# Patient Record
Sex: MALE | Race: WHITE | NOT HISPANIC OR LATINO | Employment: OTHER | URBAN - METROPOLITAN AREA
[De-identification: names, ages, dates, MRNs, and addresses within clinical notes are randomized per-mention and may not be internally consistent; named-entity substitution may affect disease eponyms.]

---

## 2017-01-09 DIAGNOSIS — E11.9 DIABETES MELLITUS WITH NO COMPLICATION (HCC): ICD-10-CM

## 2017-01-09 DIAGNOSIS — I15.8 OTHER SECONDARY HYPERTENSION: ICD-10-CM

## 2017-01-09 DIAGNOSIS — E78.49 OTHER HYPERLIPIDEMIA: Primary | ICD-10-CM

## 2017-01-10 LAB
ALBUMIN SERPL-MCNC: 4.5 G/DL (ref 3.5–4.8)
ALBUMIN/GLOB SERPL: 1.7 {RATIO} (ref 1.1–2.5)
ALP SERPL-CCNC: 64 IU/L (ref 39–117)
ALT SERPL-CCNC: 21 IU/L (ref 0–44)
AST SERPL-CCNC: 20 IU/L (ref 0–40)
BASOPHILS # BLD AUTO: 0.1 X10E3/UL (ref 0–0.2)
BASOPHILS NFR BLD AUTO: 1 %
BILIRUB SERPL-MCNC: 0.5 MG/DL (ref 0–1.2)
BUN SERPL-MCNC: 23 MG/DL (ref 8–27)
BUN/CREAT SERPL: 19 (ref 10–22)
CALCIUM SERPL-MCNC: 9.7 MG/DL (ref 8.6–10.2)
CHLORIDE SERPL-SCNC: 106 MMOL/L (ref 96–106)
CHOLEST SERPL-MCNC: 143 MG/DL (ref 100–199)
CO2 SERPL-SCNC: 23 MMOL/L (ref 18–29)
CREAT SERPL-MCNC: 1.24 MG/DL (ref 0.76–1.27)
EOSINOPHIL # BLD AUTO: 0.3 X10E3/UL (ref 0–0.4)
EOSINOPHIL NFR BLD AUTO: 4 %
ERYTHROCYTE [DISTWIDTH] IN BLOOD BY AUTOMATED COUNT: 14.5 % (ref 12.3–15.4)
GLOBULIN SER CALC-MCNC: 2.6 G/DL (ref 1.5–4.5)
GLUCOSE SERPL-MCNC: 117 MG/DL (ref 65–99)
HBA1C MFR BLD: 7 % (ref 4.8–5.6)
HCT VFR BLD AUTO: 37.1 % (ref 37.5–51)
HDLC SERPL-MCNC: 31 MG/DL
HGB BLD-MCNC: 12.6 G/DL (ref 12.6–17.7)
IMM GRANULOCYTES # BLD: 0 X10E3/UL (ref 0–0.1)
IMM GRANULOCYTES NFR BLD: 0 %
LDLC SERPL CALC-MCNC: 67 MG/DL (ref 0–99)
LDLC/HDLC SERPL: 2.2 RATIO UNITS (ref 0–3.6)
LYMPHOCYTES # BLD AUTO: 1.3 X10E3/UL (ref 0.7–3.1)
LYMPHOCYTES NFR BLD AUTO: 19 %
MCH RBC QN AUTO: 29 PG (ref 26.6–33)
MCHC RBC AUTO-ENTMCNC: 34 G/DL (ref 31.5–35.7)
MCV RBC AUTO: 85 FL (ref 79–97)
MONOCYTES # BLD AUTO: 0.6 X10E3/UL (ref 0.1–0.9)
MONOCYTES NFR BLD AUTO: 8 %
NEUTROPHILS # BLD AUTO: 4.7 X10E3/UL (ref 1.4–7)
NEUTROPHILS NFR BLD AUTO: 68 %
PLATELET # BLD AUTO: 169 X10E3/UL (ref 150–379)
POTASSIUM SERPL-SCNC: 4.8 MMOL/L (ref 3.5–5.2)
PROT SERPL-MCNC: 7.1 G/DL (ref 6–8.5)
RBC # BLD AUTO: 4.35 X10E6/UL (ref 4.14–5.8)
SODIUM SERPL-SCNC: 144 MMOL/L (ref 134–144)
TRIGL SERPL-MCNC: 225 MG/DL (ref 0–149)
VLDLC SERPL CALC-MCNC: 45 MG/DL (ref 5–40)
WBC # BLD AUTO: 6.9 X10E3/UL (ref 3.4–10.8)

## 2017-01-11 ENCOUNTER — OFFICE VISIT (OUTPATIENT)
Dept: ONCOLOGY | Facility: CLINIC | Age: 72
End: 2017-01-11

## 2017-01-11 ENCOUNTER — HOSPITAL ENCOUNTER (OUTPATIENT)
Dept: GENERAL RADIOLOGY | Facility: HOSPITAL | Age: 72
Discharge: HOME | End: 2017-01-11
Attending: INTERNAL MEDICINE | Admitting: INTERNAL MEDICINE

## 2017-01-11 ENCOUNTER — LAB (OUTPATIENT)
Dept: ONCOLOGY | Facility: HOSPITAL | Age: 72
End: 2017-01-11

## 2017-01-11 VITALS
SYSTOLIC BLOOD PRESSURE: 157 MMHG | RESPIRATION RATE: 16 BRPM | TEMPERATURE: 98.2 F | OXYGEN SATURATION: 96 % | HEIGHT: 72 IN | BODY MASS INDEX: 34.96 KG/M2 | HEART RATE: 77 BPM | WEIGHT: 258.1 LBS | DIASTOLIC BLOOD PRESSURE: 93 MMHG

## 2017-01-11 DIAGNOSIS — Z85.528 HISTORY OF RENAL CELL CARCINOMA: ICD-10-CM

## 2017-01-11 DIAGNOSIS — Z85.528 HISTORY OF RENAL CELL CARCINOMA: Primary | ICD-10-CM

## 2017-01-11 LAB
ALBUMIN SERPL-MCNC: 4 G/DL (ref 3.5–5.2)
ALBUMIN/GLOB SERPL: 1.3 G/DL
ALP SERPL-CCNC: 61 U/L (ref 39–117)
ALT SERPL W P-5'-P-CCNC: 19 U/L (ref 1–41)
ANION GAP SERPL CALCULATED.3IONS-SCNC: 13.3 MMOL/L
AST SERPL-CCNC: 16 U/L (ref 1–40)
BASOPHILS # BLD AUTO: 0.06 10*3/MM3 (ref 0–0.2)
BASOPHILS NFR BLD AUTO: 0.7 % (ref 0–1.5)
BILIRUB SERPL-MCNC: 0.4 MG/DL (ref 0.1–1.2)
BUN BLD-MCNC: 27 MG/DL (ref 8–23)
BUN/CREAT SERPL: 22 (ref 7–25)
CALCIUM SPEC-SCNC: 9.5 MG/DL (ref 8.6–10.5)
CHLORIDE SERPL-SCNC: 105 MMOL/L (ref 98–107)
CO2 SERPL-SCNC: 22.7 MMOL/L (ref 22–29)
CREAT BLD-MCNC: 1.23 MG/DL (ref 0.76–1.27)
DEPRECATED RDW RBC AUTO: 42.6 FL (ref 37–54)
EOSINOPHIL # BLD AUTO: 0.3 10*3/MM3 (ref 0–0.7)
EOSINOPHIL NFR BLD AUTO: 3.7 % (ref 0.3–6.2)
ERYTHROCYTE [DISTWIDTH] IN BLOOD BY AUTOMATED COUNT: 13.8 % (ref 11.5–14.5)
GFR SERPL CREATININE-BSD FRML MDRD: 58 ML/MIN/1.73
GLOBULIN UR ELPH-MCNC: 3 GM/DL
GLUCOSE BLD-MCNC: 193 MG/DL (ref 65–99)
HCT VFR BLD AUTO: 35.9 % (ref 40.4–52.2)
HGB BLD-MCNC: 12.4 G/DL (ref 13.7–17.6)
IMM GRANULOCYTES # BLD: 0.04 10*3/MM3 (ref 0–0.03)
IMM GRANULOCYTES NFR BLD: 0.5 % (ref 0–0.5)
LYMPHOCYTES # BLD AUTO: 1.64 10*3/MM3 (ref 0.9–4.8)
LYMPHOCYTES NFR BLD AUTO: 20.2 % (ref 19.6–45.3)
MCH RBC QN AUTO: 29.6 PG (ref 27–32.7)
MCHC RBC AUTO-ENTMCNC: 34.5 G/DL (ref 32.6–36.4)
MCV RBC AUTO: 85.7 FL (ref 79.8–96.2)
MONOCYTES # BLD AUTO: 0.46 10*3/MM3 (ref 0.2–1.2)
MONOCYTES NFR BLD AUTO: 5.7 % (ref 5–12)
NEUTROPHILS # BLD AUTO: 5.63 10*3/MM3 (ref 1.9–8.1)
NEUTROPHILS NFR BLD AUTO: 69.2 % (ref 42.7–76)
NRBC BLD MANUAL-RTO: 0 /100 WBC (ref 0–0)
PLATELET # BLD AUTO: 164 10*3/MM3 (ref 140–500)
PMV BLD AUTO: 9.6 FL (ref 6–12)
POTASSIUM BLD-SCNC: 4.4 MMOL/L (ref 3.5–5.2)
PROT SERPL-MCNC: 7 G/DL (ref 6–8.5)
RBC # BLD AUTO: 4.19 10*6/MM3 (ref 4.6–6)
SODIUM BLD-SCNC: 141 MMOL/L (ref 136–145)
WBC NRBC COR # BLD: 8.13 10*3/MM3 (ref 4.5–10.7)

## 2017-01-11 PROCEDURE — 80053 COMPREHEN METABOLIC PANEL: CPT | Performed by: INTERNAL MEDICINE

## 2017-01-11 PROCEDURE — 71020 HC CHEST PA AND LATERAL: CPT

## 2017-01-11 PROCEDURE — 36415 COLL VENOUS BLD VENIPUNCTURE: CPT

## 2017-01-11 PROCEDURE — 99213 OFFICE O/P EST LOW 20 MIN: CPT | Performed by: INTERNAL MEDICINE

## 2017-01-11 PROCEDURE — 85025 COMPLETE CBC W/AUTO DIFF WBC: CPT | Performed by: INTERNAL MEDICINE

## 2017-01-11 NOTE — PROGRESS NOTES
[facility] OUTPATIENT FOLLOW UP CLINIC VISIT    REASON FOR FOLLOW-UP:    1.  History of metastatic clear cell renal cell carcinoma.  All disease has been resected.  Currently no evidence of disease.    HISTORY OF PRESENT ILLNESS:  Micah Krishna is a 71 y.o. male who returns today for follow up of the above issue.  He has worsening right knee pain from osteoarthritis but is othewise doing well.  He denies any respiratory symptoms but did recently recover from what is likely a viral upper respiratory infection.        PAST MEDICAL, SURGICAL, FAMILY, AND SOCIAL HISTORIES were reviewed with the patient and in the electronic medical record, and were updated if indicated.    ALLERGIES:  Allergies   Allergen Reactions   • Shellfish Allergy        MEDICATIONS:  The medication list has been reviewed with the patient by the medical assistant, and the list has been updated in the electronic medical record, which I reviewed.  Medication dosages and frequencies were confirmed to be accurate.    REVIEW OF SYSTEMS:  PAIN:  See Vital Signs below.  GENERAL:  No fevers, chills, night sweats, or unintended weight loss.  SKIN:  No rashes or non-healing lesions.  HEME/LYMPH:  No abnormal bleeding.  No palpable lymphadenopathy.  EYES:  No vision changes or diplopia.  ENT:  No sore throat or difficulty swallowing.  RESPIRATORY:  No cough, shortness of breath, hemoptysis, or wheezing.  CARDIOVASCULAR:  No chest pain, palpitations, orthopnea, or dyspnea on exertion.  GASTROINTESTINAL:  No abdominal pain, nausea, vomiting, constipation, diarrhea, melena, or hematochezia.  GENITOURINARY:  No dysuria or hematuria.  MUSCULOSKELETAL:  Right knee pain secondary to osteoarthritis  NEUROLOGIC:  No dizziness, loss of consciousness, or seizures.  PSYCHIATRIC:  No depression, anxiety, or mood changes.    Vitals:    01/11/17 1351   BP: 157/93   Pulse: 77   Resp: 16   Temp: 98.2 °F (36.8 °C)   TempSrc: Oral   SpO2: 96%   Weight: 258 lb  "1.6 oz (117 kg)   Height: 72\" (182.9 cm)   PainSc: 4  Comment: Rt knee pain x4   PainLoc: Knee       PHYSICAL EXAMINATION:  GENERAL:  Well-developed well-nourished male; awake, alert and oriented, in no acute distress.  SKIN:  Warm and dry, without rashes, purpura, or petechiae.  HEAD:  Normocephalic, atraumatic.  EYES:  Pupils equal, round and reactive to light.  Extraocular movements intact.  Conjunctivae normal.  EARS:  Hearing intact.  NOSE:  Septum midline.  No excoriations or nasal discharge.  MOUTH:  No stomatitis or ulcers.  Lips are normal.  THROAT:  Oropharynx without lesions or exudates.  NECK:  Supple with good range of motion; no thyromegaly or masses; no JVD or bruits.  LYMPHATICS:  No cervical, supraclavicular, axillary, or inguinal lymphadenopathy.  CHEST:  Lungs are clear to auscultation bilaterally.  No wheezes, rales, or rhonchi.  HEART:  Regular rate; normal rhythm.  No murmurs, gallops or rubs.  ABDOMEN:  Soft, non-tender, non-distended.  Normal active bowel sounds.  No organomegaly.  EXTREMITIES:  No clubbing, cyanosis, or edema.  NEUROLOGICAL:  No focal neurologic deficits.    DIAGNOSTIC DATA:  Lab Results   Component Value Date    WBC 8.13 01/11/2017    HGB 12.4 (L) 01/11/2017    HCT 35.9 (L) 01/11/2017    MCV 85.7 01/11/2017     01/11/2017       IMAGING:  PA and lateral chest x-ray images from today were personally reviewed.  Radiology interpretation is pending.  Lungs appear clear to me.  I do not notice any change from his prior examination in December 2015    ASSESSMENT:  This is a 71 y.o. male with:  1.  Mild normocytic anemia: His hemoglobin is stable.  2.  Adrenal insufficiency following bilateral adrenalectomy currently on replacement hormone therapy.  3.  Metastatic renal cell carcinoma.  He had a left nephrectomy and adrenalectomy in 2000 and then a right adrenalectomy for metastatic disease in 2012.  Currently no evidence of disease.    PLAN:  1.  Return in 6 months for " follow-up with labs and CT imaging of the chest, abdomen, and pelvis one week prior to that.

## 2017-01-11 NOTE — LETTER
January 11, 2017     Ab Carr MD  [address]  [address]  [facility] [address]    Patient: Micah Krishna   YOB: 1945   Date of Visit: 1/11/2017       Dear Dr. Bruno MD:    Micah Krishna was in my office today. Below is a copy of my note.    If you have questions, please do not hesitate to call me. I look forward to following Micah along with you.         Sincerely,        Delmar Cat MD        CC: No Recipients    [facility] OUTPATIENT FOLLOW UP CLINIC VISIT    REASON FOR FOLLOW-UP:    1.  History of metastatic clear cell renal cell carcinoma.  All disease has been resected.  Currently no evidence of disease.    HISTORY OF PRESENT ILLNESS:  Micah Krishna is a 71 y.o. male who returns today for follow up of the above issue.  He has worsening right knee pain from osteoarthritis but is othewise doing well.  He denies any respiratory symptoms but did recently recover from what is likely a viral upper respiratory infection.        PAST MEDICAL, SURGICAL, FAMILY, AND SOCIAL HISTORIES were reviewed with the patient and in the electronic medical record, and were updated if indicated.    ALLERGIES:  Allergies   Allergen Reactions   • Shellfish Allergy        MEDICATIONS:  The medication list has been reviewed with the patient by the medical assistant, and the list has been updated in the electronic medical record, which I reviewed.  Medication dosages and frequencies were confirmed to be accurate.    REVIEW OF SYSTEMS:  PAIN:  See Vital Signs below.  GENERAL:  No fevers, chills, night sweats, or unintended weight loss.  SKIN:  No rashes or non-healing lesions.  HEME/LYMPH:  No abnormal bleeding.  No palpable lymphadenopathy.  EYES:  No vision changes or diplopia.  ENT:  No sore throat or difficulty swallowing.  RESPIRATORY:  No cough, shortness of breath, hemoptysis, or wheezing.  CARDIOVASCULAR:  No chest pain, palpitations, orthopnea, or dyspnea on  "exertion.  GASTROINTESTINAL:  No abdominal pain, nausea, vomiting, constipation, diarrhea, melena, or hematochezia.  GENITOURINARY:  No dysuria or hematuria.  MUSCULOSKELETAL:  Right knee pain secondary to osteoarthritis  NEUROLOGIC:  No dizziness, loss of consciousness, or seizures.  PSYCHIATRIC:  No depression, anxiety, or mood changes.    Vitals:    01/11/17 1351   BP: 157/93   Pulse: 77   Resp: 16   Temp: 98.2 °F (36.8 °C)   TempSrc: Oral   SpO2: 96%   Weight: 258 lb 1.6 oz (117 kg)   Height: 72\" (182.9 cm)   PainSc: 4  Comment: Rt knee pain x4   PainLoc: Knee       PHYSICAL EXAMINATION:  GENERAL:  Well-developed well-nourished male; awake, alert and oriented, in no acute distress.  SKIN:  Warm and dry, without rashes, purpura, or petechiae.  HEAD:  Normocephalic, atraumatic.  EYES:  Pupils equal, round and reactive to light.  Extraocular movements intact.  Conjunctivae normal.  EARS:  Hearing intact.  NOSE:  Septum midline.  No excoriations or nasal discharge.  MOUTH:  No stomatitis or ulcers.  Lips are normal.  THROAT:  Oropharynx without lesions or exudates.  NECK:  Supple with good range of motion; no thyromegaly or masses; no JVD or bruits.  LYMPHATICS:  No cervical, supraclavicular, axillary, or inguinal lymphadenopathy.  CHEST:  Lungs are clear to auscultation bilaterally.  No wheezes, rales, or rhonchi.  HEART:  Regular rate; normal rhythm.  No murmurs, gallops or rubs.  ABDOMEN:  Soft, non-tender, non-distended.  Normal active bowel sounds.  No organomegaly.  EXTREMITIES:  No clubbing, cyanosis, or edema.  NEUROLOGICAL:  No focal neurologic deficits.    DIAGNOSTIC DATA:  Lab Results   Component Value Date    WBC 8.13 01/11/2017    HGB 12.4 (L) 01/11/2017    HCT 35.9 (L) 01/11/2017    MCV 85.7 01/11/2017     01/11/2017       IMAGING:  PA and lateral chest x-ray images from today were personally reviewed.  Radiology interpretation is pending.  Lungs appear clear to me.  I do not notice any change " from his prior examination in December 2015    ASSESSMENT:  This is a 71 y.o. male with:  1.  Mild normocytic anemia: His hemoglobin is stable.  2.  Adrenal insufficiency following bilateral adrenalectomy currently on replacement hormone therapy.  3.  Metastatic renal cell carcinoma.  He had a left nephrectomy and adrenalectomy in 2000 and then a right adrenalectomy for metastatic disease in 2012.  Currently no evidence of disease.    PLAN:  1.  Return in 6 months for follow-up with labs and CT imaging of the chest, abdomen, and pelvis one week prior to that.

## 2017-01-13 ENCOUNTER — OFFICE VISIT (OUTPATIENT)
Dept: FAMILY MEDICINE CLINIC | Facility: CLINIC | Age: 72
End: 2017-01-13

## 2017-01-13 VITALS
SYSTOLIC BLOOD PRESSURE: 108 MMHG | HEART RATE: 71 BPM | OXYGEN SATURATION: 97 % | WEIGHT: 255 LBS | DIASTOLIC BLOOD PRESSURE: 68 MMHG | RESPIRATION RATE: 20 BRPM | HEIGHT: 73 IN | TEMPERATURE: 98.6 F | BODY MASS INDEX: 33.8 KG/M2

## 2017-01-13 DIAGNOSIS — Z79.4 DIABETES MELLITUS DUE TO UNDERLYING CONDITION WITH HYPERGLYCEMIA, WITH LONG-TERM CURRENT USE OF INSULIN (HCC): ICD-10-CM

## 2017-01-13 DIAGNOSIS — M25.561 CHRONIC PAIN OF RIGHT KNEE: ICD-10-CM

## 2017-01-13 DIAGNOSIS — G89.29 CHRONIC PAIN OF RIGHT KNEE: ICD-10-CM

## 2017-01-13 DIAGNOSIS — E27.40 ADRENAL INSUFFICIENCY (HCC): ICD-10-CM

## 2017-01-13 DIAGNOSIS — I10 ESSENTIAL HYPERTENSION: Primary | ICD-10-CM

## 2017-01-13 DIAGNOSIS — E08.65 DIABETES MELLITUS DUE TO UNDERLYING CONDITION WITH HYPERGLYCEMIA, WITH LONG-TERM CURRENT USE OF INSULIN (HCC): ICD-10-CM

## 2017-01-13 DIAGNOSIS — E78.2 MIXED HYPERLIPIDEMIA: ICD-10-CM

## 2017-01-13 PROCEDURE — 99214 OFFICE O/P EST MOD 30 MIN: CPT | Performed by: FAMILY MEDICINE

## 2017-01-13 PROCEDURE — 73560 X-RAY EXAM OF KNEE 1 OR 2: CPT | Performed by: FAMILY MEDICINE

## 2017-01-13 NOTE — MR AVS SNAPSHOT
Micah ASIYA CostaKrishna   1/13/2017 2:00 PM   Office Visit    Dept Phone:  [phone]   Encounter #:  [id number]    Provider:  Ab Carr MD   Department:  [facility] PRIMARY CARE                Your Full Care Plan              Your Updated Medication List          This list is accurate as of: 1/13/17  2:55 PM.  Always use your most recent med list.                allopurinol 300 MG tablet   Commonly known as:  ZYLOPRIM   TAKE 1 TABLET BY MOUTH ONCE A DAY       amLODIPine 10 MG tablet   Commonly known as:  NORVASC   TAKE 1 TABLET BY MOUTH ONCE A DAY       ASPIRIN PO       BD PEN NEEDLE TALI U/F 32G X 4 MM misc   Generic drug:  Insulin Pen Needle       CloNIDine 0.1 MG tablet   Commonly known as:  CATAPRES   TAKE 1 TABLET BY MOUTH TWICE A DAY       finasteride 5 MG tablet   Commonly known as:  PROSCAR   TAKE 1 TABLET BY MOUTH ONCE A DAY       fludrocortisone 0.1 MG tablet   TAKE 1 TABLET BY MOUTH EVERY OTHER DAY       hydrocortisone 20 MG tablet   Commonly known as:  CORTEF   TAKE 1 TABLET BY MOUTH EVERY MORNING AND 1/2 TABLET BY MOUTH EVERY NIGHT       isosorbide mononitrate 30 MG 24 hr tablet   Commonly known as:  IMDUR   TAKE 1 TABLET BY MOUTH TWICE A DAY       LANTUS SOLOSTAR 100 UNIT/ML injection pen   Generic drug:  Insulin Glargine   USE 30 UNITS SUBCUTANEOUSLY EVERY MORNING AND 30 UNITS SUBCUTANEOUSLY EVERY EVENING       losartan 100 MG tablet   Commonly known as:  COZAAR   TAKE 1 TABLET BY MOUTH ONCE A DAY       metoprolol succinate  MG 24 hr tablet   Commonly known as:  TOPROL-XL   TAKE 2 TABLETS BY MOUTH DAILY       nitroglycerin 0.4 MG/SPRAY spray   Commonly known as:  NITROLINGUAL   Place 1 spray under the tongue every 5 (five) minutes as needed for chest pain.       omeprazole 20 MG capsule   Commonly known as:  priLOSEC   TAKE 1 CAPSULE BY MOUTH DAILY       ONETOUCH VERIO test strip   Generic drug:  glucose blood       rosuvastatin 20 MG tablet      Commonly known as:  CRESTOR   Take 1 tablet by mouth daily.               We Performed the Following     Ambulatory Referral to Orthopedic Surgery     XR Knee 1 or 2 View Right       You Were Diagnosed With        Codes Comments    Essential hypertension    -  Primary ICD-10-CM: I10  ICD-9-CM: 401.9     Mixed hyperlipidemia     ICD-10-CM: E78.2  ICD-9-CM: 272.2     Diabetes mellitus due to underlying condition with hyperglycemia, with long-term current use of insulin     ICD-10-CM: E08.65, Z79.4  ICD-9-CM: 249.80, 790.29, V58.67     Adrenal insufficiency     ICD-10-CM: E27.40  ICD-9-CM: 255.41     Chronic pain of right knee     ICD-10-CM: M25.561, G89.29  ICD-9-CM: 719.46, 338.29       Instructions    This is a very nice 71-year-old who is here for follow-up.  I have given him copies of his lab work and will request a referral to orthopedics for his right knee.  I would like him to call if there is a problem.     Patient Instructions History      Upcoming Appointments     Visit Type Date Time Department    OFFICE VISIT 2017  2:00 PM [facility]    CT [facility] ABD PELVIS W CONTRAST 7/10/2017 11:30 AM [facility]    FOLLOW UP 2 UNIT 2017 11:20 AM [facility]    VITALS ONLY 2017 11:00 AM  [facility]      MyChart Signup     [facility] [employer] allows you to send messages to your doctor, view your test results, renew your prescriptions, schedule appointments, and more. To sign up, go to [employer] and click on the Sign Up Now link in the New User? box. Enter your [employer] Activation Code exactly as it appears below along with the last four digits of your Social Security Number and your Date of Birth () to complete the sign-up process. If you do not sign up before the expiration date, you must request a new code.    [employer] Activation Code: [email or web address]  Expires: 2017 10:12 AM    If you have questions, you can email [email or web address] or call  "[phone] to talk to our [employer] staff. Remember, [employer] is NOT to be used for urgent needs. For medical emergencies, dial 911.               Other Info from Your Visit           Your Appointments     Jul 10, 2017 11:30 AM EDT   CT shai abd pelvis w contrast with SHAI UCE CT 1   [facility] [place] [facility] ([place]    [address] [facility] [address] [address] [phone]           Patient may only have liquids 4 hrs prior to exam. Please arrive 1 hour prior to exam to drink barium contrast.            Jul 12, 2017 11:00 AM EDT   Vitals Only with VITALS ONLY [facility] ONC LAB (--)    [address]  [address]  [place] [id number] [phone]            Jul 12, 2017 11:20 AM EDT   FOLLOW UP with Delmar Cat MD   [facility] ([facility] [place])    [address]  [address] [place] [id number] [phone]              Allergies     Shellfish Allergy        Reason for Visit     Hypertension check up     Diabetes check up     Hyperlipidemia check up     Labs Only results from 1-11-17    Knee Pain right knee       Vital Signs     Blood Pressure Pulse Temperature Respirations Height Weight    108/68 71 98.6 °F (37 °C) 20 72.5\" (184.2 cm) 255 lb (116 kg)    Oxygen Saturation Body Mass Index Smoking Status             97% 34.11 kg/m2 Former Smoker         Problems and Diagnoses Noted     Adrenal insufficiency    Chronic pain of right knee    Diabetes mellitus due to underlying condition with hyperglycemia, with long-term current use of insulin    High blood pressure    Mixed hyperlipidemia      No Longer an Issue     Chest pain      Results     XR Knee 1 or 2 View Right               "

## 2017-01-13 NOTE — PATIENT INSTRUCTIONS
This is a very nice 71-year-old who is here for follow-up.  I have given him copies of his lab work and will request a referral to orthopedics for his right knee.  I would like him to call if there is a problem.

## 2017-01-13 NOTE — PROGRESS NOTES
"Subjective   Micah Krishna is a 71 y.o. male presenting with   Chief Complaint   Patient presents with   • Hypertension     check up    • Diabetes     check up    • Hyperlipidemia     check up    • Labs Only     results from 1-11-17   • Knee Pain     right knee         HPI Comments: 71-year-old  white male nonsmoker comes in today with the complaint that he has marked right knee pain with swelling.  He played football in high school and he had to have \"fluid drained off his knee\" when he was in high school.  He says periodically it has bothered him since, but in the last month it has gotten to where it really swells and hurts.  He does not have any fever or night sweats or weight loss.    He also is here for follow-up for his diabetes and high cholesterol and gout and high blood pressure.  His blood pressure is very well controlled and he had blood work done prior to this office visit.  I have made copies and given it to him which shows A1c of 7.0.    To further evaluate his complaint of chronic significant right knee pain I have requested an x-ray.  There are no recent ones to compare this to, but it shows osteoarthritis.  He did tell me that 2 years ago he saw an orthopedist for his knee and was told that he was not ready for knee replacement.  I suggested we get a consult again because he has significant discomfort and swelling.    Hypertension     Diabetes     Hyperlipidemia     Knee Pain    Associated symptoms include numbness (when he goes barefooted the bottoms of his feet go numb).        The following portions of the patient's history were reviewed and updated as appropriate: current medications, past family history, past medical history, past social history, past surgical history and problem list.    Review of Systems   Musculoskeletal: Positive for arthralgias and joint swelling (right knee).   Neurological: Positive for numbness (when he goes barefooted the bottoms of his feet go numb).   All " other systems reviewed and are negative.      Objective   Physical Exam   Constitutional: He is oriented to person, place, and time. He appears well-developed and well-nourished. No distress.   HENT:   Head: Normocephalic and atraumatic.   Mouth/Throat: Oropharynx is clear and moist. No oropharyngeal exudate.   Eyes: EOM are normal. Pupils are equal, round, and reactive to light. No scleral icterus.   Neck: Normal range of motion. Neck supple. No thyromegaly present.   Cardiovascular: Normal rate, regular rhythm and normal heart sounds.  Exam reveals no friction rub.    No murmur heard.  Pulmonary/Chest: Effort normal and breath sounds normal. No respiratory distress. He has no wheezes.   Musculoskeletal: He exhibits edema (oddest effusion of right knee) and tenderness (he has tenderness and crepitance to range of motion of the right knee with positive Partha). He exhibits no deformity.   Lymphadenopathy:     He has no cervical adenopathy.   Neurological: He is alert and oriented to person, place, and time. He has normal strength. A sensory deficit (e has normal sensation on the dorsal foot to monofilament line testing, but diminished sensation on the plantar surface) is present. No cranial nerve deficit.   Skin: Skin is warm and dry.   Psychiatric: He has a normal mood and affect. His behavior is normal.   Nursing note and vitals reviewed.      Assessment/Plan   Micah was seen today for hypertension, diabetes, hyperlipidemia, labs only and knee pain.    Diagnoses and all orders for this visit:    Essential hypertension    Mixed hyperlipidemia    Diabetes mellitus due to underlying condition with hyperglycemia, with long-term current use of insulin    Adrenal insufficiency    Chronic pain of right knee  -     XR Knee 1 or 2 View Right                   I would like him to return for another visit in 6 month(s)

## 2017-01-20 ENCOUNTER — TRANSCRIBE ORDERS (OUTPATIENT)
Dept: ADMINISTRATIVE | Facility: HOSPITAL | Age: 72
End: 2017-01-20

## 2017-01-20 DIAGNOSIS — G89.29 CHRONIC PAIN OF LEFT KNEE: Primary | ICD-10-CM

## 2017-01-20 DIAGNOSIS — M25.562 CHRONIC PAIN OF LEFT KNEE: Primary | ICD-10-CM

## 2017-01-24 ENCOUNTER — HOSPITAL ENCOUNTER (OUTPATIENT)
Dept: MRI IMAGING | Facility: HOSPITAL | Age: 72
Discharge: HOME | End: 2017-01-24
Attending: ORTHOPAEDIC SURGERY | Admitting: ORTHOPAEDIC SURGERY

## 2017-01-24 DIAGNOSIS — G89.29 CHRONIC PAIN OF LEFT KNEE: ICD-10-CM

## 2017-01-24 DIAGNOSIS — M25.562 CHRONIC PAIN OF LEFT KNEE: ICD-10-CM

## 2017-01-24 PROCEDURE — 73721 MRI JNT OF LWR EXTRE W/O DYE: CPT

## 2017-02-03 ENCOUNTER — TELEPHONE (OUTPATIENT)
Dept: FAMILY MEDICINE CLINIC | Facility: CLINIC | Age: 72
End: 2017-02-03

## 2017-02-03 NOTE — TELEPHONE ENCOUNTER
Pt called stating he needs something in witting stating he is educated the  proper way to stop insulin before his knee surgey on 2-8-17 and when to restart it after his surgery.  He said it could get be something written on a rx pad

## 2017-02-03 NOTE — TELEPHONE ENCOUNTER
Normally, he just stops the insulin the day of the surgery, and his glucose is monitored by the anesthesiologist while in surgery, and then he goes back to the usual routine once released.  Has his ortho doc said something different is going to happe  n?

## 2017-02-03 NOTE — TELEPHONE ENCOUNTER
I informed Micah of your message and he    said he just needs something written down and signed by you stating that he is aware of what to do about his insulin

## 2017-02-15 ENCOUNTER — TELEPHONE (OUTPATIENT)
Dept: FAMILY MEDICINE CLINIC | Facility: CLINIC | Age: 72
End: 2017-02-15

## 2017-02-15 ENCOUNTER — HOSPITAL ENCOUNTER (INPATIENT)
Facility: HOSPITAL | Age: 72
LOS: 2 days | Discharge: HOME | End: 2017-02-17
Attending: EMERGENCY MEDICINE | Admitting: INTERNAL MEDICINE

## 2017-02-15 ENCOUNTER — APPOINTMENT (OUTPATIENT)
Dept: GENERAL RADIOLOGY | Facility: HOSPITAL | Age: 72
End: 2017-02-15

## 2017-02-15 DIAGNOSIS — N28.9 ACUTE RENAL INSUFFICIENCY: ICD-10-CM

## 2017-02-15 DIAGNOSIS — E66.9 DIABETES MELLITUS TYPE 2 IN OBESE (HCC): ICD-10-CM

## 2017-02-15 DIAGNOSIS — E27.40 ADRENAL INSUFFICIENCY (HCC): ICD-10-CM

## 2017-02-15 DIAGNOSIS — E86.0 MODERATE DEHYDRATION: ICD-10-CM

## 2017-02-15 DIAGNOSIS — R11.2 INTRACTABLE VOMITING WITH NAUSEA, UNSPECIFIED VOMITING TYPE: Primary | ICD-10-CM

## 2017-02-15 DIAGNOSIS — I10 ESSENTIAL HYPERTENSION: ICD-10-CM

## 2017-02-15 DIAGNOSIS — E11.69 DIABETES MELLITUS TYPE 2 IN OBESE (HCC): ICD-10-CM

## 2017-02-15 LAB
ALBUMIN SERPL-MCNC: 3.9 G/DL (ref 3.5–5.2)
ALBUMIN/GLOB SERPL: 1 G/DL
ALP SERPL-CCNC: 72 U/L (ref 40–129)
ALT SERPL W P-5'-P-CCNC: 26 U/L (ref 5–41)
AMYLASE SERPL-CCNC: 60 U/L (ref 28–100)
ANION GAP SERPL CALCULATED.3IONS-SCNC: 19 MMOL/L
AST SERPL-CCNC: 33 U/L (ref 5–40)
BASOPHILS # BLD AUTO: 0.09 10*3/MM3 (ref 0–0.2)
BASOPHILS NFR BLD AUTO: 1.2 % (ref 0–2)
BILIRUB SERPL-MCNC: 0.8 MG/DL (ref 0.2–1.2)
BILIRUB UR QL STRIP: ABNORMAL
BUN BLD-MCNC: 35 MG/DL (ref 8–23)
BUN/CREAT SERPL: 16.5 (ref 7–25)
CALCIUM SPEC-SCNC: 9.9 MG/DL (ref 8.8–10.5)
CHLORIDE SERPL-SCNC: 92 MMOL/L (ref 98–107)
CLARITY UR: CLEAR
CO2 SERPL-SCNC: 19 MMOL/L (ref 22–29)
COLOR UR: YELLOW
CREAT BLD-MCNC: 2.12 MG/DL (ref 0.76–1.27)
DEPRECATED RDW RBC AUTO: 41 FL (ref 37–54)
EOSINOPHIL # BLD AUTO: 0.53 10*3/MM3 (ref 0.1–0.3)
EOSINOPHIL NFR BLD AUTO: 7 % (ref 0–4)
ERYTHROCYTE [DISTWIDTH] IN BLOOD BY AUTOMATED COUNT: 13.5 % (ref 11.5–14.5)
GFR SERPL CREATININE-BSD FRML MDRD: 31 ML/MIN/1.73
GLOBULIN UR ELPH-MCNC: 4 GM/DL
GLUCOSE BLD-MCNC: 119 MG/DL (ref 65–99)
GLUCOSE BLDC GLUCOMTR-MCNC: 241 MG/DL (ref 70–130)
GLUCOSE UR STRIP-MCNC: NEGATIVE MG/DL
HCT VFR BLD AUTO: 41.2 % (ref 42–52)
HGB BLD-MCNC: 14.2 G/DL (ref 14–18)
HGB UR QL STRIP.AUTO: NEGATIVE
IMM GRANULOCYTES # BLD: 0.08 10*3/MM3 (ref 0–0.03)
IMM GRANULOCYTES NFR BLD: 1 % (ref 0–0.5)
KETONES UR QL STRIP: ABNORMAL
LEUKOCYTE ESTERASE UR QL STRIP.AUTO: NEGATIVE
LIPASE SERPL-CCNC: 79 U/L (ref 13–60)
LYMPHOCYTES # BLD AUTO: 2.07 10*3/MM3 (ref 0.6–4.8)
LYMPHOCYTES NFR BLD AUTO: 27.2 % (ref 20–45)
MCH RBC QN AUTO: 28.7 PG (ref 27–31)
MCHC RBC AUTO-ENTMCNC: 34.5 G/DL (ref 31–37)
MCV RBC AUTO: 83.2 FL (ref 80–94)
MONOCYTES # BLD AUTO: 0.83 10*3/MM3 (ref 0–1)
MONOCYTES NFR BLD AUTO: 10.9 % (ref 3–8)
NEUTROPHILS # BLD AUTO: 4.02 10*3/MM3 (ref 1.5–8.3)
NEUTROPHILS NFR BLD AUTO: 52.7 % (ref 45–70)
NITRITE UR QL STRIP: NEGATIVE
NRBC BLD MANUAL-RTO: 0 /100 WBC (ref 0–0)
PH UR STRIP.AUTO: <=5 [PH] (ref 4.5–8)
PLATELET # BLD AUTO: 252 10*3/MM3 (ref 140–500)
PMV BLD AUTO: 9.6 FL (ref 7.4–10.4)
POTASSIUM BLD-SCNC: 4.2 MMOL/L (ref 3.5–5.2)
PROT SERPL-MCNC: 7.9 G/DL (ref 6–8.5)
PROT UR QL STRIP: ABNORMAL
RBC # BLD AUTO: 4.95 10*6/MM3 (ref 4.7–6.1)
SODIUM BLD-SCNC: 130 MMOL/L (ref 136–145)
SP GR UR STRIP: 1.02 (ref 1–1.03)
UROBILINOGEN UR QL STRIP: ABNORMAL
WBC NRBC COR # BLD: 7.62 10*3/MM3 (ref 4.8–10.8)

## 2017-02-15 PROCEDURE — 25010000002 HYDROCORTISONE SODIUM SUCCINATE 100 MG RECONSTITUTED SOLUTION: Performed by: EMERGENCY MEDICINE

## 2017-02-15 PROCEDURE — 93010 ELECTROCARDIOGRAM REPORT: CPT | Performed by: INTERNAL MEDICINE

## 2017-02-15 PROCEDURE — 84484 ASSAY OF TROPONIN QUANT: CPT | Performed by: INTERNAL MEDICINE

## 2017-02-15 PROCEDURE — G0378 HOSPITAL OBSERVATION PER HR: HCPCS

## 2017-02-15 PROCEDURE — 63710000001 INSULIN DETEMIR PER 5 UNITS: Performed by: INTERNAL MEDICINE

## 2017-02-15 PROCEDURE — 93005 ELECTROCARDIOGRAM TRACING: CPT | Performed by: INTERNAL MEDICINE

## 2017-02-15 PROCEDURE — 83690 ASSAY OF LIPASE: CPT | Performed by: EMERGENCY MEDICINE

## 2017-02-15 PROCEDURE — 25010000002 ONDANSETRON PER 1 MG: Performed by: EMERGENCY MEDICINE

## 2017-02-15 PROCEDURE — 99283 EMERGENCY DEPT VISIT LOW MDM: CPT

## 2017-02-15 PROCEDURE — 85025 COMPLETE CBC W/AUTO DIFF WBC: CPT | Performed by: EMERGENCY MEDICINE

## 2017-02-15 PROCEDURE — 99219 PR INITIAL OBSERVATION CARE/DAY 50 MINUTES: CPT | Performed by: INTERNAL MEDICINE

## 2017-02-15 PROCEDURE — 99285 EMERGENCY DEPT VISIT HI MDM: CPT | Performed by: EMERGENCY MEDICINE

## 2017-02-15 PROCEDURE — 81003 URINALYSIS AUTO W/O SCOPE: CPT | Performed by: INTERNAL MEDICINE

## 2017-02-15 PROCEDURE — 82150 ASSAY OF AMYLASE: CPT | Performed by: EMERGENCY MEDICINE

## 2017-02-15 PROCEDURE — 25810000003 DEXTROSE-NACL PER 500 ML: Performed by: EMERGENCY MEDICINE

## 2017-02-15 PROCEDURE — 74022 RADEX COMPL AQT ABD SERIES: CPT

## 2017-02-15 PROCEDURE — 82962 GLUCOSE BLOOD TEST: CPT

## 2017-02-15 PROCEDURE — 80053 COMPREHEN METABOLIC PANEL: CPT | Performed by: EMERGENCY MEDICINE

## 2017-02-15 RX ORDER — ACETAMINOPHEN 325 MG/1
650 TABLET ORAL EVERY 6 HOURS PRN
Status: DISCONTINUED | OUTPATIENT
Start: 2017-02-15 | End: 2017-02-17 | Stop reason: HOSPADM

## 2017-02-15 RX ORDER — FINASTERIDE 5 MG/1
5 TABLET, FILM COATED ORAL DAILY
Status: DISCONTINUED | OUTPATIENT
Start: 2017-02-15 | End: 2017-02-17 | Stop reason: HOSPADM

## 2017-02-15 RX ORDER — ATORVASTATIN CALCIUM 40 MG/1
40 TABLET, FILM COATED ORAL NIGHTLY
Status: DISCONTINUED | OUTPATIENT
Start: 2017-02-15 | End: 2017-02-16

## 2017-02-15 RX ORDER — PANTOPRAZOLE SODIUM 40 MG/10ML
40 INJECTION, POWDER, LYOPHILIZED, FOR SOLUTION INTRAVENOUS
Status: DISCONTINUED | OUTPATIENT
Start: 2017-02-16 | End: 2017-02-17 | Stop reason: HOSPADM

## 2017-02-15 RX ORDER — SODIUM CHLORIDE 0.9 % (FLUSH) 0.9 %
10 SYRINGE (ML) INJECTION AS NEEDED
Status: DISCONTINUED | OUTPATIENT
Start: 2017-02-15 | End: 2017-02-17 | Stop reason: HOSPADM

## 2017-02-15 RX ORDER — ATORVASTATIN CALCIUM 20 MG/1
TABLET, FILM COATED ORAL
Status: DISPENSED
Start: 2017-02-15 | End: 2017-02-16

## 2017-02-15 RX ORDER — NICOTINE POLACRILEX 4 MG
15 LOZENGE BUCCAL
Status: DISCONTINUED | OUTPATIENT
Start: 2017-02-15 | End: 2017-02-17 | Stop reason: HOSPADM

## 2017-02-15 RX ORDER — DEXTROSE AND SODIUM CHLORIDE 5; .9 G/100ML; G/100ML
250 INJECTION, SOLUTION INTRAVENOUS CONTINUOUS
Status: DISCONTINUED | OUTPATIENT
Start: 2017-02-15 | End: 2017-02-16

## 2017-02-15 RX ORDER — ISOSORBIDE MONONITRATE 30 MG/1
30 TABLET, EXTENDED RELEASE ORAL
Status: DISCONTINUED | OUTPATIENT
Start: 2017-02-15 | End: 2017-02-17 | Stop reason: HOSPADM

## 2017-02-15 RX ORDER — METOPROLOL SUCCINATE 50 MG/1
100 TABLET, EXTENDED RELEASE ORAL
Status: DISCONTINUED | OUTPATIENT
Start: 2017-02-15 | End: 2017-02-16

## 2017-02-15 RX ORDER — SODIUM CHLORIDE 9 MG/ML
INJECTION, SOLUTION INTRAVENOUS
Status: COMPLETED
Start: 2017-02-15 | End: 2017-02-15

## 2017-02-15 RX ORDER — NITROGLYCERIN 0.4 MG/1
0.4 TABLET SUBLINGUAL
Status: DISCONTINUED | OUTPATIENT
Start: 2017-02-15 | End: 2017-02-17 | Stop reason: HOSPADM

## 2017-02-15 RX ORDER — DEXTROSE MONOHYDRATE 25 G/50ML
25 INJECTION, SOLUTION INTRAVENOUS
Status: DISCONTINUED | OUTPATIENT
Start: 2017-02-15 | End: 2017-02-17 | Stop reason: HOSPADM

## 2017-02-15 RX ORDER — ONDANSETRON 2 MG/ML
4 INJECTION INTRAMUSCULAR; INTRAVENOUS ONCE
Status: COMPLETED | OUTPATIENT
Start: 2017-02-15 | End: 2017-02-15

## 2017-02-15 RX ADMIN — HYDROCORTISONE SODIUM SUCCINATE 250 MG: 100 INJECTION, POWDER, FOR SOLUTION INTRAMUSCULAR; INTRAVENOUS at 16:14

## 2017-02-15 RX ADMIN — INSULIN DETEMIR 15 UNITS: 100 INJECTION, SOLUTION SUBCUTANEOUS at 23:09

## 2017-02-15 RX ADMIN — SODIUM CHLORIDE 500 ML: 0.9 INJECTION, SOLUTION INTRAVENOUS at 23:31

## 2017-02-15 RX ADMIN — ONDANSETRON 4 MG: 2 INJECTION, SOLUTION INTRAMUSCULAR; INTRAVENOUS at 16:12

## 2017-02-15 RX ADMIN — DEXTROSE AND SODIUM CHLORIDE 250 ML/HR: 5; 900 INJECTION, SOLUTION INTRAVENOUS at 16:11

## 2017-02-15 NOTE — TELEPHONE ENCOUNTER
Wife called and said pt has knee surgery last Wednesday and he continues to throw up and low fever. She did call surgeon and he said call PCP. Anyway you can work him in today?      i was able to get him in with you this afternoon. thanks

## 2017-02-16 LAB
ALBUMIN SERPL-MCNC: 3.4 G/DL (ref 3.5–5.2)
ALBUMIN/GLOB SERPL: 1 G/DL
ALP SERPL-CCNC: 60 U/L (ref 40–129)
ALT SERPL W P-5'-P-CCNC: 20 U/L (ref 5–41)
ANION GAP SERPL CALCULATED.3IONS-SCNC: 16.7 MMOL/L
AST SERPL-CCNC: 22 U/L (ref 5–40)
BASOPHILS # BLD AUTO: 0.02 10*3/MM3 (ref 0–0.2)
BASOPHILS NFR BLD AUTO: 0.5 % (ref 0–2)
BILIRUB SERPL-MCNC: 0.5 MG/DL (ref 0.2–1.2)
BUN BLD-MCNC: 31 MG/DL (ref 8–23)
BUN/CREAT SERPL: 20 (ref 7–25)
CALCIUM SPEC-SCNC: 9.2 MG/DL (ref 8.8–10.5)
CHLORIDE SERPL-SCNC: 98 MMOL/L (ref 98–107)
CO2 SERPL-SCNC: 16.3 MMOL/L (ref 22–29)
CREAT BLD-MCNC: 1.55 MG/DL (ref 0.76–1.27)
DEPRECATED RDW RBC AUTO: 39.8 FL (ref 37–54)
EOSINOPHIL # BLD AUTO: 0 10*3/MM3 (ref 0.1–0.3)
EOSINOPHIL NFR BLD AUTO: 0 % (ref 0–4)
ERYTHROCYTE [DISTWIDTH] IN BLOOD BY AUTOMATED COUNT: 13.2 % (ref 11.5–14.5)
GFR SERPL CREATININE-BSD FRML MDRD: 44 ML/MIN/1.73
GLOBULIN UR ELPH-MCNC: 3.5 GM/DL
GLUCOSE BLD-MCNC: 290 MG/DL (ref 65–99)
GLUCOSE BLDC GLUCOMTR-MCNC: 151 MG/DL (ref 70–130)
GLUCOSE BLDC GLUCOMTR-MCNC: 158 MG/DL (ref 70–130)
GLUCOSE BLDC GLUCOMTR-MCNC: 269 MG/DL (ref 70–130)
GLUCOSE BLDC GLUCOMTR-MCNC: 309 MG/DL (ref 70–130)
HCT VFR BLD AUTO: 35.2 % (ref 42–52)
HGB BLD-MCNC: 12.3 G/DL (ref 14–18)
IMM GRANULOCYTES # BLD: 0.02 10*3/MM3 (ref 0–0.03)
IMM GRANULOCYTES NFR BLD: 0.5 % (ref 0–0.5)
LYMPHOCYTES # BLD AUTO: 0.55 10*3/MM3 (ref 0.6–4.8)
LYMPHOCYTES NFR BLD AUTO: 13.4 % (ref 20–45)
MCH RBC QN AUTO: 28.8 PG (ref 27–31)
MCHC RBC AUTO-ENTMCNC: 34.9 G/DL (ref 31–37)
MCV RBC AUTO: 82.4 FL (ref 80–94)
MONOCYTES # BLD AUTO: 0.16 10*3/MM3 (ref 0–1)
MONOCYTES NFR BLD AUTO: 3.9 % (ref 3–8)
NEUTROPHILS # BLD AUTO: 3.35 10*3/MM3 (ref 1.5–8.3)
NEUTROPHILS NFR BLD AUTO: 81.7 % (ref 45–70)
NRBC BLD MANUAL-RTO: 0 /100 WBC (ref 0–0)
PLATELET # BLD AUTO: 218 10*3/MM3 (ref 140–500)
PMV BLD AUTO: 9.4 FL (ref 7.4–10.4)
POTASSIUM BLD-SCNC: 4.8 MMOL/L (ref 3.5–5.2)
PROT SERPL-MCNC: 6.9 G/DL (ref 6–8.5)
RBC # BLD AUTO: 4.27 10*6/MM3 (ref 4.7–6.1)
SODIUM BLD-SCNC: 131 MMOL/L (ref 136–145)
TROPONIN T SERPL-MCNC: <0.01 NG/ML (ref 0–0.03)
TROPONIN T SERPL-MCNC: <0.01 NG/ML (ref 0–0.03)
WBC NRBC COR # BLD: 4.1 10*3/MM3 (ref 4.8–10.8)

## 2017-02-16 PROCEDURE — 82962 GLUCOSE BLOOD TEST: CPT

## 2017-02-16 PROCEDURE — 25010000002 HYDROCORTISONE SODIUM SUCCINATE 100 MG RECONSTITUTED SOLUTION: Performed by: INTERNAL MEDICINE

## 2017-02-16 PROCEDURE — 84484 ASSAY OF TROPONIN QUANT: CPT | Performed by: INTERNAL MEDICINE

## 2017-02-16 PROCEDURE — 63710000001 INSULIN DETEMIR PER 5 UNITS: Performed by: INTERNAL MEDICINE

## 2017-02-16 PROCEDURE — 85025 COMPLETE CBC W/AUTO DIFF WBC: CPT | Performed by: INTERNAL MEDICINE

## 2017-02-16 PROCEDURE — 25810000003 DEXTROSE-NACL PER 500 ML: Performed by: INTERNAL MEDICINE

## 2017-02-16 PROCEDURE — 99232 SBSQ HOSP IP/OBS MODERATE 35: CPT | Performed by: NURSE PRACTITIONER

## 2017-02-16 PROCEDURE — 25010000002 ENOXAPARIN PER 10 MG: Performed by: INTERNAL MEDICINE

## 2017-02-16 PROCEDURE — 25010000002 HYDROCORTISONE SODIUM SUCCINATE 100 MG RECONSTITUTED SOLUTION: Performed by: HOSPITALIST

## 2017-02-16 PROCEDURE — 80053 COMPREHEN METABOLIC PANEL: CPT | Performed by: INTERNAL MEDICINE

## 2017-02-16 RX ORDER — FLUDROCORTISONE ACETATE 0.1 MG/1
100 TABLET ORAL 3 TIMES WEEKLY
Status: DISCONTINUED | OUTPATIENT
Start: 2017-02-17 | End: 2017-02-17 | Stop reason: HOSPADM

## 2017-02-16 RX ORDER — SODIUM CHLORIDE, SODIUM LACTATE, POTASSIUM CHLORIDE, CALCIUM CHLORIDE 600; 310; 30; 20 MG/100ML; MG/100ML; MG/100ML; MG/100ML
100 INJECTION, SOLUTION INTRAVENOUS CONTINUOUS
Status: DISCONTINUED | OUTPATIENT
Start: 2017-02-16 | End: 2017-02-17 | Stop reason: HOSPADM

## 2017-02-16 RX ORDER — SODIUM CHLORIDE 9 MG/ML
100 INJECTION, SOLUTION INTRAVENOUS CONTINUOUS
Status: DISCONTINUED | OUTPATIENT
Start: 2017-02-16 | End: 2017-02-16

## 2017-02-16 RX ORDER — SODIUM CHLORIDE, SODIUM LACTATE, POTASSIUM CHLORIDE, CALCIUM CHLORIDE 600; 310; 30; 20 MG/100ML; MG/100ML; MG/100ML; MG/100ML
INJECTION, SOLUTION INTRAVENOUS
Status: COMPLETED
Start: 2017-02-16 | End: 2017-02-16

## 2017-02-16 RX ORDER — HYDROCORTISONE 10 MG/1
10 TABLET ORAL NIGHTLY
Status: DISCONTINUED | OUTPATIENT
Start: 2017-02-16 | End: 2017-02-16

## 2017-02-16 RX ORDER — HYDROCORTISONE 10 MG/1
20 TABLET ORAL
Status: DISCONTINUED | OUTPATIENT
Start: 2017-02-16 | End: 2017-02-16

## 2017-02-16 RX ADMIN — INSULIN DETEMIR 15 UNITS: 100 INJECTION, SOLUTION SUBCUTANEOUS at 21:28

## 2017-02-16 RX ADMIN — HYDROCORTISONE 20 MG: 10 TABLET ORAL at 16:21

## 2017-02-16 RX ADMIN — ISOSORBIDE MONONITRATE 30 MG: 30 TABLET, EXTENDED RELEASE ORAL at 09:55

## 2017-02-16 RX ADMIN — HYDROCORTISONE SODIUM SUCCINATE 100 MG: 100 INJECTION, POWDER, FOR SOLUTION INTRAMUSCULAR; INTRAVENOUS at 03:02

## 2017-02-16 RX ADMIN — HYDROCORTISONE SODIUM SUCCINATE 20 MG: 100 INJECTION, POWDER, FOR SOLUTION INTRAMUSCULAR; INTRAVENOUS at 23:46

## 2017-02-16 RX ADMIN — INSULIN DETEMIR 15 UNITS: 100 INJECTION, SOLUTION SUBCUTANEOUS at 10:14

## 2017-02-16 RX ADMIN — DEXTROSE AND SODIUM CHLORIDE 150 ML/HR: 5; 900 INJECTION, SOLUTION INTRAVENOUS at 09:36

## 2017-02-16 RX ADMIN — FINASTERIDE 5 MG: 5 TABLET, FILM COATED ORAL at 09:55

## 2017-02-16 RX ADMIN — SODIUM CHLORIDE, POTASSIUM CHLORIDE, SODIUM LACTATE AND CALCIUM CHLORIDE 100 ML/HR: 600; 310; 30; 20 INJECTION, SOLUTION INTRAVENOUS at 10:14

## 2017-02-16 RX ADMIN — ENOXAPARIN SODIUM 30 MG: 30 INJECTION SUBCUTANEOUS at 09:54

## 2017-02-16 RX ADMIN — SODIUM CHLORIDE, SODIUM LACTATE, POTASSIUM CHLORIDE, CALCIUM CHLORIDE 100 ML/HR: 600; 310; 30; 20 INJECTION, SOLUTION INTRAVENOUS at 10:14

## 2017-02-17 VITALS
DIASTOLIC BLOOD PRESSURE: 74 MMHG | SYSTOLIC BLOOD PRESSURE: 143 MMHG | RESPIRATION RATE: 18 BRPM | OXYGEN SATURATION: 95 % | TEMPERATURE: 97.8 F | WEIGHT: 255 LBS | HEART RATE: 93 BPM | BODY MASS INDEX: 34.54 KG/M2 | HEIGHT: 72 IN

## 2017-02-17 LAB
ALBUMIN SERPL-MCNC: 3.6 G/DL (ref 3.5–5.2)
ALBUMIN/GLOB SERPL: 1.2 G/DL
ALP SERPL-CCNC: 58 U/L (ref 40–129)
ALT SERPL W P-5'-P-CCNC: 16 U/L (ref 5–41)
ANION GAP SERPL CALCULATED.3IONS-SCNC: 14 MMOL/L
AST SERPL-CCNC: 19 U/L (ref 5–40)
BASOPHILS # BLD AUTO: 0.02 10*3/MM3 (ref 0–0.2)
BASOPHILS NFR BLD AUTO: 0.2 % (ref 0–2)
BILIRUB SERPL-MCNC: 0.6 MG/DL (ref 0.2–1.2)
BUN BLD-MCNC: 22 MG/DL (ref 8–23)
BUN/CREAT SERPL: 17.7 (ref 7–25)
CALCIUM SPEC-SCNC: 9.5 MG/DL (ref 8.8–10.5)
CHLORIDE SERPL-SCNC: 104 MMOL/L (ref 98–107)
CO2 SERPL-SCNC: 21 MMOL/L (ref 22–29)
CREAT BLD-MCNC: 1.24 MG/DL (ref 0.76–1.27)
DEPRECATED RDW RBC AUTO: 39.8 FL (ref 37–54)
EOSINOPHIL # BLD AUTO: 0.1 10*3/MM3 (ref 0.1–0.3)
EOSINOPHIL NFR BLD AUTO: 1.1 % (ref 0–4)
ERYTHROCYTE [DISTWIDTH] IN BLOOD BY AUTOMATED COUNT: 13.2 % (ref 11.5–14.5)
GFR SERPL CREATININE-BSD FRML MDRD: 57 ML/MIN/1.73
GLOBULIN UR ELPH-MCNC: 2.9 GM/DL
GLUCOSE BLD-MCNC: 170 MG/DL (ref 65–99)
GLUCOSE BLDC GLUCOMTR-MCNC: 122 MG/DL (ref 70–130)
HCT VFR BLD AUTO: 33.1 % (ref 42–52)
HGB BLD-MCNC: 11.5 G/DL (ref 14–18)
IMM GRANULOCYTES # BLD: 0.03 10*3/MM3 (ref 0–0.03)
IMM GRANULOCYTES NFR BLD: 0.3 % (ref 0–0.5)
LYMPHOCYTES # BLD AUTO: 1.03 10*3/MM3 (ref 0.6–4.8)
LYMPHOCYTES NFR BLD AUTO: 11.8 % (ref 20–45)
MCH RBC QN AUTO: 28.9 PG (ref 27–31)
MCHC RBC AUTO-ENTMCNC: 34.7 G/DL (ref 31–37)
MCV RBC AUTO: 83.2 FL (ref 80–94)
MONOCYTES # BLD AUTO: 0.7 10*3/MM3 (ref 0–1)
MONOCYTES NFR BLD AUTO: 8 % (ref 3–8)
NEUTROPHILS # BLD AUTO: 6.88 10*3/MM3 (ref 1.5–8.3)
NEUTROPHILS NFR BLD AUTO: 78.6 % (ref 45–70)
NRBC BLD MANUAL-RTO: 0 /100 WBC (ref 0–0)
PLATELET # BLD AUTO: 237 10*3/MM3 (ref 140–500)
PMV BLD AUTO: 9.7 FL (ref 7.4–10.4)
POTASSIUM BLD-SCNC: 4.4 MMOL/L (ref 3.5–5.2)
PROT SERPL-MCNC: 6.5 G/DL (ref 6–8.5)
RBC # BLD AUTO: 3.98 10*6/MM3 (ref 4.7–6.1)
SODIUM BLD-SCNC: 139 MMOL/L (ref 136–145)
WBC NRBC COR # BLD: 8.76 10*3/MM3 (ref 4.8–10.8)

## 2017-02-17 PROCEDURE — 99238 HOSP IP/OBS DSCHRG MGMT 30/<: CPT | Performed by: NURSE PRACTITIONER

## 2017-02-17 PROCEDURE — 25010000002 HYDROCORTISONE SODIUM SUCCINATE 100 MG RECONSTITUTED SOLUTION: Performed by: HOSPITALIST

## 2017-02-17 PROCEDURE — 80053 COMPREHEN METABOLIC PANEL: CPT | Performed by: NURSE PRACTITIONER

## 2017-02-17 PROCEDURE — 85025 COMPLETE CBC W/AUTO DIFF WBC: CPT | Performed by: NURSE PRACTITIONER

## 2017-02-17 PROCEDURE — 25010000002 ENOXAPARIN PER 10 MG: Performed by: INTERNAL MEDICINE

## 2017-02-17 PROCEDURE — 82962 GLUCOSE BLOOD TEST: CPT

## 2017-02-17 PROCEDURE — 63710000001 INSULIN DETEMIR PER 5 UNITS: Performed by: INTERNAL MEDICINE

## 2017-02-17 RX ORDER — METOPROLOL SUCCINATE 100 MG/1
100 TABLET, EXTENDED RELEASE ORAL DAILY
Qty: 30 TABLET | Refills: 0 | Status: SHIPPED | OUTPATIENT
Start: 2017-02-17 | End: 2017-05-22

## 2017-02-17 RX ADMIN — FINASTERIDE 5 MG: 5 TABLET, FILM COATED ORAL at 08:55

## 2017-02-17 RX ADMIN — ISOSORBIDE MONONITRATE 30 MG: 30 TABLET, EXTENDED RELEASE ORAL at 08:54

## 2017-02-17 RX ADMIN — FLUDROCORTISONE ACETATE 100 MCG: 0.1 TABLET ORAL at 08:55

## 2017-02-17 RX ADMIN — INSULIN DETEMIR 15 UNITS: 100 INJECTION, SOLUTION SUBCUTANEOUS at 09:05

## 2017-02-17 RX ADMIN — HYDROCORTISONE SODIUM SUCCINATE 20 MG: 100 INJECTION, POWDER, FOR SOLUTION INTRAMUSCULAR; INTRAVENOUS at 09:09

## 2017-02-17 RX ADMIN — ENOXAPARIN SODIUM 30 MG: 30 INJECTION SUBCUTANEOUS at 08:54

## 2017-02-17 RX ADMIN — PANTOPRAZOLE SODIUM 40 MG: 40 INJECTION, POWDER, FOR SOLUTION INTRAVENOUS at 05:16

## 2017-02-24 ENCOUNTER — OFFICE VISIT (OUTPATIENT)
Dept: FAMILY MEDICINE CLINIC | Facility: CLINIC | Age: 72
End: 2017-02-24

## 2017-02-24 VITALS
BODY MASS INDEX: 32.74 KG/M2 | DIASTOLIC BLOOD PRESSURE: 72 MMHG | WEIGHT: 247 LBS | OXYGEN SATURATION: 97 % | TEMPERATURE: 98.3 F | SYSTOLIC BLOOD PRESSURE: 118 MMHG | HEIGHT: 73 IN | HEART RATE: 72 BPM | RESPIRATION RATE: 16 BRPM

## 2017-02-24 DIAGNOSIS — E27.40 ADRENAL INSUFFICIENCY (HCC): ICD-10-CM

## 2017-02-24 DIAGNOSIS — Z79.4 DIABETES MELLITUS DUE TO UNDERLYING CONDITION WITH HYPERGLYCEMIA, WITH LONG-TERM CURRENT USE OF INSULIN (HCC): Primary | ICD-10-CM

## 2017-02-24 DIAGNOSIS — I10 ESSENTIAL HYPERTENSION: ICD-10-CM

## 2017-02-24 DIAGNOSIS — E08.65 DIABETES MELLITUS DUE TO UNDERLYING CONDITION WITH HYPERGLYCEMIA, WITH LONG-TERM CURRENT USE OF INSULIN (HCC): Primary | ICD-10-CM

## 2017-02-24 DIAGNOSIS — E78.2 MIXED HYPERLIPIDEMIA: ICD-10-CM

## 2017-02-24 LAB
ALBUMIN SERPL-MCNC: 4.4 G/DL (ref 3.5–5.2)
ALBUMIN/GLOB SERPL: 1.6 G/DL
ALP SERPL-CCNC: 60 U/L (ref 39–117)
ALT SERPL-CCNC: 13 U/L (ref 1–41)
AST SERPL-CCNC: 16 U/L (ref 1–40)
BILIRUB SERPL-MCNC: 0.3 MG/DL (ref 0.1–1.2)
BUN SERPL-MCNC: 24 MG/DL (ref 8–23)
BUN/CREAT SERPL: 17.9 (ref 7–25)
CALCIUM SERPL-MCNC: 9.8 MG/DL (ref 8.6–10.5)
CHLORIDE SERPL-SCNC: 104 MMOL/L (ref 98–107)
CO2 SERPL-SCNC: 23.9 MMOL/L (ref 22–29)
CREAT SERPL-MCNC: 1.34 MG/DL (ref 0.76–1.27)
GLOBULIN SER CALC-MCNC: 2.8 GM/DL
GLUCOSE SERPL-MCNC: 171 MG/DL (ref 65–99)
HBA1C MFR BLD: 6.42 % (ref 4.8–5.6)
POTASSIUM SERPL-SCNC: 4.6 MMOL/L (ref 3.5–5.2)
PROT SERPL-MCNC: 7.2 G/DL (ref 6–8.5)
SODIUM SERPL-SCNC: 143 MMOL/L (ref 136–145)

## 2017-02-24 PROCEDURE — 99213 OFFICE O/P EST LOW 20 MIN: CPT | Performed by: FAMILY MEDICINE

## 2017-02-24 NOTE — PROGRESS NOTES
Subjective   Micah Krishna is a 71 y.o. male presenting with   Chief Complaint   Patient presents with   • Follow-up     hospital f/u stayed 3 days  for vomiting         HPI Comments: This is a 71-year-old gentleman with iatrogenic renal insufficiency who had outpatient surgery on his right knee and developed postoperative nausea and vomiting at home.  He was unable to keep down his hydrocortisone which caused an acute addisonian crisis.  He was admitted to the hospital where he was found to have acute renal insufficiency due to dehydration and was given IV fluids and intravenous hydrocortisone.  He is here for follow-up and to ascertain whether his kidney function has recovered since he only has one kidney now.  He feels a lot better but continues to feel like his right knee is catching when he tries to flex it.  He is currently involved in physical therapy and has been told this is not unusual but he is concerned.  Fortunately he does not have any swelling or redness of the knee.       The following portions of the patient's history were reviewed and updated as appropriate: current medications, past family history, past medical history, past social history, past surgical history and problem list.    Review of Systems   Musculoskeletal: Positive for arthralgias, gait problem and joint swelling.   All other systems reviewed and are negative.      Objective   Physical Exam   Constitutional: He is oriented to person, place, and time. He appears well-developed and well-nourished. No distress.   HENT:   Head: Normocephalic and atraumatic.   Mouth/Throat: Oropharynx is clear and moist. No oropharyngeal exudate.   Eyes: EOM are normal. Pupils are equal, round, and reactive to light. No scleral icterus.   Neck: Normal range of motion. Neck supple. No thyromegaly present.   Cardiovascular: Normal rate and regular rhythm.    No murmur heard.  Pulmonary/Chest: Effort normal and breath sounds normal. No respiratory distress.  He has no wheezes.   Musculoskeletal: He exhibits edema and tenderness (mild tenderness on range of motion of the right knee). He exhibits no deformity.   Lymphadenopathy:     He has no cervical adenopathy.   Neurological: He is alert and oriented to person, place, and time. No cranial nerve deficit. Coordination normal.   Skin: Skin is warm and dry. He is not diaphoretic. No pallor.   Psychiatric: He has a normal mood and affect. His behavior is normal.   Nursing note and vitals reviewed.      Assessment/Plan   Micah was seen today for follow-up.    Diagnoses and all orders for this visit:    Diabetes mellitus due to underlying condition with hyperglycemia, with long-term current use of insulin  -     Comprehensive Metabolic Panel  -     Hemoglobin A1c    Essential hypertension  -     Comprehensive Metabolic Panel    Mixed hyperlipidemia  -     Comprehensive Metabolic Panel    Adrenal insufficiency  -     Comprehensive Metabolic Panel    Other orders  -     hydrocortisone sodium succinate (Solu-CORTEF) 100 MG injection; Inject 50 mg into the shoulder, thigh, or buttocks Daily.                   I would like him to return for another visit in 3 month(s)

## 2017-02-24 NOTE — PATIENT INSTRUCTIONS
This is a very nice gentleman who suffered nausea and vomiting postoperatively and became quite dehydrated with acute renal insufficiency.  He is here for follow-up so I will request blood work to further evaluate his kidney status.  I will call him when the results are available.  I would like him to call if there is any problem.

## 2017-03-07 ENCOUNTER — TELEPHONE (OUTPATIENT)
Dept: FAMILY MEDICINE CLINIC | Facility: CLINIC | Age: 72
End: 2017-03-07

## 2017-03-07 NOTE — TELEPHONE ENCOUNTER
Pt called asking if you could give him a call he said he just got out of the hospital again and they still don't know what is wrong with him and would like to talk to you.    [phone]

## 2017-03-13 DIAGNOSIS — E78.5 HLD (HYPERLIPIDEMIA): ICD-10-CM

## 2017-03-13 RX ORDER — LOSARTAN POTASSIUM 100 MG/1
TABLET ORAL
Qty: 90 TABLET | Refills: 0 | Status: SHIPPED | OUTPATIENT
Start: 2017-03-13 | End: 2017-05-22 | Stop reason: SDUPTHER

## 2017-03-13 RX ORDER — ROSUVASTATIN CALCIUM 20 MG/1
TABLET, COATED ORAL
Qty: 90 TABLET | Refills: 0 | Status: SHIPPED | OUTPATIENT
Start: 2017-03-13 | End: 2017-05-22 | Stop reason: SDUPTHER

## 2017-04-20 RX ORDER — AMLODIPINE BESYLATE 10 MG/1
TABLET ORAL
Qty: 90 TABLET | Refills: 3 | Status: SHIPPED | OUTPATIENT
Start: 2017-04-20 | End: 2017-05-22 | Stop reason: SDUPTHER

## 2017-04-21 ENCOUNTER — TELEPHONE (OUTPATIENT)
Dept: FAMILY MEDICINE CLINIC | Facility: CLINIC | Age: 72
End: 2017-04-21

## 2017-04-21 RX ORDER — HYDROCORTISONE 20 MG/1
TABLET ORAL
Qty: 135 TABLET | Refills: 0 | Status: SHIPPED | OUTPATIENT
Start: 2017-04-21 | End: 2017-05-22 | Stop reason: SDUPTHER

## 2017-05-08 ENCOUNTER — TELEPHONE (OUTPATIENT)
Dept: ONCOLOGY | Facility: CLINIC | Age: 72
End: 2017-05-08

## 2017-05-08 RX ORDER — FINASTERIDE 5 MG/1
TABLET, FILM COATED ORAL
Qty: 90 TABLET | Refills: 1 | Status: SHIPPED | OUTPATIENT
Start: 2017-05-08 | End: 2017-05-22 | Stop reason: SDUPTHER

## 2017-05-15 RX ORDER — OMEPRAZOLE 20 MG/1
CAPSULE, DELAYED RELEASE ORAL
Qty: 90 CAPSULE | Refills: 1 | Status: SHIPPED | OUTPATIENT
Start: 2017-05-15 | End: 2017-05-22 | Stop reason: SDUPTHER

## 2017-05-18 DIAGNOSIS — I10 ESSENTIAL HYPERTENSION: Primary | ICD-10-CM

## 2017-05-18 DIAGNOSIS — E78.2 MIXED HYPERLIPIDEMIA: ICD-10-CM

## 2017-05-18 DIAGNOSIS — R73.9 HYPERGLYCEMIA: ICD-10-CM

## 2017-05-18 LAB
ALBUMIN SERPL-MCNC: 4.6 G/DL (ref 3.5–5.2)
ALBUMIN/GLOB SERPL: 1.8 G/DL
ALP SERPL-CCNC: 74 U/L (ref 39–117)
ALT SERPL-CCNC: 16 U/L (ref 1–41)
AST SERPL-CCNC: 15 U/L (ref 1–40)
BASOPHILS # BLD AUTO: 0.04 10*3/MM3 (ref 0–0.2)
BASOPHILS NFR BLD AUTO: 0.6 % (ref 0–1.5)
BILIRUB SERPL-MCNC: 0.4 MG/DL (ref 0.1–1.2)
BUN SERPL-MCNC: 18 MG/DL (ref 8–23)
BUN/CREAT SERPL: 14.5 (ref 7–25)
CALCIUM SERPL-MCNC: 9.9 MG/DL (ref 8.6–10.5)
CHLORIDE SERPL-SCNC: 103 MMOL/L (ref 98–107)
CHOLEST SERPL-MCNC: 133 MG/DL (ref 0–200)
CO2 SERPL-SCNC: 26.8 MMOL/L (ref 22–29)
CREAT SERPL-MCNC: 1.24 MG/DL (ref 0.76–1.27)
EOSINOPHIL # BLD AUTO: 0.29 10*3/MM3 (ref 0–0.7)
EOSINOPHIL NFR BLD AUTO: 4.5 % (ref 0.3–6.2)
ERYTHROCYTE [DISTWIDTH] IN BLOOD BY AUTOMATED COUNT: 14.3 % (ref 11.5–14.5)
GLOBULIN SER CALC-MCNC: 2.6 GM/DL
GLUCOSE SERPL-MCNC: 86 MG/DL (ref 65–99)
HBA1C MFR BLD: 5.89 % (ref 4.8–5.6)
HCT VFR BLD AUTO: 39.2 % (ref 40.4–52.2)
HDLC SERPL-MCNC: 39 MG/DL (ref 40–60)
HGB BLD-MCNC: 13 G/DL (ref 13.7–17.6)
IMM GRANULOCYTES # BLD: 0.02 10*3/MM3 (ref 0–0.03)
IMM GRANULOCYTES NFR BLD: 0.3 % (ref 0–0.5)
LDLC SERPL CALC-MCNC: 56 MG/DL (ref 0–100)
LDLC/HDLC SERPL: 1.43 {RATIO}
LYMPHOCYTES # BLD AUTO: 1.86 10*3/MM3 (ref 0.9–4.8)
LYMPHOCYTES NFR BLD AUTO: 28.9 % (ref 19.6–45.3)
MCH RBC QN AUTO: 28.8 PG (ref 27–32.7)
MCHC RBC AUTO-ENTMCNC: 33.2 G/DL (ref 32.6–36.4)
MCV RBC AUTO: 86.7 FL (ref 79.8–96.2)
MONOCYTES # BLD AUTO: 0.58 10*3/MM3 (ref 0.2–1.2)
MONOCYTES NFR BLD AUTO: 9 % (ref 5–12)
NEUTROPHILS # BLD AUTO: 3.64 10*3/MM3 (ref 1.9–8.1)
NEUTROPHILS NFR BLD AUTO: 56.7 % (ref 42.7–76)
PLATELET # BLD AUTO: 160 10*3/MM3 (ref 140–500)
POTASSIUM SERPL-SCNC: 4.2 MMOL/L (ref 3.5–5.2)
PROT SERPL-MCNC: 7.2 G/DL (ref 6–8.5)
RBC # BLD AUTO: 4.52 10*6/MM3 (ref 4.6–6)
SODIUM SERPL-SCNC: 145 MMOL/L (ref 136–145)
TRIGL SERPL-MCNC: 191 MG/DL (ref 0–150)
VLDLC SERPL CALC-MCNC: 38.2 MG/DL (ref 5–40)
WBC # BLD AUTO: 6.43 10*3/MM3 (ref 4.5–10.7)

## 2017-05-22 ENCOUNTER — OFFICE VISIT (OUTPATIENT)
Dept: FAMILY MEDICINE CLINIC | Facility: CLINIC | Age: 72
End: 2017-05-22

## 2017-05-22 VITALS
DIASTOLIC BLOOD PRESSURE: 80 MMHG | HEART RATE: 86 BPM | SYSTOLIC BLOOD PRESSURE: 140 MMHG | WEIGHT: 254 LBS | BODY MASS INDEX: 34.4 KG/M2 | HEIGHT: 72 IN | TEMPERATURE: 98.2 F | RESPIRATION RATE: 16 BRPM | OXYGEN SATURATION: 97 %

## 2017-05-22 DIAGNOSIS — Z79.4 DIABETES MELLITUS DUE TO UNDERLYING CONDITION WITH HYPERGLYCEMIA, WITH LONG-TERM CURRENT USE OF INSULIN (HCC): ICD-10-CM

## 2017-05-22 DIAGNOSIS — E78.2 MIXED HYPERLIPIDEMIA: ICD-10-CM

## 2017-05-22 DIAGNOSIS — E27.40 ADRENAL INSUFFICIENCY (HCC): ICD-10-CM

## 2017-05-22 DIAGNOSIS — E08.65 DIABETES MELLITUS DUE TO UNDERLYING CONDITION WITH HYPERGLYCEMIA, WITH LONG-TERM CURRENT USE OF INSULIN (HCC): ICD-10-CM

## 2017-05-22 DIAGNOSIS — I10 ESSENTIAL HYPERTENSION: Primary | ICD-10-CM

## 2017-05-22 PROCEDURE — 99213 OFFICE O/P EST LOW 20 MIN: CPT | Performed by: FAMILY MEDICINE

## 2017-05-22 RX ORDER — ALLOPURINOL 300 MG/1
300 TABLET ORAL DAILY
Qty: 90 TABLET | Refills: 1 | Status: SHIPPED | OUTPATIENT
Start: 2017-05-22 | End: 2017-10-06 | Stop reason: SDUPTHER

## 2017-05-22 RX ORDER — ALLOPURINOL 300 MG/1
TABLET ORAL
Qty: 90 TABLET | Refills: 1 | Status: SHIPPED | OUTPATIENT
Start: 2017-05-22 | End: 2017-05-22 | Stop reason: SDUPTHER

## 2017-05-22 RX ORDER — FINASTERIDE 5 MG/1
5 TABLET, FILM COATED ORAL DAILY
Qty: 90 TABLET | Refills: 1 | Status: SHIPPED | OUTPATIENT
Start: 2017-05-22 | End: 2017-11-09 | Stop reason: SDUPTHER

## 2017-05-22 RX ORDER — INSULIN LISPRO 100 [IU]/ML
INJECTION, SOLUTION INTRAVENOUS; SUBCUTANEOUS
Refills: 1 | COMMUNITY
Start: 2017-03-22

## 2017-05-22 RX ORDER — HYDROCORTISONE 20 MG/1
20 TABLET ORAL DAILY
Qty: 90 TABLET | Refills: 1 | Status: SHIPPED | OUTPATIENT
Start: 2017-05-22 | End: 2017-08-21

## 2017-05-22 RX ORDER — OMEPRAZOLE 20 MG/1
20 CAPSULE, DELAYED RELEASE ORAL DAILY
Qty: 90 CAPSULE | Refills: 1 | Status: SHIPPED | OUTPATIENT
Start: 2017-05-22 | End: 2017-11-09 | Stop reason: SDUPTHER

## 2017-05-22 RX ORDER — ROSUVASTATIN CALCIUM 20 MG/1
20 TABLET, COATED ORAL DAILY
Qty: 90 TABLET | Refills: 1 | Status: SHIPPED | OUTPATIENT
Start: 2017-05-22 | End: 2017-09-20

## 2017-05-22 RX ORDER — ISOSORBIDE MONONITRATE 30 MG/1
30 TABLET, EXTENDED RELEASE ORAL 2 TIMES DAILY
Qty: 180 TABLET | Refills: 1 | Status: SHIPPED | OUTPATIENT
Start: 2017-05-22 | End: 2017-11-17 | Stop reason: SDUPTHER

## 2017-05-22 RX ORDER — LOSARTAN POTASSIUM 100 MG/1
100 TABLET ORAL DAILY
Qty: 90 TABLET | Refills: 1 | Status: SHIPPED | OUTPATIENT
Start: 2017-05-22 | End: 2018-01-28 | Stop reason: SDUPTHER

## 2017-05-22 RX ORDER — HYDROCORTISONE 10 MG/1
10 TABLET ORAL NIGHTLY
Qty: 90 TABLET | Refills: 1 | Status: SHIPPED | OUTPATIENT
Start: 2017-05-22 | End: 2018-01-10

## 2017-05-22 RX ORDER — AMLODIPINE BESYLATE 10 MG/1
10 TABLET ORAL DAILY
Qty: 90 TABLET | Refills: 1 | Status: SHIPPED | OUTPATIENT
Start: 2017-05-22 | End: 2017-08-21 | Stop reason: SDUPTHER

## 2017-05-22 RX ORDER — METOPROLOL SUCCINATE 100 MG/1
100 TABLET, EXTENDED RELEASE ORAL DAILY
Qty: 90 TABLET | Refills: 1 | Status: SHIPPED | OUTPATIENT
Start: 2017-05-22 | End: 2017-12-05 | Stop reason: SDUPTHER

## 2017-05-22 RX ORDER — ISOSORBIDE MONONITRATE 30 MG/1
TABLET, EXTENDED RELEASE ORAL
Qty: 180 TABLET | Refills: 1 | Status: SHIPPED | OUTPATIENT
Start: 2017-05-22 | End: 2017-05-22 | Stop reason: SDUPTHER

## 2017-06-02 ENCOUNTER — TELEPHONE (OUTPATIENT)
Dept: FAMILY MEDICINE CLINIC | Facility: CLINIC | Age: 72
End: 2017-06-02

## 2017-06-08 ENCOUNTER — OUTSIDE FACILITY SERVICE (OUTPATIENT)
Dept: PSYCHIATRY | Facility: CLINIC | Age: 72
End: 2017-06-08

## 2017-06-08 PROCEDURE — 99232 SBSQ HOSP IP/OBS MODERATE 35: CPT | Performed by: PSYCHIATRY & NEUROLOGY

## 2017-07-10 ENCOUNTER — LAB (OUTPATIENT)
Dept: LAB | Facility: HOSPITAL | Age: 72
End: 2017-07-10

## 2017-07-10 ENCOUNTER — HOSPITAL ENCOUNTER (OUTPATIENT)
Dept: CT IMAGING | Facility: HOSPITAL | Age: 72
Discharge: HOME | End: 2017-07-10
Attending: INTERNAL MEDICINE | Admitting: INTERNAL MEDICINE

## 2017-07-10 DIAGNOSIS — Z85.528 HISTORY OF RENAL CELL CARCINOMA: ICD-10-CM

## 2017-07-10 LAB
ALBUMIN SERPL-MCNC: 4.3 G/DL (ref 3.5–5.2)
ALBUMIN/GLOB SERPL: 1.5 G/DL
ALP SERPL-CCNC: 73 U/L (ref 39–117)
ALT SERPL W P-5'-P-CCNC: 18 U/L (ref 1–41)
ANION GAP SERPL CALCULATED.3IONS-SCNC: 13 MMOL/L
AST SERPL-CCNC: 17 U/L (ref 1–40)
BASOPHILS # BLD AUTO: 0.06 10*3/MM3 (ref 0–0.2)
BASOPHILS NFR BLD AUTO: 0.9 % (ref 0–1.5)
BILIRUB SERPL-MCNC: 0.4 MG/DL (ref 0.1–1.2)
BUN BLD-MCNC: 15 MG/DL (ref 8–23)
BUN/CREAT SERPL: 10.8 (ref 7–25)
CALCIUM SPEC-SCNC: 9.1 MG/DL (ref 8.6–10.5)
CHLORIDE SERPL-SCNC: 103 MMOL/L (ref 98–107)
CO2 SERPL-SCNC: 26 MMOL/L (ref 22–29)
CREAT BLD-MCNC: 1.39 MG/DL (ref 0.76–1.27)
DEPRECATED RDW RBC AUTO: 41.4 FL (ref 37–54)
EOSINOPHIL # BLD AUTO: 0.29 10*3/MM3 (ref 0–0.7)
EOSINOPHIL NFR BLD AUTO: 4.6 % (ref 0.3–6.2)
ERYTHROCYTE [DISTWIDTH] IN BLOOD BY AUTOMATED COUNT: 13.7 % (ref 11.5–14.5)
GFR SERPL CREATININE-BSD FRML MDRD: 50 ML/MIN/1.73
GLOBULIN UR ELPH-MCNC: 2.8 GM/DL
GLUCOSE BLD-MCNC: 224 MG/DL (ref 65–99)
HCT VFR BLD AUTO: 35.3 % (ref 40.4–52.2)
HGB BLD-MCNC: 12.3 G/DL (ref 13.7–17.6)
IMM GRANULOCYTES # BLD: 0.03 10*3/MM3 (ref 0–0.03)
IMM GRANULOCYTES NFR BLD: 0.5 % (ref 0–0.5)
LYMPHOCYTES # BLD AUTO: 1.39 10*3/MM3 (ref 0.9–4.8)
LYMPHOCYTES NFR BLD AUTO: 21.9 % (ref 19.6–45.3)
MCH RBC QN AUTO: 28.9 PG (ref 27–32.7)
MCHC RBC AUTO-ENTMCNC: 34.8 G/DL (ref 32.6–36.4)
MCV RBC AUTO: 83.1 FL (ref 79.8–96.2)
MONOCYTES # BLD AUTO: 0.48 10*3/MM3 (ref 0.2–1.2)
MONOCYTES NFR BLD AUTO: 7.6 % (ref 5–12)
NEUTROPHILS # BLD AUTO: 4.09 10*3/MM3 (ref 1.9–8.1)
NEUTROPHILS NFR BLD AUTO: 64.5 % (ref 42.7–76)
NRBC BLD MANUAL-RTO: 0 /100 WBC (ref 0–0)
PLATELET # BLD AUTO: 138 10*3/MM3 (ref 140–500)
PMV BLD AUTO: 9.6 FL (ref 6–12)
POTASSIUM BLD-SCNC: 3.5 MMOL/L (ref 3.5–5.2)
PROT SERPL-MCNC: 7.1 G/DL (ref 6–8.5)
RBC # BLD AUTO: 4.25 10*6/MM3 (ref 4.6–6)
SODIUM BLD-SCNC: 142 MMOL/L (ref 136–145)
WBC NRBC COR # BLD: 6.34 10*3/MM3 (ref 4.5–10.7)

## 2017-07-10 PROCEDURE — 36415 COLL VENOUS BLD VENIPUNCTURE: CPT

## 2017-07-10 PROCEDURE — 80053 COMPREHEN METABOLIC PANEL: CPT

## 2017-07-10 PROCEDURE — 85025 COMPLETE CBC W/AUTO DIFF WBC: CPT

## 2017-07-10 PROCEDURE — 82565 ASSAY OF CREATININE: CPT

## 2017-07-10 PROCEDURE — 0 DIATRIZOATE MEGLUMINE & SODIUM PER 1 ML: Performed by: INTERNAL MEDICINE

## 2017-07-10 PROCEDURE — 71260 CT THORAX DX C+: CPT

## 2017-07-10 PROCEDURE — 74177 CT ABD & PELVIS W/CONTRAST: CPT

## 2017-07-10 PROCEDURE — 0 IOPAMIDOL 61 % SOLUTION: Performed by: INTERNAL MEDICINE

## 2017-07-10 RX ADMIN — DIATRIZOATE MEGLUMINE AND DIATRIZOATE SODIUM 30 ML: 660; 100 LIQUID ORAL; RECTAL at 12:15

## 2017-07-10 RX ADMIN — IOPAMIDOL 85 ML: 612 INJECTION, SOLUTION INTRAVENOUS at 12:15

## 2017-07-11 LAB — CREAT BLDA-MCNC: 1.4 MG/DL (ref 0.6–1.3)

## 2017-07-19 ENCOUNTER — APPOINTMENT (OUTPATIENT)
Dept: OTHER | Facility: HOSPITAL | Age: 72
End: 2017-07-19

## 2017-07-19 ENCOUNTER — APPOINTMENT (OUTPATIENT)
Dept: ONCOLOGY | Facility: CLINIC | Age: 72
End: 2017-07-19

## 2017-07-19 ENCOUNTER — OFFICE VISIT (OUTPATIENT)
Dept: ONCOLOGY | Facility: CLINIC | Age: 72
End: 2017-07-19

## 2017-07-19 VITALS
TEMPERATURE: 98.1 F | HEIGHT: 72 IN | BODY MASS INDEX: 35.35 KG/M2 | OXYGEN SATURATION: 95 % | SYSTOLIC BLOOD PRESSURE: 161 MMHG | HEART RATE: 79 BPM | DIASTOLIC BLOOD PRESSURE: 85 MMHG | RESPIRATION RATE: 16 BRPM | WEIGHT: 261 LBS

## 2017-07-19 DIAGNOSIS — Z85.528 HISTORY OF RENAL CELL CARCINOMA: Primary | ICD-10-CM

## 2017-07-19 PROCEDURE — G0463 HOSPITAL OUTPT CLINIC VISIT: HCPCS | Performed by: INTERNAL MEDICINE

## 2017-07-19 PROCEDURE — 99213 OFFICE O/P EST LOW 20 MIN: CPT | Performed by: INTERNAL MEDICINE

## 2017-07-19 NOTE — PROGRESS NOTES
[facility] GROUP OUTPATIENT FOLLOW UP CLINIC VISIT    REASON FOR FOLLOW-UP:    1.  History of metastatic clear cell renal cell carcinoma.  All disease has been resected.  Currently no evidence of disease.    HISTORY OF PRESENT ILLNESS:  Micah Krishna is a 72 y.o. male who returns today for follow up of the above issue.     He had an arthroscopic procedure on his right knee which is feeling better at this point and he is hoping to become more active.  Over the past month or so he has had more bilateral lower extremity edema.  His blood glucose levels have also been more difficult to control.  No chest pain or shortness of breath.  He has gained a little bit of weight.      PAST MEDICAL, SURGICAL, FAMILY, AND SOCIAL HISTORIES were reviewed with the patient and in the electronic medical record, and were updated if indicated.    ALLERGIES:  Allergies   Allergen Reactions   • Shellfish-Derived Products Anaphylaxis   • Iodinated Diagnostic Agents Itching and Swelling   • Shellfish Allergy    • Adhesive Tape Hives       MEDICATIONS:  The medication list has been reviewed with the patient by the medical assistant, and the list has been updated in the electronic medical record, which I reviewed.  Medication dosages and frequencies were confirmed to be accurate.    REVIEW OF SYSTEMS:  PAIN:  See Vital Signs below.  GENERAL:  No fevers, chills, night sweats, or unintended weight loss.  SKIN:  No rashes or non-healing lesions.  HEME/LYMPH:  No abnormal bleeding.  No palpable lymphadenopathy.  EYES:  No vision changes or diplopia.  ENT:  No sore throat or difficulty swallowing.  RESPIRATORY:  No cough, shortness of breath, hemoptysis, or wheezing.  CARDIOVASCULAR:  No chest pain, palpitations, orthopnea, or dyspnea on exertion.  GASTROINTESTINAL:  No abdominal pain, nausea, vomiting, constipation, diarrhea, melena, or hematochezia.  GENITOURINARY:  No dysuria or hematuria.  MUSCULOSKELETAL:  Right knee pain secondary  "to osteoarthritis which is improving.  Bilateral lower extremity edema.  NEUROLOGIC:  No dizziness, loss of consciousness, or seizures.  PSYCHIATRIC:  No depression, anxiety, or mood changes.    Vitals:    07/19/17 1421   BP: 161/85   Pulse: 79   Resp: 16   Temp: 98.1 °F (36.7 °C)   TempSrc: Oral   SpO2: 95%   Weight: 261 lb (118 kg)   Height: 72\" (182.9 cm)   PainSc:   3   PainLoc: Generalized       PHYSICAL EXAMINATION:  GENERAL:  Well-developed well-nourished male; awake, alert and oriented, in no acute distress.  SKIN:  Warm and dry, without rashes, purpura, or petechiae.  HEAD:  Normocephalic, atraumatic.  EYES:  Pupils equal, round and reactive to light.  Extraocular movements intact.  Conjunctivae normal.  EARS:  Hearing intact.  NOSE:  Septum midline.  No excoriations or nasal discharge.  MOUTH:  No stomatitis or ulcers.  Lips are normal.  THROAT:  Oropharynx without lesions or exudates.  NECK:  Supple with good range of motion; no thyromegaly or masses; no JVD or bruits.  LYMPHATICS:  No cervical, supraclavicular, axillary, or inguinal lymphadenopathy.  CHEST:  Lungs are clear to auscultation bilaterally.  No wheezes, rales, or rhonchi.  HEART:  Regular rate; normal rhythm.  No murmurs, gallops or rubs.  ABDOMEN:  Soft, non-tender, non-distended.  Normal active bowel sounds.  No organomegaly.  EXTREMITIES:  1+ bilateral lower extremity edema, symmetric  NEUROLOGICAL:  No focal neurologic deficits.    DIAGNOSTIC DATA:  Lab Results   Component Value Date    WBC 6.34 07/10/2017    HGB 12.3 (L) 07/10/2017    HCT 35.3 (L) 07/10/2017    MCV 83.1 07/10/2017     (L) 07/10/2017       IMAGING:  CT imaging from 7/10/2017 shows no evidence for metastatic disease in the chest abdomen or pelvis    ASSESSMENT:  This is a 72 y.o. male with:  1.  Mild normocytic anemia: His hemoglobin is stable.  2.  Adrenal insufficiency following bilateral adrenalectomy currently on replacement hormone therapy.  3.  Metastatic " renal cell carcinoma.  He had a left nephrectomy and adrenalectomy in 2000 and then a right adrenalectomy for metastatic disease in 2012.  Currently no evidence of disease on CT imaging from 7/10.  4.  Hypertension:Follow up with primary care and cardiology  5.  Lower extremity edema: Again, he will follow-up with primary care and cardiology regarding this.    PLAN:  1.  Return in 6 months for follow-up with labs and a chest xray that day.

## 2017-08-11 DIAGNOSIS — E11.9 DIABETES MELLITUS WITHOUT COMPLICATION (HCC): ICD-10-CM

## 2017-08-11 DIAGNOSIS — I10 ESSENTIAL HYPERTENSION: Primary | ICD-10-CM

## 2017-08-11 DIAGNOSIS — E78.49 OTHER HYPERLIPIDEMIA: ICD-10-CM

## 2017-08-11 DIAGNOSIS — M10.9 GOUT, UNSPECIFIED: ICD-10-CM

## 2017-08-14 ENCOUNTER — RESULTS ENCOUNTER (OUTPATIENT)
Dept: FAMILY MEDICINE CLINIC | Facility: CLINIC | Age: 72
End: 2017-08-14

## 2017-08-14 DIAGNOSIS — M10.9 GOUT, UNSPECIFIED: ICD-10-CM

## 2017-08-14 DIAGNOSIS — E11.9 DIABETES MELLITUS WITHOUT COMPLICATION (HCC): ICD-10-CM

## 2017-08-14 DIAGNOSIS — I10 ESSENTIAL HYPERTENSION: ICD-10-CM

## 2017-08-14 DIAGNOSIS — E78.49 OTHER HYPERLIPIDEMIA: ICD-10-CM

## 2017-08-15 LAB
ALBUMIN SERPL-MCNC: 4.5 G/DL (ref 3.5–5.2)
ALBUMIN/GLOB SERPL: 1.6 G/DL
ALP SERPL-CCNC: 91 U/L (ref 39–117)
ALT SERPL-CCNC: 16 U/L (ref 1–41)
AST SERPL-CCNC: 16 U/L (ref 1–40)
BASOPHILS # BLD AUTO: 0.05 10*3/MM3 (ref 0–0.2)
BASOPHILS NFR BLD AUTO: 0.7 % (ref 0–1.5)
BILIRUB SERPL-MCNC: 0.5 MG/DL (ref 0.1–1.2)
BUN SERPL-MCNC: 18 MG/DL (ref 8–23)
BUN/CREAT SERPL: 15.5 (ref 7–25)
CALCIUM SERPL-MCNC: 10.1 MG/DL (ref 8.6–10.5)
CHLORIDE SERPL-SCNC: 104 MMOL/L (ref 98–107)
CHOLEST SERPL-MCNC: 114 MG/DL (ref 0–200)
CO2 SERPL-SCNC: 26.4 MMOL/L (ref 22–29)
CREAT SERPL-MCNC: 1.16 MG/DL (ref 0.76–1.27)
EOSINOPHIL # BLD AUTO: 0.46 10*3/MM3 (ref 0–0.7)
EOSINOPHIL NFR BLD AUTO: 6.1 % (ref 0.3–6.2)
ERYTHROCYTE [DISTWIDTH] IN BLOOD BY AUTOMATED COUNT: 14.6 % (ref 11.5–14.5)
GLOBULIN SER CALC-MCNC: 2.8 GM/DL
GLUCOSE SERPL-MCNC: 112 MG/DL (ref 65–99)
HBA1C MFR BLD: 7 % (ref 4.8–5.6)
HCT VFR BLD AUTO: 37.9 % (ref 40.4–52.2)
HDLC SERPL-MCNC: 33 MG/DL (ref 40–60)
HGB BLD-MCNC: 12.7 G/DL (ref 13.7–17.6)
IMM GRANULOCYTES # BLD: 0.02 10*3/MM3 (ref 0–0.03)
IMM GRANULOCYTES NFR BLD: 0.3 % (ref 0–0.5)
LDLC SERPL CALC-MCNC: 41 MG/DL (ref 0–100)
LDLC/HDLC SERPL: 1.25 {RATIO}
LYMPHOCYTES # BLD AUTO: 1.51 10*3/MM3 (ref 0.9–4.8)
LYMPHOCYTES NFR BLD AUTO: 20 % (ref 19.6–45.3)
MCH RBC QN AUTO: 29.3 PG (ref 27–32.7)
MCHC RBC AUTO-ENTMCNC: 33.5 G/DL (ref 32.6–36.4)
MCV RBC AUTO: 87.5 FL (ref 79.8–96.2)
MONOCYTES # BLD AUTO: 0.6 10*3/MM3 (ref 0.2–1.2)
MONOCYTES NFR BLD AUTO: 7.9 % (ref 5–12)
NEUTROPHILS # BLD AUTO: 4.92 10*3/MM3 (ref 1.9–8.1)
NEUTROPHILS NFR BLD AUTO: 65 % (ref 42.7–76)
PLATELET # BLD AUTO: 162 10*3/MM3 (ref 140–500)
POTASSIUM SERPL-SCNC: 4.3 MMOL/L (ref 3.5–5.2)
PROT SERPL-MCNC: 7.3 G/DL (ref 6–8.5)
RBC # BLD AUTO: 4.33 10*6/MM3 (ref 4.6–6)
SODIUM SERPL-SCNC: 145 MMOL/L (ref 136–145)
TRIGL SERPL-MCNC: 198 MG/DL (ref 0–150)
URATE SERPL-MCNC: 4.7 MG/DL (ref 3.4–7)
VLDLC SERPL CALC-MCNC: 39.6 MG/DL (ref 5–40)
WBC # BLD AUTO: 7.56 10*3/MM3 (ref 4.5–10.7)

## 2017-08-21 ENCOUNTER — OFFICE VISIT (OUTPATIENT)
Dept: FAMILY MEDICINE CLINIC | Facility: CLINIC | Age: 72
End: 2017-08-21

## 2017-08-21 VITALS
SYSTOLIC BLOOD PRESSURE: 120 MMHG | HEART RATE: 75 BPM | OXYGEN SATURATION: 95 % | BODY MASS INDEX: 35.21 KG/M2 | HEIGHT: 72 IN | DIASTOLIC BLOOD PRESSURE: 80 MMHG | WEIGHT: 260 LBS | TEMPERATURE: 98 F

## 2017-08-21 DIAGNOSIS — E08.65 DIABETES MELLITUS DUE TO UNDERLYING CONDITION WITH HYPERGLYCEMIA, WITH LONG-TERM CURRENT USE OF INSULIN (HCC): ICD-10-CM

## 2017-08-21 DIAGNOSIS — R60.0 BILATERAL EDEMA OF LOWER EXTREMITY: ICD-10-CM

## 2017-08-21 DIAGNOSIS — E27.40 ADRENAL INSUFFICIENCY (HCC): ICD-10-CM

## 2017-08-21 DIAGNOSIS — Z79.4 DIABETES MELLITUS DUE TO UNDERLYING CONDITION WITH HYPERGLYCEMIA, WITH LONG-TERM CURRENT USE OF INSULIN (HCC): ICD-10-CM

## 2017-08-21 DIAGNOSIS — E78.2 MIXED HYPERLIPIDEMIA: ICD-10-CM

## 2017-08-21 DIAGNOSIS — I10 ESSENTIAL HYPERTENSION: Primary | ICD-10-CM

## 2017-08-21 DIAGNOSIS — I50.9 CONGESTIVE HEART FAILURE, UNSPECIFIED CONGESTIVE HEART FAILURE CHRONICITY, UNSPECIFIED CONGESTIVE HEART FAILURE TYPE: Primary | ICD-10-CM

## 2017-08-21 PROCEDURE — 99214 OFFICE O/P EST MOD 30 MIN: CPT | Performed by: FAMILY MEDICINE

## 2017-08-21 RX ORDER — HYDROCORTISONE 5 MG/1
5 TABLET ORAL DAILY
COMMUNITY
End: 2017-09-20 | Stop reason: CLARIF

## 2017-08-21 RX ORDER — AMLODIPINE BESYLATE 5 MG/1
5 TABLET ORAL DAILY
Qty: 30 TABLET | Refills: 2 | Status: SHIPPED | OUTPATIENT
Start: 2017-08-21 | End: 2019-01-25

## 2017-08-21 NOTE — PROGRESS NOTES
Subjective   Micah Krishna is a 72 y.o. male presenting with   Chief Complaint   Patient presents with   • Hypertension   • Diabetes   • Joint Swelling     bilateral ankles and feet swollen x 3 months         HPI Comments: 72-year-old  white male nonsmoker comes in today for follow-up for diabetes and high blood pressure and adrenal insufficiency and benign prostatic hypertrophy.  He has been experiencing marked leg edema over the last 3 months and has an appointment to see his cardiologist Friday.  He had blood work done prior to arrival and I made him copies.  His uric acid and liver function and kidney function are normal.  His blood count is borderline.  His serum protein is normal.  His endocrinologist recently cut back on the dose of his steroid dose thinking that this might be the cause of his swelling but it has not really helped.  I told him that amlodipine frequently has this as a side effect and that since his blood pressure appears to be well controlled I would like to try to drop the dose of that.    He is anxious to eliminate some of his medications and I told him I would be happy to look over his list and see if there is anything that can be eliminated.    Hypertension   Associated symptoms include shortness of breath (he has mild chronic shortness of breath due to COPD.).   Diabetes     Joint Swelling   Associated symptoms include joint swelling.        The following portions of the patient's history were reviewed and updated as appropriate: current medications, past family history, past medical history, past social history, past surgical history and problem list.    Review of Systems   Respiratory: Positive for shortness of breath (he has mild chronic shortness of breath due to COPD.).    Cardiovascular: Positive for leg swelling.   Musculoskeletal: Positive for joint swelling.   All other systems reviewed and are negative.      Objective   Physical Exam   Constitutional: He is oriented to  person, place, and time. He appears well-developed and well-nourished. No distress.   HENT:   Head: Normocephalic and atraumatic.   Mouth/Throat: Oropharynx is clear and moist. No oropharyngeal exudate.   Eyes: EOM are normal. Pupils are equal, round, and reactive to light. No scleral icterus.   Neck: Normal range of motion. Neck supple. No thyromegaly present.   Cardiovascular: Normal rate and regular rhythm.  Exam reveals no friction rub.    No murmur heard.  Pulmonary/Chest: Effort normal and breath sounds normal. No respiratory distress. He has no wheezes.   Abdominal: Soft. Bowel sounds are normal.   Musculoskeletal: Edema: 3+  bilateral pitting ankle edema without calf pain.   Lymphadenopathy:     He has no cervical adenopathy.   Neurological: He is alert and oriented to person, place, and time. No cranial nerve deficit. Coordination normal.   Skin: Skin is warm and dry. He is not diaphoretic.   Psychiatric: He has a normal mood and affect. His behavior is normal.   Nursing note and vitals reviewed.      Assessment/Plan   Micah was seen today for hypertension, diabetes and joint swelling.    Diagnoses and all orders for this visit:    Essential hypertension    Mixed hyperlipidemia    Diabetes mellitus due to underlying condition with hyperglycemia, with long-term current use of insulin    Adrenal insufficiency    Bilateral edema of lower extremity  -     proBNP    Other orders  -     amLODIPine (NORVASC) 5 MG tablet; Take 1 tablet by mouth Daily.                   I would like him to return for another visit in 6 month(s)

## 2017-08-22 LAB — NT-PROBNP SERPL-MCNC: 164 PG/ML (ref 0–376)

## 2017-08-26 ENCOUNTER — RESULTS ENCOUNTER (OUTPATIENT)
Dept: FAMILY MEDICINE CLINIC | Facility: CLINIC | Age: 72
End: 2017-08-26

## 2017-08-26 DIAGNOSIS — I50.9 CONGESTIVE HEART FAILURE, UNSPECIFIED CONGESTIVE HEART FAILURE CHRONICITY, UNSPECIFIED CONGESTIVE HEART FAILURE TYPE: ICD-10-CM

## 2017-09-20 ENCOUNTER — OFFICE VISIT (OUTPATIENT)
Dept: FAMILY MEDICINE CLINIC | Facility: CLINIC | Age: 72
End: 2017-09-20

## 2017-09-20 VITALS
OXYGEN SATURATION: 94 % | WEIGHT: 265 LBS | HEIGHT: 72 IN | BODY MASS INDEX: 35.89 KG/M2 | DIASTOLIC BLOOD PRESSURE: 80 MMHG | HEART RATE: 82 BPM | SYSTOLIC BLOOD PRESSURE: 130 MMHG

## 2017-09-20 DIAGNOSIS — E08.65 DIABETES MELLITUS DUE TO UNDERLYING CONDITION WITH HYPERGLYCEMIA, WITH LONG-TERM CURRENT USE OF INSULIN (HCC): ICD-10-CM

## 2017-09-20 DIAGNOSIS — Z85.528 HISTORY OF RENAL CELL CARCINOMA: ICD-10-CM

## 2017-09-20 DIAGNOSIS — I10 ESSENTIAL HYPERTENSION: Primary | ICD-10-CM

## 2017-09-20 DIAGNOSIS — E27.40 ADRENAL INSUFFICIENCY (HCC): ICD-10-CM

## 2017-09-20 DIAGNOSIS — E78.2 MIXED HYPERLIPIDEMIA: ICD-10-CM

## 2017-09-20 DIAGNOSIS — Z23 NEED FOR STREPTOCOCCUS PNEUMONIAE AND INFLUENZA VACCINATION: ICD-10-CM

## 2017-09-20 DIAGNOSIS — Z79.4 DIABETES MELLITUS DUE TO UNDERLYING CONDITION WITH HYPERGLYCEMIA, WITH LONG-TERM CURRENT USE OF INSULIN (HCC): ICD-10-CM

## 2017-09-20 PROCEDURE — 99213 OFFICE O/P EST LOW 20 MIN: CPT | Performed by: FAMILY MEDICINE

## 2017-09-20 PROCEDURE — G0008 ADMIN INFLUENZA VIRUS VAC: HCPCS | Performed by: FAMILY MEDICINE

## 2017-09-20 PROCEDURE — 90732 PPSV23 VACC 2 YRS+ SUBQ/IM: CPT | Performed by: FAMILY MEDICINE

## 2017-09-20 PROCEDURE — G0009 ADMIN PNEUMOCOCCAL VACCINE: HCPCS | Performed by: FAMILY MEDICINE

## 2017-09-20 RX ORDER — CLOPIDOGREL BISULFATE 75 MG/1
TABLET ORAL
Refills: 2 | COMMUNITY
Start: 2017-08-30 | End: 2017-11-25 | Stop reason: SDUPTHER

## 2017-09-20 RX ORDER — OLMESARTAN MEDOXOMIL 40 MG/1
TABLET ORAL
Refills: 5 | COMMUNITY
Start: 2017-08-25 | End: 2019-04-26 | Stop reason: SDUPTHER

## 2017-09-20 RX ORDER — BUMETANIDE 1 MG/1
TABLET ORAL
Refills: 2 | COMMUNITY
Start: 2017-08-30 | End: 2017-09-20 | Stop reason: SDUPTHER

## 2017-09-20 RX ORDER — POTASSIUM CHLORIDE 20 MEQ/1
TABLET, EXTENDED RELEASE ORAL
Refills: 2 | COMMUNITY
Start: 2017-08-30 | End: 2017-11-25 | Stop reason: SDUPTHER

## 2017-09-20 RX ORDER — HYDROCORTISONE 5 MG/1
5 TABLET ORAL TAKE AS DIRECTED
COMMUNITY
End: 2018-05-16 | Stop reason: SDUPTHER

## 2017-09-20 RX ORDER — BUMETANIDE 2 MG/1
2 TABLET ORAL DAILY
Qty: 90 TABLET | Refills: 1 | Status: SHIPPED | OUTPATIENT
Start: 2017-09-20 | End: 2018-06-05 | Stop reason: SDUPTHER

## 2017-09-20 NOTE — PROGRESS NOTES
Subjective   Micah Krishna is a 72 y.o. male presenting with   Chief Complaint   Patient presents with   • Follow-up     hospital stay on 8-30-17   blood pressure was elevated    • Injections     Flu shot  pneumonia #2        HPI Comments: 72-year-old  white male with a history of adrenal insufficiency and renal cancer and high blood pressure and peripheral edema and diabetes on insulin who developed left arm weakness and numbness and went to a hospital where he had resolution of symptoms spontaneously and was diagnosed with a transient ischemic attack.  He also had severe swelling of his legs despite the use of furosemide, and was changed to Bumex.  He said he diuresed nicely but now has discovered he is beginning to start swelling again.  I told him I would increase the Bumex to 2 mg daily and see if we can regain control of this.       The following portions of the patient's history were reviewed and updated as appropriate: current medications, past family history, past medical history, past social history, past surgical history and problem list.    Review of Systems   Cardiovascular: Positive for leg swelling.   All other systems reviewed and are negative.      Objective   Physical Exam   Constitutional: He is oriented to person, place, and time. He appears well-developed and well-nourished. No distress.   HENT:   Head: Normocephalic and atraumatic.   Eyes: EOM are normal. Pupils are equal, round, and reactive to light.   Neck: Normal range of motion. Neck supple. No thyromegaly present.   Cardiovascular: Normal rate, regular rhythm, normal heart sounds and intact distal pulses.  Exam reveals no friction rub.    No murmur heard.  Pulmonary/Chest: Effort normal and breath sounds normal. No respiratory distress. He has no wheezes.   Abdominal: Soft. Bowel sounds are normal. He exhibits no distension. There is no tenderness.   Musculoskeletal: Normal range of motion. He exhibits edema (1  to 2+ pitting  edema of both legs). He exhibits no tenderness.   Lymphadenopathy:     He has no cervical adenopathy.   Neurological: He is alert and oriented to person, place, and time.   Skin: Skin is warm and dry. He is not diaphoretic.   Psychiatric: He has a normal mood and affect. His behavior is normal.   Nursing note and vitals reviewed.      Assessment/Plan   Micah was seen today for follow-up and injections.    Diagnoses and all orders for this visit:    Essential hypertension  -     Comprehensive Metabolic Panel    Mixed hyperlipidemia  -     Comprehensive Metabolic Panel    Diabetes mellitus due to underlying condition with hyperglycemia, with long-term current use of insulin  -     Comprehensive Metabolic Panel  -     Hemoglobin A1c    Adrenal insufficiency    History of renal cell carcinoma    Need for Streptococcus pneumoniae and influenza vaccination  -     Pneumococcal Polysaccharide Vaccine 23-Valent Greater Than or Equal To 1yo Subcutaneous / IM  -     Flu Vaccine High Dose PF 65YR+ (FLUZONE [id number])    Other orders  -     bumetanide (BUMEX) 2 MG tablet; Take 1 tablet by mouth Daily.                   I would like him to return for another visit in 3 month(s)

## 2017-09-20 NOTE — PATIENT INSTRUCTIONS
This is an extremely nice gentleman who had what looked like transient ischemic attack symptoms that resolve spontaneously.  He is here for follow-up.  I will request blood work to check on his potassium and A1c and notify him when the results are available.

## 2017-09-21 LAB
ALBUMIN SERPL-MCNC: 4.3 G/DL (ref 3.5–5.2)
ALBUMIN/GLOB SERPL: 1.5 G/DL
ALP SERPL-CCNC: 93 U/L (ref 39–117)
ALT SERPL-CCNC: 14 U/L (ref 1–41)
AST SERPL-CCNC: 15 U/L (ref 1–40)
BILIRUB SERPL-MCNC: 0.3 MG/DL (ref 0.1–1.2)
BUN SERPL-MCNC: 22 MG/DL (ref 8–23)
BUN/CREAT SERPL: 16.2 (ref 7–25)
CALCIUM SERPL-MCNC: 9.7 MG/DL (ref 8.6–10.5)
CHLORIDE SERPL-SCNC: 102 MMOL/L (ref 98–107)
CO2 SERPL-SCNC: 23.7 MMOL/L (ref 22–29)
CREAT SERPL-MCNC: 1.36 MG/DL (ref 0.76–1.27)
GLOBULIN SER CALC-MCNC: 2.9 GM/DL
GLUCOSE SERPL-MCNC: 150 MG/DL (ref 65–99)
HBA1C MFR BLD: 6.56 % (ref 4.8–5.6)
POTASSIUM SERPL-SCNC: 3.9 MMOL/L (ref 3.5–5.2)
PROT SERPL-MCNC: 7.2 G/DL (ref 6–8.5)
SODIUM SERPL-SCNC: 141 MMOL/L (ref 136–145)

## 2017-10-06 RX ORDER — ALLOPURINOL 300 MG/1
TABLET ORAL
Qty: 90 TABLET | Refills: 3 | Status: SHIPPED | OUTPATIENT
Start: 2017-10-06 | End: 2018-09-28 | Stop reason: SDUPTHER

## 2017-10-23 ENCOUNTER — OFFICE VISIT (OUTPATIENT)
Dept: FAMILY MEDICINE CLINIC | Facility: CLINIC | Age: 72
End: 2017-10-23

## 2017-10-23 VITALS
HEART RATE: 68 BPM | HEIGHT: 72 IN | DIASTOLIC BLOOD PRESSURE: 70 MMHG | WEIGHT: 248 LBS | SYSTOLIC BLOOD PRESSURE: 128 MMHG | OXYGEN SATURATION: 96 % | BODY MASS INDEX: 33.59 KG/M2

## 2017-10-23 DIAGNOSIS — Z79.4 DIABETES MELLITUS DUE TO UNDERLYING CONDITION WITH HYPERGLYCEMIA, WITH LONG-TERM CURRENT USE OF INSULIN (HCC): ICD-10-CM

## 2017-10-23 DIAGNOSIS — E78.2 MIXED HYPERLIPIDEMIA: ICD-10-CM

## 2017-10-23 DIAGNOSIS — E08.65 DIABETES MELLITUS DUE TO UNDERLYING CONDITION WITH HYPERGLYCEMIA, WITH LONG-TERM CURRENT USE OF INSULIN (HCC): ICD-10-CM

## 2017-10-23 DIAGNOSIS — I10 ESSENTIAL HYPERTENSION: ICD-10-CM

## 2017-10-23 DIAGNOSIS — E27.40 ADRENAL INSUFFICIENCY (HCC): ICD-10-CM

## 2017-10-23 DIAGNOSIS — M79.10 MYALGIA: Primary | ICD-10-CM

## 2017-10-23 PROCEDURE — 99214 OFFICE O/P EST MOD 30 MIN: CPT | Performed by: FAMILY MEDICINE

## 2017-10-23 NOTE — PROGRESS NOTES
Subjective   Micah Krishna is a 72 y.o. male presenting with   Chief Complaint   Patient presents with   • Leg Issues      bilateral leg soreness and weakness         HPI Comments: This is a gentleman who has diabetes and adrenal insufficiency secondary to surgical removal due to renal cell carcinoma and high cholesterol and high blood pressure and gout.  He has noticed that his legs have become more and more weak lately.  When he tries to get up from a seated position he has to use his hands.  He cannot do any sort of deep knee bends because of weakness.  He says this has been going on for a while and preceded the 17 pound weight loss he has experienced from diuresis.    He does not have any orthostatic symptoms such as dizziness or palpitations when he stands up.  Once he is up and erect he can walk without the aid of a cane or walker.  It is the act of rising that causes difficulty due to the thigh muscle weakness.  He is on steroids so I have to consider steroid myopathy but he is also on Crestor so I have to consider statin myopathy.  He does have some back pain so you have to worry about stenosis or nerve impingement causing this.    He also has a history of hypertension but his blood pressure is well-controlled.       The following portions of the patient's history were reviewed and updated as appropriate: current medications, past family history, past medical history, past social history, past surgical history and problem list.    Review of Systems   Musculoskeletal: Positive for myalgias.   Neurological: Positive for weakness.   All other systems reviewed and are negative.      Objective   Physical Exam   Constitutional: He is oriented to person, place, and time. He appears well-developed and well-nourished. No distress.   HENT:   Head: Normocephalic.   Mouth/Throat: Oropharynx is clear and moist. No oropharyngeal exudate.   Eyes: EOM are normal. Pupils are equal, round, and reactive to light. No scleral  icterus.   Neck: Normal range of motion. Neck supple. No thyromegaly present.   Cardiovascular: Normal rate and regular rhythm.    Pulmonary/Chest: Effort normal and breath sounds normal.   Abdominal: Soft. Bowel sounds are normal. He exhibits no distension. There is no tenderness.   Musculoskeletal: He exhibits tenderness (he has some tenderness and palpable muscle spasm in the right quadriceps femoris muscle and displays weakness when he tries to rise from a seated position). He exhibits no edema or deformity.   Lymphadenopathy:     He has no cervical adenopathy.   Neurological: He is alert and oriented to person, place, and time. No cranial nerve deficit. Coordination normal.   Skin: Skin is warm and dry. He is not diaphoretic.   Psychiatric: He has a normal mood and affect.   Nursing note and vitals reviewed.      Assessment/Plan   Micah was seen today for leg issues.    Diagnoses and all orders for this visit:    Myalgia  -     Comprehensive Metabolic Panel  -     TSH  -     CK  -     Sedimentation rate, automated  -     EMG & Nerve Conduction Test    Mixed hyperlipidemia    Essential hypertension  -     Comprehensive Metabolic Panel  -     TSH    Diabetes mellitus due to underlying condition with hyperglycemia, with long-term current use of insulin    Adrenal insufficiency                   I would like him to return for another visit in 3 month(s)

## 2017-10-23 NOTE — PATIENT INSTRUCTIONS
This is a very nice 72-year-old who has developed muscle weakness in his thighs.  I am concerned it could be due to the Crestor, so I would like him to stop that.  I will request nerve conduction studies and blood work and notify him when the results are available.

## 2017-10-24 LAB
ALBUMIN SERPL-MCNC: 4.6 G/DL (ref 3.5–5.2)
ALBUMIN/GLOB SERPL: 1.6 G/DL
ALP SERPL-CCNC: 89 U/L (ref 39–117)
ALT SERPL-CCNC: 12 U/L (ref 1–41)
AST SERPL-CCNC: 14 U/L (ref 1–40)
BILIRUB SERPL-MCNC: 0.3 MG/DL (ref 0.1–1.2)
BUN SERPL-MCNC: 24 MG/DL (ref 8–23)
BUN/CREAT SERPL: 18.9 (ref 7–25)
CALCIUM SERPL-MCNC: 10.1 MG/DL (ref 8.6–10.5)
CHLORIDE SERPL-SCNC: 104 MMOL/L (ref 98–107)
CK SERPL-CCNC: 107 U/L (ref 20–200)
CO2 SERPL-SCNC: 26.7 MMOL/L (ref 22–29)
CREAT SERPL-MCNC: 1.27 MG/DL (ref 0.76–1.27)
ERYTHROCYTE [SEDIMENTATION RATE] IN BLOOD BY WESTERGREN METHOD: 23 MM/HR (ref 0–20)
GFR SERPLBLD CREATININE-BSD FMLA CKD-EPI: 55 ML/MIN/1.73
GFR SERPLBLD CREATININE-BSD FMLA CKD-EPI: 67 ML/MIN/1.73
GLOBULIN SER CALC-MCNC: 2.8 GM/DL
GLUCOSE SERPL-MCNC: 159 MG/DL (ref 65–99)
POTASSIUM SERPL-SCNC: 4.3 MMOL/L (ref 3.5–5.2)
PROT SERPL-MCNC: 7.4 G/DL (ref 6–8.5)
SODIUM SERPL-SCNC: 145 MMOL/L (ref 136–145)
TSH SERPL DL<=0.005 MIU/L-ACNC: 1.08 MIU/ML (ref 0.27–4.2)

## 2017-11-02 ENCOUNTER — HOSPITAL ENCOUNTER (OUTPATIENT)
Dept: INFUSION THERAPY | Facility: HOSPITAL | Age: 72
Discharge: HOME | End: 2017-11-02
Attending: PSYCHIATRY & NEUROLOGY | Admitting: PSYCHIATRY & NEUROLOGY

## 2017-11-02 DIAGNOSIS — R29.898 WEAKNESS OF BOTH LEGS: Primary | ICD-10-CM

## 2017-11-02 PROCEDURE — 95908 NRV CNDJ TST 3-4 STUDIES: CPT

## 2017-11-02 PROCEDURE — 95886 MUSC TEST DONE W/N TEST COMP: CPT | Performed by: PSYCHIATRY & NEUROLOGY

## 2017-11-02 PROCEDURE — 95886 MUSC TEST DONE W/N TEST COMP: CPT

## 2017-11-02 PROCEDURE — 95908 NRV CNDJ TST 3-4 STUDIES: CPT | Performed by: PSYCHIATRY & NEUROLOGY

## 2017-11-02 NOTE — PROCEDURES
EMG REPORT    Indication: Leg weakness    Findings: Bilateral peroneal motor study showed normal distal latencies velocities and amplitudes.  Left tibial motor study showed normal distal latency amplitude and F-wave latency.    Concentric needle EMG of bilateral vastus lateralis, vastus medialis, anterior tibialis, peroneus, gastrocnemius muscles showed no abnormality.    Impression: Normal EMG and nerve conduction studies of both lower extremities.    Thank you for sending this patient for further evaluation.  Leonel Chavira M.D.

## 2017-11-09 RX ORDER — FINASTERIDE 5 MG/1
TABLET, FILM COATED ORAL
Qty: 90 TABLET | Refills: 3 | Status: SHIPPED | OUTPATIENT
Start: 2017-11-09 | End: 2018-09-12 | Stop reason: SDUPTHER

## 2017-11-09 RX ORDER — OMEPRAZOLE 20 MG/1
CAPSULE, DELAYED RELEASE ORAL
Qty: 90 CAPSULE | Refills: 3 | Status: SHIPPED | OUTPATIENT
Start: 2017-11-09 | End: 2018-07-25 | Stop reason: SDUPTHER

## 2017-11-17 RX ORDER — ISOSORBIDE MONONITRATE 30 MG/1
TABLET, EXTENDED RELEASE ORAL
Qty: 180 TABLET | Refills: 3 | Status: SHIPPED | OUTPATIENT
Start: 2017-11-17 | End: 2018-09-28 | Stop reason: SDUPTHER

## 2017-11-27 ENCOUNTER — TELEPHONE (OUTPATIENT)
Dept: FAMILY MEDICINE CLINIC | Facility: CLINIC | Age: 72
End: 2017-11-27

## 2017-11-27 RX ORDER — POTASSIUM CHLORIDE 20 MEQ/1
TABLET, EXTENDED RELEASE ORAL
Qty: 30 TABLET | Refills: 5 | Status: SHIPPED | OUTPATIENT
Start: 2017-11-27 | End: 2018-05-22 | Stop reason: SDUPTHER

## 2017-11-27 RX ORDER — CLOPIDOGREL BISULFATE 75 MG/1
TABLET ORAL
Qty: 30 TABLET | Refills: 5 | Status: SHIPPED | OUTPATIENT
Start: 2017-11-27 | End: 2018-05-07 | Stop reason: SDUPTHER

## 2017-12-06 RX ORDER — METOPROLOL SUCCINATE 100 MG/1
TABLET, EXTENDED RELEASE ORAL
Qty: 90 TABLET | Refills: 1 | Status: SHIPPED | OUTPATIENT
Start: 2017-12-06 | End: 2018-06-24 | Stop reason: SDUPTHER

## 2017-12-07 DIAGNOSIS — E03.9 HYPOTHYROIDISM, UNSPECIFIED TYPE: ICD-10-CM

## 2017-12-07 DIAGNOSIS — E11.9 DIABETES MELLITUS WITHOUT COMPLICATION (HCC): ICD-10-CM

## 2017-12-07 DIAGNOSIS — R70.0 ELEVATED SED RATE: ICD-10-CM

## 2017-12-07 DIAGNOSIS — E78.5 HYPERLIPIDEMIA, UNSPECIFIED HYPERLIPIDEMIA TYPE: Primary | ICD-10-CM

## 2017-12-13 ENCOUNTER — RESULTS ENCOUNTER (OUTPATIENT)
Dept: FAMILY MEDICINE CLINIC | Facility: CLINIC | Age: 72
End: 2017-12-13

## 2017-12-13 DIAGNOSIS — E78.5 HYPERLIPIDEMIA, UNSPECIFIED HYPERLIPIDEMIA TYPE: ICD-10-CM

## 2017-12-13 DIAGNOSIS — R70.0 ELEVATED SED RATE: ICD-10-CM

## 2017-12-13 DIAGNOSIS — E03.9 HYPOTHYROIDISM, UNSPECIFIED TYPE: ICD-10-CM

## 2017-12-13 DIAGNOSIS — E11.9 DIABETES MELLITUS WITHOUT COMPLICATION (HCC): ICD-10-CM

## 2017-12-15 LAB
ALBUMIN SERPL-MCNC: 4.4 G/DL (ref 3.5–5.2)
ALBUMIN/GLOB SERPL: 1.5 G/DL
ALP SERPL-CCNC: 94 U/L (ref 39–117)
ALT SERPL-CCNC: 14 U/L (ref 1–41)
AST SERPL-CCNC: 14 U/L (ref 1–40)
BASOPHILS # BLD AUTO: 0.05 10*3/MM3 (ref 0–0.2)
BASOPHILS NFR BLD AUTO: 0.9 % (ref 0–1.5)
BILIRUB SERPL-MCNC: 0.4 MG/DL (ref 0.1–1.2)
BUN SERPL-MCNC: 23 MG/DL (ref 8–23)
BUN/CREAT SERPL: 19.2 (ref 7–25)
CALCIUM SERPL-MCNC: 9.4 MG/DL (ref 8.6–10.5)
CHLORIDE SERPL-SCNC: 104 MMOL/L (ref 98–107)
CHOLEST SERPL-MCNC: 176 MG/DL (ref 0–200)
CO2 SERPL-SCNC: 25 MMOL/L (ref 22–29)
CREAT SERPL-MCNC: 1.2 MG/DL (ref 0.76–1.27)
EOSINOPHIL # BLD AUTO: 0.44 10*3/MM3 (ref 0–0.7)
EOSINOPHIL NFR BLD AUTO: 7.5 % (ref 0.3–6.2)
ERYTHROCYTE [DISTWIDTH] IN BLOOD BY AUTOMATED COUNT: 14.4 % (ref 11.5–14.5)
ERYTHROCYTE [SEDIMENTATION RATE] IN BLOOD BY WESTERGREN METHOD: 17 MM/HR (ref 0–20)
GFR SERPLBLD CREATININE-BSD FMLA CKD-EPI: 59 ML/MIN/1.73
GFR SERPLBLD CREATININE-BSD FMLA CKD-EPI: 72 ML/MIN/1.73
GLOBULIN SER CALC-MCNC: 2.9 GM/DL
GLUCOSE SERPL-MCNC: 121 MG/DL (ref 65–99)
HBA1C MFR BLD: 6.9 % (ref 4.8–5.6)
HCT VFR BLD AUTO: 35.9 % (ref 40.4–52.2)
HDLC SERPL-MCNC: 25 MG/DL (ref 40–60)
HGB BLD-MCNC: 11.8 G/DL (ref 13.7–17.6)
IMM GRANULOCYTES # BLD: 0.02 10*3/MM3 (ref 0–0.03)
IMM GRANULOCYTES NFR BLD: 0.3 % (ref 0–0.5)
LDLC SERPL CALC-MCNC: ABNORMAL MG/DL
LDLC/HDLC SERPL: ABNORMAL {RATIO}
LYMPHOCYTES # BLD AUTO: 1.84 10*3/MM3 (ref 0.9–4.8)
LYMPHOCYTES NFR BLD AUTO: 31.4 % (ref 19.6–45.3)
MCH RBC QN AUTO: 28.9 PG (ref 27–32.7)
MCHC RBC AUTO-ENTMCNC: 32.9 G/DL (ref 32.6–36.4)
MCV RBC AUTO: 88 FL (ref 79.8–96.2)
MONOCYTES # BLD AUTO: 0.42 10*3/MM3 (ref 0.2–1.2)
MONOCYTES NFR BLD AUTO: 7.2 % (ref 5–12)
NEUTROPHILS # BLD AUTO: 3.09 10*3/MM3 (ref 1.9–8.1)
NEUTROPHILS NFR BLD AUTO: 52.7 % (ref 42.7–76)
PLATELET # BLD AUTO: 165 10*3/MM3 (ref 140–500)
POTASSIUM SERPL-SCNC: 4.3 MMOL/L (ref 3.5–5.2)
PROT SERPL-MCNC: 7.3 G/DL (ref 6–8.5)
RBC # BLD AUTO: 4.08 10*6/MM3 (ref 4.6–6)
SODIUM SERPL-SCNC: 145 MMOL/L (ref 136–145)
TRIGL SERPL-MCNC: 422 MG/DL (ref 0–150)
VLDLC SERPL CALC-MCNC: ABNORMAL MG/DL
WBC # BLD AUTO: 5.86 10*3/MM3 (ref 4.5–10.7)

## 2017-12-20 ENCOUNTER — OFFICE VISIT (OUTPATIENT)
Dept: FAMILY MEDICINE CLINIC | Facility: CLINIC | Age: 72
End: 2017-12-20

## 2017-12-20 VITALS
BODY MASS INDEX: 33.46 KG/M2 | HEIGHT: 72 IN | OXYGEN SATURATION: 97 % | SYSTOLIC BLOOD PRESSURE: 110 MMHG | TEMPERATURE: 98.5 F | DIASTOLIC BLOOD PRESSURE: 80 MMHG | WEIGHT: 247 LBS | HEART RATE: 65 BPM

## 2017-12-20 DIAGNOSIS — Z79.4 DIABETES MELLITUS DUE TO UNDERLYING CONDITION WITH HYPERGLYCEMIA, WITH LONG-TERM CURRENT USE OF INSULIN (HCC): ICD-10-CM

## 2017-12-20 DIAGNOSIS — M79.10 MYALGIA: Primary | ICD-10-CM

## 2017-12-20 DIAGNOSIS — M65.321 TRIGGER INDEX FINGER OF RIGHT HAND: ICD-10-CM

## 2017-12-20 DIAGNOSIS — I10 ESSENTIAL HYPERTENSION: ICD-10-CM

## 2017-12-20 DIAGNOSIS — E27.40 ADRENAL INSUFFICIENCY (HCC): ICD-10-CM

## 2017-12-20 DIAGNOSIS — E08.65 DIABETES MELLITUS DUE TO UNDERLYING CONDITION WITH HYPERGLYCEMIA, WITH LONG-TERM CURRENT USE OF INSULIN (HCC): ICD-10-CM

## 2017-12-20 DIAGNOSIS — E78.2 MIXED HYPERLIPIDEMIA: ICD-10-CM

## 2017-12-20 PROCEDURE — 99213 OFFICE O/P EST LOW 20 MIN: CPT | Performed by: FAMILY MEDICINE

## 2017-12-20 RX ORDER — ICOSAPENT ETHYL 1000 MG/1
1 CAPSULE ORAL
COMMUNITY
Start: 2017-11-10 | End: 2020-10-22

## 2017-12-20 RX ORDER — NITROGLYCERIN 400 UG/1
1 SPRAY ORAL
Qty: 12 G | Refills: 5 | Status: SHIPPED | OUTPATIENT
Start: 2017-12-20

## 2017-12-20 NOTE — PROGRESS NOTES
Subjective   Micah Krishna is a 72 y.o. male presenting with   Chief Complaint   Patient presents with   • Hypertension     check up    • Med Refill     nitroglycerin spray  send to [facility] INGRAM Comments: This is an extremely nice 72-year-old who is here for follow-up for adrenal insufficiency and diabetes and high blood pressure and coronary artery disease and mixed hyperlipidemia.  I have made copies of his blood work and gone over with him.  His A1c is 6.9 showing reasonable control of his blood sugar.  His cholesterol is acceptable but his triglycerides are extremely high.    He tells me his main concern is increasing weakness and soreness in his thigh muscles.  He says he has gotten to the point now where he has to use his arms to raise up from a seated position.  He discontinued any statins but this has not improved.  His conduction study was normal.  I am worried that this is steroid myopathy but certainly it could be something else, so I will request a rheumatology consult to evaluate this before it gets any worse.    Hypertension          The following portions of the patient's history were reviewed and updated as appropriate: current medications, past family history, past medical history, past social history, past surgical history and problem list.    Review of Systems   Musculoskeletal: Positive for myalgias.   Neurological: Positive for weakness.   All other systems reviewed and are negative.      Objective   Physical Exam   Constitutional: He is oriented to person, place, and time. He appears well-developed and well-nourished.   HENT:   Head: Normocephalic and atraumatic.   Eyes: EOM are normal. Pupils are equal, round, and reactive to light.   Neck: Normal range of motion. Neck supple. No thyromegaly present.   Cardiovascular: Normal rate, regular rhythm, normal heart sounds and intact distal pulses.    No murmur heard.  Pulmonary/Chest: Effort normal and breath sounds normal. No  respiratory distress. He has no wheezes.   Musculoskeletal: He exhibits tenderness (minor tenderness to firm palpation of the quadriceps muscle group without any obvious deformity). He exhibits no edema or deformity.   Lymphadenopathy:     He has no cervical adenopathy.   Neurological: He is alert and oriented to person, place, and time. No cranial nerve deficit. Coordination normal.   Skin: Skin is warm and dry.   Psychiatric: He has a normal mood and affect. His behavior is normal.   Nursing note and vitals reviewed.      Assessment/Plan   Micah was seen today for hypertension and med refill.    Diagnoses and all orders for this visit:    Myalgia  -     Ambulatory Referral to Rheumatology    Trigger index finger of right hand  -     Ambulatory Referral to Hand Surgery    Essential hypertension    Mixed hyperlipidemia    Diabetes mellitus due to underlying condition with hyperglycemia, with long-term current use of insulin    Adrenal insufficiency    Other orders  -     nitroglycerin (NITROLINGUAL) 0.4 MG/SPRAY spray; Place 1 spray under the tongue Every 5 (Five) Minutes As Needed for Chest Pain.                   I would like him to return for another visit in 6 month(s)

## 2017-12-20 NOTE — PATIENT INSTRUCTIONS
This is an extremely nice 72-year-old who is here for follow-up.  He has increasingly severe discomfort and weakness in his thigh muscles, so I will request a rheumatology consult.  He also is developing trigger finger in both hands so I will request a consultation with our hand specialist.

## 2018-01-10 ENCOUNTER — LAB (OUTPATIENT)
Dept: OTHER | Facility: HOSPITAL | Age: 73
End: 2018-01-10

## 2018-01-10 ENCOUNTER — OFFICE VISIT (OUTPATIENT)
Dept: ONCOLOGY | Facility: CLINIC | Age: 73
End: 2018-01-10

## 2018-01-10 ENCOUNTER — HOSPITAL ENCOUNTER (OUTPATIENT)
Dept: GENERAL RADIOLOGY | Facility: HOSPITAL | Age: 73
Discharge: HOME | End: 2018-01-10
Attending: INTERNAL MEDICINE | Admitting: INTERNAL MEDICINE

## 2018-01-10 VITALS
BODY MASS INDEX: 33.05 KG/M2 | HEIGHT: 72 IN | RESPIRATION RATE: 18 BRPM | HEART RATE: 74 BPM | WEIGHT: 244 LBS | OXYGEN SATURATION: 98 % | DIASTOLIC BLOOD PRESSURE: 70 MMHG | SYSTOLIC BLOOD PRESSURE: 130 MMHG | TEMPERATURE: 98.4 F

## 2018-01-10 DIAGNOSIS — Z85.528 HISTORY OF RENAL CELL CARCINOMA: Primary | ICD-10-CM

## 2018-01-10 DIAGNOSIS — Z85.528 HISTORY OF RENAL CELL CARCINOMA: ICD-10-CM

## 2018-01-10 LAB
ALBUMIN SERPL-MCNC: 4.3 G/DL (ref 3.5–5.2)
ALBUMIN/GLOB SERPL: 1.5 G/DL
ALP SERPL-CCNC: 92 U/L (ref 39–117)
ALT SERPL W P-5'-P-CCNC: 16 U/L (ref 1–41)
ANION GAP SERPL CALCULATED.3IONS-SCNC: 13.8 MMOL/L
AST SERPL-CCNC: 15 U/L (ref 1–40)
BASOPHILS # BLD AUTO: 0.08 10*3/MM3 (ref 0–0.2)
BASOPHILS NFR BLD AUTO: 1.6 % (ref 0–1.5)
BILIRUB SERPL-MCNC: 0.4 MG/DL (ref 0.1–1.2)
BUN BLD-MCNC: 34 MG/DL (ref 8–23)
BUN/CREAT SERPL: 24.3 (ref 7–25)
CALCIUM SPEC-SCNC: 9.6 MG/DL (ref 8.6–10.5)
CHLORIDE SERPL-SCNC: 103 MMOL/L (ref 98–107)
CO2 SERPL-SCNC: 24.2 MMOL/L (ref 22–29)
CREAT BLD-MCNC: 1.4 MG/DL (ref 0.76–1.27)
DEPRECATED RDW RBC AUTO: 42.5 FL (ref 37–54)
EOSINOPHIL # BLD AUTO: 0.24 10*3/MM3 (ref 0–0.7)
EOSINOPHIL NFR BLD AUTO: 4.8 % (ref 0.3–6.2)
ERYTHROCYTE [DISTWIDTH] IN BLOOD BY AUTOMATED COUNT: 14 % (ref 11.5–14.5)
GFR SERPL CREATININE-BSD FRML MDRD: 50 ML/MIN/1.73
GLOBULIN UR ELPH-MCNC: 2.9 GM/DL
GLUCOSE BLD-MCNC: 245 MG/DL (ref 65–99)
HCT VFR BLD AUTO: 33.3 % (ref 40.4–52.2)
HGB BLD-MCNC: 11.4 G/DL (ref 13.7–17.6)
IMM GRANULOCYTES # BLD: 0.02 10*3/MM3 (ref 0–0.03)
IMM GRANULOCYTES NFR BLD: 0.4 % (ref 0–0.5)
LYMPHOCYTES # BLD AUTO: 1.09 10*3/MM3 (ref 0.9–4.8)
LYMPHOCYTES NFR BLD AUTO: 21.7 % (ref 19.6–45.3)
MCH RBC QN AUTO: 29.1 PG (ref 27–32.7)
MCHC RBC AUTO-ENTMCNC: 34.2 G/DL (ref 32.6–36.4)
MCV RBC AUTO: 84.9 FL (ref 79.8–96.2)
MONOCYTES # BLD AUTO: 0.22 10*3/MM3 (ref 0.2–1.2)
MONOCYTES NFR BLD AUTO: 4.4 % (ref 5–12)
NEUTROPHILS # BLD AUTO: 3.37 10*3/MM3 (ref 1.9–8.1)
NEUTROPHILS NFR BLD AUTO: 67.1 % (ref 42.7–76)
NRBC BLD MANUAL-RTO: 0 /100 WBC (ref 0–0)
PLATELET # BLD AUTO: 179 10*3/MM3 (ref 140–500)
PMV BLD AUTO: 10.2 FL (ref 6–12)
POTASSIUM BLD-SCNC: 4.4 MMOL/L (ref 3.5–5.2)
PROT SERPL-MCNC: 7.2 G/DL (ref 6–8.5)
RBC # BLD AUTO: 3.92 10*6/MM3 (ref 4.6–6)
SODIUM BLD-SCNC: 141 MMOL/L (ref 136–145)
WBC NRBC COR # BLD: 5.02 10*3/MM3 (ref 4.5–10.7)

## 2018-01-10 PROCEDURE — 80053 COMPREHEN METABOLIC PANEL: CPT | Performed by: INTERNAL MEDICINE

## 2018-01-10 PROCEDURE — 71046 X-RAY EXAM CHEST 2 VIEWS: CPT

## 2018-01-10 PROCEDURE — 85025 COMPLETE CBC W/AUTO DIFF WBC: CPT | Performed by: INTERNAL MEDICINE

## 2018-01-10 PROCEDURE — 99214 OFFICE O/P EST MOD 30 MIN: CPT | Performed by: INTERNAL MEDICINE

## 2018-01-10 PROCEDURE — 36415 COLL VENOUS BLD VENIPUNCTURE: CPT

## 2018-01-10 RX ORDER — ROSUVASTATIN CALCIUM 20 MG/1
TABLET, COATED ORAL
COMMUNITY
Start: 2017-12-05 | End: 2018-01-28 | Stop reason: SDUPTHER

## 2018-01-10 RX ORDER — CLONIDINE HYDROCHLORIDE 0.2 MG/1
TABLET ORAL
COMMUNITY
Start: 2017-11-27 | End: 2019-07-10 | Stop reason: SDUPTHER

## 2018-01-10 RX ORDER — AMLODIPINE BESYLATE 10 MG/1
TABLET ORAL
COMMUNITY
Start: 2017-12-05 | End: 2018-01-10 | Stop reason: SDUPTHER

## 2018-01-10 RX ORDER — INSULIN DETEMIR 100 [IU]/ML
INJECTION, SOLUTION SUBCUTANEOUS
COMMUNITY
Start: 2017-12-11 | End: 2018-01-10 | Stop reason: SDUPTHER

## 2018-01-10 NOTE — PROGRESS NOTES
[facility] GROUP OUTPATIENT FOLLOW UP CLINIC VISIT    REASON FOR FOLLOW-UP:    1.  History of metastatic clear cell renal cell carcinoma.  All disease has been resected.  Currently no evidence of disease.    HISTORY OF PRESENT ILLNESS:  Micah Krishna is a 72 y.o. male who returns today for follow up of the above issue.     He was admitted to [facility] with what was presumed to be a TIA.  Symptoms resolved quickly.  He now complains of proximal muscle weakness ongoing for months.  He has been referred to rheumatology by his primary care provider.  He has a hard time getting up out of a chair due to proximal lower extremity weakness.  He denies the lymphadenopathy.  No lower back pain that is persistent.  He does have some pain in his lower extremities when he rests at night but this actually resolves with physical activity.  Bilateral lower extremity edema improved after diuresis.    PAST MEDICAL, SURGICAL, FAMILY, AND SOCIAL HISTORIES were reviewed with the patient and in the electronic medical record, and were updated if indicated.    ALLERGIES:  Allergies   Allergen Reactions   • Shellfish-Derived Products Anaphylaxis   • Iodinated Diagnostic Agents Itching and Swelling   • Shellfish Allergy    • Adhesive Tape Hives       MEDICATIONS:  The medication list has been reviewed with the patient by the medical assistant, and the list has been updated in the electronic medical record, which I reviewed.  Medication dosages and frequencies were confirmed to be accurate.    REVIEW OF SYSTEMS:  PAIN:  See Vital Signs below.  GENERAL:  No fevers, chills, night sweats, or unintended weight loss.  SKIN:  No rashes or non-healing lesions.  HEME/LYMPH:  No abnormal bleeding.  No palpable lymphadenopathy.  EYES:  No vision changes or diplopia.  ENT:  No sore throat or difficulty swallowing.  RESPIRATORY:  No cough, shortness of breath, hemoptysis, or wheezing.  CARDIOVASCULAR:  No chest pain, palpitations,  "orthopnea, or dyspnea on exertion.  GASTROINTESTINAL:  No abdominal pain, nausea, vomiting, constipation, diarrhea, melena, or hematochezia.  GENITOURINARY:  No dysuria or hematuria.  MUSCULOSKELETAL:  See history of present illness  NEUROLOGIC:  No dizziness, loss of consciousness, or seizures.  PSYCHIATRIC:  No depression, anxiety, or mood changes.    Vitals:    01/10/18 1312   BP: 130/70   Pulse: 74   Resp: 18   Temp: 98.4 °F (36.9 °C)   TempSrc: Oral   SpO2: 98%   Weight: 111 kg (244 lb)   Height: 182 cm (71.65\")   PainSc: 2  Comment: pain and weakness in the legs.   PainLoc: Leg       PHYSICAL EXAMINATION:  GENERAL:  Well-developed well-nourished male; awake, alert and oriented, in no acute distress.  Some difficulty rising from a chair is noted.  SKIN:  Warm and dry, without rashes, purpura, or petechiae.  HEAD:  Normocephalic, atraumatic.  EYES:  Pupils equal, round and reactive to light.  Extraocular movements intact.  Conjunctivae normal.  EARS:  Hearing intact.  NOSE:  Septum midline.  No excoriations or nasal discharge.  MOUTH:  No stomatitis or ulcers.  Lips are normal.  THROAT:  Oropharynx without lesions or exudates.  NECK:  Supple with good range of motion; no thyromegaly or masses; no JVD or bruits.  LYMPHATICS:  No cervical, supraclavicular, axillary, or inguinal lymphadenopathy.  CHEST:  Lungs are clear to auscultation bilaterally.  No wheezes, rales, or rhonchi.  HEART:  Regular rate; normal rhythm.  No murmurs, gallops or rubs.  ABDOMEN:  Soft, non-tender, non-distended.  Normal active bowel sounds.  No organomegaly.  EXTREMITIES:  No clubbing cyanosis or edema  NEUROLOGICAL:  No focal neurologic deficits.    DIAGNOSTIC DATA:  Lab Results   Component Value Date    WBC 5.02 01/10/2018    HGB 11.4 (L) 01/10/2018    HCT 33.3 (L) 01/10/2018    MCV 84.9 01/10/2018     01/10/2018       IMAGING:  CT imaging from 7/10/2017 shows no evidence for metastatic disease in the chest abdomen or " "pelvis    ASSESSMENT:  This is a 72 y.o. male with:  1.  Mild normocytic anemia: His hemoglobin is stable.  2.  Adrenal insufficiency following bilateral adrenalectomy currently on replacement hormone therapy.  He follows with endocrinology.  3.  Metastatic renal cell carcinoma.  He had a left nephrectomy and adrenalectomy in 2000 and then a right adrenalectomy for metastatic disease in 2012.  Currently no evidence of disease on CT imaging from 7/10/2017.  He will have a chest x-ray today.  4.  Hypertension:  Blood pressure better after diuresis   5.  Lower extremity edema:  He is currently on diuretics with improvement.  6.  Proximal muscle weakness: He has been referred to rheumatology and polymyalgia rheumatica is certainly a consideration.  There are no other signs of adrenal insufficiency but I wonder if an adjustment in his steroid dosing might help.  He was told a long time ago that he has a disc issue with his lower back.  He denies any back pain but certainly a lumbar spine issue could be contributing to this as well.  This does not sound like a vascular issue since his symptoms improve with activity and worsen with rest.  This was a new issue that we discussed today.  He states that he has been concerned that he has a \"blood disorder\" contributing to this.  I reassured him today that his blood counts look good aside from mild anemia.    PLAN:   1.  He will see rheumatology  2.  He will follow up with Dr. Dowd his endocrinologist  3.  Return in 6 months for follow-up with labs and a CT scan of the chest abdomen and pelvis without IV contrast done one week prior.  4.  I will follow-up his chest x-ray from today and notify him of any abnormalities.  "

## 2018-01-29 RX ORDER — LOSARTAN POTASSIUM 100 MG/1
TABLET ORAL
Qty: 90 TABLET | Refills: 1 | Status: SHIPPED | OUTPATIENT
Start: 2018-01-29 | End: 2019-04-26

## 2018-01-29 RX ORDER — AMLODIPINE BESYLATE 10 MG/1
TABLET ORAL
Qty: 90 TABLET | Refills: 1 | Status: SHIPPED | OUTPATIENT
Start: 2018-01-29 | End: 2018-05-23 | Stop reason: SDUPTHER

## 2018-01-29 RX ORDER — ROSUVASTATIN CALCIUM 20 MG/1
TABLET, COATED ORAL
Qty: 90 TABLET | Refills: 1 | Status: SHIPPED | OUTPATIENT
Start: 2018-01-29 | End: 2018-05-23 | Stop reason: SDUPTHER

## 2018-05-07 RX ORDER — CLOPIDOGREL BISULFATE 75 MG/1
TABLET ORAL
Qty: 90 TABLET | Refills: 0 | Status: SHIPPED | OUTPATIENT
Start: 2018-05-07 | End: 2018-07-30 | Stop reason: SDUPTHER

## 2018-05-16 RX ORDER — HYDROCORTISONE 10 MG/1
10 TABLET ORAL TAKE AS DIRECTED
Qty: 135 TABLET | Refills: 3 | Status: SHIPPED | OUTPATIENT
Start: 2018-05-16 | End: 2019-04-26 | Stop reason: DRUGHIGH

## 2018-05-23 RX ORDER — ROSUVASTATIN CALCIUM 20 MG/1
20 TABLET, COATED ORAL DAILY
Qty: 90 TABLET | Refills: 1 | Status: SHIPPED | OUTPATIENT
Start: 2018-05-23 | End: 2019-02-09 | Stop reason: SDUPTHER

## 2018-06-05 RX ORDER — BUMETANIDE 2 MG/1
TABLET ORAL
Qty: 90 TABLET | Refills: 0 | Status: SHIPPED | OUTPATIENT
Start: 2018-06-05 | End: 2018-07-25 | Stop reason: SDUPTHER

## 2018-06-14 DIAGNOSIS — Z00.00 ANNUAL PHYSICAL EXAM: Primary | ICD-10-CM

## 2018-06-14 DIAGNOSIS — Z12.5 PROSTATE CANCER SCREENING: ICD-10-CM

## 2018-06-14 LAB
ALBUMIN SERPL-MCNC: 4.5 G/DL (ref 3.5–5.2)
ALBUMIN/GLOB SERPL: 1.7 G/DL
ALP SERPL-CCNC: 80 U/L (ref 39–117)
ALT SERPL-CCNC: 15 U/L (ref 1–41)
AST SERPL-CCNC: 13 U/L (ref 1–40)
BASOPHILS # BLD AUTO: 0.06 10*3/MM3 (ref 0–0.2)
BASOPHILS NFR BLD AUTO: 1.1 % (ref 0–1.5)
BILIRUB SERPL-MCNC: 0.4 MG/DL (ref 0.1–1.2)
BUN SERPL-MCNC: 22 MG/DL (ref 8–23)
BUN/CREAT SERPL: 16.8 (ref 7–25)
CALCIUM SERPL-MCNC: 9.6 MG/DL (ref 8.6–10.5)
CHLORIDE SERPL-SCNC: 102 MMOL/L (ref 98–107)
CHOLEST SERPL-MCNC: 120 MG/DL (ref 0–200)
CO2 SERPL-SCNC: 26.9 MMOL/L (ref 22–29)
CREAT SERPL-MCNC: 1.31 MG/DL (ref 0.76–1.27)
EOSINOPHIL # BLD AUTO: 0.48 10*3/MM3 (ref 0–0.7)
EOSINOPHIL NFR BLD AUTO: 8.7 % (ref 0.3–6.2)
ERYTHROCYTE [DISTWIDTH] IN BLOOD BY AUTOMATED COUNT: 14.2 % (ref 11.5–14.5)
GFR SERPLBLD CREATININE-BSD FMLA CKD-EPI: 53 ML/MIN/1.73
GFR SERPLBLD CREATININE-BSD FMLA CKD-EPI: 65 ML/MIN/1.73
GLOBULIN SER CALC-MCNC: 2.6 GM/DL
GLUCOSE SERPL-MCNC: 132 MG/DL (ref 65–99)
HCT VFR BLD AUTO: 35.9 % (ref 40.4–52.2)
HDLC SERPL-MCNC: 30 MG/DL (ref 40–60)
HGB BLD-MCNC: 11.9 G/DL (ref 13.7–17.6)
IMM GRANULOCYTES # BLD: 0.02 10*3/MM3 (ref 0–0.03)
IMM GRANULOCYTES NFR BLD: 0.4 % (ref 0–0.5)
LDLC SERPL CALC-MCNC: 32 MG/DL (ref 0–100)
LDLC/HDLC SERPL: 1.07 {RATIO}
LYMPHOCYTES # BLD AUTO: 1.97 10*3/MM3 (ref 0.9–4.8)
LYMPHOCYTES NFR BLD AUTO: 35.8 % (ref 19.6–45.3)
MCH RBC QN AUTO: 29.5 PG (ref 27–32.7)
MCHC RBC AUTO-ENTMCNC: 33.1 G/DL (ref 32.6–36.4)
MCV RBC AUTO: 89.1 FL (ref 79.8–96.2)
MONOCYTES # BLD AUTO: 0.41 10*3/MM3 (ref 0.2–1.2)
MONOCYTES NFR BLD AUTO: 7.5 % (ref 5–12)
NEUTROPHILS # BLD AUTO: 2.56 10*3/MM3 (ref 1.9–8.1)
NEUTROPHILS NFR BLD AUTO: 46.5 % (ref 42.7–76)
PLATELET # BLD AUTO: 144 10*3/MM3 (ref 140–500)
POTASSIUM SERPL-SCNC: 4.1 MMOL/L (ref 3.5–5.2)
PROT SERPL-MCNC: 7.1 G/DL (ref 6–8.5)
PSA SERPL-MCNC: 6.9 NG/ML (ref 0–4)
RBC # BLD AUTO: 4.03 10*6/MM3 (ref 4.6–6)
SODIUM SERPL-SCNC: 145 MMOL/L (ref 136–145)
TRIGL SERPL-MCNC: 289 MG/DL (ref 0–150)
VLDLC SERPL CALC-MCNC: 57.8 MG/DL (ref 5–40)
WBC # BLD AUTO: 5.5 10*3/MM3 (ref 4.5–10.7)

## 2018-06-20 ENCOUNTER — OFFICE VISIT (OUTPATIENT)
Dept: FAMILY MEDICINE CLINIC | Facility: CLINIC | Age: 73
End: 2018-06-20

## 2018-06-20 VITALS
SYSTOLIC BLOOD PRESSURE: 113 MMHG | BODY MASS INDEX: 32.95 KG/M2 | DIASTOLIC BLOOD PRESSURE: 68 MMHG | HEART RATE: 73 BPM | TEMPERATURE: 98.2 F | OXYGEN SATURATION: 98 % | WEIGHT: 243.3 LBS | HEIGHT: 72 IN

## 2018-06-20 DIAGNOSIS — E08.65 DIABETES MELLITUS DUE TO UNDERLYING CONDITION WITH HYPERGLYCEMIA, WITH LONG-TERM CURRENT USE OF INSULIN (HCC): ICD-10-CM

## 2018-06-20 DIAGNOSIS — E78.2 MIXED HYPERLIPIDEMIA: ICD-10-CM

## 2018-06-20 DIAGNOSIS — Z00.00 MEDICARE ANNUAL WELLNESS VISIT, INITIAL: Primary | ICD-10-CM

## 2018-06-20 DIAGNOSIS — M25.511 CHRONIC RIGHT SHOULDER PAIN: ICD-10-CM

## 2018-06-20 DIAGNOSIS — E27.40 ADRENAL INSUFFICIENCY (HCC): ICD-10-CM

## 2018-06-20 DIAGNOSIS — Z79.4 DIABETES MELLITUS DUE TO UNDERLYING CONDITION WITH HYPERGLYCEMIA, WITH LONG-TERM CURRENT USE OF INSULIN (HCC): ICD-10-CM

## 2018-06-20 DIAGNOSIS — G89.29 CHRONIC RIGHT SHOULDER PAIN: ICD-10-CM

## 2018-06-20 DIAGNOSIS — I10 ESSENTIAL HYPERTENSION: ICD-10-CM

## 2018-06-20 PROCEDURE — G0438 PPPS, INITIAL VISIT: HCPCS | Performed by: FAMILY MEDICINE

## 2018-06-20 RX ORDER — PANTOPRAZOLE SODIUM 20 MG/1
20 TABLET, DELAYED RELEASE ORAL DAILY
COMMUNITY
End: 2018-07-25 | Stop reason: SDUPTHER

## 2018-06-20 NOTE — PATIENT INSTRUCTIONS
This is an extremely nice 73-year-old who is here for checkup.  He already had blood work so we went over the results.  I would like him to call if there is any problem.

## 2018-06-20 NOTE — PROGRESS NOTES
QUICK REFERENCE INFORMATION:  The ABCs of the Annual Wellness Visit    Initial Medicare Wellness Visit    HEALTH RISK ASSESSMENT    1945    Recent Hospitalizations:  No hospitalization(s) within the last year..        Current Medical Providers:  Patient Care Team:  Ab Carr MD as PCP - General  Ab Carr MD as PCP - Claims Attributed  Delmar Cat MD as Consulting Physician (Hematology and Oncology)  Ab Carr MD as Referring Physician (Family Medicine)  Samir Dowd MD as Consulting Physician (Internal Medicine)        Smoking Status:  History   Smoking Status   • Former Smoker   • Packs/day: 1.50   • Years: 40.00   • Types: Cigarettes   • Quit date: 2008   Smokeless Tobacco   • Not on file       Alcohol Consumption:  History   Alcohol Use No       Depression Screen:   PHQ-2/PHQ-9 Depression Screening 6/20/2018   Little interest or pleasure in doing things 0   Feeling down, depressed, or hopeless 0   Total Score 0       Health Habits and Functional and Cognitive Screening:  Functional & Cognitive Status 6/20/2018   Do you have difficulty preparing food and eating? No   Do you have difficulty bathing yourself, getting dressed or grooming yourself? No   Do you have difficulty using the toilet? No   Do you have difficulty moving around from place to place? No   Do you have trouble with steps or getting out of a bed or a chair? No   In the past year have you fallen or experienced a near fall? No   Current Diet Limited Junk Food   Dental Exam Up to date   Eye Exam Up to date   Exercise (times per week) 3 times per week   Current Exercise Activities Include Walking   Do you need help using the phone?  No   Are you deaf or do you have serious difficulty hearing?  No   Do you need help with transportation? No   Do you need help shopping? No   Do you need help preparing meals?  No   Do you need help with housework?  No   Do you need help with laundry? No   Do you need help taking your  medications? No   Do you need help managing money? No   Do you ever drive or ride in a car without wearing a seat belt? No   Have you felt unusual stress, anger or loneliness in the last month? No   Who do you live with? Spouse   If you need help, do you have trouble finding someone available to you? No   Have you been bothered in the last four weeks by sexual problems? No   Do you have difficulty concentrating, remembering or making decisions? No           Does the patient have evidence of cognitive impairment? No    Asiprin use counseling: Does not need ASA (and currently is not on it)      Recent Lab Results:    Visual Acuity:  No exam data present    Age-appropriate Screening Schedule:  Refer to the list below for future screening recommendations based on patient's age, sex and/or medical conditions. Orders for these recommended tests are listed in the plan section. The patient has been provided with a written plan.    Health Maintenance   Topic Date Due   • URINE MICROALBUMIN  1945   • ZOSTER VACCINE (1 of 2) 03/15/1993   • DIABETIC FOOT EXAM  01/28/2016   • COLONOSCOPY  04/11/2016   • HEMOGLOBIN A1C  06/14/2018   • INFLUENZA VACCINE  08/01/2018   • PNEUMOCOCCAL VACCINES (65+ LOW/MEDIUM RISK) (2 of 2 - PCV13) 09/20/2018   • DIABETIC EYE EXAM  04/19/2019   • LIPID PANEL  06/14/2019   • TDAP/TD VACCINES (2 - Td) 10/22/2025        Subjective   History of Present Illness    Micah Krishna is a 73 y.o. male who presents for an Annual Wellness Visit.    The following portions of the patient's history were reviewed and updated as appropriate: current medications, past family history, past medical history, past social history, past surgical history and problem list.    Outpatient Medications Prior to Visit   Medication Sig Dispense Refill   • allopurinol (ZYLOPRIM) 300 MG tablet TAKE 1 TABLET BY MOUTH ONCE A DAY 90 tablet 3   • amLODIPine (NORVASC) 5 MG tablet Take 1 tablet by mouth Daily. 30 tablet 2   •  bumetanide (BUMEX) 2 MG tablet TAKE 1 TABLET BY MOUTH  EVERY DAY 90 tablet 0   • CloNIDine (CATAPRES) 0.2 MG tablet      • clopidogrel (PLAVIX) 75 MG tablet TAKE 1 TABLET BY MOUTH  EVERY DAY 90 tablet 0   • finasteride (PROSCAR) 5 MG tablet TAKE 1 TABLET BY MOUTH ONCE A DAY 90 tablet 3   • fludrocortisone 0.1 MG tablet TAKE 1 TABLET BY MOUTH EVERY OTHER DAY 45 tablet 2   • HUMALOG KWIKPEN 100 UNIT/ML solution pen-injector INJECT 2 UNITS FOR EACH 50 MG ABOVE 200  1   • hydrocortisone (CORTEF) 10 MG tablet Take 1 tablet by mouth Take As Directed. Pt takes 10mg in the am, and 5mg in the pm. 135 tablet 3   • hydrocortisone sodium succinate (Solu-CORTEF) 100 MG injection Inject 50 mg into the shoulder, thigh, or buttocks Daily. 1 vial 0   • icosapent ethyl (VASCEPA) 1 g capsule capsule Take 1 g by mouth.     • insulin detemir (LEVEMIR) 100 UNIT/ML injection Inject  under the skin Daily.     • Insulin Glargine (LANTUS SOLOSTAR) 100 UNIT/ML injection pen Inject 30 Units under the skin 2 (Two) Times a Day. 45 mL 1   • isosorbide mononitrate (IMDUR) 30 MG 24 hr tablet TAKE 1 TABLET BY MOUTH  TWICE A  tablet 3   • metoprolol succinate XL (TOPROL-XL) 100 MG 24 hr tablet TAKE 1 TABLET BY MOUTH  DAILY 90 tablet 1   • nitroglycerin (NITROLINGUAL) 0.4 MG/SPRAY spray Place 1 spray under the tongue Every 5 (Five) Minutes As Needed for Chest Pain. 12 g 5   • olmesartan (BENICAR) 40 MG tablet TK 1 T PO D  5   • rosuvastatin (CRESTOR) 20 MG tablet Take 1 tablet by mouth Daily. 90 tablet 1   • losartan (COZAAR) 100 MG tablet TAKE 1 TABLET BY MOUTH  DAILY 90 tablet 1   • omeprazole (priLOSEC) 20 MG capsule TAKE 1 CAPSULE BY MOUTH  DAILY 90 capsule 3   • potassium chloride (K-DUR,KLOR-CON) 20 MEQ CR tablet TAKE 1 TABLET BY MOUTH DAILY 30 tablet 2     No facility-administered medications prior to visit.        Patient Active Problem List   Diagnosis   • Paresthesia of both hands   • Diabetes mellitus due to underlying condition with  "hyperglycemia, with long-term current use of insulin   • Adrenal insufficiency   • Essential hypertension   • Mixed hyperlipidemia   • History of renal cell carcinoma   • Chronic pain of right knee   • Bilateral edema of lower extremity   • Myalgia   • Trigger index finger of right hand   • Chronic right shoulder pain   • Medicare annual wellness visit, initial       Advance Care Planning:  has an advance directive - a copy has been provided and is in file    Identification of Risk Factors:  Risk factors include: weight  and inactivity.    Review of Systems   Musculoskeletal: Positive for arthralgias (chronic right shoulder pain).   All other systems reviewed and are negative.      Compared to one year ago, the patient feels his physical health is the same.  Compared to one year ago, the patient feels his mental health is the same.    Objective     Physical Exam   Constitutional: He is oriented to person, place, and time. He appears well-developed and well-nourished. No distress.   HENT:   Head: Normocephalic and atraumatic.   Eyes: EOM are normal. Pupils are equal, round, and reactive to light.   Neck: Normal range of motion. Neck supple. No thyromegaly present.   Cardiovascular: Normal rate and regular rhythm.    Pulmonary/Chest: Effort normal and breath sounds normal.   Abdominal: Soft. Bowel sounds are normal.   Musculoskeletal: He exhibits tenderness. He exhibits no edema or deformity.        Right shoulder: He exhibits tenderness.        Arms:  Lymphadenopathy:     He has no cervical adenopathy.   Neurological: He is alert and oriented to person, place, and time.   Skin: Skin is warm and dry. He is not diaphoretic.   Psychiatric: He has a normal mood and affect. His behavior is normal.   Nursing note and vitals reviewed.      Vitals:    06/20/18 1122   BP: 113/68   Pulse: 73   Temp: 98.2 °F (36.8 °C)   SpO2: 98%   Weight: 110 kg (243 lb 4.8 oz)   Height: 182.9 cm (72\")       Patient's Body mass index is 33 " kg/m². BMI is above normal parameters. Recommendations include: exercise counseling and nutrition counseling.      Assessment/Plan   Patient Self-Management and Personalized Health Advice  The patient has been provided with information about: diet, exercise and weight management and preventive services including:   · Exercise counseling provided.    Visit Diagnoses:    ICD-10-CM ICD-9-CM   1. Medicare annual wellness visit, initial Z00.00 V70.0   2. Essential hypertension I10 401.9   3. Mixed hyperlipidemia E78.2 272.2   4. Diabetes mellitus due to underlying condition with hyperglycemia, with long-term current use of insulin E08.65 249.80    Z79.4 790.29     V58.67   5. Adrenal insufficiency E27.40 255.41   6. Chronic right shoulder pain M25.511 719.41    G89.29 338.29       Orders Placed This Encounter   Procedures   • Ambulatory Referral to Physical Therapy Evaluate and treat     Referral Priority:   Routine     Referral Type:   Therapy     Referral Reason:   Specialty Services Required     Requested Specialty:   Physical Therapy     Number of Visits Requested:   1       Outpatient Encounter Prescriptions as of 6/20/2018   Medication Sig Dispense Refill   • allopurinol (ZYLOPRIM) 300 MG tablet TAKE 1 TABLET BY MOUTH ONCE A DAY 90 tablet 3   • amLODIPine (NORVASC) 5 MG tablet Take 1 tablet by mouth Daily. 30 tablet 2   • bumetanide (BUMEX) 2 MG tablet TAKE 1 TABLET BY MOUTH  EVERY DAY 90 tablet 0   • CloNIDine (CATAPRES) 0.2 MG tablet      • clopidogrel (PLAVIX) 75 MG tablet TAKE 1 TABLET BY MOUTH  EVERY DAY 90 tablet 0   • finasteride (PROSCAR) 5 MG tablet TAKE 1 TABLET BY MOUTH ONCE A DAY 90 tablet 3   • fludrocortisone 0.1 MG tablet TAKE 1 TABLET BY MOUTH EVERY OTHER DAY 45 tablet 2   • HUMALOG KWIKPEN 100 UNIT/ML solution pen-injector INJECT 2 UNITS FOR EACH 50 MG ABOVE 200  1   • hydrocortisone (CORTEF) 10 MG tablet Take 1 tablet by mouth Take As Directed. Pt takes 10mg in the am, and 5mg in the pm. 135  tablet 3   • hydrocortisone sodium succinate (Solu-CORTEF) 100 MG injection Inject 50 mg into the shoulder, thigh, or buttocks Daily. 1 vial 0   • icosapent ethyl (VASCEPA) 1 g capsule capsule Take 1 g by mouth.     • insulin detemir (LEVEMIR) 100 UNIT/ML injection Inject  under the skin Daily.     • Insulin Glargine (LANTUS SOLOSTAR) 100 UNIT/ML injection pen Inject 30 Units under the skin 2 (Two) Times a Day. 45 mL 1   • isosorbide mononitrate (IMDUR) 30 MG 24 hr tablet TAKE 1 TABLET BY MOUTH  TWICE A  tablet 3   • metoprolol succinate XL (TOPROL-XL) 100 MG 24 hr tablet TAKE 1 TABLET BY MOUTH  DAILY 90 tablet 1   • nitroglycerin (NITROLINGUAL) 0.4 MG/SPRAY spray Place 1 spray under the tongue Every 5 (Five) Minutes As Needed for Chest Pain. 12 g 5   • olmesartan (BENICAR) 40 MG tablet TK 1 T PO D  5   • pantoprazole (PROTONIX) 20 MG EC tablet Take 20 mg by mouth Daily.     • rosuvastatin (CRESTOR) 20 MG tablet Take 1 tablet by mouth Daily. 90 tablet 1   • losartan (COZAAR) 100 MG tablet TAKE 1 TABLET BY MOUTH  DAILY 90 tablet 1   • omeprazole (priLOSEC) 20 MG capsule TAKE 1 CAPSULE BY MOUTH  DAILY 90 capsule 3   • potassium chloride (K-DUR,KLOR-CON) 20 MEQ CR tablet TAKE 1 TABLET BY MOUTH DAILY 30 tablet 2     No facility-administered encounter medications on file as of 6/20/2018.        Reviewed use of high risk medication in the elderly: not applicable  Reviewed for potential of harmful drug interactions in the elderly: not applicable    Follow Up:  Return in about 6 months (around 12/20/2018) for Recheck.     An After Visit Summary and PPPS with all of these plans were given to the patient.

## 2018-06-25 RX ORDER — METOPROLOL SUCCINATE 100 MG/1
TABLET, EXTENDED RELEASE ORAL
Qty: 90 TABLET | Refills: 3 | Status: SHIPPED | OUTPATIENT
Start: 2018-06-25 | End: 2019-07-10 | Stop reason: SDUPTHER

## 2018-07-17 ENCOUNTER — HOSPITAL ENCOUNTER (OUTPATIENT)
Dept: CT IMAGING | Facility: HOSPITAL | Age: 73
Discharge: HOME | End: 2018-07-17
Attending: INTERNAL MEDICINE | Admitting: INTERNAL MEDICINE

## 2018-07-17 ENCOUNTER — LAB (OUTPATIENT)
Dept: LAB | Facility: HOSPITAL | Age: 73
End: 2018-07-17

## 2018-07-17 DIAGNOSIS — Z85.528 HISTORY OF RENAL CELL CARCINOMA: ICD-10-CM

## 2018-07-17 LAB
ALBUMIN SERPL-MCNC: 4.5 G/DL (ref 3.5–5.2)
ALBUMIN/GLOB SERPL: 1.6 G/DL
ALP SERPL-CCNC: 86 U/L (ref 39–117)
ALT SERPL W P-5'-P-CCNC: 16 U/L (ref 1–41)
ANION GAP SERPL CALCULATED.3IONS-SCNC: 10.2 MMOL/L
AST SERPL-CCNC: 16 U/L (ref 1–40)
BASOPHILS # BLD AUTO: 0.05 10*3/MM3 (ref 0–0.2)
BASOPHILS NFR BLD AUTO: 0.7 % (ref 0–1.5)
BILIRUB SERPL-MCNC: 0.3 MG/DL (ref 0.1–1.2)
BUN BLD-MCNC: 28 MG/DL (ref 8–23)
BUN/CREAT SERPL: 22 (ref 7–25)
CALCIUM SPEC-SCNC: 9.3 MG/DL (ref 8.6–10.5)
CHLORIDE SERPL-SCNC: 105 MMOL/L (ref 98–107)
CO2 SERPL-SCNC: 25.8 MMOL/L (ref 22–29)
CREAT BLD-MCNC: 1.27 MG/DL (ref 0.76–1.27)
DEPRECATED RDW RBC AUTO: 42.6 FL (ref 37–54)
EOSINOPHIL # BLD AUTO: 0.43 10*3/MM3 (ref 0–0.7)
EOSINOPHIL NFR BLD AUTO: 6 % (ref 0.3–6.2)
ERYTHROCYTE [DISTWIDTH] IN BLOOD BY AUTOMATED COUNT: 13.7 % (ref 11.5–14.5)
GFR SERPL CREATININE-BSD FRML MDRD: 55 ML/MIN/1.73
GLOBULIN UR ELPH-MCNC: 2.8 GM/DL
GLUCOSE BLD-MCNC: 141 MG/DL (ref 65–99)
HCT VFR BLD AUTO: 33.6 % (ref 40.4–52.2)
HGB BLD-MCNC: 11.7 G/DL (ref 13.7–17.6)
IMM GRANULOCYTES # BLD: 0.03 10*3/MM3 (ref 0–0.03)
IMM GRANULOCYTES NFR BLD: 0.4 % (ref 0–0.5)
LYMPHOCYTES # BLD AUTO: 1.94 10*3/MM3 (ref 0.9–4.8)
LYMPHOCYTES NFR BLD AUTO: 27 % (ref 19.6–45.3)
MCH RBC QN AUTO: 29.8 PG (ref 27–32.7)
MCHC RBC AUTO-ENTMCNC: 34.8 G/DL (ref 32.6–36.4)
MCV RBC AUTO: 85.5 FL (ref 79.8–96.2)
MONOCYTES # BLD AUTO: 0.67 10*3/MM3 (ref 0.2–1.2)
MONOCYTES NFR BLD AUTO: 9.3 % (ref 5–12)
NEUTROPHILS # BLD AUTO: 4.07 10*3/MM3 (ref 1.9–8.1)
NEUTROPHILS NFR BLD AUTO: 56.6 % (ref 42.7–76)
NRBC BLD MANUAL-RTO: 0 /100 WBC (ref 0–0)
PLATELET # BLD AUTO: 141 10*3/MM3 (ref 140–500)
PMV BLD AUTO: 9.8 FL (ref 6–12)
POTASSIUM BLD-SCNC: 4.4 MMOL/L (ref 3.5–5.2)
PROT SERPL-MCNC: 7.3 G/DL (ref 6–8.5)
RBC # BLD AUTO: 3.93 10*6/MM3 (ref 4.6–6)
SODIUM BLD-SCNC: 141 MMOL/L (ref 136–145)
WBC NRBC COR # BLD: 7.19 10*3/MM3 (ref 4.5–10.7)

## 2018-07-17 PROCEDURE — 80053 COMPREHEN METABOLIC PANEL: CPT

## 2018-07-17 PROCEDURE — 74176 CT ABD & PELVIS W/O CONTRAST: CPT

## 2018-07-17 PROCEDURE — 71250 CT THORAX DX C-: CPT

## 2018-07-17 PROCEDURE — 85025 COMPLETE CBC W/AUTO DIFF WBC: CPT

## 2018-07-17 PROCEDURE — 36415 COLL VENOUS BLD VENIPUNCTURE: CPT

## 2018-07-24 ENCOUNTER — APPOINTMENT (OUTPATIENT)
Dept: OTHER | Facility: HOSPITAL | Age: 73
End: 2018-07-24

## 2018-07-24 ENCOUNTER — OFFICE VISIT (OUTPATIENT)
Dept: ONCOLOGY | Facility: CLINIC | Age: 73
End: 2018-07-24

## 2018-07-24 VITALS
DIASTOLIC BLOOD PRESSURE: 68 MMHG | OXYGEN SATURATION: 96 % | TEMPERATURE: 98.1 F | HEIGHT: 72 IN | RESPIRATION RATE: 16 BRPM | HEART RATE: 65 BPM | BODY MASS INDEX: 32.59 KG/M2 | WEIGHT: 240.6 LBS | SYSTOLIC BLOOD PRESSURE: 112 MMHG

## 2018-07-24 DIAGNOSIS — Z85.528 HISTORY OF RENAL CELL CARCINOMA: Primary | ICD-10-CM

## 2018-07-24 PROCEDURE — 99214 OFFICE O/P EST MOD 30 MIN: CPT | Performed by: INTERNAL MEDICINE

## 2018-07-24 PROCEDURE — G0463 HOSPITAL OUTPT CLINIC VISIT: HCPCS | Performed by: INTERNAL MEDICINE

## 2018-07-24 NOTE — PROGRESS NOTES
[facility] GROUP OUTPATIENT FOLLOW UP CLINIC VISIT    REASON FOR FOLLOW-UP:    1.  History of metastatic clear cell renal cell carcinoma.  All disease has been resected.  Currently no evidence of disease.    HISTORY OF PRESENT ILLNESS:  Micah Krishna is a 73 y.o. male who returns today for follow up of the above issue.  He denies any new problems aside from GI upset which he has attributed to the oral contrast administered for the CT scan.  He feels bloated and constipated.  He and his wife are considering moving to [place] where his wife is from.  They may do this soon.         PAST MEDICAL, SURGICAL, FAMILY, AND SOCIAL HISTORIES were reviewed with the patient and in the electronic medical record, and were updated if indicated.    ALLERGIES:  Allergies   Allergen Reactions   • Shellfish-Derived Products Anaphylaxis   • Iodinated Diagnostic Agents Itching and Swelling   • Shellfish Allergy    • Adhesive Tape Hives       MEDICATIONS:  The medication list has been reviewed with the patient by the medical assistant, and the list has been updated in the electronic medical record, which I reviewed.  Medication dosages and frequencies were confirmed to be accurate.    REVIEW OF SYSTEMS:  PAIN:  See Vital Signs below.  GENERAL:  No fevers, chills, night sweats, or unintended weight loss.  SKIN:  No rashes or non-healing lesions.  HEME/LYMPH:  No abnormal bleeding.  No palpable lymphadenopathy.  EYES:  No vision changes or diplopia.  ENT:  No sore throat or difficulty swallowing.  RESPIRATORY:  No cough, shortness of breath, hemoptysis, or wheezing.  CARDIOVASCULAR:  No chest pain, palpitations, orthopnea, or dyspnea on exertion.  GASTROINTESTINAL:  Abdominal bloating and constipation.    GENITOURINARY:  No dysuria or hematuria.  MUSCULOSKELETAL:  Persistent arthritis pain.    NEUROLOGIC:  No dizziness, loss of consciousness, or seizures.  PSYCHIATRIC:  No depression, anxiety, or mood changes.    Vitals:     "07/24/18 1052   BP: 112/68   Pulse: 65   Resp: 16   Temp: 98.1 °F (36.7 °C)   TempSrc: Oral   SpO2: 96%   Weight: 109 kg (240 lb 9.6 oz)   Height: 184 cm (72.44\")   PainSc:   3       PHYSICAL EXAMINATION:  GENERAL:  Well-developed well-nourished male; awake, alert and oriented, in no acute distress.  Some difficulty rising from a chair is noted.  SKIN:  Warm and dry, without rashes, purpura, or petechiae.  HEAD:  Normocephalic, atraumatic.  EYES:  Pupils equal, round and reactive to light.  Extraocular movements intact.  Conjunctivae normal.  EARS:  Hearing intact.  NOSE:  Septum midline.  No excoriations or nasal discharge.  MOUTH:  No stomatitis or ulcers.  Lips are normal.  THROAT:  Oropharynx without lesions or exudates.  NECK:  Supple with good range of motion; no thyromegaly or masses; no JVD or bruits.  LYMPHATICS:  No cervical, supraclavicular, axillary, or inguinal lymphadenopathy.  CHEST:  Lungs are clear to auscultation bilaterally.  No wheezes, rales, or rhonchi.  HEART:  Regular rate; normal rhythm.  No murmurs, gallops or rubs.  ABDOMEN:  Soft, non-tender, non-distended.  Normal active bowel sounds.  No organomegaly.  EXTREMITIES:  No clubbing cyanosis or edema  NEUROLOGICAL:  No focal neurologic deficits.    DIAGNOSTIC DATA:  Lab Results   Component Value Date    WBC 7.19 07/17/2018    HGB 11.7 (L) 07/17/2018    HCT 33.6 (L) 07/17/2018    MCV 85.5 07/17/2018     07/17/2018       IMAGING:  CT imaging from 7/17/2018 shows no evidence for metastatic disease in the chest abdomen or pelvis.  Images personally reviewed.      ASSESSMENT:  This is a 73 y.o. male with:  1.  Mild normocytic anemia: His hemoglobin is stable.  2.  Adrenal insufficiency following bilateral adrenalectomy currently on replacement hormone therapy.  He follows with endocrinology.  3.  History of metastatic renal cell carcinoma.  He had a left nephrectomy and adrenalectomy in 2000 and then a right adrenalectomy for metastatic " disease in 2012.  Currently no evidence of disease on CT imaging.  Follow up in 6 months with a CXR.    4.  Hypertension:  Blood pressure better after diuresis   5.  Lower extremity edema:  He is currently on diuretics with improvement.  6.  Constipation:  I advised Senokot    PLAN:   1.  Follow up in 6 months with a CXR.  Annual CT imaging without contrast in one year.  If he does move to [place], I will be glad to assist with finding an oncologist for him to see there as well.  He will let me know.

## 2018-07-25 RX ORDER — BUMETANIDE 2 MG/1
TABLET ORAL
Qty: 90 TABLET | Refills: 3 | Status: SHIPPED | OUTPATIENT
Start: 2018-07-25 | End: 2019-04-26

## 2018-07-25 RX ORDER — PANTOPRAZOLE SODIUM 20 MG/1
TABLET, DELAYED RELEASE ORAL
Qty: 90 TABLET | Refills: 3 | Status: SHIPPED | OUTPATIENT
Start: 2018-07-25 | End: 2019-07-28 | Stop reason: SDUPTHER

## 2018-07-30 RX ORDER — CLOPIDOGREL BISULFATE 75 MG/1
75 TABLET ORAL DAILY
Qty: 90 TABLET | Refills: 3 | Status: SHIPPED | OUTPATIENT
Start: 2018-07-30 | End: 2019-08-08 | Stop reason: SDUPTHER

## 2018-08-06 RX ORDER — AMLODIPINE BESYLATE 10 MG/1
TABLET ORAL
Qty: 90 TABLET | Refills: 0 | Status: SHIPPED | OUTPATIENT
Start: 2018-08-06 | End: 2018-09-12 | Stop reason: SDUPTHER

## 2018-08-22 RX ORDER — POTASSIUM CHLORIDE 20 MEQ/1
TABLET, EXTENDED RELEASE ORAL
Qty: 30 TABLET | Refills: 5 | Status: SHIPPED | OUTPATIENT
Start: 2018-08-22 | End: 2018-11-02 | Stop reason: SDUPTHER

## 2018-09-12 RX ORDER — AMLODIPINE BESYLATE 10 MG/1
TABLET ORAL
Qty: 90 TABLET | Refills: 3 | Status: SHIPPED | OUTPATIENT
Start: 2018-09-12 | End: 2019-09-16 | Stop reason: SDUPTHER

## 2018-09-12 RX ORDER — FINASTERIDE 5 MG/1
TABLET, FILM COATED ORAL
Qty: 90 TABLET | Refills: 3 | Status: SHIPPED | OUTPATIENT
Start: 2018-09-12 | End: 2019-12-09 | Stop reason: SDUPTHER

## 2018-09-12 NOTE — TELEPHONE ENCOUNTER
rx request for amLODIPine (NORVASC) 10 MG tablet     finasteride (PROSCAR) 5 MG tablet     Last seen 6/25/18

## 2018-09-28 RX ORDER — ALLOPURINOL 300 MG/1
TABLET ORAL
Qty: 90 TABLET | Refills: 1 | Status: SHIPPED | OUTPATIENT
Start: 2018-09-28 | End: 2019-04-25 | Stop reason: SDUPTHER

## 2018-09-28 RX ORDER — ISOSORBIDE MONONITRATE 30 MG/1
TABLET, EXTENDED RELEASE ORAL
Qty: 180 TABLET | Refills: 2 | Status: SHIPPED | OUTPATIENT
Start: 2018-09-28 | End: 2019-06-25 | Stop reason: SDUPTHER

## 2018-10-16 ENCOUNTER — TELEPHONE (OUTPATIENT)
Dept: FAMILY MEDICINE CLINIC | Facility: CLINIC | Age: 73
End: 2018-10-16

## 2018-10-16 DIAGNOSIS — N28.9 RENAL INSUFFICIENCY: ICD-10-CM

## 2018-10-16 DIAGNOSIS — R97.20 ELEVATED PSA: ICD-10-CM

## 2018-10-16 DIAGNOSIS — E11.9 TYPE 2 DIABETES MELLITUS WITHOUT COMPLICATION, WITHOUT LONG-TERM CURRENT USE OF INSULIN (HCC): Primary | ICD-10-CM

## 2018-10-16 DIAGNOSIS — M89.9 DISORDER OF BONE: ICD-10-CM

## 2018-10-16 DIAGNOSIS — N40.0 BENIGN PROSTATIC HYPERPLASIA, UNSPECIFIED WHETHER LOWER URINARY TRACT SYMPTOMS PRESENT: ICD-10-CM

## 2018-10-17 ENCOUNTER — RESULTS ENCOUNTER (OUTPATIENT)
Dept: FAMILY MEDICINE CLINIC | Facility: CLINIC | Age: 73
End: 2018-10-17

## 2018-10-17 DIAGNOSIS — R97.20 ELEVATED PSA: ICD-10-CM

## 2018-10-17 DIAGNOSIS — N28.9 RENAL INSUFFICIENCY: ICD-10-CM

## 2018-10-17 DIAGNOSIS — E11.9 TYPE 2 DIABETES MELLITUS WITHOUT COMPLICATION, WITHOUT LONG-TERM CURRENT USE OF INSULIN (HCC): ICD-10-CM

## 2018-10-17 DIAGNOSIS — M89.9 DISORDER OF BONE: ICD-10-CM

## 2018-10-18 LAB
25(OH)D3+25(OH)D2 SERPL-MCNC: 17.1 NG/ML (ref 30–100)
ALBUMIN SERPL-MCNC: 4.4 G/DL (ref 3.5–5.2)
ALBUMIN/CREAT UR: 67.5 MG/G CREAT (ref 0–30)
ALBUMIN/GLOB SERPL: 1.6 G/DL
ALP SERPL-CCNC: 78 U/L (ref 39–117)
ALT SERPL-CCNC: 17 U/L (ref 1–41)
AST SERPL-CCNC: 15 U/L (ref 1–40)
BASOPHILS # BLD AUTO: 0.03 10*3/MM3 (ref 0–0.2)
BASOPHILS NFR BLD AUTO: 0.5 % (ref 0–1.5)
BILIRUB SERPL-MCNC: 0.4 MG/DL (ref 0.1–1.2)
BUN SERPL-MCNC: 24 MG/DL (ref 8–23)
BUN/CREAT SERPL: 19.2 (ref 7–25)
CALCIUM SERPL-MCNC: 9.4 MG/DL (ref 8.6–10.5)
CHLORIDE SERPL-SCNC: 104 MMOL/L (ref 98–107)
CHOLEST SERPL-MCNC: 106 MG/DL (ref 0–200)
CO2 SERPL-SCNC: 25.8 MMOL/L (ref 22–29)
CREAT SERPL-MCNC: 1.25 MG/DL (ref 0.76–1.27)
CREAT UR-MCNC: 131.5 MG/DL
EOSINOPHIL # BLD AUTO: 0.4 10*3/MM3 (ref 0–0.7)
EOSINOPHIL NFR BLD AUTO: 7 % (ref 0.3–6.2)
ERYTHROCYTE [DISTWIDTH] IN BLOOD BY AUTOMATED COUNT: 14.8 % (ref 11.5–14.5)
GLOBULIN SER CALC-MCNC: 2.7 GM/DL
GLUCOSE SERPL-MCNC: 138 MG/DL (ref 65–99)
HBA1C MFR BLD: 7.42 % (ref 4.8–5.6)
HCT VFR BLD AUTO: 34.7 % (ref 40.4–52.2)
HDLC SERPL-MCNC: 31 MG/DL (ref 40–60)
HGB BLD-MCNC: 11.8 G/DL (ref 13.7–17.6)
IMM GRANULOCYTES # BLD: 0.02 10*3/MM3 (ref 0–0.03)
IMM GRANULOCYTES NFR BLD: 0.3 % (ref 0–0.5)
LDLC SERPL CALC-MCNC: 34 MG/DL (ref 0–100)
LYMPHOCYTES # BLD AUTO: 1.6 10*3/MM3 (ref 0.9–4.8)
LYMPHOCYTES NFR BLD AUTO: 27.9 % (ref 19.6–45.3)
MCH RBC QN AUTO: 29.5 PG (ref 27–32.7)
MCHC RBC AUTO-ENTMCNC: 34 G/DL (ref 32.6–36.4)
MCV RBC AUTO: 86.8 FL (ref 79.8–96.2)
MICROALBUMIN UR-MCNC: 88.7 UG/ML
MONOCYTES # BLD AUTO: 0.53 10*3/MM3 (ref 0.2–1.2)
MONOCYTES NFR BLD AUTO: 9.2 % (ref 5–12)
NEUTROPHILS # BLD AUTO: 3.18 10*3/MM3 (ref 1.9–8.1)
NEUTROPHILS NFR BLD AUTO: 55.4 % (ref 42.7–76)
PLATELET # BLD AUTO: 147 10*3/MM3 (ref 140–500)
POTASSIUM SERPL-SCNC: 4.1 MMOL/L (ref 3.5–5.2)
PROT SERPL-MCNC: 7.1 G/DL (ref 6–8.5)
PSA SERPL-MCNC: 5.92 NG/ML (ref 0–4)
RBC # BLD AUTO: 4 10*6/MM3 (ref 4.6–6)
SODIUM SERPL-SCNC: 144 MMOL/L (ref 136–145)
TRIGL SERPL-MCNC: 205 MG/DL (ref 0–150)
TSH SERPL DL<=0.005 MIU/L-ACNC: 1.27 MIU/ML (ref 0.27–4.2)
VLDLC SERPL CALC-MCNC: 41 MG/DL (ref 5–40)
WBC # BLD AUTO: 5.74 10*3/MM3 (ref 4.5–10.7)

## 2018-10-22 ENCOUNTER — OFFICE VISIT (OUTPATIENT)
Dept: FAMILY MEDICINE CLINIC | Facility: CLINIC | Age: 73
End: 2018-10-22

## 2018-10-22 VITALS
OXYGEN SATURATION: 97 % | BODY MASS INDEX: 33.05 KG/M2 | WEIGHT: 244 LBS | HEART RATE: 71 BPM | DIASTOLIC BLOOD PRESSURE: 70 MMHG | HEIGHT: 72 IN | SYSTOLIC BLOOD PRESSURE: 120 MMHG

## 2018-10-22 DIAGNOSIS — Z23 NEED FOR IMMUNIZATION AGAINST INFLUENZA: ICD-10-CM

## 2018-10-22 DIAGNOSIS — N18.30 CHRONIC RENAL INSUFFICIENCY, STAGE 3 (MODERATE) (HCC): ICD-10-CM

## 2018-10-22 DIAGNOSIS — G89.29 CHRONIC PAIN OF RIGHT KNEE: ICD-10-CM

## 2018-10-22 DIAGNOSIS — M25.561 CHRONIC PAIN OF RIGHT KNEE: ICD-10-CM

## 2018-10-22 DIAGNOSIS — M25.461 KNEE EFFUSION, RIGHT: Primary | ICD-10-CM

## 2018-10-22 PROCEDURE — 90662 IIV NO PRSV INCREASED AG IM: CPT | Performed by: NURSE PRACTITIONER

## 2018-10-22 PROCEDURE — 99214 OFFICE O/P EST MOD 30 MIN: CPT | Performed by: NURSE PRACTITIONER

## 2018-10-22 PROCEDURE — G0008 ADMIN INFLUENZA VIRUS VAC: HCPCS | Performed by: NURSE PRACTITIONER

## 2018-10-22 NOTE — PROGRESS NOTES
Subjective   Micah Krishna is a 73 y.o. male.     Pleasant gentleman complains right knee swelling mild tenderness  After recent car ride [place]  No calf pain thigh pain lower extremity swelling chest pain or shortness of breath no history DVT  No treatment getting slowly better but still has swelling    He does have a history of right knee problems arthritis  He's had some increased pain mild to moderate over last several months  He had knee surgery several years ago, unfortunately the orthopedist at that time and failed to recognize according to patient, history of adrenal insufficiency or lack thereof, which resulted in Pola's crisis and was admitted to the hospital for several weeks    He is not followed up with orthopedics since  Sees Dr. Ab Carr who is out on medical leave    Labs show chronic renal insufficiency    No fever no chills no recent injury      Knee Pain           The following portions of the patient's history were reviewed and updated as appropriate: allergies, past family history, past medical history, past social history, past surgical history and problem list.    Review of Systems   Constitutional: Negative for fatigue and fever.   HENT: Negative.  Negative for trouble swallowing.    Eyes: Negative.    Respiratory: Negative.  Negative for cough and shortness of breath.    Cardiovascular: Negative for chest pain, palpitations and leg swelling.   Gastrointestinal: Negative.  Negative for abdominal pain.   Genitourinary: Negative.    Musculoskeletal: Positive for joint swelling.   Skin: Negative.    Neurological: Negative.  Negative for dizziness and confusion.   Psychiatric/Behavioral: Negative.        Objective   Physical Exam   Constitutional: He is oriented to person, place, and time. He appears well-developed and well-nourished. No distress.   HENT:   Head: Normocephalic and atraumatic.   Nose: Nose normal.   Mouth/Throat: Oropharynx is clear and moist.   Eyes: Pupils are equal,  round, and reactive to light. Conjunctivae are normal. No scleral icterus.   Neck: Neck supple. No JVD present. No thyromegaly present.   Cardiovascular: Normal rate, regular rhythm and normal heart sounds.  Exam reveals no gallop and no friction rub.    No murmur heard.  Pulmonary/Chest: Effort normal and breath sounds normal. No respiratory distress. He has no wheezes. He has no rales.   Abdominal: Soft. Bowel sounds are normal. He exhibits no distension and no mass. There is no tenderness. There is no guarding. No hernia.   Musculoskeletal: He exhibits edema. He exhibits no tenderness.   Right knee full range of motion with crepitus  Mild-to-moderate effusion noted with range of motion near normal  No redness nontender  No laxity negative drawer  Negative Lachman's negative Schilling's  Gait is normal  Medial thigh posterior posterior fossa  And calf nontender negative Homans  No posterior knee swelling  No evidence of DVT       Lymphadenopathy:     He has no cervical adenopathy.   Neurological: He is alert and oriented to person, place, and time. He has normal reflexes.   Skin: Skin is warm and dry. No rash noted. He is not diaphoretic. No erythema.   Psychiatric: He has a normal mood and affect. His behavior is normal. Judgment and thought content normal.   Vitals reviewed.        Assessment/Plan   Micah was seen today for 4 mos check-up and knee pain.    Diagnoses and all orders for this visit:    Knee effusion, right  -     Ambulatory Referral to Orthopedic Surgery    Need for immunization against influenza  -     Flu Vaccine High Dose PF 65YR+ ([id number])    Chronic pain of right knee  -     Ambulatory Referral to Orthopedic Surgery    Chronic renal insufficiency, stage 3 (moderate) (CMS/HCC)                    Surgically induced adrenal insufficiency related to renal cancer and nephrectomy complete left and partial adrenal only right requires  Steroid bolus during stress    Avoid anti-inflammatories naproxen  Aleve  Tylenol okay  He had forgotten regarding his renal insufficiency  Reminded him to avoid NSAIDs anti-inflammatories Aleve ibuprofen    At least 64 ounces fluid daily  Avoid dehydration  We discussed Bumex  Patient follow advice of his physician Dr. Carr  Patient denies history of congestive heart failure although I do see diastolic dysfunction on his list  He's never had fluid restriction  His body has been elevated on several occasions    He wants to know if he can stop his Bumex  He has lower extremity edema dependent in the past  He can continue the Bumex if he wants, he can take it every other day and see how he does  As long as his blood pressures controlled  And he doesn't have too much rebound edema this should be fine as well  Just good prudence and prevention of any dehydrated state  As well as good blood pressure control  And his kidney should last him a long time    He can take less than 3000 mg Tylenol daily    Refer to orthopedic  He'll follow-up Dr. Carr  He should try to avoid any surgery if possible if unavoidable  He will discuss with the surgeon is history of adrenal insufficiency     Office visit 30 minutes  Increased pain redness swelling fever chills urgent recheck ER

## 2018-10-22 NOTE — PATIENT INSTRUCTIONS
Discharge instructions  Avoid anti-inflammatories avoid naproxen Aleve  Tylenol okay    Tylenol extended release 650  1-2 twice daily if needed    Push fluids minimum 64 ounces of fluid daily water    Continue Bumex  May change in the future consider every other day    Follow-up Dr. Carr    Continue monitor kidney function

## 2018-11-02 RX ORDER — POTASSIUM CHLORIDE 20 MEQ/1
20 TABLET, EXTENDED RELEASE ORAL DAILY
Qty: 90 TABLET | Refills: 0 | Status: SHIPPED | OUTPATIENT
Start: 2018-11-02 | End: 2018-12-09 | Stop reason: SDUPTHER

## 2018-11-20 ENCOUNTER — OFFICE VISIT (OUTPATIENT)
Dept: ORTHOPEDIC SURGERY | Facility: CLINIC | Age: 73
End: 2018-11-20

## 2018-11-20 VITALS — BODY MASS INDEX: 33.05 KG/M2 | HEIGHT: 72 IN | WEIGHT: 244 LBS

## 2018-11-20 DIAGNOSIS — M25.561 CHRONIC PAIN OF RIGHT KNEE: Primary | ICD-10-CM

## 2018-11-20 DIAGNOSIS — G89.29 CHRONIC PAIN OF RIGHT KNEE: Primary | ICD-10-CM

## 2018-11-20 PROCEDURE — 20610 DRAIN/INJ JOINT/BURSA W/O US: CPT | Performed by: ORTHOPAEDIC SURGERY

## 2018-11-20 PROCEDURE — 99204 OFFICE O/P NEW MOD 45 MIN: CPT | Performed by: ORTHOPAEDIC SURGERY

## 2018-11-20 PROCEDURE — 73562 X-RAY EXAM OF KNEE 3: CPT | Performed by: ORTHOPAEDIC SURGERY

## 2018-11-20 RX ORDER — METHYLPREDNISOLONE ACETATE 80 MG/ML
80 INJECTION, SUSPENSION INTRA-ARTICULAR; INTRALESIONAL; INTRAMUSCULAR; SOFT TISSUE
Status: COMPLETED | OUTPATIENT
Start: 2018-11-20 | End: 2018-11-20

## 2018-11-20 RX ORDER — BUPIVACAINE HYDROCHLORIDE 5 MG/ML
2 INJECTION, SOLUTION PERINEURAL
Status: COMPLETED | OUTPATIENT
Start: 2018-11-20 | End: 2018-11-20

## 2018-11-20 RX ADMIN — BUPIVACAINE HYDROCHLORIDE 2 ML: 5 INJECTION, SOLUTION PERINEURAL at 14:38

## 2018-11-20 RX ADMIN — METHYLPREDNISOLONE ACETATE 80 MG: 80 INJECTION, SUSPENSION INTRA-ARTICULAR; INTRALESIONAL; INTRAMUSCULAR; SOFT TISSUE at 14:38

## 2018-11-20 NOTE — PROGRESS NOTES
Patient: Micah Krishna  YOB: 1945 73 y.o. male  Medical Record Number: [id number]    Chief Complaints:   Chief Complaint   Patient presents with   • Right Knee - Establish Care, Pain       History of Present Illness:Micah Krishna is a 73 y.o. male who presents with complaints of right knee pain and swelling.  It's essentially been going on for about 2 years.  He had it scoped by another orthopedic surgeon and states that it really didn't help.  He now complains of a moderate constant stabbing type pain it's worse with activities.    Allergies:   Allergies   Allergen Reactions   • Shellfish-Derived Products Anaphylaxis   • Iodinated Diagnostic Agents Itching and Swelling   • Shellfish Allergy    • Adhesive Tape Hives       Medications:   Current Outpatient Medications   Medication Sig Dispense Refill   • allopurinol (ZYLOPRIM) 300 MG tablet TAKE 1 TABLET BY MOUTH ONCE A DAY 90 tablet 1   • amLODIPine (NORVASC) 10 MG tablet TAKE 1 TABLET BY MOUTH  DAILY 90 tablet 3   • amLODIPine (NORVASC) 5 MG tablet Take 1 tablet by mouth Daily. 30 tablet 2   • bumetanide (BUMEX) 2 MG tablet TAKE 1 TABLET BY MOUTH  EVERY DAY 90 tablet 3   • CloNIDine (CATAPRES) 0.2 MG tablet      • clopidogrel (PLAVIX) 75 MG tablet Take 1 tablet by mouth Daily. 90 tablet 3   • finasteride (PROSCAR) 5 MG tablet TAKE 1 TABLET BY MOUTH ONCE A DAY 90 tablet 3   • fludrocortisone 0.1 MG tablet TAKE 1 TABLET BY MOUTH EVERY OTHER DAY 45 tablet 2   • HUMALOG KWIKPEN 100 UNIT/ML solution pen-injector INJECT 2 UNITS FOR EACH 50 MG ABOVE 200  1   • hydrocortisone (CORTEF) 10 MG tablet Take 1 tablet by mouth Take As Directed. Pt takes 10mg in the am, and 5mg in the pm. 135 tablet 3   • hydrocortisone sodium succinate (Solu-CORTEF) 100 MG injection Inject 50 mg into the shoulder, thigh, or buttocks Daily. 1 vial 0   • icosapent ethyl (VASCEPA) 1 g capsule capsule Take 1 g by mouth.     • insulin detemir (LEVEMIR) 100 UNIT/ML injection Inject   "under the skin Daily.     • Insulin Glargine (LANTUS SOLOSTAR) 100 UNIT/ML injection pen Inject 30 Units under the skin 2 (Two) Times a Day. 45 mL 1   • isosorbide mononitrate (IMDUR) 30 MG 24 hr tablet TAKE 1 TABLET BY MOUTH  TWICE A  tablet 2   • losartan (COZAAR) 100 MG tablet TAKE 1 TABLET BY MOUTH  DAILY 90 tablet 1   • metoprolol succinate XL (TOPROL-XL) 100 MG 24 hr tablet TAKE 1 TABLET BY MOUTH  DAILY 90 tablet 3   • nitroglycerin (NITROLINGUAL) 0.4 MG/SPRAY spray Place 1 spray under the tongue Every 5 (Five) Minutes As Needed for Chest Pain. 12 g 5   • olmesartan (BENICAR) 40 MG tablet TK 1 T PO D  5   • pantoprazole (PROTONIX) 20 MG EC tablet TAKE 1 TABLET BY MOUTH  EVERY DAY 90 tablet 3   • potassium chloride (K-DUR,KLOR-CON) 20 MEQ CR tablet Take 1 tablet by mouth Daily. 90 tablet 0   • rosuvastatin (CRESTOR) 20 MG tablet Take 1 tablet by mouth Daily. 90 tablet 1     No current facility-administered medications for this visit.          The following portions of the patient's history were reviewed and updated as appropriate: allergies, current medications, past family history, past medical history, past social history, past surgical history and problem list.    Review of Systems:   A 14 point review of systems was performed. All systems negative except pertinent positives/negative listed in HPI above    Physical Exam:   Vitals:    11/20/18 1400   Weight: 111 kg (244 lb)   Height: 184 cm (72.44\")       General: A and O x 3, ASA, NAD    SCLERA:    Normal    DENTITION:   Normal  Knee:  right    ALIGNMENT:     Neutral  ,   Patella tracks   midline    GAIT:    Antalgic    SKIN:    No abnormality    RANGE OF MOTION:   Painful flexion    STRENGTH:   5 / 5    LIGAMENTS:    No varus / valgus instability.   Negative  Lachman.    MENISCUS:     Positive  medial   Partha       DISTAL PULSES:    Paplable    DISTAL SENSATION :   Intact    LYMPHATICS:     No   lymphadenopathy    OTHER:          - Positive  " effusion      - No crepitance with ROM          Radiology:  Xrays 3views right knee(ap,lateral, sunrise) were ordered and reviewed for evaluation of knee pain demonstrating calcification of the medial and lateral meniscus of both knees.  The appearance appears consistent with CPPD.  There still has moderate preservation of the joint space.  There are no previous films for comparison.    Assessment/Plan: right knee active CPPD.  I aspirated and injected the knee.  We'll see how he does without I will also send him to physical therapy for quad strengthening.  If his pain returns or should he have worsening pain he'll call back.  It should be noted that he had an arthroscopy 2 years ago and has adrenal insufficiency.  He had to be admitted to the intensive care unit in nearly  following surgery and what sounds like an adrenal crisis.  Large Joint Arthrocentesis: R knee  Date/Time: 2018 2:38 PM  Consent given by: patient  Site marked: site marked  Timeout: Immediately prior to procedure a time out was called to verify the correct patient, procedure, equipment, support staff and site/side marked as required   Supporting Documentation  Indications: pain and joint swelling   Procedure Details  Location: knee - R knee  Preparation: Patient was prepped and draped in the usual sterile fashion  Needle size: 22 G  Approach: anterolateral  Medications administered: 80 mg methylPREDNISolone acetate 80 MG/ML; 2 mL bupivacaine 0.5 %  Aspirate amount: 35 mL  Aspirate: clear and yellow  Patient tolerance: patient tolerated the procedure well with no immediate complications            Kevin Thompson MD  2018

## 2018-12-06 ENCOUNTER — TREATMENT (OUTPATIENT)
Dept: PHYSICAL THERAPY | Facility: CLINIC | Age: 73
End: 2018-12-06

## 2018-12-06 DIAGNOSIS — M25.561 CHRONIC PAIN OF RIGHT KNEE: Primary | ICD-10-CM

## 2018-12-06 DIAGNOSIS — G89.29 CHRONIC PAIN OF RIGHT KNEE: Primary | ICD-10-CM

## 2018-12-06 PROCEDURE — 97110 THERAPEUTIC EXERCISES: CPT | Performed by: PHYSICAL THERAPIST

## 2018-12-06 PROCEDURE — G8981 BODY POS CURRENT STATUS: HCPCS | Performed by: PHYSICAL THERAPIST

## 2018-12-06 PROCEDURE — G8982 BODY POS GOAL STATUS: HCPCS | Performed by: PHYSICAL THERAPIST

## 2018-12-06 PROCEDURE — 97161 PT EVAL LOW COMPLEX 20 MIN: CPT | Performed by: PHYSICAL THERAPIST

## 2018-12-06 NOTE — PROGRESS NOTES
"  Physical Therapy Initial Evaluation and Plan of Care      Patient: Micah Krishna   : 1945  Diagnosis/ICD-10 Code:  Chronic pain of right knee [M25.561, G89.29]  Referring practitioner: Kevin Thompson MD  Date of Initial Visit: 2018  Today's Date: 2018  Patient seen for 1 sessions           Subjective Questionnaire: LEFS: 61/80      Subjective Evaluation    History of Present Illness  Mechanism of injury: C/o (R) knee pain. Hx of R arthoscopic Sx 2 years ago. Pt reports minimal pain post-aspiration and injection. Only having pain at injection site and some tightness behind his knee.   Pain increased with ambulation and all activity. \"If I went up a ladder, the next day I would be miserable.\" Knee swells.  Pain decreased minimally with Bengay.    Xray 18: Calcification of the medial and lateral meniscus of both knees.  The appearance appears consistent with CPPD.  There still has moderate preservation of the joint space.  There are no previous films for comparison.  CPPD - Calcium Pyrophosphate Deposition     Hx of Kidney cancer (x2) and DDD. Currently having low back pain that he plans to speak with his physician about in January.       Patient Occupation: Retired Pain  Current pain ratin  Location: R knee  Quality: sharp  Relieving factors: rest  Aggravating factors: ambulation, squatting, movement and stairs  Progression: improved    Social Support  Lives in: multiple-level home  Lives with: spouse    Diagnostic Tests  X-ray: abnormal    Treatments  Previous treatment: injection treatment and physical therapy  Current treatment: physical therapy  Patient Goals  Patient goals for therapy: decreased pain, increased motion, increased strength and independence with ADLs/IADLs             Objective       Palpation     Additional Palpation Details  Non TTP    Active Range of Motion   Left Knee   Flexion: 128 degrees   Extension: 0 degrees     Right Knee   Flexion: 130 degrees   Extension: 0 " degrees     Strength/Myotome Testing     Right Knee   Flexion: 5  Extension: 5    Swelling     Left Knee Girth Measurement (cm)   Joint line: 44 cm    Right Knee Girth Measurement (cm)   Joint line: 46 cm         Assessment & Plan     Assessment  Impairments: activity intolerance, impaired physical strength, lacks appropriate home exercise program and pain with function  Assessment details: Pt presents w/ limited hamstring flexibility, pain w/ transfers, and limited functional strength. Will benefit from skilled PT services in order to address listed impairments and increase tolerance to normal daily activities including ADLs, work, and recreational activities.    Prognosis: good  Functional Limitations: walking and standing  Goals  Plan Goals: Plan Goals: Short Term Goals: 2 weeks. Patient will:  1. Be independent with initial HEP  2. Be instructed in posture and body mechanics  3. Tolerate CKC knee strengthening w/o significant increase in pain    Long Term Goals: 4-8 weeks. Patient will:  1. Ambulate 1 mile w/o significant pain, limitation, or deviation in gait mechanics  2. Perform sit to stand from low seat w/o c/o pain  3. Perform body weight squat w/o significant knee valgus, demonstrating adequate strength for ADLs  4. Perceived disability </= 15% as measured on LEFS  5. Be independent with long term HEP    Plan  Therapy options: will be seen for skilled physical therapy services  Planned modality interventions: cryotherapy, microcurrent electrical stimulation, ultrasound and thermotherapy (hydrocollator packs)  Planned therapy interventions: abdominal trunk stabilization, balance/weight-bearing training, body mechanics training, ADL retraining, flexibility, functional ROM exercises, gait training, home exercise program, joint mobilization, manual therapy, neuromuscular re-education, soft tissue mobilization, strengthening, stretching and therapeutic activities  Duration in visits: 8  Treatment plan  discussed with: patient        Manual Therapy:    0     mins  41455;  Therapeutic Exercise:    15     mins  99380;     Neuromuscular Meghana:    0    mins  91676;    Therapeutic Activity:     0     mins  49587;     Gait Trainin     mins  89183;     Ultrasound:     0     mins  76608;    Electrical Stimulation:    0     mins  87558 ( );  Dry Needling     0     mins self-pay    Timed Treatment:   15   mins   Total Treatment:     60   mins    PT SIGNATURE: Katherine Guerrier, LINDSEY   DATE TREATMENT INITIATED: 2018    Initial Certification  Certification Period: 3/6/2019  I certify that the therapy services are furnished while this patient is under my care.  The services outlined above are required by this patient, and will be reviewed every 90 days.     PHYSICIAN: Kevin Thompson MD      DATE:     Please sign and return via fax to [phone].. Thank you, [facility].

## 2018-12-10 RX ORDER — POTASSIUM CHLORIDE 20 MEQ/1
20 TABLET, EXTENDED RELEASE ORAL DAILY
Qty: 90 TABLET | Refills: 0 | Status: SHIPPED | OUTPATIENT
Start: 2018-12-10 | End: 2019-03-04 | Stop reason: SDUPTHER

## 2018-12-12 ENCOUNTER — TREATMENT (OUTPATIENT)
Dept: PHYSICAL THERAPY | Facility: CLINIC | Age: 73
End: 2018-12-12

## 2018-12-12 DIAGNOSIS — M25.561 CHRONIC PAIN OF RIGHT KNEE: Primary | ICD-10-CM

## 2018-12-12 DIAGNOSIS — G89.29 CHRONIC PAIN OF RIGHT KNEE: Primary | ICD-10-CM

## 2018-12-12 PROCEDURE — 97110 THERAPEUTIC EXERCISES: CPT | Performed by: PHYSICAL THERAPIST

## 2018-12-12 NOTE — PROGRESS NOTES
Physical Therapy Daily Progress Note      Visit # 2      Subjective   Pt reports brenda horse after exercise. Back of legs feels tight.    Objective   See Exercise, Manual, and Modality Logs for complete treatment.       Assessment/Plan  Excellent tolerance to new exercises. Continues to have mild limitation in R knee extension. Updated HEP. Progress per POC.                 Manual Therapy:    0     mins  96097;  Therapeutic Exercise:    40     mins  82363;     Neuromuscular Meghana:    0    mins  33969;    Therapeutic Activity:     0     mins  79191;     Gait Trainin     mins  34533;     Ultrasound:     0     mins  55180;    Work Hardening           0      mins 50864  Iontophoresis               0   mins 57880    Timed Treatment:   40   mins   Total Treatment:     46   mins    Katherine Guerrier, PT  Physical Therapist

## 2018-12-19 ENCOUNTER — TREATMENT (OUTPATIENT)
Dept: PHYSICAL THERAPY | Facility: CLINIC | Age: 73
End: 2018-12-19

## 2018-12-19 DIAGNOSIS — G89.29 CHRONIC PAIN OF RIGHT KNEE: Primary | ICD-10-CM

## 2018-12-19 DIAGNOSIS — M25.561 CHRONIC PAIN OF RIGHT KNEE: Primary | ICD-10-CM

## 2018-12-19 PROCEDURE — 97110 THERAPEUTIC EXERCISES: CPT | Performed by: PHYSICAL THERAPIST

## 2019-01-08 ENCOUNTER — OFFICE VISIT (OUTPATIENT)
Dept: ONCOLOGY | Facility: CLINIC | Age: 74
End: 2019-01-08

## 2019-01-08 ENCOUNTER — TELEPHONE (OUTPATIENT)
Dept: ONCOLOGY | Facility: CLINIC | Age: 74
End: 2019-01-08

## 2019-01-08 ENCOUNTER — HOSPITAL ENCOUNTER (OUTPATIENT)
Dept: GENERAL RADIOLOGY | Facility: HOSPITAL | Age: 74
Discharge: HOME | End: 2019-01-08
Attending: INTERNAL MEDICINE | Admitting: INTERNAL MEDICINE

## 2019-01-08 ENCOUNTER — LAB (OUTPATIENT)
Dept: OTHER | Facility: HOSPITAL | Age: 74
End: 2019-01-08

## 2019-01-08 VITALS
SYSTOLIC BLOOD PRESSURE: 138 MMHG | TEMPERATURE: 97.6 F | HEART RATE: 66 BPM | DIASTOLIC BLOOD PRESSURE: 82 MMHG | BODY MASS INDEX: 33.54 KG/M2 | OXYGEN SATURATION: 96 % | WEIGHT: 247.6 LBS | RESPIRATION RATE: 16 BRPM | HEIGHT: 72 IN

## 2019-01-08 DIAGNOSIS — Z85.528 HISTORY OF RENAL CELL CARCINOMA: Primary | ICD-10-CM

## 2019-01-08 DIAGNOSIS — Z85.528 HISTORY OF RENAL CELL CARCINOMA: ICD-10-CM

## 2019-01-08 LAB
BASOPHILS # BLD AUTO: 0.06 10*3/MM3 (ref 0–0.2)
BASOPHILS NFR BLD AUTO: 0.9 % (ref 0–1.5)
DEPRECATED RDW RBC AUTO: 40.9 FL (ref 37–54)
EOSINOPHIL # BLD AUTO: 0.27 10*3/MM3 (ref 0–0.7)
EOSINOPHIL NFR BLD AUTO: 4.2 % (ref 0.3–6.2)
ERYTHROCYTE [DISTWIDTH] IN BLOOD BY AUTOMATED COUNT: 13.8 % (ref 11.5–14.5)
HCT VFR BLD AUTO: 37.1 % (ref 40.4–52.2)
HGB BLD-MCNC: 13 G/DL (ref 13.7–17.6)
IMM GRANULOCYTES # BLD AUTO: 0.04 10*3/MM3 (ref 0–0.03)
IMM GRANULOCYTES NFR BLD AUTO: 0.6 % (ref 0–0.5)
LYMPHOCYTES # BLD AUTO: 2.17 10*3/MM3 (ref 0.9–4.8)
LYMPHOCYTES NFR BLD AUTO: 34 % (ref 19.6–45.3)
MCH RBC QN AUTO: 29 PG (ref 27–32.7)
MCHC RBC AUTO-ENTMCNC: 35 G/DL (ref 32.6–36.4)
MCV RBC AUTO: 82.6 FL (ref 79.8–96.2)
MONOCYTES # BLD AUTO: 0.69 10*3/MM3 (ref 0.2–1.2)
MONOCYTES NFR BLD AUTO: 10.8 % (ref 5–12)
NEUTROPHILS # BLD AUTO: 3.15 10*3/MM3 (ref 1.9–8.1)
NEUTROPHILS NFR BLD AUTO: 49.5 % (ref 42.7–76)
NRBC BLD AUTO-RTO: 0 /100 WBC (ref 0–0)
PLATELET # BLD AUTO: 142 10*3/MM3 (ref 140–500)
PMV BLD AUTO: 9.9 FL (ref 6–12)
RBC # BLD AUTO: 4.49 10*6/MM3 (ref 4.6–6)
WBC NRBC COR # BLD: 6.38 10*3/MM3 (ref 4.5–10.7)

## 2019-01-08 PROCEDURE — 99213 OFFICE O/P EST LOW 20 MIN: CPT | Performed by: INTERNAL MEDICINE

## 2019-01-08 PROCEDURE — 71046 X-RAY EXAM CHEST 2 VIEWS: CPT

## 2019-01-08 PROCEDURE — 85025 COMPLETE CBC W/AUTO DIFF WBC: CPT | Performed by: INTERNAL MEDICINE

## 2019-01-08 PROCEDURE — 36415 COLL VENOUS BLD VENIPUNCTURE: CPT

## 2019-01-08 RX ORDER — ERGOCALCIFEROL 1.25 MG/1
CAPSULE ORAL
Refills: 1 | COMMUNITY
Start: 2019-01-05 | End: 2021-01-06 | Stop reason: SDUPTHER

## 2019-01-08 RX ORDER — SUCRALFATE 1 G/1
1 TABLET ORAL 4 TIMES DAILY
COMMUNITY
Start: 2018-01-13 | End: 2019-04-26

## 2019-01-08 RX ORDER — HYDROCORTISONE 5 MG/1
TABLET ORAL
Refills: 1 | COMMUNITY
Start: 2019-01-05

## 2019-01-08 NOTE — TELEPHONE ENCOUNTER
----- Message from Delmar Cat MD sent at 1/8/2019  2:07 PM EST -----  Please call the patient regarding his result.  Please let him know that his chest x-ray is normal. Thanks!  ROSANNE

## 2019-01-08 NOTE — PROGRESS NOTES
[facility] GROUP OUTPATIENT FOLLOW UP CLINIC VISIT    REASON FOR FOLLOW-UP:    1.  History of metastatic clear cell renal cell carcinoma.  All disease has been resected.  Currently no evidence of disease.    HISTORY OF PRESENT ILLNESS:  Micah Krishna is a 73 y.o. male who returns today for follow up of the above issue.  He denies any new problems today and overall generally feels well.  He is on a vitamin D supplement and his energy level has improved a little bit on this.  His diabetes is adequately controlled.  He occasionally has some back pain but this is not worse.      PAST MEDICAL, SURGICAL, FAMILY, AND SOCIAL HISTORIES were reviewed with the patient and in the electronic medical record, and were updated if indicated.    ALLERGIES:  Allergies   Allergen Reactions   • Shellfish-Derived Products Anaphylaxis   • Iodinated Diagnostic Agents Itching and Swelling   • Shellfish Allergy    • Adhesive Tape Hives       MEDICATIONS:  The medication list has been reviewed with the patient by the medical assistant, and the list has been updated in the electronic medical record, which I reviewed.  Medication dosages and frequencies were confirmed to be accurate.    REVIEW OF SYSTEMS:  PAIN:  See Vital Signs below.  GENERAL:  No fevers, chills, night sweats, or unintended weight loss.  SKIN:  No rashes or non-healing lesions.  HEME/LYMPH:  No abnormal bleeding.  No palpable lymphadenopathy.  EYES:  No vision changes or diplopia.  ENT:  No sore throat or difficulty swallowing.  RESPIRATORY:  No cough, shortness of breath, hemoptysis, or wheezing.  CARDIOVASCULAR:  No chest pain, palpitations, orthopnea, or dyspnea on exertion.  GASTROINTESTINAL:  No nausea vomiting or diarrhea.  GENITOURINARY:  No dysuria or hematuria.  MUSCULOSKELETAL:  Persistent arthritis pain.    NEUROLOGIC:  No dizziness, loss of consciousness, or seizures.  PSYCHIATRIC:  No depression, anxiety, or mood changes.    Vitals:    01/08/19 1022  "  BP: 138/82   Pulse: 66   Resp: 16   Temp: 97.6 °F (36.4 °C)   TempSrc: Oral   SpO2: 96%   Weight: 112 kg (247 lb 9.6 oz)   Height: 184 cm (72.44\")   PainSc: 0-No pain  Comment: rectal cancer       PHYSICAL EXAMINATION:  GENERAL:  Well-developed well-nourished male; awake, alert and oriented, in no acute distress.  Some difficulty rising from a chair is noted.  SKIN:  Warm and dry, without rashes, purpura, or petechiae.  HEAD:  Normocephalic, atraumatic.  EYES:  Pupils equal, round and reactive to light.  Extraocular movements intact.  Conjunctivae normal.  EARS:  Hearing intact.  NOSE:  Septum midline.  No excoriations or nasal discharge.  MOUTH:  No stomatitis or ulcers.  Lips are normal.  THROAT:  Oropharynx without lesions or exudates.  NECK:  Supple with good range of motion; no thyromegaly or masses; no JVD or bruits.  LYMPHATICS:  No cervical, supraclavicular, axillary, or inguinal lymphadenopathy.  CHEST:  Lungs are clear to auscultation bilaterally.  No wheezes, rales, or rhonchi.  HEART:  Regular rate; normal rhythm.  No murmurs, gallops or rubs.  ABDOMEN:  Soft, non-tender, non-distended.  Normal active bowel sounds.  No organomegaly.  EXTREMITIES:  No clubbing cyanosis or edema  NEUROLOGICAL:  No focal neurologic deficits.    DIAGNOSTIC DATA:  Results for orders placed or performed in visit on 01/08/19   CBC Auto Differential   Result Value Ref Range    WBC 6.38 4.50 - 10.70 10*3/mm3    RBC 4.49 (L) 4.60 - 6.00 10*6/mm3    Hemoglobin 13.0 (L) 13.7 - 17.6 g/dL    Hematocrit 37.1 (L) 40.4 - 52.2 %    MCV 82.6 79.8 - 96.2 fL    MCH 29.0 27.0 - 32.7 pg    MCHC 35.0 32.6 - 36.4 g/dL    RDW 13.8 11.5 - 14.5 %    RDW-SD 40.9 37.0 - 54.0 fl    MPV 9.9 6.0 - 12.0 fL    Platelets 142 140 - 500 10*3/mm3    Neutrophil % 49.5 42.7 - 76.0 %    Lymphocyte % 34.0 19.6 - 45.3 %    Monocyte % 10.8 5.0 - 12.0 %    Eosinophil % 4.2 0.3 - 6.2 %    Basophil % 0.9 0.0 - 1.5 %    Immature Grans % 0.6 (H) 0.0 - 0.5 %    " Neutrophils, Absolute 3.15 1.90 - 8.10 10*3/mm3    Lymphocytes, Absolute 2.17 0.90 - 4.80 10*3/mm3    Monocytes, Absolute 0.69 0.20 - 1.20 10*3/mm3    Eosinophils, Absolute 0.27 0.00 - 0.70 10*3/mm3    Basophils, Absolute 0.06 0.00 - 0.20 10*3/mm3    Immature Grans, Absolute 0.04 (H) 0.00 - 0.03 10*3/mm3    nRBC 0.0 0.0 - 0.0 /100 WBC       IMAGING:  CT imaging from 7/17/2018 shows no evidence for metastatic disease in the chest abdomen or pelvis.  Images personally reviewed.      ASSESSMENT:  This is a 73 y.o. male with:  1.  Mild normocytic anemia: His hemoglobin is improved today.  2.  Adrenal insufficiency following bilateral adrenalectomy currently on replacement hormone therapy.  He follows with endocrinology.  3.  History of metastatic renal cell carcinoma.  He had a left nephrectomy and adrenalectomy in 2000 and then a right adrenalectomy for metastatic disease in 2012.  Currently no evidence of disease on CT imaging.  Follow up in 6 months with CT C/A/P without contrast.    4.  Hypertension:  Blood pressure better after diuresis   5.  Lower extremity edema:  No edema today.    PLAN:   1.  Follow up in 6 months with CT imaging of the chest abdomen and pelvis done without IV contrast done one week prior.  CBC and CMP at follow-up.  2.  He will go downstairs for chest x-ray today.

## 2019-01-08 NOTE — TELEPHONE ENCOUNTER
Called patient home phone.   Sent message per Dr. Cat results were normal.   Left Voicemail message and to call back if he has any questions.

## 2019-01-15 DIAGNOSIS — I10 HYPERTENSION, UNSPECIFIED TYPE: Primary | ICD-10-CM

## 2019-01-15 DIAGNOSIS — Z79.4 CONTROLLED TYPE 2 DIABETES MELLITUS WITH COMPLICATION, WITH LONG-TERM CURRENT USE OF INSULIN (HCC): ICD-10-CM

## 2019-01-15 DIAGNOSIS — E78.5 HYPERLIPIDEMIA, UNSPECIFIED HYPERLIPIDEMIA TYPE: ICD-10-CM

## 2019-01-15 DIAGNOSIS — E55.9 VITAMIN D DEFICIENCY: ICD-10-CM

## 2019-01-15 DIAGNOSIS — E11.8 CONTROLLED TYPE 2 DIABETES MELLITUS WITH COMPLICATION, WITH LONG-TERM CURRENT USE OF INSULIN (HCC): ICD-10-CM

## 2019-01-18 ENCOUNTER — RESULTS ENCOUNTER (OUTPATIENT)
Dept: FAMILY MEDICINE CLINIC | Facility: CLINIC | Age: 74
End: 2019-01-18

## 2019-01-18 DIAGNOSIS — I10 HYPERTENSION, UNSPECIFIED TYPE: ICD-10-CM

## 2019-01-18 DIAGNOSIS — E55.9 VITAMIN D DEFICIENCY: ICD-10-CM

## 2019-01-18 DIAGNOSIS — E78.5 HYPERLIPIDEMIA, UNSPECIFIED HYPERLIPIDEMIA TYPE: ICD-10-CM

## 2019-01-18 DIAGNOSIS — Z79.4 CONTROLLED TYPE 2 DIABETES MELLITUS WITH COMPLICATION, WITH LONG-TERM CURRENT USE OF INSULIN (HCC): ICD-10-CM

## 2019-01-18 DIAGNOSIS — E11.8 CONTROLLED TYPE 2 DIABETES MELLITUS WITH COMPLICATION, WITH LONG-TERM CURRENT USE OF INSULIN (HCC): ICD-10-CM

## 2019-01-18 LAB
25(OH)D3+25(OH)D2 SERPL-MCNC: 23.1 NG/ML (ref 30–100)
ALBUMIN SERPL-MCNC: 4.5 G/DL (ref 3.5–5.2)
ALBUMIN/GLOB SERPL: 1.7 G/DL
ALP SERPL-CCNC: 62 U/L (ref 39–117)
ALT SERPL-CCNC: 20 U/L (ref 1–41)
AST SERPL-CCNC: 14 U/L (ref 1–40)
BASOPHILS # BLD AUTO: 0.05 10*3/MM3 (ref 0–0.2)
BASOPHILS NFR BLD AUTO: 0.8 % (ref 0–1.5)
BILIRUB SERPL-MCNC: 0.3 MG/DL (ref 0.1–1.2)
BUN SERPL-MCNC: 22 MG/DL (ref 8–23)
BUN/CREAT SERPL: 19.1 (ref 7–25)
CALCIUM SERPL-MCNC: 9.7 MG/DL (ref 8.6–10.5)
CHLORIDE SERPL-SCNC: 103 MMOL/L (ref 98–107)
CHOLEST SERPL-MCNC: 129 MG/DL (ref 0–200)
CO2 SERPL-SCNC: 25.6 MMOL/L (ref 22–29)
CREAT SERPL-MCNC: 1.15 MG/DL (ref 0.76–1.27)
EOSINOPHIL # BLD AUTO: 0.25 10*3/MM3 (ref 0–0.7)
EOSINOPHIL NFR BLD AUTO: 4.1 % (ref 0.3–6.2)
ERYTHROCYTE [DISTWIDTH] IN BLOOD BY AUTOMATED COUNT: 14 % (ref 11.5–14.5)
GLOBULIN SER CALC-MCNC: 2.6 GM/DL
GLUCOSE SERPL-MCNC: 150 MG/DL (ref 65–99)
HBA1C MFR BLD: 7.99 % (ref 4.8–5.6)
HCT VFR BLD AUTO: 37.7 % (ref 40.4–52.2)
HDLC SERPL-MCNC: 29 MG/DL (ref 40–60)
HGB BLD-MCNC: 12.7 G/DL (ref 13.7–17.6)
IMM GRANULOCYTES # BLD AUTO: 0.03 10*3/MM3 (ref 0–0.03)
IMM GRANULOCYTES NFR BLD AUTO: 0.5 % (ref 0–0.5)
LDLC SERPL CALC-MCNC: 45 MG/DL (ref 0–100)
LDLC/HDLC SERPL: 1.56 {RATIO}
LYMPHOCYTES # BLD AUTO: 1.51 10*3/MM3 (ref 0.9–4.8)
LYMPHOCYTES NFR BLD AUTO: 24.7 % (ref 19.6–45.3)
MCH RBC QN AUTO: 29.6 PG (ref 27–32.7)
MCHC RBC AUTO-ENTMCNC: 33.7 G/DL (ref 32.6–36.4)
MCV RBC AUTO: 87.9 FL (ref 79.8–96.2)
MONOCYTES # BLD AUTO: 0.57 10*3/MM3 (ref 0.2–1.2)
MONOCYTES NFR BLD AUTO: 9.3 % (ref 5–12)
NEUTROPHILS # BLD AUTO: 3.74 10*3/MM3 (ref 1.9–8.1)
NEUTROPHILS NFR BLD AUTO: 61.1 % (ref 42.7–76)
PLATELET # BLD AUTO: 152 10*3/MM3 (ref 140–500)
POTASSIUM SERPL-SCNC: 4.2 MMOL/L (ref 3.5–5.2)
PROT SERPL-MCNC: 7.1 G/DL (ref 6–8.5)
RBC # BLD AUTO: 4.29 10*6/MM3 (ref 4.6–6)
SODIUM SERPL-SCNC: 141 MMOL/L (ref 136–145)
TRIGL SERPL-MCNC: 274 MG/DL (ref 0–150)
VLDLC SERPL CALC-MCNC: 54.8 MG/DL (ref 5–40)
WBC # BLD AUTO: 6.12 10*3/MM3 (ref 4.5–10.7)

## 2019-01-25 ENCOUNTER — OFFICE VISIT (OUTPATIENT)
Dept: FAMILY MEDICINE CLINIC | Facility: CLINIC | Age: 74
End: 2019-01-25

## 2019-01-25 VITALS
WEIGHT: 244.1 LBS | SYSTOLIC BLOOD PRESSURE: 128 MMHG | TEMPERATURE: 97.9 F | HEART RATE: 75 BPM | BODY MASS INDEX: 32.7 KG/M2 | DIASTOLIC BLOOD PRESSURE: 85 MMHG | OXYGEN SATURATION: 98 %

## 2019-01-25 DIAGNOSIS — E78.2 MIXED HYPERLIPIDEMIA: ICD-10-CM

## 2019-01-25 DIAGNOSIS — E08.65 DIABETES MELLITUS DUE TO UNDERLYING CONDITION WITH HYPERGLYCEMIA, WITH LONG-TERM CURRENT USE OF INSULIN (HCC): ICD-10-CM

## 2019-01-25 DIAGNOSIS — I10 ESSENTIAL HYPERTENSION: Primary | ICD-10-CM

## 2019-01-25 DIAGNOSIS — Z79.4 DIABETES MELLITUS DUE TO UNDERLYING CONDITION WITH HYPERGLYCEMIA, WITH LONG-TERM CURRENT USE OF INSULIN (HCC): ICD-10-CM

## 2019-01-25 PROBLEM — N18.30 CHRONIC RENAL INSUFFICIENCY, STAGE 3 (MODERATE) (HCC): Status: RESOLVED | Noted: 2018-10-22 | Resolved: 2019-01-25

## 2019-01-25 PROCEDURE — 99213 OFFICE O/P EST LOW 20 MIN: CPT | Performed by: FAMILY MEDICINE

## 2019-01-25 NOTE — PROGRESS NOTES
Subjective   Micah Krishna is a 73 y.o. male presenting with   Chief Complaint   Patient presents with   • Diabetes     3 month check. lab results        73-year-old  nonsmoker here for follow-up for blood pressure and cholesterol and diabetes and renal insufficiency secondary to surgical removal of both kidneys and adrenal glands for cancer.  He is doing extremely well on his current medical regiment and denies any current problems.         The following portions of the patient's history were reviewed and updated as appropriate: current medications, past family history, past medical history, past social history, past surgical history and problem list.    Review of Systems   All other systems reviewed and are negative.      Objective   Physical Exam   Constitutional: He is oriented to person, place, and time. He appears well-developed and well-nourished.   HENT:   Head: Normocephalic and atraumatic.   Eyes: EOM are normal. Pupils are equal, round, and reactive to light.   Neck: Normal range of motion. Neck supple. No thyromegaly present.   Cardiovascular: Normal rate and regular rhythm.   Pulmonary/Chest: Effort normal and breath sounds normal.   Musculoskeletal: Normal range of motion. He exhibits no edema.   Lymphadenopathy:     He has no cervical adenopathy.   Neurological: He is alert and oriented to person, place, and time. He has normal strength. No cranial nerve deficit or sensory deficit (normal monofilament testing in feet).   Skin: Skin is warm and dry.   Psychiatric: He has a normal mood and affect. His behavior is normal.   Nursing note and vitals reviewed.      Assessment/Plan   Micah was seen today for diabetes.    Diagnoses and all orders for this visit:    Essential hypertension    Mixed hyperlipidemia    Diabetes mellitus due to underlying condition with hyperglycemia, with long-term current use of insulin (CMS/[facility])                   I would like him to return for another visit in 6  month(s)

## 2019-01-25 NOTE — PATIENT INSTRUCTIONS
This is an extremely nice 73-year-old who is here for follow-up.  I have gone over his blood work which shows normal renal function.  I would like him to call if there is any problem.

## 2019-02-11 ENCOUNTER — TELEPHONE (OUTPATIENT)
Dept: FAMILY MEDICINE CLINIC | Facility: CLINIC | Age: 74
End: 2019-02-11

## 2019-02-11 RX ORDER — FLUDROCORTISONE ACETATE 0.1 MG/1
0.1 TABLET ORAL EVERY OTHER DAY
Qty: 45 TABLET | Refills: 3 | Status: SHIPPED | OUTPATIENT
Start: 2019-02-11 | End: 2019-02-11 | Stop reason: SDUPTHER

## 2019-02-11 RX ORDER — FLUDROCORTISONE ACETATE 0.1 MG/1
0.1 TABLET ORAL EVERY OTHER DAY
Qty: 45 TABLET | Refills: 3 | Status: SHIPPED | OUTPATIENT
Start: 2019-02-11 | End: 2019-12-14 | Stop reason: SDUPTHER

## 2019-02-11 RX ORDER — ROSUVASTATIN CALCIUM 20 MG/1
20 TABLET, COATED ORAL DAILY
Qty: 90 TABLET | Refills: 1 | Status: SHIPPED | OUTPATIENT
Start: 2019-02-11 | End: 2019-11-21 | Stop reason: SDUPTHER

## 2019-02-11 NOTE — TELEPHONE ENCOUNTER
Fludrocortisone 0.1mg last ordered in 2016. Patient left msg asking if it can be sent to [facility] in his chart since mail order is out of rx.    Ok to send rx to [facility]??

## 2019-03-04 ENCOUNTER — TELEPHONE (OUTPATIENT)
Dept: FAMILY MEDICINE CLINIC | Facility: CLINIC | Age: 74
End: 2019-03-04

## 2019-03-04 RX ORDER — POTASSIUM CHLORIDE 20 MEQ/1
20 TABLET, EXTENDED RELEASE ORAL DAILY
Qty: 90 TABLET | Refills: 1 | Status: SHIPPED | OUTPATIENT
Start: 2019-03-04 | End: 2019-04-26

## 2019-04-25 RX ORDER — ALLOPURINOL 300 MG/1
TABLET ORAL
Qty: 90 TABLET | Refills: 0 | Status: SHIPPED | OUTPATIENT
Start: 2019-04-25 | End: 2019-09-25 | Stop reason: SDUPTHER

## 2019-04-26 ENCOUNTER — OFFICE VISIT (OUTPATIENT)
Dept: FAMILY MEDICINE CLINIC | Facility: CLINIC | Age: 74
End: 2019-04-26

## 2019-04-26 VITALS
SYSTOLIC BLOOD PRESSURE: 126 MMHG | WEIGHT: 248 LBS | HEIGHT: 72 IN | OXYGEN SATURATION: 98 % | HEART RATE: 75 BPM | BODY MASS INDEX: 33.59 KG/M2 | DIASTOLIC BLOOD PRESSURE: 74 MMHG

## 2019-04-26 DIAGNOSIS — R60.0 BILATERAL EDEMA OF LOWER EXTREMITY: ICD-10-CM

## 2019-04-26 DIAGNOSIS — E27.40 ADRENAL INSUFFICIENCY (HCC): ICD-10-CM

## 2019-04-26 DIAGNOSIS — I10 ESSENTIAL HYPERTENSION: Primary | ICD-10-CM

## 2019-04-26 DIAGNOSIS — E78.2 MIXED HYPERLIPIDEMIA: ICD-10-CM

## 2019-04-26 DIAGNOSIS — M51.36 DEGENERATIVE DISC DISEASE, LUMBAR: ICD-10-CM

## 2019-04-26 PROCEDURE — 99214 OFFICE O/P EST MOD 30 MIN: CPT | Performed by: NURSE PRACTITIONER

## 2019-04-26 RX ORDER — OLMESARTAN MEDOXOMIL 40 MG/1
40 TABLET ORAL DAILY
Qty: 90 TABLET | Refills: 1 | Status: SHIPPED | OUTPATIENT
Start: 2019-04-26 | End: 2019-05-03 | Stop reason: CLARIF

## 2019-04-26 RX ORDER — HYDROCORTISONE 10 MG/1
10 TABLET ORAL DAILY
COMMUNITY
End: 2020-01-10 | Stop reason: SDUPTHER

## 2019-04-26 RX ORDER — BUMETANIDE 2 MG/1
TABLET ORAL
Qty: 90 TABLET | Refills: 1 | Status: SHIPPED | OUTPATIENT
Start: 2019-04-26

## 2019-04-26 RX ORDER — OLMESARTAN MEDOXOMIL 40 MG/1
40 TABLET ORAL DAILY
Refills: 5 | Status: CANCELLED | OUTPATIENT
Start: 2019-04-26

## 2019-04-26 RX ORDER — POTASSIUM CHLORIDE 20 MEQ/1
20 TABLET, EXTENDED RELEASE ORAL DAILY
Qty: 90 TABLET | Refills: 1 | Status: SHIPPED | OUTPATIENT
Start: 2019-04-26 | End: 2022-07-21 | Stop reason: SDUPTHER

## 2019-04-26 NOTE — PROGRESS NOTES
Subjective   Micah Krishna is a 74 y.o. male.     Pleasant gentleman needs new PCP  Essential hypertension controlled  Mixed hyperlipidemia statin stable managed by Endo  Diabetes mellitus type 2 is well managed by endocrinology and controlled  Adrenal insufficiency    Surgically induced adrenal insufficiency related to renal cancer and nephrectomy complete left and partial adrenal only right requires  Steroid bolus during stress  DDD   Endocrinology every 3 months for adrenal insufficiency and diabetes management      Has one kidney  No chronic renal insufficiency known I seen at least one borderline abnormal kidney function but most are normal  As he has only one kidney he has been told to avoid NSAIDs and I agree nonetheless he says he understands risk but the only thing help Tylenol does not help  Long history of chronic back pain no change in had MRIs  X-rays with degenerative changes he has had no increasing pain of his back  He is referring to the low mid back pain mostly positional increases with activity no paresthesias weakness bowel bladder change or radiculopathy  He does not wish any work-up  Asking my opinion regarding NSAID    History dependent edema  When he takes Bumex and potassium  It helps quite a bit usually only needs to take onehalf of a 2 mg Bumex but takes as needed  Denies history of heart failure  CAD stable    Sees cardiology every 6 months or so as well  Tries to eat better does not want to live perfect with some kind of compromise likes  Bagels etc.    He generally takes ibuprofen or Aleve once or twice a week for his low back pain                 The following portions of the patient's history were reviewed and updated as appropriate: allergies, current medications, past family history, past medical history, past social history, past surgical history and problem list.    Review of Systems   Constitutional: Negative for fatigue and fever.   HENT: Negative.  Negative for trouble  swallowing.    Eyes: Negative.    Respiratory: Negative.  Negative for cough and shortness of breath.    Cardiovascular: Negative for chest pain, palpitations and leg swelling.   Gastrointestinal: Negative.  Negative for abdominal pain.   Genitourinary: Negative.    Musculoskeletal: Positive for back pain.   Skin: Negative.    Neurological: Negative.  Negative for dizziness and confusion.   Psychiatric/Behavioral: Negative.    All other systems reviewed and are negative.      Objective   Physical Exam   Constitutional: He is oriented to person, place, and time. He appears well-developed and well-nourished. No distress.   Appears well pleasant   HENT:   Head: Normocephalic and atraumatic.   Nose: Nose normal.   Mouth/Throat: Oropharynx is clear and moist.   Eyes: Conjunctivae are normal. Pupils are equal, round, and reactive to light.   Neck: Neck supple. No JVD present. No thyromegaly present.   Cardiovascular: Normal rate, regular rhythm and normal heart sounds.   No murmur heard.  Carotids clear   Pulmonary/Chest: Effort normal and breath sounds normal. No respiratory distress. He has no wheezes.   Abdominal: Soft. Bowel sounds are normal. He exhibits no distension and no mass. There is no tenderness. There is no guarding. No hernia.   Musculoskeletal: Normal range of motion. He exhibits edema. He exhibits no tenderness.   Trace tibial edema no calf tenderness   Lymphadenopathy:     He has no cervical adenopathy.   Neurological: He is alert and oriented to person, place, and time.   Skin: Skin is warm and dry. He is not diaphoretic.   Psychiatric: He has a normal mood and affect. His behavior is normal. Judgment and thought content normal.   Vitals reviewed.        Assessment/Plan   Micah was seen today for transfer of dr. dumont.    Diagnoses and all orders for this visit:    Essential hypertension    Mixed hyperlipidemia    Adrenal insufficiency (CMS/HCC)    Bilateral edema of lower extremity    Degenerative disc  disease, lumbar    Other orders  -     Cancel: olmesartan (BENICAR) 40 MG tablet; Take 1 tablet by mouth Daily.  -     potassium chloride (K-DUR,KLOR-CON) 20 MEQ CR tablet; Take 1 tablet by mouth Daily. As needed take with water pill  -     bumetanide (BUMEX) 2 MG tablet; 1/2 to 1 tab daily as needed dependent edema  -     olmesartan (BENICAR) 40 MG tablet; Take 1 tablet by mouth Daily.          May change Bumex 2 mg  As needed 1/2 to 1 tablet daily as needed for edema  Take with potassium 20 mEq daily, if taking Bumex     avoid anti-inflammatories  As discussed  Plenty of fluids at least 64 ounces daily  The safest anti-inflammatory over-the-counter    Aleve  1 or 2 tablets twice daily  But as we discussed I think we should avoid this    Splunk good information regarding back  Generally back pain improves with chronic back exercises and frequent stretching and activity    Consider heat patches over-the-counter    We could try lidocaine gel  But typically this is not effective    Recommend graduated compression socks knee-high proper fit  15 to 19 mmHg or 20-30    If needed prescription     Otherwise [place] you can try this        There are no Patient Instructions on file for this visit.

## 2019-04-26 NOTE — PATIENT INSTRUCTIONS
May change Bumex 2 mg  As needed 1/2 to 1 tablet daily as needed for edema  Take with potassium 20 mEq daily, if taking Bumex     avoid anti-inflammatories  As discussed  Plenty of fluids at least 64 ounces daily  The safest anti-inflammatory over-the-counter    Aleve  1 or 2 tablets twice daily  But as we discussed I think we should avoid this    [place] good information regarding back  Generally back pain improves with chronic back exercises and frequent stretching and activity    Consider heat patches over-the-counter    We could try lidocaine gel  But typically this is not effective    Recommend graduated compression socks knee-high proper fit  15 to 19 mmHg or 20-30    If needed prescription     Otherwise [place] you can try this

## 2019-05-03 DIAGNOSIS — I10 ESSENTIAL HYPERTENSION: Primary | ICD-10-CM

## 2019-05-03 RX ORDER — VALSARTAN 160 MG/1
160 TABLET ORAL DAILY
Qty: 30 TABLET | Refills: 1 | Status: SHIPPED | OUTPATIENT
Start: 2019-05-03 | End: 2019-05-14

## 2019-05-09 ENCOUNTER — TELEPHONE (OUTPATIENT)
Dept: FAMILY MEDICINE CLINIC | Facility: CLINIC | Age: 74
End: 2019-05-09

## 2019-05-09 LAB
BUN SERPL-MCNC: 30 MG/DL (ref 8–23)
BUN/CREAT SERPL: 21.7 (ref 7–25)
CALCIUM SERPL-MCNC: 9.7 MG/DL (ref 8.6–10.5)
CHLORIDE SERPL-SCNC: 106 MMOL/L (ref 98–107)
CO2 SERPL-SCNC: 23.3 MMOL/L (ref 22–29)
CREAT SERPL-MCNC: 1.38 MG/DL (ref 0.76–1.27)
GLUCOSE SERPL-MCNC: 96 MG/DL (ref 65–99)
POTASSIUM SERPL-SCNC: 5.1 MMOL/L (ref 3.5–5.2)
SODIUM SERPL-SCNC: 140 MMOL/L (ref 136–145)

## 2019-05-09 NOTE — TELEPHONE ENCOUNTER
Hold valsartan x1 day    Then resume 160 mg 1/2 tablet  Call me blood pressure Monday    Hold continuously if systolic less than 110    Thank you

## 2019-05-09 NOTE — TELEPHONE ENCOUNTER
Patient is currently taking valsartan 160 mg daily. Blood pressure at 11 am 83/53 then at 2:30 it was 113/67.  Patient want to know if he should stop taking or cut the pill in half.    advise

## 2019-05-10 ENCOUNTER — RESULTS ENCOUNTER (OUTPATIENT)
Dept: FAMILY MEDICINE CLINIC | Facility: CLINIC | Age: 74
End: 2019-05-10

## 2019-05-10 DIAGNOSIS — I10 ESSENTIAL HYPERTENSION: ICD-10-CM

## 2019-05-14 ENCOUNTER — OFFICE VISIT (OUTPATIENT)
Dept: FAMILY MEDICINE CLINIC | Facility: CLINIC | Age: 74
End: 2019-05-14

## 2019-05-14 VITALS
HEIGHT: 72 IN | SYSTOLIC BLOOD PRESSURE: 130 MMHG | BODY MASS INDEX: 33.59 KG/M2 | HEART RATE: 66 BPM | OXYGEN SATURATION: 97 % | WEIGHT: 248 LBS | DIASTOLIC BLOOD PRESSURE: 68 MMHG

## 2019-05-14 DIAGNOSIS — K21.9 GASTROESOPHAGEAL REFLUX DISEASE WITHOUT ESOPHAGITIS: ICD-10-CM

## 2019-05-14 DIAGNOSIS — I10 ESSENTIAL HYPERTENSION: Primary | ICD-10-CM

## 2019-05-14 DIAGNOSIS — N18.30 CHRONIC RENAL INSUFFICIENCY, STAGE 3 (MODERATE) (HCC): ICD-10-CM

## 2019-05-14 PROCEDURE — 99213 OFFICE O/P EST LOW 20 MIN: CPT | Performed by: NURSE PRACTITIONER

## 2019-05-14 RX ORDER — VALSARTAN 80 MG/1
80 TABLET ORAL DAILY
Qty: 90 TABLET | Refills: 1 | Status: SHIPPED | OUTPATIENT
Start: 2019-05-14 | End: 2019-12-12 | Stop reason: SDUPTHER

## 2019-05-14 NOTE — PATIENT INSTRUCTIONS
Discharge instructions  Push fluids  Avoid NSAIDs  Average blood pressure less than 130/80  Generally greater than 110 average systolic  Avoid renal toxic medications  Trial  Omeprazole 20 mg  1 tablet every other day for now    Follow-up in 6 months sooner if problems

## 2019-05-14 NOTE — PROGRESS NOTES
Subjective   Micah Krishna is a 74 y.o. male.     Follow-up hypertension, essential controlled recent change in medication pharmacy did not have generic Benicar 40  It was changed to valsartan generic 160 mg daily after starting this  His blood pressure did drop systolic 89 asymptomatically but rechecked and confident and we held his medication and decrease to 10 mg daily  He is doing fine he is had follow-up lab and his kidney function stable potassium 5.1  He was referred to nephrology BY Endo, they have not called him back yet    He is doing well with the medication now  We discussed kidneys taking Lasix and diuretics only as needed  If so will take potassium  We discussed good blood pressure control less than 130/80  For renal insufficiency  Good diabetes control preferably A1c less than 7.0 or 6.5  Avoiding renal toxic medication    He does take over-the-counter Prilosec 20 mg  For chronic reflux  If he stopped taking the medication has increased reflux he has had an EGD in the past no difficulty swallowing no history of strictures no Baker's esophagitis or ulcers or any chronic worrisome problem  We discussed potential for renal toxicity lowest effective dose and see if we can try weaning slowly      Patient avoids NSAIDs although sometimes he takes a couple Aleve  He was advised not to do this he has chronic low back pain with no change she is had this many years  Just has some low back stiffness when cuts grass etc.  No alcohol abuse he can take Tylenol 650 extended release twice daily 1 to 2 tablets  Try heat patch etc.  Stretching low back exercises recheck for any worsening back pain           The following portions of the patient's history were reviewed and updated as appropriate: allergies, current medications, past family history, past medical history, past social history, past surgical history and problem list.    Review of Systems    Objective   Physical Exam   Constitutional: He is oriented to  person, place, and time. He appears well-developed and well-nourished. No distress.   HENT:   Head: Normocephalic and atraumatic.   Nose: Nose normal.   Mouth/Throat: Oropharynx is clear and moist.   Eyes: Conjunctivae are normal. Pupils are equal, round, and reactive to light.   Neck: Neck supple. No JVD present.   Cardiovascular: Normal rate, regular rhythm and normal heart sounds.   No murmur heard.  Carotids clear   Pulmonary/Chest: Effort normal and breath sounds normal. No respiratory distress. He has no wheezes.   Abdominal: Soft. Bowel sounds are normal. He exhibits no distension and no mass. There is no tenderness. There is no guarding. No hernia.   Musculoskeletal: He exhibits no edema or tenderness.   Lymphadenopathy:     He has no cervical adenopathy.   Neurological: He is alert and oriented to person, place, and time.   Skin: Skin is warm and dry. He is not diaphoretic.   Psychiatric: He has a normal mood and affect. His behavior is normal. Judgment and thought content normal.   Vitals reviewed.        Assessment/Plan   Micah was seen today for blood pressure follow-up.    Diagnoses and all orders for this visit:    Essential hypertension    Chronic renal insufficiency, stage 3 (moderate) (CMS/HCC)    Other orders  -     valsartan (DIOVAN) 80 MG tablet; Take 1 tablet by mouth Daily.                  Patient Instructions     Discharge instructions  Push fluids  Avoid NSAIDs  Average blood pressure less than 130/80  Generally greater than 110 average systolic  Avoid renal toxic medications  Trial  Omeprazole 20 mg  1 tablet every other day for now    Follow-up in 6 months sooner if problems

## 2019-06-14 ENCOUNTER — HOSPITAL ENCOUNTER (OUTPATIENT)
Dept: CT IMAGING | Facility: HOSPITAL | Age: 74
Discharge: HOME | End: 2019-06-14
Attending: INTERNAL MEDICINE | Admitting: INTERNAL MEDICINE

## 2019-06-14 ENCOUNTER — LAB (OUTPATIENT)
Dept: LAB | Facility: HOSPITAL | Age: 74
End: 2019-06-14

## 2019-06-14 DIAGNOSIS — Z85.528 HISTORY OF RENAL CELL CARCINOMA: ICD-10-CM

## 2019-06-14 LAB
ALBUMIN SERPL-MCNC: 4.5 G/DL (ref 3.5–5.2)
ALBUMIN/GLOB SERPL: 1.7 G/DL
ALP SERPL-CCNC: 69 U/L (ref 39–117)
ALT SERPL W P-5'-P-CCNC: 17 U/L (ref 1–41)
ANION GAP SERPL CALCULATED.3IONS-SCNC: 13.1 MMOL/L
AST SERPL-CCNC: 17 U/L (ref 1–40)
BASOPHILS # BLD AUTO: 0.07 10*3/MM3 (ref 0–0.2)
BASOPHILS NFR BLD AUTO: 1 % (ref 0–1.5)
BILIRUB SERPL-MCNC: 0.4 MG/DL (ref 0.1–1.2)
BUN BLD-MCNC: 24 MG/DL (ref 8–23)
BUN/CREAT SERPL: 18 (ref 7–25)
CALCIUM SPEC-SCNC: 9.8 MG/DL (ref 8.6–10.5)
CHLORIDE SERPL-SCNC: 104 MMOL/L (ref 98–107)
CO2 SERPL-SCNC: 23.9 MMOL/L (ref 22–29)
CREAT BLD-MCNC: 1.33 MG/DL (ref 0.76–1.27)
DEPRECATED RDW RBC AUTO: 42.6 FL (ref 37–54)
EOSINOPHIL # BLD AUTO: 0.39 10*3/MM3 (ref 0–0.4)
EOSINOPHIL NFR BLD AUTO: 5.3 % (ref 0.3–6.2)
ERYTHROCYTE [DISTWIDTH] IN BLOOD BY AUTOMATED COUNT: 13.8 % (ref 12.3–15.4)
GFR SERPL CREATININE-BSD FRML MDRD: 52 ML/MIN/1.73
GLOBULIN UR ELPH-MCNC: 2.6 GM/DL
GLUCOSE BLD-MCNC: 152 MG/DL (ref 65–99)
HCT VFR BLD AUTO: 36.7 % (ref 37.5–51)
HGB BLD-MCNC: 12.7 G/DL (ref 13–17.7)
IMM GRANULOCYTES # BLD AUTO: 0.02 10*3/MM3 (ref 0–0.05)
IMM GRANULOCYTES NFR BLD AUTO: 0.3 % (ref 0–0.5)
LYMPHOCYTES # BLD AUTO: 1.66 10*3/MM3 (ref 0.7–3.1)
LYMPHOCYTES NFR BLD AUTO: 22.7 % (ref 19.6–45.3)
MCH RBC QN AUTO: 29.1 PG (ref 26.6–33)
MCHC RBC AUTO-ENTMCNC: 34.6 G/DL (ref 31.5–35.7)
MCV RBC AUTO: 84.2 FL (ref 79–97)
MONOCYTES # BLD AUTO: 0.7 10*3/MM3 (ref 0.1–0.9)
MONOCYTES NFR BLD AUTO: 9.6 % (ref 5–12)
NEUTROPHILS # BLD AUTO: 4.46 10*3/MM3 (ref 1.7–7)
NEUTROPHILS NFR BLD AUTO: 61.1 % (ref 42.7–76)
NRBC BLD AUTO-RTO: 0 /100 WBC (ref 0–0.2)
PLATELET # BLD AUTO: 147 10*3/MM3 (ref 140–450)
PMV BLD AUTO: 9.2 FL (ref 6–12)
POTASSIUM BLD-SCNC: 4.5 MMOL/L (ref 3.5–5.2)
PROT SERPL-MCNC: 7.1 G/DL (ref 6–8.5)
RBC # BLD AUTO: 4.36 10*6/MM3 (ref 4.14–5.8)
SODIUM BLD-SCNC: 141 MMOL/L (ref 136–145)
WBC NRBC COR # BLD: 7.3 10*3/MM3 (ref 3.4–10.8)

## 2019-06-14 PROCEDURE — 80053 COMPREHEN METABOLIC PANEL: CPT

## 2019-06-14 PROCEDURE — 0 DIATRIZOATE MEGLUMINE & SODIUM PER 1 ML: Performed by: INTERNAL MEDICINE

## 2019-06-14 PROCEDURE — 36415 COLL VENOUS BLD VENIPUNCTURE: CPT

## 2019-06-14 PROCEDURE — 71250 CT THORAX DX C-: CPT

## 2019-06-14 PROCEDURE — 74176 CT ABD & PELVIS W/O CONTRAST: CPT

## 2019-06-14 PROCEDURE — 85025 COMPLETE CBC W/AUTO DIFF WBC: CPT

## 2019-06-14 RX ADMIN — DIATRIZOATE MEGLUMINE AND DIATRIZOATE SODIUM 30 ML: 660; 100 LIQUID ORAL; RECTAL at 10:01

## 2019-06-17 RX ORDER — HYDROCORTISONE 10 MG/1
TABLET ORAL
Qty: 135 TABLET | Refills: 3 | Status: SHIPPED | OUTPATIENT
Start: 2019-06-17 | End: 2020-08-03

## 2019-06-21 ENCOUNTER — APPOINTMENT (OUTPATIENT)
Dept: OTHER | Facility: HOSPITAL | Age: 74
End: 2019-06-21

## 2019-06-21 ENCOUNTER — OFFICE VISIT (OUTPATIENT)
Dept: ONCOLOGY | Facility: CLINIC | Age: 74
End: 2019-06-21

## 2019-06-21 VITALS
HEART RATE: 65 BPM | DIASTOLIC BLOOD PRESSURE: 83 MMHG | OXYGEN SATURATION: 97 % | SYSTOLIC BLOOD PRESSURE: 128 MMHG | BODY MASS INDEX: 32.94 KG/M2 | WEIGHT: 243.2 LBS | HEIGHT: 72 IN | RESPIRATION RATE: 16 BRPM | TEMPERATURE: 98.3 F

## 2019-06-21 DIAGNOSIS — Z85.528 HISTORY OF RENAL CELL CARCINOMA: Primary | ICD-10-CM

## 2019-06-21 PROCEDURE — 99215 OFFICE O/P EST HI 40 MIN: CPT | Performed by: INTERNAL MEDICINE

## 2019-06-21 PROCEDURE — G0463 HOSPITAL OUTPT CLINIC VISIT: HCPCS | Performed by: INTERNAL MEDICINE

## 2019-06-21 NOTE — PROGRESS NOTES
[facility] GROUP OUTPATIENT FOLLOW UP CLINIC VISIT    REASON FOR FOLLOW-UP:    1.  History of metastatic clear cell renal cell carcinoma.  All disease has been resected.  Currently no evidence of disease.    HISTORY OF PRESENT ILLNESS:  Micah Krishna is a 74 y.o. male who returns today for follow up of the above issue.      He does report some right-sided low back pain with radiation to the right buttock.  He notices this worse when he does something like cut his grass.  He otherwise denies any new problems.  He seems concerned that his kidney function is not as good as it once was.  His primary care has referred him to nephrology and he will see them in the near future.      PAST MEDICAL, SURGICAL, FAMILY, AND SOCIAL HISTORIES were reviewed with the patient and in the electronic medical record, and were updated if indicated.    ALLERGIES:  Allergies   Allergen Reactions   • Shellfish-Derived Products Anaphylaxis   • Iodinated Diagnostic Agents Itching and Swelling   • Shellfish Allergy    • Adhesive Tape Hives       MEDICATIONS:  The medication list has been reviewed with the patient by the medical assistant, and the list has been updated in the electronic medical record, which I reviewed.  Medication dosages and frequencies were confirmed to be accurate.    REVIEW OF SYSTEMS:  PAIN:  See Vital Signs below.  GENERAL:  No fevers, chills, night sweats, or unintended weight loss.  SKIN:  No rashes or non-healing lesions.  HEME/LYMPH:  No abnormal bleeding.  No palpable lymphadenopathy.  EYES:  No vision changes or diplopia.  ENT:  No sore throat or difficulty swallowing.  RESPIRATORY:  No cough, shortness of breath, hemoptysis, or wheezing.  CARDIOVASCULAR:  No chest pain, palpitations, orthopnea, or dyspnea on exertion.  GASTROINTESTINAL:  No nausea vomiting or diarrhea.  GENITOURINARY:  No dysuria or hematuria.  MUSCULOSKELETAL:  Persistent arthritis pain.  Right-sided low back pain with radiation to the  "right buttock.  NEUROLOGIC:  No dizziness, loss of consciousness, or seizures.  PSYCHIATRIC:  No depression, anxiety, or mood changes.    Vitals:    06/21/19 1105   BP: 128/83   Pulse: 65   Resp: 16   Temp: 98.3 °F (36.8 °C)   TempSrc: Oral   SpO2: 97%   Weight: 110 kg (243 lb 3.2 oz)   Height: 182.9 cm (72.01\")   PainSc: 0-No pain  Comment: renal cancer       PHYSICAL EXAMINATION:  GENERAL:  Well-developed well-nourished male; awake, alert and oriented, in no acute distress.   SKIN:  Warm and dry, without rashes, purpura, or petechiae.  HEAD:  Normocephalic, atraumatic.  EARS:  Hearing intact.  NOSE:  Septum midline.  No excoriations or nasal discharge.  MOUTH:  No stomatitis or ulcers.  Lips are normal.  THROAT:  Oropharynx without lesions or exudates.  NECK:  Supple with good range of motion; no thyromegaly or masses; no JVD or bruits.  LYMPHATICS:  No cervical, supraclavicular, axillary, or inguinal lymphadenopathy.  CHEST:  Lungs are clear to auscultation bilaterally.  No wheezes, rales, or rhonchi.  HEART:  Regular rate; normal rhythm.  No murmurs, gallops or rubs.  ABDOMEN:  Soft, non-tender, non-distended.  Normal active bowel sounds.  No organomegaly.  EXTREMITIES:  No clubbing cyanosis or edema  NEUROLOGICAL:  No focal neurologic deficits.  Positive straight leg raise on the right.    DIAGNOSTIC DATA:  Results for orders placed or performed in visit on 06/14/19   Comprehensive Metabolic Panel   Result Value Ref Range    Glucose 152 (H) 65 - 99 mg/dL    BUN 24 (H) 8 - 23 mg/dL    Creatinine 1.33 (H) 0.76 - 1.27 mg/dL    Sodium 141 136 - 145 mmol/L    Potassium 4.5 3.5 - 5.2 mmol/L    Chloride 104 98 - 107 mmol/L    CO2 23.9 22.0 - 29.0 mmol/L    Calcium 9.8 8.6 - 10.5 mg/dL    Total Protein 7.1 6.0 - 8.5 g/dL    Albumin 4.50 3.50 - 5.20 g/dL    ALT (SGPT) 17 1 - 41 U/L    AST (SGOT) 17 1 - 40 U/L    Alkaline Phosphatase 69 39 - 117 U/L    Total Bilirubin 0.4 0.1 - 1.2 mg/dL    eGFR Non  Amer 52 (L) " >60 mL/min/1.73    Globulin 2.6 gm/dL    A/G Ratio 1.7 g/dL    BUN/Creatinine Ratio 18.0 7.0 - 25.0    Anion Gap 13.1 mmol/L   CBC Auto Differential   Result Value Ref Range    WBC 7.30 3.40 - 10.80 10*3/mm3    RBC 4.36 4.14 - 5.80 10*6/mm3    Hemoglobin 12.7 (L) 13.0 - 17.7 g/dL    Hematocrit 36.7 (L) 37.5 - 51.0 %    MCV 84.2 79.0 - 97.0 fL    MCH 29.1 26.6 - 33.0 pg    MCHC 34.6 31.5 - 35.7 g/dL    RDW 13.8 12.3 - 15.4 %    RDW-SD 42.6 37.0 - 54.0 fl    MPV 9.2 6.0 - 12.0 fL    Platelets 147 140 - 450 10*3/mm3    Neutrophil % 61.1 42.7 - 76.0 %    Lymphocyte % 22.7 19.6 - 45.3 %    Monocyte % 9.6 5.0 - 12.0 %    Eosinophil % 5.3 0.3 - 6.2 %    Basophil % 1.0 0.0 - 1.5 %    Immature Grans % 0.3 0.0 - 0.5 %    Neutrophils, Absolute 4.46 1.70 - 7.00 10*3/mm3    Lymphocytes, Absolute 1.66 0.70 - 3.10 10*3/mm3    Monocytes, Absolute 0.70 0.10 - 0.90 10*3/mm3    Eosinophils, Absolute 0.39 0.00 - 0.40 10*3/mm3    Basophils, Absolute 0.07 0.00 - 0.20 10*3/mm3    Immature Grans, Absolute 0.02 0.00 - 0.05 10*3/mm3    nRBC 0.0 0.0 - 0.2 /100 WBC       IMAGING:  CT imaging from 6/14/2019 images personally reviewed.  No evidence for metastatic disease.    ASSESSMENT:  This is a 74 y.o. male with:  1.  Mild normocytic anemia: His hemoglobin is stable today.  2.  Adrenal insufficiency following bilateral adrenalectomy currently on replacement hormone therapy.  He follows with endocrinology.  3.  History of metastatic renal cell carcinoma.  He had a left nephrectomy and adrenalectomy in 2000 and then a right adrenalectomy for metastatic disease in 2012.  Currently no evidence of disease on CT imaging.  Follow up in 6 months with CT C/A/P without contrast.    4.  Hypertension:  Blood pressure better after diuresis and looks good today  5.  Lower extremity edema:  No edema today.  6.  Chronic kidney disease: Status post left nephrectomy  He has been referred to nephrology.  I believe that his creatinine is overall stable over the  past few years.  7.  Right-sided low back pain with radiation to the right buttock: This seems consistent with sciatic nerve pain.  He should follow-up with primary care.  He cannot take NSAIDs.    Physical therapy might help.    PLAN:   1.  Follow-up in 6 months with a CBC, CMP, and a chest x-ray that day.  We will continue annual CT imaging without IV contrast for surveillance otherwise.

## 2019-06-25 ENCOUNTER — TELEPHONE (OUTPATIENT)
Dept: FAMILY MEDICINE CLINIC | Facility: CLINIC | Age: 74
End: 2019-06-25

## 2019-06-25 RX ORDER — ISOSORBIDE MONONITRATE 30 MG/1
30 TABLET, EXTENDED RELEASE ORAL 2 TIMES DAILY
Qty: 180 TABLET | Refills: 2 | Status: SHIPPED | OUTPATIENT
Start: 2019-06-25 | End: 2019-07-05 | Stop reason: SDUPTHER

## 2019-06-27 RX ORDER — METOPROLOL SUCCINATE 100 MG/1
TABLET, EXTENDED RELEASE ORAL
Qty: 90 TABLET | Refills: 3 | OUTPATIENT
Start: 2019-06-27

## 2019-07-05 RX ORDER — ISOSORBIDE MONONITRATE 30 MG/1
30 TABLET, EXTENDED RELEASE ORAL 2 TIMES DAILY
Qty: 180 TABLET | Refills: 2 | Status: SHIPPED | OUTPATIENT
Start: 2019-07-05 | End: 2020-06-23 | Stop reason: SDUPTHER

## 2019-07-05 NOTE — PROGRESS NOTES
"Physical Therapy Daily Progress Note      Visit # 3      Subjective   Pt reports today is his last day. He states,\"my doctor told me I'll be back in his office to have my knee drained in 6 months, so what good is this.\"    Objective   Knee Extension AROM  R lacking 4deg   L 0deg    See Exercise, Manual, and Modality Logs for complete treatment.       Assessment/Plan  Pt continues to have limited R knee extension. No significant functional limitations. Explained to pt that exercises will help improve hamstring flexibility and core and LE strength. Recommend that he continue HEP independently. Will DC per pt's request.               Manual Therapy:    5     mins  01021;  Therapeutic Exercise:    25     mins  58130;     Neuromuscular Meghana:    0    mins  01812;    Therapeutic Activity:     0     mins  36629;     Gait Trainin     mins  41452;     Ultrasound:     0     mins  65209;    Work Hardening           0      mins 26598  Iontophoresis               0   mins 07005    Timed Treatment:   30   mins   Total Treatment:     40   mins    Katherine Guerrier, PT  Physical Therapist  " S: Patient is a 20 yo  Ab1 female at 37w 2d gestational age who recently moved to the area. Her pregnancy has been completely normal up to this point with an ALFREDO of 19. She presented to L&D because of a decrease in fetal movement. No contractions, bleeding, or leaking.    O:  NST was done and found to be reactive.    A:  Normal term pregnancy, not in labor.    P:  Released to home with reassurance that the baby is doing well.  F/U in our clinic within the week.    This patient was not examined by me at this visit. Information was obtained from the RN.

## 2019-07-08 RX ORDER — CLONIDINE HYDROCHLORIDE 0.2 MG/1
TABLET ORAL
OUTPATIENT
Start: 2019-07-08

## 2019-07-08 NOTE — TELEPHONE ENCOUNTER
Please clarify the frequency and dose and quantity  Not sure of we have written this before  Listed under historical  I certainly can do this just need the details thank you

## 2019-07-09 NOTE — TELEPHONE ENCOUNTER
Please give him clonidine 0.2 mg dispense 90+1 refill  1 tablet p.o. daily for blood pressure    Please verify that this is only 1 time daily      Please give him metoprolol succinate 100 mg  1 p.o. daily dispense 90+1 refill    Thanks

## 2019-07-10 RX ORDER — METOPROLOL SUCCINATE 100 MG/1
100 TABLET, EXTENDED RELEASE ORAL DAILY
Qty: 90 TABLET | Refills: 1 | Status: SHIPPED | OUTPATIENT
Start: 2019-07-10 | End: 2019-12-12 | Stop reason: SDUPTHER

## 2019-07-10 RX ORDER — CLONIDINE HYDROCHLORIDE 0.2 MG/1
0.2 TABLET ORAL DAILY
Qty: 90 TABLET | Refills: 1 | Status: SHIPPED | OUTPATIENT
Start: 2019-07-10 | End: 2019-11-21 | Stop reason: SDUPTHER

## 2019-08-01 RX ORDER — PANTOPRAZOLE SODIUM 20 MG/1
TABLET, DELAYED RELEASE ORAL
Qty: 90 TABLET | Refills: 0 | Status: SHIPPED | OUTPATIENT
Start: 2019-08-01 | End: 2019-09-20 | Stop reason: SDUPTHER

## 2019-08-08 RX ORDER — CLOPIDOGREL BISULFATE 75 MG/1
75 TABLET ORAL DAILY
Qty: 90 TABLET | Refills: 3 | Status: SHIPPED | OUTPATIENT
Start: 2019-08-08 | End: 2020-07-31

## 2019-08-22 RX ORDER — ALLOPURINOL 300 MG/1
TABLET ORAL
Qty: 90 TABLET | Refills: 0 | OUTPATIENT
Start: 2019-08-22

## 2019-09-03 ENCOUNTER — CLINICAL SUPPORT (OUTPATIENT)
Dept: FAMILY MEDICINE CLINIC | Facility: CLINIC | Age: 74
End: 2019-09-03

## 2019-09-03 DIAGNOSIS — Z23 NEED FOR IMMUNIZATION AGAINST INFLUENZA: ICD-10-CM

## 2019-09-03 DIAGNOSIS — Z23 NEED FOR VACCINATION: Primary | ICD-10-CM

## 2019-09-03 PROCEDURE — G0008 ADMIN INFLUENZA VIRUS VAC: HCPCS | Performed by: NURSE PRACTITIONER

## 2019-09-03 PROCEDURE — 90662 IIV NO PRSV INCREASED AG IM: CPT | Performed by: NURSE PRACTITIONER

## 2019-09-16 RX ORDER — AMLODIPINE BESYLATE 10 MG/1
10 TABLET ORAL DAILY
Qty: 90 TABLET | Refills: 2 | Status: SHIPPED | OUTPATIENT
Start: 2019-09-16 | End: 2019-09-16 | Stop reason: SDUPTHER

## 2019-09-20 RX ORDER — PANTOPRAZOLE SODIUM 20 MG/1
20 TABLET, DELAYED RELEASE ORAL DAILY
Qty: 90 TABLET | Refills: 1 | Status: SHIPPED | OUTPATIENT
Start: 2019-09-20 | End: 2020-08-31

## 2019-09-25 RX ORDER — AMLODIPINE BESYLATE 10 MG/1
10 TABLET ORAL DAILY
Qty: 90 TABLET | Refills: 2 | Status: SHIPPED | OUTPATIENT
Start: 2019-09-25 | End: 2020-06-23 | Stop reason: SDUPTHER

## 2019-09-30 RX ORDER — ALLOPURINOL 300 MG/1
TABLET ORAL
Qty: 90 TABLET | Refills: 0 | Status: SHIPPED | OUTPATIENT
Start: 2019-09-30 | End: 2019-11-23 | Stop reason: SDUPTHER

## 2019-10-30 RX ORDER — FINASTERIDE 5 MG/1
TABLET, FILM COATED ORAL
Qty: 90 TABLET | Refills: 1 | OUTPATIENT
Start: 2019-10-30

## 2019-11-13 ENCOUNTER — HOSPITAL ENCOUNTER (OUTPATIENT)
Dept: GENERAL RADIOLOGY | Facility: HOSPITAL | Age: 74
Discharge: HOME | End: 2019-11-13
Admitting: INTERNAL MEDICINE

## 2019-11-13 ENCOUNTER — OFFICE VISIT (OUTPATIENT)
Dept: FAMILY MEDICINE CLINIC | Facility: CLINIC | Age: 74
End: 2019-11-13

## 2019-11-13 VITALS
SYSTOLIC BLOOD PRESSURE: 115 MMHG | OXYGEN SATURATION: 97 % | TEMPERATURE: 96.9 F | DIASTOLIC BLOOD PRESSURE: 76 MMHG | HEART RATE: 76 BPM | BODY MASS INDEX: 32.1 KG/M2 | WEIGHT: 237 LBS | HEIGHT: 72 IN

## 2019-11-13 DIAGNOSIS — Z85.528 HISTORY OF RENAL CELL CARCINOMA: ICD-10-CM

## 2019-11-13 DIAGNOSIS — R06.00 DYSPNEA, UNSPECIFIED TYPE: Primary | ICD-10-CM

## 2019-11-13 PROCEDURE — 71046 X-RAY EXAM CHEST 2 VIEWS: CPT

## 2019-11-13 PROCEDURE — 99214 OFFICE O/P EST MOD 30 MIN: CPT | Performed by: NURSE PRACTITIONER

## 2019-11-13 RX ORDER — AZITHROMYCIN 250 MG/1
TABLET, FILM COATED ORAL
Qty: 6 TABLET | Refills: 0 | Status: SHIPPED | OUTPATIENT
Start: 2019-11-13 | End: 2020-06-11 | Stop reason: SDUPTHER

## 2019-11-13 NOTE — PROGRESS NOTES
Procedure   Procedures          Adult ECG Report     Name: Micah Krishna   Age: 74 y.o.   Gender: male       Rate: 73   Rhythm: normal sinus rhythm   QRS Axis: nml   OK Interval: 180   QRS Duration: 119   QTc: 426   Voltages: .   Conduction Disturbances: none   Other Abnormalities: Possible old Q waves in inferior leads without ST elevation or reciprocal changes     Narrative Interpretation: Borderline EKG normal sinus rhythm no acute ST or T wave abnormalities.  Indication episode of dyspnea nausea weakness over the last 7 to 10 days gradually improving.  No chest pain nausea vomiting weakness presently, indication comparison no significant change EKG February 21, 2017 [place]  Answers for HPI/ROS submitted by the patient on 11/13/2019   Shortness of breath  Chronicity: recurrent  Onset: 1 to 4 weeks ago  Frequency: intermittently  Progression since onset: waxing and waning  abdominal pain: Yes  chest pain: Yes  claudication: No  coryza: Yes  ear pain: No  fever: Yes  headaches: No  hemoptysis: No  leg pain: No  leg swelling: No  neck pain: No  orthopnea: No  PND: No  rash: No  rhinorrhea: Yes  sore throat: No  sputum production: Yes  swollen glands: No  syncope: No  vomiting: Yes  wheezing: No  Aggravating factors: exercise, any activity

## 2019-11-13 NOTE — PATIENT INSTRUCTIONS
Discharge instructions    Labs now  Start Zithromax now  Chest x-ray now  Call tomorrow for results  Call your cardiologist  Schedule appointment as soon as possible within the week  To let me know if you need help with this or urgent referral which I can do      Increased chest pain shortness of breath increased weakness another episode of nausea vomiting heaviness or difficulty breathing emergency room

## 2019-11-13 NOTE — PROGRESS NOTES
"Subjective   Micah Krishna is a 74 y.o. male.       Patient complains dyspnea fatigue exertional dyspnea weakness.  Feels like he has pneumonia  No chest pain he does have some shortness of breath with activities more than usual  No productive cough no runny nose sore throat or other stigmata of respiratory illness  No severe myalgias  No night sweats unexplained weight loss bowel bladder normal swallowing normally no headache  Concerned about pneumonia does not feel like any cardiac problem  He has no chronic heart failure and no weight gain or edema lower extremity  He just had labs his CBC group mild anemia liver kidney function normal thyroid normal  No calf pain or swelling no history of DVT or PE    Dr Millard cardiology up-to-date  History of diastolic dysfunction  Normal stress test 2017 [place]                     /76   Pulse 76   Temp 96.9 °F (36.1 °C) (Oral)   Ht 182.9 cm (72.01\")   Wt 108 kg (237 lb)   SpO2 97%   BMI 32.13 kg/m²       The following portions of the patient's history were reviewed and updated as appropriate: allergies, current medications, past family history, past medical history, past social history, past surgical history and problem list.    Review of Systems   Constitutional: Positive for fatigue. Negative for activity change, chills, diaphoresis, fever, unexpected weight gain and unexpected weight loss.   HENT: Negative.  Negative for trouble swallowing.    Eyes: Negative.    Respiratory: Positive for shortness of breath. Negative for cough.    Cardiovascular: Negative for chest pain, palpitations and leg swelling.   Gastrointestinal: Negative.  Negative for abdominal pain.   Genitourinary: Negative.    Musculoskeletal: Negative.    Skin: Negative.    Neurological: Negative.  Negative for dizziness and confusion.   Psychiatric/Behavioral: Negative.    All other systems reviewed and are negative.      Objective   Physical Exam   Constitutional: He is oriented to person, " place, and time. He appears well-developed and well-nourished. No distress.   Pleasant appears well energetic talkative good history   HENT:   Head: Normocephalic and atraumatic.   Nose: Nose normal.   Mouth/Throat: Oropharynx is clear and moist.   Eyes: Conjunctivae are normal. Pupils are equal, round, and reactive to light.   Neck: Neck supple. No JVD present.   Cardiovascular: Normal rate, regular rhythm and normal heart sounds.   No murmur heard.  AP pulse normal no murmur heart regular rate and rhythm   Pulmonary/Chest: Effort normal and breath sounds normal. No respiratory distress. He has no wheezes.   Completely normal can speak in full sentences give a good history with no dyspnea  Respirations less than 20 good air exchange no crackles or expiratory rhonchi     Abdominal: Soft. Bowel sounds are normal. He exhibits no distension and no mass. There is no tenderness. There is no guarding. No hernia.   Musculoskeletal: He exhibits no edema or tenderness.   Lymphadenopathy:     He has no cervical adenopathy.   Neurological: He is alert and oriented to person, place, and time.   Skin: Skin is warm and dry. He is not diaphoretic.   Psychiatric: He has a normal mood and affect. His behavior is normal. Judgment and thought content normal.   Vitals reviewed.        Assessment/Plan   Micah was seen today for possible pneumonia.    Diagnoses and all orders for this visit:    Dyspnea, unspecified type  -     proBNP  -     XR Chest PA & Lateral    Other orders  -     azithromycin (ZITHROMAX Z-DIVYA) 250 MG tablet; Take 2 tablets the first day, then 1 tablet daily for 4 days.      Patient is without chest pain he feels fine presently  Presently no shortness of breath  Overall his symptoms have improved over the last 10 days  He has an upcoming appointment with his cardiologist  He has no cough congestion but will check a chest x-ray  He can start Zithromax until the reports back  Any increasing shortness of breath  increased fatigue chest pain shortness of breath nausea vomiting emergency room    He will go ahead and follow-up with cardiology as well            There are no Patient Instructions on file for this visit.

## 2019-11-14 PROBLEM — I25.10 CORONARY ARTERY DISEASE INVOLVING NATIVE CORONARY ARTERY: Status: ACTIVE | Noted: 2019-11-14

## 2019-11-14 LAB — NT-PROBNP SERPL-MCNC: 27 PG/ML (ref 0–486)

## 2019-11-21 RX ORDER — ROSUVASTATIN CALCIUM 20 MG/1
20 TABLET, COATED ORAL DAILY
Qty: 90 TABLET | Refills: 1 | Status: SHIPPED | OUTPATIENT
Start: 2019-11-21 | End: 2020-07-05 | Stop reason: SDUPTHER

## 2019-11-21 RX ORDER — CLONIDINE HYDROCHLORIDE 0.2 MG/1
0.2 TABLET ORAL DAILY
Qty: 90 TABLET | Refills: 1 | Status: SHIPPED | OUTPATIENT
Start: 2019-11-21 | End: 2020-06-28 | Stop reason: SDUPTHER

## 2019-11-25 RX ORDER — ALLOPURINOL 300 MG/1
TABLET ORAL
Qty: 90 TABLET | Refills: 0 | Status: SHIPPED | OUTPATIENT
Start: 2019-11-25 | End: 2020-03-06

## 2019-12-09 RX ORDER — FINASTERIDE 5 MG/1
5 TABLET, FILM COATED ORAL DAILY
Qty: 90 TABLET | Refills: 2 | Status: SHIPPED | OUTPATIENT
Start: 2019-12-09 | End: 2020-07-31

## 2019-12-12 RX ORDER — METOPROLOL SUCCINATE 100 MG/1
100 TABLET, EXTENDED RELEASE ORAL DAILY
Qty: 90 TABLET | Refills: 1 | Status: SHIPPED | OUTPATIENT
Start: 2019-12-12 | End: 2020-08-03

## 2019-12-12 RX ORDER — VALSARTAN 80 MG/1
80 TABLET ORAL DAILY
Qty: 90 TABLET | Refills: 1 | Status: SHIPPED | OUTPATIENT
Start: 2019-12-12 | End: 2020-07-05 | Stop reason: SDUPTHER

## 2019-12-16 RX ORDER — FLUDROCORTISONE ACETATE 0.1 MG/1
0.1 TABLET ORAL EVERY OTHER DAY
Qty: 45 TABLET | Refills: 0 | Status: SHIPPED | OUTPATIENT
Start: 2019-12-16 | End: 2020-03-02 | Stop reason: SDUPTHER

## 2020-01-10 ENCOUNTER — OFFICE VISIT (OUTPATIENT)
Dept: ONCOLOGY | Facility: CLINIC | Age: 75
End: 2020-01-10

## 2020-01-10 ENCOUNTER — LAB (OUTPATIENT)
Dept: OTHER | Facility: HOSPITAL | Age: 75
End: 2020-01-10

## 2020-01-10 VITALS
TEMPERATURE: 97.9 F | DIASTOLIC BLOOD PRESSURE: 80 MMHG | RESPIRATION RATE: 16 BRPM | WEIGHT: 239.8 LBS | HEIGHT: 72 IN | HEART RATE: 64 BPM | OXYGEN SATURATION: 97 % | SYSTOLIC BLOOD PRESSURE: 146 MMHG | BODY MASS INDEX: 32.48 KG/M2

## 2020-01-10 DIAGNOSIS — Z85.528 HISTORY OF RENAL CELL CARCINOMA: Primary | ICD-10-CM

## 2020-01-10 DIAGNOSIS — Z85.528 HISTORY OF RENAL CELL CARCINOMA: ICD-10-CM

## 2020-01-10 DIAGNOSIS — R06.09 DYSPNEA ON EXERTION: ICD-10-CM

## 2020-01-10 LAB
ALBUMIN SERPL-MCNC: 4.5 G/DL (ref 3.5–5.2)
ALBUMIN/GLOB SERPL: 1.4 G/DL
ALP SERPL-CCNC: 70 U/L (ref 39–117)
ALT SERPL W P-5'-P-CCNC: 18 U/L (ref 1–41)
ANION GAP SERPL CALCULATED.3IONS-SCNC: 12.2 MMOL/L (ref 5–15)
AST SERPL-CCNC: 17 U/L (ref 1–40)
BASOPHILS # BLD AUTO: 0.07 10*3/MM3 (ref 0–0.2)
BASOPHILS NFR BLD AUTO: 1 % (ref 0–1.5)
BILIRUB SERPL-MCNC: 0.4 MG/DL (ref 0.1–1.2)
BUN BLD-MCNC: 19 MG/DL (ref 8–23)
BUN/CREAT SERPL: 16 (ref 7–25)
CALCIUM SPEC-SCNC: 9.8 MG/DL (ref 8.6–10.5)
CHLORIDE SERPL-SCNC: 106 MMOL/L (ref 98–107)
CO2 SERPL-SCNC: 22.8 MMOL/L (ref 22–29)
CREAT BLD-MCNC: 1.19 MG/DL (ref 0.76–1.27)
DEPRECATED RDW RBC AUTO: 41.5 FL (ref 37–54)
EOSINOPHIL # BLD AUTO: 0.36 10*3/MM3 (ref 0–0.4)
EOSINOPHIL NFR BLD AUTO: 5.3 % (ref 0.3–6.2)
ERYTHROCYTE [DISTWIDTH] IN BLOOD BY AUTOMATED COUNT: 13.8 % (ref 12.3–15.4)
GFR SERPL CREATININE-BSD FRML MDRD: 59 ML/MIN/1.73
GLOBULIN UR ELPH-MCNC: 3.3 GM/DL
GLUCOSE BLD-MCNC: 106 MG/DL (ref 65–99)
HCT VFR BLD AUTO: 35.8 % (ref 37.5–51)
HGB BLD-MCNC: 12.5 G/DL (ref 13–17.7)
IMM GRANULOCYTES # BLD AUTO: 0.03 10*3/MM3 (ref 0–0.05)
IMM GRANULOCYTES NFR BLD AUTO: 0.4 % (ref 0–0.5)
LYMPHOCYTES # BLD AUTO: 1.59 10*3/MM3 (ref 0.7–3.1)
LYMPHOCYTES NFR BLD AUTO: 23.6 % (ref 19.6–45.3)
MCH RBC QN AUTO: 29.1 PG (ref 26.6–33)
MCHC RBC AUTO-ENTMCNC: 34.9 G/DL (ref 31.5–35.7)
MCV RBC AUTO: 83.3 FL (ref 79–97)
MONOCYTES # BLD AUTO: 0.6 10*3/MM3 (ref 0.1–0.9)
MONOCYTES NFR BLD AUTO: 8.9 % (ref 5–12)
NEUTROPHILS # BLD AUTO: 4.1 10*3/MM3 (ref 1.7–7)
NEUTROPHILS NFR BLD AUTO: 60.8 % (ref 42.7–76)
NRBC BLD AUTO-RTO: 0 /100 WBC (ref 0–0.2)
PLATELET # BLD AUTO: 151 10*3/MM3 (ref 140–450)
PMV BLD AUTO: 9.5 FL (ref 6–12)
POTASSIUM BLD-SCNC: 4.2 MMOL/L (ref 3.5–5.2)
PROT SERPL-MCNC: 7.8 G/DL (ref 6–8.5)
RBC # BLD AUTO: 4.3 10*6/MM3 (ref 4.14–5.8)
SODIUM BLD-SCNC: 141 MMOL/L (ref 136–145)
WBC NRBC COR # BLD: 6.75 10*3/MM3 (ref 3.4–10.8)

## 2020-01-10 PROCEDURE — 36415 COLL VENOUS BLD VENIPUNCTURE: CPT

## 2020-01-10 PROCEDURE — 80053 COMPREHEN METABOLIC PANEL: CPT | Performed by: INTERNAL MEDICINE

## 2020-01-10 PROCEDURE — 85025 COMPLETE CBC W/AUTO DIFF WBC: CPT | Performed by: INTERNAL MEDICINE

## 2020-01-10 PROCEDURE — 99215 OFFICE O/P EST HI 40 MIN: CPT | Performed by: INTERNAL MEDICINE

## 2020-01-10 RX ORDER — PROCHLORPERAZINE 25 MG/1
SUPPOSITORY RECTAL
COMMUNITY
Start: 2019-10-08 | End: 2020-01-10 | Stop reason: SDDI

## 2020-01-10 RX ORDER — PROCHLORPERAZINE 25 MG/1
1 SUPPOSITORY RECTAL
COMMUNITY
Start: 2019-10-08 | End: 2020-01-10 | Stop reason: SDDI

## 2020-01-10 NOTE — PROGRESS NOTES
[facility] OUTPATIENT FOLLOW UP CLINIC VISIT    REASON FOR FOLLOW-UP:    1.  History of metastatic clear cell renal cell carcinoma.  All disease has been resected.  Currently no evidence of disease.    HISTORY OF PRESENT ILLNESS:  Micah Krishna is a 74 y.o. male who returns today for follow up of the above issue.      His biggest complaint today is dyspnea on exertion associated at times with some right-sided chest discomfort.  This started last fall when he last mowed his grass.  He cannot walk more than about 50 feet without getting short of breath.  He saw primary care and had a normal chest x-ray back in November.  He saw his cardiologist with an evaluation not showing any cardiac reasons for his symptoms.  He has ongoing fatigue.  No lymphadenopathy.  No fevers chills or night sweats.  No abdominal pain.      PAST MEDICAL, SURGICAL, FAMILY, AND SOCIAL HISTORIES were reviewed with the patient and in the electronic medical record, and were updated if indicated.    ALLERGIES:  Allergies   Allergen Reactions   • Shellfish-Derived Products Anaphylaxis   • Iodinated Diagnostic Agents Itching and Swelling   • Shellfish Allergy Unknown - Low Severity   • Adhesive Tape Hives       MEDICATIONS:  The medication list has been reviewed with the patient by the medical assistant, and the list has been updated in the electronic medical record, which I reviewed.  Medication dosages and frequencies were confirmed to be accurate.    REVIEW OF SYSTEMS:  PAIN:  See Vital Signs below.  GENERAL:  No fevers, chills, night sweats, or unintended weight loss.  SKIN:  No rashes or non-healing lesions.  HEME/LYMPH:  No abnormal bleeding.  No palpable lymphadenopathy.  EYES:  No vision changes or diplopia.  ENT:  No sore throat or difficulty swallowing.  RESPIRATORY:  No cough, shortness of breath, hemoptysis, or wheezing.  CARDIOVASCULAR: Dyspnea on exertion and chest pain with walking  GASTROINTESTINAL:  No nausea vomiting  "or diarrhea.  GENITOURINARY:  No dysuria or hematuria.  MUSCULOSKELETAL:  Persistent arthritis pain.    NEUROLOGIC:  No dizziness, loss of consciousness, or seizures.  PSYCHIATRIC:  No depression, anxiety, or mood changes.    Vitals:    01/10/20 1048   BP: 146/80   Pulse: 64   Resp: 16   Temp: 97.9 °F (36.6 °C)   TempSrc: Oral   SpO2: 97%   Weight: 109 kg (239 lb 12.8 oz)   Height: 182.9 cm (72.01\")   PainSc: 0-No pain  Comment: renal cancer       PHYSICAL EXAMINATION:  GENERAL:  Well-developed well-nourished male; awake, alert and oriented, in no acute distress.   SKIN:  Warm and dry, without rashes, purpura, or petechiae.  HEAD:  Normocephalic, atraumatic.  EARS:  Hearing intact.  NOSE:  Septum midline.  No excoriations or nasal discharge.  MOUTH:  No stomatitis or ulcers.  Lips are normal.  THROAT:  Oropharynx without lesions or exudates.  NECK:  Supple with good range of motion; no thyromegaly or masses; no JVD or bruits.  LYMPHATICS:  No cervical, supraclavicular, axillary, or inguinal lymphadenopathy.  CHEST:  Lungs are clear to auscultation bilaterally.  No wheezes, rales, or rhonchi.  HEART:  Regular rate; normal rhythm.  No murmurs, gallops or rubs.  ABDOMEN:  Soft, non-tender, non-distended.  Normal active bowel sounds.  No organomegaly.  EXTREMITIES:  No clubbing cyanosis or edema  NEUROLOGICAL:  No focal neurologic deficits.      DIAGNOSTIC DATA:  Results for orders placed or performed in visit on 01/10/20   Comprehensive Metabolic Panel   Result Value Ref Range    Glucose 106 (H) 65 - 99 mg/dL    BUN 19 8 - 23 mg/dL    Creatinine 1.19 0.76 - 1.27 mg/dL    Sodium 141 136 - 145 mmol/L    Potassium 4.2 3.5 - 5.2 mmol/L    Chloride 106 98 - 107 mmol/L    CO2 22.8 22.0 - 29.0 mmol/L    Calcium 9.8 8.6 - 10.5 mg/dL    Total Protein 7.8 6.0 - 8.5 g/dL    Albumin 4.50 3.50 - 5.20 g/dL    ALT (SGPT) 18 1 - 41 U/L    AST (SGOT) 17 1 - 40 U/L    Alkaline Phosphatase 70 39 - 117 U/L    Total Bilirubin 0.4 0.1 - 1.2 " mg/dL    eGFR Non African Amer 59 (L) >60 mL/min/1.73    Globulin 3.3 gm/dL    A/G Ratio 1.4 g/dL    BUN/Creatinine Ratio 16.0 7.0 - 25.0    Anion Gap 12.2 5.0 - 15.0 mmol/L   CBC Auto Differential   Result Value Ref Range    WBC 6.75 3.40 - 10.80 10*3/mm3    RBC 4.30 4.14 - 5.80 10*6/mm3    Hemoglobin 12.5 (L) 13.0 - 17.7 g/dL    Hematocrit 35.8 (L) 37.5 - 51.0 %    MCV 83.3 79.0 - 97.0 fL    MCH 29.1 26.6 - 33.0 pg    MCHC 34.9 31.5 - 35.7 g/dL    RDW 13.8 12.3 - 15.4 %    RDW-SD 41.5 37.0 - 54.0 fl    MPV 9.5 6.0 - 12.0 fL    Platelets 151 140 - 450 10*3/mm3    Neutrophil % 60.8 42.7 - 76.0 %    Lymphocyte % 23.6 19.6 - 45.3 %    Monocyte % 8.9 5.0 - 12.0 %    Eosinophil % 5.3 0.3 - 6.2 %    Basophil % 1.0 0.0 - 1.5 %    Immature Grans % 0.4 0.0 - 0.5 %    Neutrophils, Absolute 4.10 1.70 - 7.00 10*3/mm3    Lymphocytes, Absolute 1.59 0.70 - 3.10 10*3/mm3    Monocytes, Absolute 0.60 0.10 - 0.90 10*3/mm3    Eosinophils, Absolute 0.36 0.00 - 0.40 10*3/mm3    Basophils, Absolute 0.07 0.00 - 0.20 10*3/mm3    Immature Grans, Absolute 0.03 0.00 - 0.05 10*3/mm3    nRBC 0.0 0.0 - 0.2 /100 WBC       IMAGING: Chest x-ray 11/13/2019 is negative.  Images personally reviewed.  No pulmonary infiltrates.    ASSESSMENT:  This is a 74 y.o. male with:  1.  Mild normocytic anemia: His hemoglobin is stable today.  2.  Adrenal insufficiency following bilateral adrenalectomy currently on replacement hormone therapy.  He follows with endocrinology.  3.  History of metastatic renal cell carcinoma.  He had a left nephrectomy and adrenalectomy in 2000 and then a right adrenalectomy for metastatic disease in 2012.  No evidence of disease.  Follow up in 6 months with CT C/A/P without contrast.    4.  Hypertension:  Blood pressure better after diuresis and is acceptable today  5.  Lower extremity edema:  No edema today.  6.  Chronic kidney disease: Status post left nephrectomy  He has been referred to nephrology.  I believe that his creatinine  is overall stable over the past few years.  7.  Dyspnea on exertion with right-sided chest pain with exertion: He had a negative cardiac evaluation by his cardiologist.  He is a former smoker.  His chest x-rays and CT scans have looked okay.  He might have chronic bronchitis.  I will get pulmonary function testing and if appropriate refer him to pulmonology.  He wants to see somebody here at [place] or over at [place] if possible.    PLAN:   1.  Pulmonary function testing  2.  Referral to pulmonary medicine if appropriate  3.  Follow-up in 6 months with a CBC, CMP, CT imaging of the chest abdomen and pelvis done about a week prior to that.

## 2020-01-13 ENCOUNTER — TELEPHONE (OUTPATIENT)
Dept: GENERAL RADIOLOGY | Facility: HOSPITAL | Age: 75
End: 2020-01-13

## 2020-01-13 ENCOUNTER — TELEPHONE (OUTPATIENT)
Dept: ONCOLOGY | Facility: CLINIC | Age: 75
End: 2020-01-13

## 2020-01-13 NOTE — TELEPHONE ENCOUNTER
Pt called stating Dr. Cat ordered a PFT and pt wants to do it at [facility]. Pt called today with phone number for [place] to get it scheduled [phone]. Message sent to schedulers to make appt and send order

## 2020-01-13 NOTE — TELEPHONE ENCOUNTER
----- Message from Kelly Benavidez RN sent at 1/13/2020 10:01 AM EST -----  Dr. Cat ordered a PFT on this pt. He wants it done at [facility] [phone]. Can you please call to schedule    thanks

## 2020-03-02 RX ORDER — FLUDROCORTISONE ACETATE 0.1 MG/1
0.1 TABLET ORAL EVERY OTHER DAY
Qty: 45 TABLET | Refills: 1 | Status: SHIPPED | OUTPATIENT
Start: 2020-03-02 | End: 2020-03-05 | Stop reason: SDUPTHER

## 2020-03-05 RX ORDER — FLUDROCORTISONE ACETATE 0.1 MG/1
0.1 TABLET ORAL EVERY OTHER DAY
Qty: 45 TABLET | Refills: 0 | Status: SHIPPED | OUTPATIENT
Start: 2020-03-05 | End: 2020-07-12 | Stop reason: SDUPTHER

## 2020-03-06 RX ORDER — ALLOPURINOL 300 MG/1
TABLET ORAL
Qty: 90 TABLET | Refills: 0 | Status: SHIPPED | OUTPATIENT
Start: 2020-03-06 | End: 2020-06-28 | Stop reason: SDUPTHER

## 2020-06-11 ENCOUNTER — HOSPITAL ENCOUNTER (OUTPATIENT)
Dept: GENERAL RADIOLOGY | Facility: HOSPITAL | Age: 75
Discharge: HOME | End: 2020-06-11
Admitting: NURSE PRACTITIONER

## 2020-06-11 ENCOUNTER — OFFICE VISIT (OUTPATIENT)
Dept: FAMILY MEDICINE CLINIC | Facility: CLINIC | Age: 75
End: 2020-06-11

## 2020-06-11 VITALS
DIASTOLIC BLOOD PRESSURE: 99 MMHG | RESPIRATION RATE: 16 BRPM | HEART RATE: 81 BPM | WEIGHT: 246 LBS | HEIGHT: 72 IN | BODY MASS INDEX: 33.32 KG/M2 | SYSTOLIC BLOOD PRESSURE: 173 MMHG | TEMPERATURE: 98.2 F | OXYGEN SATURATION: 96 %

## 2020-06-11 DIAGNOSIS — R10.9 ACUTE FLANK PAIN: ICD-10-CM

## 2020-06-11 DIAGNOSIS — R07.81 PLEURITIC CHEST PAIN: Primary | ICD-10-CM

## 2020-06-11 LAB
BILIRUB BLD-MCNC: NEGATIVE MG/DL
CLARITY, POC: CLEAR
COLOR UR: YELLOW
GLUCOSE UR STRIP-MCNC: NEGATIVE MG/DL
KETONES UR QL: NEGATIVE
LEUKOCYTE EST, POC: NEGATIVE
NITRITE UR-MCNC: NEGATIVE MG/ML
PH UR: 6 [PH] (ref 5–8)
PROT UR STRIP-MCNC: ABNORMAL MG/DL
RBC # UR STRIP: ABNORMAL /UL
SP GR UR: 1.01 (ref 1–1.03)
UROBILINOGEN UR QL: NORMAL

## 2020-06-11 PROCEDURE — 71046 X-RAY EXAM CHEST 2 VIEWS: CPT

## 2020-06-11 PROCEDURE — 99214 OFFICE O/P EST MOD 30 MIN: CPT | Performed by: NURSE PRACTITIONER

## 2020-06-11 RX ORDER — TRAMADOL HYDROCHLORIDE 50 MG/1
TABLET ORAL
Qty: 15 TABLET | Refills: 0 | Status: SHIPPED | OUTPATIENT
Start: 2020-06-11 | End: 2020-10-02 | Stop reason: SDUPTHER

## 2020-06-11 RX ORDER — PREDNISONE 10 MG/1
60 TABLET ORAL DAILY
Qty: 42 TABLET | Refills: 0 | Status: SHIPPED | OUTPATIENT
Start: 2020-06-11 | End: 2020-06-18

## 2020-06-11 RX ORDER — AZITHROMYCIN 250 MG/1
TABLET, FILM COATED ORAL
Qty: 6 TABLET | Refills: 0 | Status: SHIPPED | OUTPATIENT
Start: 2020-06-11 | End: 2020-10-22

## 2020-06-11 NOTE — PROGRESS NOTES
Procedure   Procedures     Adult ECG Report     Name: Micah Krishna   Age: 75 y.o.   Gender: male       Rate: 81   Rhythm: normal sinus rhythm   QRS Axis: nml   MD Interval: 175   QRS Duration: 110   QTc: 420   Voltages: .   Conduction Disturbances: none   Other Abnormalities: none     Narrative Interpretation: Normal EKG no ST changes indication pleuritic chest pain  No change in EKG 2090

## 2020-06-11 NOTE — PROGRESS NOTES
"Subjective   Micah Krishna is a 75 y.o. male.     Pleasant gentleman complains sharp stabbing mild to moderate moderate severe pleuritic chest pain right anterior lower chest when he takes a big breath or deep breath.  Patient's had right flank discomfort the last week or so some achiness several days constant and nonpleuritic.  Woke up this morning without flank pain  His dog jumped off the couch she turned quickly to catch the dog and felt a sudden sharp moderate severe to severe pain right anterior chest.  At rest he is without any shortness of breath or dyspnea and without chest pain or flank pain.  The pain radiates to his right thoracic mid and right anterior inferior chest which is pleuritic in nature.  He has no fatigue no nausea vomiting diaphoresis no abdominal pain he has no cough no sore throat or new nasal congestion  Does not feel malaise or illness he has no dysuria frequency urgency  History of malignancy renal cancer status post left nephrectomy he has a CAT scan planned next month for follow-up  He has normal kidney function right  No recent hematuria or other urinary complaints    He has no history of DVT PE  No history of CAD and had a stress test last year negative  He does not smoke  He has been maintaining social distance    He is not has blood pressure medicine today it was elevated today 173/99 I rechecked 168/90  He has had no exertional chest pains and is without dyspnea walking through the hallway of a large walkway building he managed fine    Chest Pain    Associated symptoms include back pain. Pertinent negatives include no abdominal pain, cough, diaphoresis, dizziness, fever, palpitations or shortness of breath.   Back Pain   Pertinent negatives include no abdominal pain or fever.   Flank Pain   Pertinent negatives include no abdominal pain or fever.        /99   Pulse 81   Temp 98.2 °F (36.8 °C)   Resp 16   Ht 182.9 cm (72\")   Wt 112 kg (246 lb)   SpO2 96%   BMI 33.36 " kg/m²       The following portions of the patient's history were reviewed and updated as appropriate: allergies, current medications, past family history, past medical history, past social history, past surgical history and problem list.    Review of Systems   Constitutional: Negative for activity change, appetite change, chills, diaphoresis, fatigue and fever.   HENT: Negative.  Negative for trouble swallowing.    Eyes: Negative.    Respiratory: Negative.  Negative for cough and shortness of breath.         Pleuritic chest pain right   Cardiovascular: Negative for palpitations and leg swelling.   Gastrointestinal: Negative.  Negative for abdominal pain.   Genitourinary: Positive for flank pain.   Musculoskeletal: Positive for back pain.   Skin: Negative.    Neurological: Negative.  Negative for dizziness.   Psychiatric/Behavioral: Negative.        Objective   Physical Exam   Constitutional: He is oriented to person, place, and time. He appears well-developed and well-nourished. No distress.   Patient pleasant appears well   HENT:   Head: Normocephalic and atraumatic.   Nose: Nose normal.   Mouth/Throat: Oropharynx is clear and moist.   Eyes: Pupils are equal, round, and reactive to light. Conjunctivae are normal.   Neck: Neck supple. No JVD present.   Cardiovascular: Normal rate, regular rhythm and normal heart sounds.   No murmur heard.  Pulmonary/Chest: Effort normal and breath sounds normal. No respiratory distress. He has no wheezes.   Possibly few inspiratory crackles right lower lobe good air exchange otherwise  Nontender to palpation  No flank tenderness right side as well   Abdominal: Soft. Bowel sounds are normal. He exhibits no distension and no mass. There is no tenderness. There is no guarding. No hernia.   Musculoskeletal: He exhibits no edema or tenderness.   Lymphadenopathy:     He has no cervical adenopathy.   Neurological: He is alert and oriented to person, place, and time.   Skin: Skin is warm  and dry. He is not diaphoretic.   Warm and dry afebrile   Psychiatric: He has a normal mood and affect. His behavior is normal. Judgment and thought content normal.   Vitals reviewed.        Assessment/Plan   Micah was seen today for chest pain, back pain and flank pain.    Diagnoses and all orders for this visit:    Pleuritic chest pain  -     traMADol (ULTRAM) 50 MG tablet; 1 to 2 tablets every 6 hours as needed for pleuritic chest pain, ER if worse  -     XR Chest PA & Lateral    Acute flank pain  -     POC Urinalysis Dipstick, Automated    Other orders  -     azithromycin (Zithromax Z-Ruben) 250 MG tablet; Take 2 tablets the first day, then 1 tablet daily for 4 days.  -     predniSONE (DELTASONE) 10 MG tablet; Take 6 tablets by mouth Daily for 7 days.  -     ECG 12 Lead; Standing  -     ECG 12 Lead    Patient is without chest pain or shortness of breath or fever presently EKG is normal no changes urinalysis was trace of blood    Protein large amount  Otherwise negative he is without urinary symptoms and he has no CVA tenderness  He is without CVA pain presently and is merely having sharp pain with deep breath  He cannot take NSAIDs Tylenol did not help no history of drug or alcohol abuse  Tramadol risk-benefit risk of addiction dependence discussed alternatives conservative measures  Proper storage disposal  Controlled substance agreement  [place]  [facility] on follow-up  Acute supply only provided  He will get his chest x-ray now call tomorrow for results  Prednisone for inflammation as well as he has a history of adrenal insufficiency  Zithromax to cover any possible pneumonia until the chest x-ray is back  Patient understands instructions well  He understands go to the emergency room any condition change he should have a low threshold for follow-up  He is without fever shortness of breath some no need for COVID testing presently he has had no known exposure    Patient is leaving without distress he walks the entire  hallway to the checkout without notable dyspnea a walk [id number] with him    Office visit 30 minutes    He will follow-up Monday              Patient Instructions   Discharge instructions  Chest x-ray now call tomorrow for results  your prescriptions Zithromax prednisone  Tramadol with Tylenol as needed caution for sedation  Lowest effective dose  Push fluids plenty rest    If increased chest pain shortness of breath fever chills weakness nausea vomiting  Emergency room  Recheck Monday    Quite a bit of protein in her urine this needs to be repeated we should repeat  Urinalysis in 2 to 3 weeks  We can do this outpatient

## 2020-06-11 NOTE — PATIENT INSTRUCTIONS
Discharge instructions  Chest x-ray now call tomorrow for results  your prescriptions Zithromax prednisone  Tramadol with Tylenol as needed caution for sedation  Lowest effective dose  Push fluids plenty rest    If increased chest pain shortness of breath fever chills weakness nausea vomiting  Emergency room  Recheck Monday    Quite a bit of protein in her urine this needs to be repeated we should repeat  Urinalysis in 2 to 3 weeks  We can do this outpatient

## 2020-06-15 ENCOUNTER — OFFICE VISIT (OUTPATIENT)
Dept: FAMILY MEDICINE CLINIC | Facility: CLINIC | Age: 75
End: 2020-06-15

## 2020-06-15 VITALS
DIASTOLIC BLOOD PRESSURE: 97 MMHG | TEMPERATURE: 97.7 F | BODY MASS INDEX: 33.39 KG/M2 | HEART RATE: 63 BPM | HEIGHT: 72 IN | RESPIRATION RATE: 16 BRPM | OXYGEN SATURATION: 98 % | SYSTOLIC BLOOD PRESSURE: 176 MMHG | WEIGHT: 246.5 LBS

## 2020-06-15 DIAGNOSIS — R80.9 PROTEINURIA, UNSPECIFIED TYPE: ICD-10-CM

## 2020-06-15 DIAGNOSIS — R07.81 PLEURITIC CHEST PAIN: Primary | ICD-10-CM

## 2020-06-15 DIAGNOSIS — I10 ESSENTIAL HYPERTENSION: ICD-10-CM

## 2020-06-15 PROCEDURE — 99213 OFFICE O/P EST LOW 20 MIN: CPT | Performed by: NURSE PRACTITIONER

## 2020-06-15 NOTE — PROGRESS NOTES
"Subjective   Micah Krishna is a 75 y.o. male.     Follow-up pleuritic chest pain anterior sharp pain with deep breath without dyspnea fever chills cough  Patient symptoms have 100% resolved feels good no fatigue  Attributes it to the prednisone  Finish Zithromax chest x-ray negative  Taking prednisone 60 mg a day  Blood pressures a little elevated no headache or dizziness 176/97 he is compliant with his medication he will recheck his blood pressure  Likely secondary to pain previously and now prednisone  History of surgically induced adrenal insufficiency    Sees endocrinology protein in his urinalysis he had no urinary complaints originally had some flank pain which resolved  And he had no CVA tenderness when I saw him this pain was separate from his pleuritic chest pain    Trace of blood as well  We will repeat this today    Hypertension          /97   Pulse 63   Temp 97.7 °F (36.5 °C)   Resp 16   Ht 182.9 cm (72\")   Wt 112 kg (246 lb 8 oz)   SpO2 98%   BMI 33.43 kg/m²       The following portions of the patient's history were reviewed and updated as appropriate: allergies, current medications, past family history, past medical history, past social history, past surgical history and problem list.    Review of Systems    Objective   Physical Exam      Assessment/Plan   Micah was seen today for hypertension.    Diagnoses and all orders for this visit:    Pleuritic chest pain    Proteinuria, unspecified type  -     Urinalysis With Microscopic If Indicated (No Culture) - Urine, Clean Catch      Pleuritic chest pain resolved proteinuria repeat urinalysis  Essential hypertension he will follow instructions below            Patient Instructions   Discharge instructions  Make discontinue prednisone  Recheck blood pressure in a couple hours  If 170 or greater  Go ahead and take an extra valsartan 80 mg daily  Then call me blood pressure readings in a couple days    Otherwise give it another few days  After " discontinuing prednisone and see if it does not improve    Increased shortness of breath chest pain weakness nausea vomiting diaphoresis fever chills emergency room

## 2020-06-15 NOTE — PATIENT INSTRUCTIONS
Discharge instructions  Make discontinue prednisone  Recheck blood pressure in a couple hours  If 170 or greater  Go ahead and take an extra valsartan 80 mg daily  Then call me blood pressure readings in a couple days    Otherwise give it another few days  After discontinuing prednisone and see if it does not improve    Increased shortness of breath chest pain weakness nausea vomiting diaphoresis fever chills emergency room

## 2020-06-16 LAB
APPEARANCE UR: CLEAR
BACTERIA #/AREA URNS HPF: NORMAL /HPF
BILIRUB UR QL STRIP: NEGATIVE
CASTS URNS MICRO: NORMAL
COLOR UR: YELLOW
EPI CELLS #/AREA URNS HPF: NORMAL /HPF
GLUCOSE UR QL: NEGATIVE
HGB UR QL STRIP: NEGATIVE
KETONES UR QL STRIP: NEGATIVE
LEUKOCYTE ESTERASE UR QL STRIP: NEGATIVE
NITRITE UR QL STRIP: NEGATIVE
PH UR STRIP: 6.5 [PH] (ref 5–8)
PROT UR QL STRIP: ABNORMAL
RBC #/AREA URNS HPF: NORMAL /HPF
SP GR UR: 1.01 (ref 1–1.03)
UROBILINOGEN UR STRIP-MCNC: ABNORMAL MG/DL
WBC #/AREA URNS HPF: NORMAL /HPF

## 2020-06-24 RX ORDER — AMLODIPINE BESYLATE 10 MG/1
10 TABLET ORAL DAILY
Qty: 90 TABLET | Refills: 2 | Status: SHIPPED | OUTPATIENT
Start: 2020-06-24 | End: 2020-12-28 | Stop reason: SDUPTHER

## 2020-06-24 RX ORDER — ISOSORBIDE MONONITRATE 30 MG/1
30 TABLET, EXTENDED RELEASE ORAL 2 TIMES DAILY
Qty: 180 TABLET | Refills: 2 | Status: SHIPPED | OUTPATIENT
Start: 2020-06-24 | End: 2020-12-28 | Stop reason: SDUPTHER

## 2020-06-29 RX ORDER — ALLOPURINOL 300 MG/1
300 TABLET ORAL DAILY
Qty: 90 TABLET | Refills: 0 | Status: SHIPPED | OUTPATIENT
Start: 2020-06-29 | End: 2020-08-24

## 2020-06-29 RX ORDER — CLONIDINE HYDROCHLORIDE 0.2 MG/1
0.2 TABLET ORAL DAILY
Qty: 90 TABLET | Refills: 1 | Status: SHIPPED | OUTPATIENT
Start: 2020-06-29 | End: 2020-11-02

## 2020-07-06 ENCOUNTER — HOSPITAL ENCOUNTER (OUTPATIENT)
Dept: CT IMAGING | Facility: HOSPITAL | Age: 75
Discharge: HOME | End: 2020-07-06
Admitting: INTERNAL MEDICINE

## 2020-07-06 ENCOUNTER — LAB (OUTPATIENT)
Dept: LAB | Facility: HOSPITAL | Age: 75
End: 2020-07-06

## 2020-07-06 DIAGNOSIS — R06.09 DYSPNEA ON EXERTION: ICD-10-CM

## 2020-07-06 DIAGNOSIS — Z85.528 HISTORY OF RENAL CELL CARCINOMA: ICD-10-CM

## 2020-07-06 LAB
ALBUMIN SERPL-MCNC: 4.2 G/DL (ref 3.5–5.2)
ALBUMIN/GLOB SERPL: 1.8 G/DL
ALP SERPL-CCNC: 113 U/L (ref 39–117)
ALT SERPL W P-5'-P-CCNC: 20 U/L (ref 1–41)
ANION GAP SERPL CALCULATED.3IONS-SCNC: 7.6 MMOL/L (ref 5–15)
AST SERPL-CCNC: 14 U/L (ref 1–40)
BASOPHILS # BLD AUTO: 0.04 10*3/MM3 (ref 0–0.2)
BASOPHILS NFR BLD AUTO: 0.6 % (ref 0–1.5)
BILIRUB SERPL-MCNC: 0.3 MG/DL (ref 0.1–1.2)
BUN SERPL-MCNC: 19 MG/DL (ref 8–23)
BUN/CREAT SERPL: 17.1 (ref 7–25)
CALCIUM SPEC-SCNC: 9.1 MG/DL (ref 8.6–10.5)
CHLORIDE SERPL-SCNC: 107 MMOL/L (ref 98–107)
CO2 SERPL-SCNC: 25.4 MMOL/L (ref 22–29)
CREAT SERPL-MCNC: 1.11 MG/DL (ref 0.76–1.27)
DEPRECATED RDW RBC AUTO: 41.1 FL (ref 37–54)
EOSINOPHIL # BLD AUTO: 0.22 10*3/MM3 (ref 0–0.4)
EOSINOPHIL NFR BLD AUTO: 3.2 % (ref 0.3–6.2)
ERYTHROCYTE [DISTWIDTH] IN BLOOD BY AUTOMATED COUNT: 13.3 % (ref 12.3–15.4)
GFR SERPL CREATININE-BSD FRML MDRD: 64 ML/MIN/1.73
GLOBULIN UR ELPH-MCNC: 2.4 GM/DL
GLUCOSE SERPL-MCNC: 241 MG/DL (ref 65–99)
HCT VFR BLD AUTO: 34.5 % (ref 37.5–51)
HGB BLD-MCNC: 11.8 G/DL (ref 13–17.7)
IMM GRANULOCYTES # BLD AUTO: 0.05 10*3/MM3 (ref 0–0.05)
IMM GRANULOCYTES NFR BLD AUTO: 0.7 % (ref 0–0.5)
LYMPHOCYTES # BLD AUTO: 1.96 10*3/MM3 (ref 0.7–3.1)
LYMPHOCYTES NFR BLD AUTO: 28.4 % (ref 19.6–45.3)
MCH RBC QN AUTO: 28.9 PG (ref 26.6–33)
MCHC RBC AUTO-ENTMCNC: 34.2 G/DL (ref 31.5–35.7)
MCV RBC AUTO: 84.6 FL (ref 79–97)
MONOCYTES # BLD AUTO: 0.72 10*3/MM3 (ref 0.1–0.9)
MONOCYTES NFR BLD AUTO: 10.4 % (ref 5–12)
NEUTROPHILS NFR BLD AUTO: 3.92 10*3/MM3 (ref 1.7–7)
NEUTROPHILS NFR BLD AUTO: 56.7 % (ref 42.7–76)
NRBC BLD AUTO-RTO: 0 /100 WBC (ref 0–0.2)
PLATELET # BLD AUTO: 142 10*3/MM3 (ref 140–450)
PMV BLD AUTO: 9.9 FL (ref 6–12)
POTASSIUM SERPL-SCNC: 3.5 MMOL/L (ref 3.5–5.2)
PROT SERPL-MCNC: 6.6 G/DL (ref 6–8.5)
RBC # BLD AUTO: 4.08 10*6/MM3 (ref 4.14–5.8)
SODIUM SERPL-SCNC: 140 MMOL/L (ref 136–145)
WBC # BLD AUTO: 6.91 10*3/MM3 (ref 3.4–10.8)

## 2020-07-06 PROCEDURE — 80053 COMPREHEN METABOLIC PANEL: CPT

## 2020-07-06 PROCEDURE — 85025 COMPLETE CBC W/AUTO DIFF WBC: CPT

## 2020-07-06 PROCEDURE — 36415 COLL VENOUS BLD VENIPUNCTURE: CPT

## 2020-07-06 PROCEDURE — 0 DIATRIZOATE MEGLUMINE & SODIUM PER 1 ML: Performed by: INTERNAL MEDICINE

## 2020-07-06 PROCEDURE — 71250 CT THORAX DX C-: CPT

## 2020-07-06 PROCEDURE — 74176 CT ABD & PELVIS W/O CONTRAST: CPT

## 2020-07-06 RX ORDER — ROSUVASTATIN CALCIUM 20 MG/1
20 TABLET, COATED ORAL DAILY
Qty: 90 TABLET | Refills: 1 | Status: SHIPPED | OUTPATIENT
Start: 2020-07-06 | End: 2020-12-28 | Stop reason: SDUPTHER

## 2020-07-06 RX ORDER — VALSARTAN 80 MG/1
80 TABLET ORAL DAILY
Qty: 90 TABLET | Refills: 1 | Status: SHIPPED | OUTPATIENT
Start: 2020-07-06 | End: 2020-08-30 | Stop reason: SDUPTHER

## 2020-07-06 RX ADMIN — DIATRIZOATE MEGLUMINE AND DIATRIZOATE SODIUM 30 ML: 660; 100 LIQUID ORAL; RECTAL at 10:04

## 2020-07-10 ENCOUNTER — OFFICE VISIT (OUTPATIENT)
Dept: ONCOLOGY | Facility: CLINIC | Age: 75
End: 2020-07-10

## 2020-07-10 ENCOUNTER — APPOINTMENT (OUTPATIENT)
Dept: OTHER | Facility: HOSPITAL | Age: 75
End: 2020-07-10

## 2020-07-10 VITALS
RESPIRATION RATE: 16 BRPM | WEIGHT: 250.4 LBS | HEIGHT: 72 IN | HEART RATE: 72 BPM | DIASTOLIC BLOOD PRESSURE: 96 MMHG | OXYGEN SATURATION: 96 % | BODY MASS INDEX: 33.92 KG/M2 | SYSTOLIC BLOOD PRESSURE: 171 MMHG | TEMPERATURE: 98 F

## 2020-07-10 DIAGNOSIS — Z85.528 HISTORY OF RENAL CELL CARCINOMA: Primary | ICD-10-CM

## 2020-07-10 DIAGNOSIS — R91.8 LUNG NODULES: ICD-10-CM

## 2020-07-10 DIAGNOSIS — S22.32XD CLOSED FRACTURE OF ONE RIB OF LEFT SIDE WITH ROUTINE HEALING: ICD-10-CM

## 2020-07-10 PROCEDURE — 99215 OFFICE O/P EST HI 40 MIN: CPT | Performed by: INTERNAL MEDICINE

## 2020-07-10 NOTE — PROGRESS NOTES
[facility] GROUP OUTPATIENT FOLLOW UP CLINIC VISIT    REASON FOR FOLLOW-UP:    1.  History of metastatic clear cell renal cell carcinoma.  All disease has been resected.  Currently no evidence of disease.    HISTORY OF PRESENT ILLNESS:  Micah Krishna is a 75 y.o. male who returns today for follow up of the above issue.      At his last visit he complained of dyspnea on exertion associated at times with some right-sided chest discomfort.  This started last fall when he last mowed his grass.  He could not walk more than about 50 feet without getting short of breath.  He saw primary care and had a normal chest x-ray back in November.  He saw his cardiologist with an evaluation not showing any cardiac reasons for his symptoms.  He has ongoing fatigue.  No lymphadenopathy.  No fevers chills or night sweats.  No abdominal pain.    PFTs showed nothing significant.  A CXR on 6/11/2020 was unremarkable.    He did have CT imaging done on 7/6 for review today.    Several weeks ago he developed acute left-sided pleuritic chest pain.  He was diagnosed with pleurisy after a chest x-ray was unremarkable.  Prior to this, he was very constipated and straining on the toilet.  This has improved with stool softeners and suppositories.  His left-sided pleuritic chest pain has improved.  He is having some mild low back pain at this point.    PAST MEDICAL, SURGICAL, FAMILY, AND SOCIAL HISTORIES were reviewed with the patient and in the electronic medical record, and were updated if indicated.    ALLERGIES:  Allergies   Allergen Reactions   • Shellfish-Derived Products Anaphylaxis   • Iodinated Diagnostic Agents Itching and Swelling   • Shellfish Allergy Unknown - Low Severity   • Adhesive Tape Hives       MEDICATIONS:  The medication list has been reviewed with the patient by the medical assistant, and the list has been updated in the electronic medical record, which I reviewed.  Medication dosages and frequencies were confirmed  "to be accurate.    REVIEW OF SYSTEMS:  PAIN:  See Vital Signs below.  GENERAL:  No fevers, chills, night sweats, or unintended weight loss.  SKIN:  No rashes or non-healing lesions.  HEME/LYMPH:  No abnormal bleeding.  No palpable lymphadenopathy.  EYES:  No vision changes or diplopia.  ENT:  No sore throat or difficulty swallowing.  RESPIRATORY: Pleuritic chest pain improved  CARDIOVASCULAR: Dyspnea on exertion and chest pain with walking improved  GASTROINTESTINAL:  No nausea vomiting or diarrhea.  GENITOURINARY:  No dysuria or hematuria.  MUSCULOSKELETAL:  Persistent arthritis pain.  Mild back pain  NEUROLOGIC:  No dizziness, loss of consciousness, or seizures.  PSYCHIATRIC:  No depression, anxiety, or mood changes.    Vitals:    07/10/20 0851   BP: 171/96   Pulse: 72   Resp: 16   Temp: 98 °F (36.7 °C)   TempSrc: Temporal   SpO2: 96%   Weight: 114 kg (250 lb 6.4 oz)   Height: 182.9 cm (72.01\")   PainSc: 2  Comment: renal cancer   PainLoc: Back       PHYSICAL EXAMINATION:  GENERAL:  Well-developed well-nourished male; awake, alert and oriented, in no acute distress.   SKIN:  Warm and dry, without rashes, purpura, or petechiae.  HEAD:  Normocephalic, atraumatic.  Wearing a mask  EARS:  Hearing intact.  LYMPHATICS:  No cervical, supraclavicular, axillary, or inguinal lymphadenopathy.  CHEST:  Lungs are clear to auscultation bilaterally.  No wheezes, rales, or rhonchi.  Some mild tenderness to palpation on the left lateral chest.  HEART:  Regular rate; normal rhythm.  No murmurs, gallops or rubs.  ABDOMEN: Not examined today  EXTREMITIES:  No clubbing cyanosis or edema  NEUROLOGICAL:  No focal neurologic deficits.      DIAGNOSTIC DATA:  Results for orders placed or performed in visit on 07/06/20   Comprehensive Metabolic Panel   Result Value Ref Range    Glucose 241 (H) 65 - 99 mg/dL    BUN 19 8 - 23 mg/dL    Creatinine 1.11 0.76 - 1.27 mg/dL    Sodium 140 136 - 145 mmol/L    Potassium 3.5 3.5 - 5.2 mmol/L    " Chloride 107 98 - 107 mmol/L    CO2 25.4 22.0 - 29.0 mmol/L    Calcium 9.1 8.6 - 10.5 mg/dL    Total Protein 6.6 6.0 - 8.5 g/dL    Albumin 4.20 3.50 - 5.20 g/dL    ALT (SGPT) 20 1 - 41 U/L    AST (SGOT) 14 1 - 40 U/L    Alkaline Phosphatase 113 39 - 117 U/L    Total Bilirubin 0.3 0.1 - 1.2 mg/dL    eGFR Non African Amer 64 >60 mL/min/1.73    Globulin 2.4 gm/dL    A/G Ratio 1.8 g/dL    BUN/Creatinine Ratio 17.1 7.0 - 25.0    Anion Gap 7.6 5.0 - 15.0 mmol/L   CBC Auto Differential   Result Value Ref Range    WBC 6.91 3.40 - 10.80 10*3/mm3    RBC 4.08 (L) 4.14 - 5.80 10*6/mm3    Hemoglobin 11.8 (L) 13.0 - 17.7 g/dL    Hematocrit 34.5 (L) 37.5 - 51.0 %    MCV 84.6 79.0 - 97.0 fL    MCH 28.9 26.6 - 33.0 pg    MCHC 34.2 31.5 - 35.7 g/dL    RDW 13.3 12.3 - 15.4 %    RDW-SD 41.1 37.0 - 54.0 fl    MPV 9.9 6.0 - 12.0 fL    Platelets 142 140 - 450 10*3/mm3    Neutrophil % 56.7 42.7 - 76.0 %    Lymphocyte % 28.4 19.6 - 45.3 %    Monocyte % 10.4 5.0 - 12.0 %    Eosinophil % 3.2 0.3 - 6.2 %    Basophil % 0.6 0.0 - 1.5 %    Immature Grans % 0.7 (H) 0.0 - 0.5 %    Neutrophils, Absolute 3.92 1.70 - 7.00 10*3/mm3    Lymphocytes, Absolute 1.96 0.70 - 3.10 10*3/mm3    Monocytes, Absolute 0.72 0.10 - 0.90 10*3/mm3    Eosinophils, Absolute 0.22 0.00 - 0.40 10*3/mm3    Basophils, Absolute 0.04 0.00 - 0.20 10*3/mm3    Immature Grans, Absolute 0.05 0.00 - 0.05 10*3/mm3    nRBC 0.0 0.0 - 0.2 /100 WBC       IMAGING:  CT C/A/P 7/6/2020 with a RUL sub 6 mm pulmonary nodule in the RUL, stable since 1/17/2018, new 6 mm nodules in the medial RLL and RUL,  LLL nodule unchanged since 7/17/2018.  Healing left ninth rib fracture.    Images personally reviewed.      ASSESSMENT:  This is a 75 y.o. male with:  1.  Mild normocytic anemia: His hemoglobin is stable today.  2.  Adrenal insufficiency following bilateral adrenalectomy currently on replacement hormone therapy.  He follows with endocrinology.  3.  History of metastatic renal cell carcinoma.  He  had a left nephrectomy and adrenalectomy in 2000 and then a right adrenalectomy for metastatic disease in 2012.  See below  4.  Hypertension:  Blood pressure better after diuresis and is acceptable today  5.  Lower extremity edema:  No edema today.  6.  Chronic kidney disease: Status post left nephrectomy  He has been referred to nephrology.  I believe that his creatinine is overall stable over the past few years.  7.  Dyspnea on exertion with right-sided chest pain with exertion: He had a negative cardiac evaluation by his cardiologist.  He is a former smoker.  PFTs 1/15/2020 with some air trapping, normal spirometry, no restriction, normal diffusion.  8.  Two new 6 mm nodules in the RUL and RLL, non-specific but because they are new they are concerning.  Plan repeat imaging in a few months.   9.  Left-sided pleuritic chest pain that developed suddenly about a month ago.  He had a negative chest x-ray.  His CT imaging does show a healing left ninth rib fracture and I do suspect that this is what was causing his pain.  The reason why he has a rib fracture is undetermined.  He denies any trauma.  It was straining a lot on the toilet prior to his pain developing and this could have contributed.  Obviously, this could be a metastatic lesion.  Nonetheless, his pain is improving significantly.  10.  Constipation: Improved with stool softeners and MiraLAX.  Continue these as necessary.  11.  Headaches: He does complain of some mild right-sided temporal headaches which are concerning him considering his history of cancer.  He denies any other focal neurologic symptoms.  Nonetheless, imaging is warranted.    PLAN:   We will plan to obtain CT imaging of his head without contrast and repeat CT imaging of his chest without contrast in about 3 months.  We will also plan for a bone scan at that time as well.  I will see him back after that with a CBC to review the imaging.  Further evaluation as appropriate.    40 minutes.  25  minutes spent counseling him regarding the lung lesions and rib fracture.

## 2020-07-13 RX ORDER — FLUDROCORTISONE ACETATE 0.1 MG/1
0.1 TABLET ORAL DAILY
Qty: 90 TABLET | Refills: 3 | Status: SHIPPED | OUTPATIENT
Start: 2020-07-13 | End: 2020-09-29 | Stop reason: SDUPTHER

## 2020-07-31 RX ORDER — CLOPIDOGREL BISULFATE 75 MG/1
75 TABLET ORAL DAILY
Qty: 90 TABLET | Refills: 0 | Status: SHIPPED | OUTPATIENT
Start: 2020-07-31 | End: 2020-11-09

## 2020-07-31 RX ORDER — FINASTERIDE 5 MG/1
5 TABLET, FILM COATED ORAL DAILY
Qty: 90 TABLET | Refills: 0 | Status: SHIPPED | OUTPATIENT
Start: 2020-07-31 | End: 2020-08-30 | Stop reason: SDUPTHER

## 2020-08-04 RX ORDER — HYDROCORTISONE 10 MG/1
TABLET ORAL
Qty: 135 TABLET | Refills: 0 | Status: SHIPPED | OUTPATIENT
Start: 2020-08-04 | End: 2020-10-22

## 2020-08-04 RX ORDER — HYDROCORTISONE 10 MG/1
TABLET ORAL
Qty: 135 TABLET | Refills: 0 | Status: SHIPPED | OUTPATIENT
Start: 2020-08-04 | End: 2020-12-08

## 2020-08-04 RX ORDER — METOPROLOL SUCCINATE 100 MG/1
100 TABLET, EXTENDED RELEASE ORAL DAILY
Qty: 90 TABLET | Refills: 0 | Status: SHIPPED | OUTPATIENT
Start: 2020-08-04 | End: 2020-09-28 | Stop reason: SDUPTHER

## 2020-08-24 RX ORDER — ALLOPURINOL 300 MG/1
300 TABLET ORAL DAILY
Qty: 90 TABLET | Refills: 1 | Status: SHIPPED | OUTPATIENT
Start: 2020-08-24 | End: 2020-09-29 | Stop reason: SDUPTHER

## 2020-08-31 RX ORDER — FINASTERIDE 5 MG/1
5 TABLET, FILM COATED ORAL DAILY
Qty: 90 TABLET | Refills: 0 | Status: SHIPPED | OUTPATIENT
Start: 2020-08-31 | End: 2020-12-30

## 2020-08-31 RX ORDER — PANTOPRAZOLE SODIUM 20 MG/1
20 TABLET, DELAYED RELEASE ORAL DAILY
Qty: 90 TABLET | Refills: 1 | Status: SHIPPED | OUTPATIENT
Start: 2020-08-31 | End: 2020-10-22

## 2020-08-31 RX ORDER — VALSARTAN 80 MG/1
80 TABLET ORAL DAILY
Qty: 90 TABLET | Refills: 1 | Status: SHIPPED | OUTPATIENT
Start: 2020-08-31 | End: 2020-10-28

## 2020-08-31 RX ORDER — PANTOPRAZOLE SODIUM 20 MG/1
20 TABLET, DELAYED RELEASE ORAL DAILY
Qty: 90 TABLET | Refills: 1 | Status: SHIPPED | OUTPATIENT
Start: 2020-08-31 | End: 2020-10-02 | Stop reason: SDUPTHER

## 2020-09-28 ENCOUNTER — HOSPITAL ENCOUNTER (OUTPATIENT)
Dept: NUCLEAR MEDICINE | Facility: HOSPITAL | Age: 75
Discharge: HOME | End: 2020-09-28

## 2020-09-28 ENCOUNTER — HOSPITAL ENCOUNTER (OUTPATIENT)
Dept: CT IMAGING | Facility: HOSPITAL | Age: 75
Discharge: HOME | End: 2020-09-28
Admitting: INTERNAL MEDICINE

## 2020-09-28 DIAGNOSIS — S22.32XD CLOSED FRACTURE OF ONE RIB OF LEFT SIDE WITH ROUTINE HEALING: ICD-10-CM

## 2020-09-28 DIAGNOSIS — R91.8 LUNG NODULES: ICD-10-CM

## 2020-09-28 DIAGNOSIS — Z85.528 HISTORY OF RENAL CELL CARCINOMA: ICD-10-CM

## 2020-09-28 PROCEDURE — 70450 CT HEAD/BRAIN W/O DYE: CPT

## 2020-09-28 PROCEDURE — A9503 TC99M MEDRONATE: HCPCS | Performed by: INTERNAL MEDICINE

## 2020-09-28 PROCEDURE — 78306 BONE IMAGING WHOLE BODY: CPT

## 2020-09-28 PROCEDURE — 71250 CT THORAX DX C-: CPT

## 2020-09-28 PROCEDURE — 0 TECHNETIUM MEDRONATE KIT: Performed by: INTERNAL MEDICINE

## 2020-09-28 RX ORDER — TC 99M MEDRONATE 20 MG/10ML
21.8 INJECTION, POWDER, LYOPHILIZED, FOR SOLUTION INTRAVENOUS
Status: COMPLETED | OUTPATIENT
Start: 2020-09-28 | End: 2020-09-28

## 2020-09-28 RX ORDER — METOPROLOL SUCCINATE 100 MG/1
TABLET, EXTENDED RELEASE ORAL
Qty: 90 TABLET | Refills: 1 | Status: SHIPPED | OUTPATIENT
Start: 2020-09-28 | End: 2020-11-19 | Stop reason: SDUPTHER

## 2020-09-28 RX ADMIN — Medication 21.8 MILLICURIE: at 06:50

## 2020-10-01 RX ORDER — ALLOPURINOL 300 MG/1
300 TABLET ORAL DAILY
Qty: 90 TABLET | Refills: 1 | Status: SHIPPED | OUTPATIENT
Start: 2020-10-01 | End: 2020-12-28 | Stop reason: SDUPTHER

## 2020-10-01 RX ORDER — FLUDROCORTISONE ACETATE 0.1 MG/1
0.1 TABLET ORAL DAILY
Qty: 90 TABLET | Refills: 1 | Status: SHIPPED | OUTPATIENT
Start: 2020-10-01 | End: 2021-01-18 | Stop reason: SDUPTHER

## 2020-10-01 NOTE — PROGRESS NOTES
[facility] GROUP OUTPATIENT FOLLOW UP CLINIC VISIT    REASON FOR FOLLOW-UP:    1.  History of metastatic clear cell renal cell carcinoma.  All disease has been resected..    HISTORY OF PRESENT ILLNESS:  Micah Krishna is a 75 y.o. male who returns today for follow up of the above issue.      He continues to have migratory pain.  He has a headache.  He has intermittent back pain, intermittent shoulder pain, intermittent rib pain, intermittent hip pain.  He has been using ibuprofen 2 to 3 tablets every 4-6 hours.  He notes fatigue.    PAST MEDICAL, SURGICAL, FAMILY, AND SOCIAL HISTORIES were reviewed with the patient and in the electronic medical record, and were updated if indicated.    ALLERGIES:  Allergies   Allergen Reactions   • Shellfish-Derived Products Anaphylaxis   • Iodinated Diagnostic Agents Itching and Swelling   • Shellfish Allergy Unknown - Low Severity   • Adhesive Tape Hives       MEDICATIONS:  The medication list has been reviewed with the patient by the medical assistant, and the list has been updated in the electronic medical record, which I reviewed.  Medication dosages and frequencies were confirmed to be accurate.    REVIEW OF SYSTEMS:  PAIN:  See Vital Signs below.  GENERAL:  No fevers, chills, night sweats, or unintended weight loss.  SKIN:  No rashes or non-healing lesions.  HEME/LYMPH:  No abnormal bleeding.  No palpable lymphadenopathy.  EYES:  No vision changes or diplopia.  ENT:  No sore throat or difficulty swallowing.  RESPIRATORY: Pleuritic chest pain improved  CARDIOVASCULAR: Dyspnea on exertion and chest pain with walking improved  GASTROINTESTINAL:  No nausea vomiting or diarrhea.  GENITOURINARY:  No dysuria or hematuria.  MUSCULOSKELETAL:  Persistent arthritis pain.  Migratory skeletal pain  NEUROLOGIC:  No dizziness, loss of consciousness, or seizures.  PSYCHIATRIC:  No depression, anxiety, or mood changes.    Vitals:    10/02/20 0955   BP: 176/80   Pulse: 72   Resp: 16  "  Temp: 98 °F (36.7 °C)   TempSrc: Temporal   SpO2: 97%   Weight: 110 kg (243 lb 8 oz)   Height: 182.9 cm (72.01\")   PainSc: 4  Comment: renal cancer   PainLoc: Comment: Left hip       PHYSICAL EXAMINATION:  GENERAL:  Well-developed well-nourished male; awake, alert and oriented, in no acute distress.   SKIN:  Warm and dry, without rashes, purpura, or petechiae.  HEAD:  Normocephalic, atraumatic.  Wearing a mask  EARS:  Hearing intact.  LYMPHATICS:  No cervical, supraclavicular, axillary,lymphadenopathy.  CHEST:  Lungs are clear to auscultation bilaterally.  No wheezes, rales, or rhonchi.   HEART:  Regular rate; normal rhythm.  No murmurs, gallops or rubs.  ABDOMEN: Not examined today  EXTREMITIES:  No clubbing cyanosis or edema  NEUROLOGICAL:  No focal neurologic deficits.      DIAGNOSTIC DATA:  Results for orders placed or performed in visit on 10/02/20   CBC Auto Differential    Specimen: Blood   Result Value Ref Range    WBC 6.29 3.40 - 10.80 10*3/mm3    RBC 4.20 4.14 - 5.80 10*6/mm3    Hemoglobin 12.0 (L) 13.0 - 17.7 g/dL    Hematocrit 34.0 (L) 37.5 - 51.0 %    MCV 81.0 79.0 - 97.0 fL    MCH 28.6 26.6 - 33.0 pg    MCHC 35.3 31.5 - 35.7 g/dL    RDW 13.5 12.3 - 15.4 %    RDW-SD 38.7 37.0 - 54.0 fl    MPV 9.9 6.0 - 12.0 fL    Platelets 167 140 - 450 10*3/mm3    Neutrophil % 53.5 42.7 - 76.0 %    Lymphocyte % 28.8 19.6 - 45.3 %    Monocyte % 8.7 5.0 - 12.0 %    Eosinophil % 7.2 (H) 0.3 - 6.2 %    Basophil % 1.0 0.0 - 1.5 %    Immature Grans % 0.8 (H) 0.0 - 0.5 %    Neutrophils, Absolute 3.37 1.70 - 7.00 10*3/mm3    Lymphocytes, Absolute 1.81 0.70 - 3.10 10*3/mm3    Monocytes, Absolute 0.55 0.10 - 0.90 10*3/mm3    Eosinophils, Absolute 0.45 (H) 0.00 - 0.40 10*3/mm3    Basophils, Absolute 0.06 0.00 - 0.20 10*3/mm3    Immature Grans, Absolute 0.05 0.00 - 0.05 10*3/mm3    nRBC 0.0 0.0 - 0.2 /100 WBC       IMAGING:  CT C/A/P 7/6/2020 with a RUL sub 6 mm pulmonary nodule in the RUL, stable since 1/17/2018, new 6 mm " nodules in the medial RLL and RUL,  LLL nodule unchanged since 7/17/2018.  Healing left ninth rib fracture.    CT chest 9/28/2020 with stable lung nodules, pathologic fracture of the right 8th rib, abnormal appearance of T8, narrowing of the thecal sack at this level, additional lucencies at several vertebral bodies such as T7 and T11, subacute healing fractures in the anterior right 6th rib and lateral left 9th rib.    CT head 9/28/2020 with calvarial metastatic disease involving the frontal and occipital bones.    Bone scan 9/28/2020 with multifocal uptake consistent with metastatic disease. Anterior frontal bone, upper C spine at C1 or C2, T and L spine at T3, T8, T11, multifocal rib uptake, abnormal uptake in the iliac wings/bodies and left femoral heads/acetabulum, distal left clavicle and both humeral heads.     Images personally reviewed.      ASSESSMENT:  This is a 75 y.o. male with:  1.  Mild normocytic anemia: His hemoglobin is stable today.  2.  Adrenal insufficiency following bilateral adrenalectomy currently on replacement hormone therapy.  He follows with endocrinology.  3.  History of metastatic renal cell carcinoma.  He had a left nephrectomy and adrenalectomy in 2000 and then a right adrenalectomy for metastatic disease in 2012.  See below  4.  Hypertension:  Blood pressure higher today likely secondary to ibuprofen use.  5.  Lower extremity edema:  No edema today.  6.  Chronic kidney disease: Status post left nephrectomy.  I believe that his creatinine is overall stable over the past few years.  7.  Dyspnea on exertion with right-sided chest pain with exertion: He had a negative cardiac evaluation by his cardiologist.  He is a former smoker.  PFTs 1/15/2020 with some air trapping, normal spirometry, no restriction, normal diffusion.  8.  Two new 6 mm nodules in the RUL and RLL, non-specific but because they are new they are concerning.  Stable on follow-up imaging  9.  Migratory skeletal pain:  Metastatic disease apparent now on CT imaging and bone scan.  Prescription for tramadol.  10.  Constipation: Improved with stool softeners and MiraLAX.  Continue these as necessary.  11.  Headaches: CT head without evidence for metastatic disease but there are calvarial bone lesions that are likely causing his headaches.  12. Evidence for bony metastatic disease on CT and bone scan imaging from 9/28/2020.  Likely metastatic renal cell carcinoma.  This may be difficult to prove.  No obvious soft tissue lesions on CT imaging but this has been done without contrast.  PET scan requested for further evaluation.    PLAN:   1. Prescription for tramadol 50 mg every 6 hours as needed was electronically sent to his pharmacy.  2. PET scan with labs today including a CMP, magnesium, phosphorus  3. He was advised to cut back significantly on NSAID use as this is adversely affecting his kidney function and hypertension  4. We can refer to radiation oncology to consider radiating any bony lesions causing pain that is not well controlled with medication.  5. We will likely plan for therapy with postoperative.  I gave him some written information regarding this today and briefly outlined potential adverse effects.  He is willing to take oral therapy but he is not willing to take intravenous chemotherapy.  I explained to him that therapy for metastatic renal cell carcinoma typically involves oral TKI therapy as well as immunotherapy  6. Consider monthly Xgeva for bony metastatic disease  7. I will see him back soon after his PET scan for review.    40 minutes face-to-face with him today.  30 minutes counseling him regarding the findings on imaging, symptom management, and our likely plan of care.

## 2020-10-02 ENCOUNTER — OFFICE VISIT (OUTPATIENT)
Dept: ONCOLOGY | Facility: CLINIC | Age: 75
End: 2020-10-02

## 2020-10-02 ENCOUNTER — LAB (OUTPATIENT)
Dept: OTHER | Facility: HOSPITAL | Age: 75
End: 2020-10-02

## 2020-10-02 VITALS
BODY MASS INDEX: 32.98 KG/M2 | WEIGHT: 243.5 LBS | HEART RATE: 72 BPM | SYSTOLIC BLOOD PRESSURE: 176 MMHG | TEMPERATURE: 98 F | OXYGEN SATURATION: 97 % | RESPIRATION RATE: 16 BRPM | HEIGHT: 72 IN | DIASTOLIC BLOOD PRESSURE: 80 MMHG

## 2020-10-02 DIAGNOSIS — Z85.528 HISTORY OF RENAL CELL CARCINOMA: ICD-10-CM

## 2020-10-02 DIAGNOSIS — Z85.528 HISTORY OF RENAL CELL CARCINOMA: Primary | ICD-10-CM

## 2020-10-02 DIAGNOSIS — R07.81 PLEURITIC CHEST PAIN: ICD-10-CM

## 2020-10-02 DIAGNOSIS — S22.32XD CLOSED FRACTURE OF ONE RIB OF LEFT SIDE WITH ROUTINE HEALING: ICD-10-CM

## 2020-10-02 DIAGNOSIS — R93.0 ABNORMAL FINDINGS ON DIAGNOSTIC IMAGING OF SKULL AND HEAD, NOT ELSEWHERE CLASSIFIED: ICD-10-CM

## 2020-10-02 DIAGNOSIS — R91.8 LUNG NODULES: ICD-10-CM

## 2020-10-02 LAB
BASOPHILS # BLD AUTO: 0.06 10*3/MM3 (ref 0–0.2)
BASOPHILS NFR BLD AUTO: 1 % (ref 0–1.5)
DEPRECATED RDW RBC AUTO: 38.7 FL (ref 37–54)
EOSINOPHIL # BLD AUTO: 0.45 10*3/MM3 (ref 0–0.4)
EOSINOPHIL NFR BLD AUTO: 7.2 % (ref 0.3–6.2)
ERYTHROCYTE [DISTWIDTH] IN BLOOD BY AUTOMATED COUNT: 13.5 % (ref 12.3–15.4)
HCT VFR BLD AUTO: 34 % (ref 37.5–51)
HGB BLD-MCNC: 12 G/DL (ref 13–17.7)
IMM GRANULOCYTES # BLD AUTO: 0.05 10*3/MM3 (ref 0–0.05)
IMM GRANULOCYTES NFR BLD AUTO: 0.8 % (ref 0–0.5)
LYMPHOCYTES # BLD AUTO: 1.81 10*3/MM3 (ref 0.7–3.1)
LYMPHOCYTES NFR BLD AUTO: 28.8 % (ref 19.6–45.3)
MCH RBC QN AUTO: 28.6 PG (ref 26.6–33)
MCHC RBC AUTO-ENTMCNC: 35.3 G/DL (ref 31.5–35.7)
MCV RBC AUTO: 81 FL (ref 79–97)
MONOCYTES # BLD AUTO: 0.55 10*3/MM3 (ref 0.1–0.9)
MONOCYTES NFR BLD AUTO: 8.7 % (ref 5–12)
NEUTROPHILS NFR BLD AUTO: 3.37 10*3/MM3 (ref 1.7–7)
NEUTROPHILS NFR BLD AUTO: 53.5 % (ref 42.7–76)
NRBC BLD AUTO-RTO: 0 /100 WBC (ref 0–0.2)
PLATELET # BLD AUTO: 167 10*3/MM3 (ref 140–450)
PMV BLD AUTO: 9.9 FL (ref 6–12)
RBC # BLD AUTO: 4.2 10*6/MM3 (ref 4.14–5.8)
WBC # BLD AUTO: 6.29 10*3/MM3 (ref 3.4–10.8)

## 2020-10-02 PROCEDURE — 99215 OFFICE O/P EST HI 40 MIN: CPT | Performed by: INTERNAL MEDICINE

## 2020-10-02 PROCEDURE — 85025 COMPLETE CBC W/AUTO DIFF WBC: CPT | Performed by: INTERNAL MEDICINE

## 2020-10-02 PROCEDURE — 36415 COLL VENOUS BLD VENIPUNCTURE: CPT

## 2020-10-02 RX ORDER — TRAMADOL HYDROCHLORIDE 50 MG/1
50 TABLET ORAL EVERY 6 HOURS PRN
Qty: 60 TABLET | Refills: 0 | Status: SHIPPED | OUTPATIENT
Start: 2020-10-02 | End: 2020-10-22 | Stop reason: SINTOL

## 2020-10-05 ENCOUNTER — TELEPHONE (OUTPATIENT)
Dept: ONCOLOGY | Facility: CLINIC | Age: 75
End: 2020-10-05

## 2020-10-05 DIAGNOSIS — Z85.528 HISTORY OF RENAL CELL CARCINOMA: Primary | ICD-10-CM

## 2020-10-05 RX ORDER — HYDROCODONE BITARTRATE AND ACETAMINOPHEN 5; 325 MG/1; MG/1
1 TABLET ORAL EVERY 6 HOURS PRN
Qty: 60 TABLET | Refills: 0 | Status: SHIPPED | OUTPATIENT
Start: 2020-10-05 | End: 2020-10-15 | Stop reason: SDUPTHER

## 2020-10-05 NOTE — TELEPHONE ENCOUNTER
Pt reports N/V since starting tramadol on 10/2. Pt states it happens whether he takes with or without food. R/w Dr. Cat, order received for Norco 5/325 mg q6hr prn. Script routed to Dr. Cat for approval. Pt v/u.

## 2020-10-07 ENCOUNTER — HOSPITAL ENCOUNTER (OUTPATIENT)
Dept: PET IMAGING | Facility: HOSPITAL | Age: 75
End: 2020-10-07

## 2020-10-07 ENCOUNTER — HOSPITAL ENCOUNTER (OUTPATIENT)
Dept: PET IMAGING | Facility: HOSPITAL | Age: 75
Discharge: HOME | End: 2020-10-07

## 2020-10-07 DIAGNOSIS — Z85.528 HISTORY OF RENAL CELL CARCINOMA: ICD-10-CM

## 2020-10-07 DIAGNOSIS — R93.0 ABNORMAL FINDINGS ON DIAGNOSTIC IMAGING OF SKULL AND HEAD, NOT ELSEWHERE CLASSIFIED: ICD-10-CM

## 2020-10-08 ENCOUNTER — LAB (OUTPATIENT)
Dept: LAB | Facility: HOSPITAL | Age: 75
End: 2020-10-08

## 2020-10-08 ENCOUNTER — HOSPITAL ENCOUNTER (OUTPATIENT)
Dept: PET IMAGING | Facility: HOSPITAL | Age: 75
Discharge: HOME | End: 2020-10-08

## 2020-10-08 DIAGNOSIS — Z85.528 HISTORY OF RENAL CELL CARCINOMA: ICD-10-CM

## 2020-10-08 LAB
ALBUMIN SERPL-MCNC: 4.4 G/DL (ref 3.5–5.2)
ALBUMIN/GLOB SERPL: 1.4 G/DL (ref 1.1–2.4)
ALP SERPL-CCNC: 163 U/L (ref 38–116)
ALT SERPL W P-5'-P-CCNC: 14 U/L (ref 0–41)
ANION GAP SERPL CALCULATED.3IONS-SCNC: 12.1 MMOL/L (ref 5–15)
AST SERPL-CCNC: 17 U/L (ref 0–40)
BILIRUB SERPL-MCNC: 0.5 MG/DL (ref 0.2–1.2)
BUN SERPL-MCNC: 14 MG/DL (ref 6–20)
BUN/CREAT SERPL: 11.9 (ref 7.3–30)
CALCIUM SPEC-SCNC: 9.6 MG/DL (ref 8.5–10.2)
CHLORIDE SERPL-SCNC: 102 MMOL/L (ref 98–107)
CO2 SERPL-SCNC: 25.9 MMOL/L (ref 22–29)
CREAT SERPL-MCNC: 1.18 MG/DL (ref 0.7–1.3)
GFR SERPL CREATININE-BSD FRML MDRD: 60 ML/MIN/1.73
GLOBULIN UR ELPH-MCNC: 3.1 GM/DL (ref 1.8–3.5)
GLUCOSE BLDC GLUCOMTR-MCNC: 140 MG/DL (ref 70–130)
GLUCOSE SERPL-MCNC: 129 MG/DL (ref 74–124)
MAGNESIUM SERPL-MCNC: 1.5 MG/DL (ref 1.8–2.5)
PHOSPHATE SERPL-MCNC: 4 MG/DL (ref 2.5–4.5)
POTASSIUM SERPL-SCNC: 3.7 MMOL/L (ref 3.5–4.7)
PROT SERPL-MCNC: 7.5 G/DL (ref 6.3–8)
SODIUM SERPL-SCNC: 140 MMOL/L (ref 134–145)

## 2020-10-08 PROCEDURE — 78815 PET IMAGE W/CT SKULL-THIGH: CPT

## 2020-10-08 PROCEDURE — 0 FLUDEOXYGLUCOSE F18 SOLUTION: Performed by: INTERNAL MEDICINE

## 2020-10-08 PROCEDURE — 80053 COMPREHEN METABOLIC PANEL: CPT

## 2020-10-08 PROCEDURE — 82962 GLUCOSE BLOOD TEST: CPT

## 2020-10-08 PROCEDURE — 83735 ASSAY OF MAGNESIUM: CPT

## 2020-10-08 PROCEDURE — 36415 COLL VENOUS BLD VENIPUNCTURE: CPT

## 2020-10-08 PROCEDURE — A9552 F18 FDG: HCPCS | Performed by: INTERNAL MEDICINE

## 2020-10-08 PROCEDURE — 84100 ASSAY OF PHOSPHORUS: CPT

## 2020-10-08 RX ADMIN — FLUDEOXYGLUCOSE F18 1 DOSE: 300 INJECTION INTRAVENOUS at 07:29

## 2020-10-09 ENCOUNTER — OFFICE VISIT (OUTPATIENT)
Dept: ONCOLOGY | Facility: CLINIC | Age: 75
End: 2020-10-09

## 2020-10-09 ENCOUNTER — HOSPITAL ENCOUNTER (EMERGENCY)
Facility: HOSPITAL | Age: 75
Discharge: HOME | End: 2020-10-10
Attending: EMERGENCY MEDICINE | Admitting: EMERGENCY MEDICINE

## 2020-10-09 ENCOUNTER — TELEPHONE (OUTPATIENT)
Dept: ONCOLOGY | Facility: CLINIC | Age: 75
End: 2020-10-09

## 2020-10-09 ENCOUNTER — APPOINTMENT (OUTPATIENT)
Dept: OTHER | Facility: HOSPITAL | Age: 75
End: 2020-10-09

## 2020-10-09 DIAGNOSIS — G89.3 CANCER RELATED PAIN: ICD-10-CM

## 2020-10-09 DIAGNOSIS — E27.40 ADRENAL INSUFFICIENCY (HCC): ICD-10-CM

## 2020-10-09 DIAGNOSIS — G89.3 CANCER ASSOCIATED PAIN: ICD-10-CM

## 2020-10-09 DIAGNOSIS — Z79.4 DIABETES MELLITUS DUE TO UNDERLYING CONDITION WITH HYPERGLYCEMIA, WITH LONG-TERM CURRENT USE OF INSULIN (HCC): ICD-10-CM

## 2020-10-09 DIAGNOSIS — E08.65 DIABETES MELLITUS DUE TO UNDERLYING CONDITION WITH HYPERGLYCEMIA, WITH LONG-TERM CURRENT USE OF INSULIN (HCC): ICD-10-CM

## 2020-10-09 DIAGNOSIS — C79.51 BONE METASTASES: ICD-10-CM

## 2020-10-09 DIAGNOSIS — C64.9 METASTATIC RENAL CELL CARCINOMA, UNSPECIFIED LATERALITY (HCC): Primary | ICD-10-CM

## 2020-10-09 DIAGNOSIS — R11.2 NON-INTRACTABLE VOMITING WITH NAUSEA, UNSPECIFIED VOMITING TYPE: Primary | ICD-10-CM

## 2020-10-09 LAB
ALBUMIN SERPL-MCNC: 4.4 G/DL (ref 3.5–5.2)
ALBUMIN/GLOB SERPL: 1.4 G/DL
ALP SERPL-CCNC: 174 U/L (ref 39–117)
ALT SERPL W P-5'-P-CCNC: 17 U/L (ref 1–41)
ANION GAP SERPL CALCULATED.3IONS-SCNC: 15.3 MMOL/L (ref 5–15)
AST SERPL-CCNC: 19 U/L (ref 1–40)
BASOPHILS # BLD AUTO: 0.07 10*3/MM3 (ref 0–0.2)
BASOPHILS NFR BLD AUTO: 1 % (ref 0–1.5)
BILIRUB SERPL-MCNC: 0.7 MG/DL (ref 0–1.2)
BUN SERPL-MCNC: 19 MG/DL (ref 8–23)
BUN/CREAT SERPL: 16.4 (ref 7–25)
CALCIUM SPEC-SCNC: 9.8 MG/DL (ref 8.6–10.5)
CHLORIDE SERPL-SCNC: 100 MMOL/L (ref 98–107)
CO2 SERPL-SCNC: 23.7 MMOL/L (ref 22–29)
CREAT SERPL-MCNC: 1.16 MG/DL (ref 0.76–1.27)
D-LACTATE SERPL-SCNC: 1.4 MMOL/L (ref 0.5–2)
DEPRECATED RDW RBC AUTO: 43.9 FL (ref 37–54)
EOSINOPHIL # BLD AUTO: 0.35 10*3/MM3 (ref 0–0.4)
EOSINOPHIL NFR BLD AUTO: 5.1 % (ref 0.3–6.2)
ERYTHROCYTE [DISTWIDTH] IN BLOOD BY AUTOMATED COUNT: 14.1 % (ref 12.3–15.4)
GFR SERPL CREATININE-BSD FRML MDRD: 61 ML/MIN/1.73
GLOBULIN UR ELPH-MCNC: 3.1 GM/DL
GLUCOSE SERPL-MCNC: 108 MG/DL (ref 65–99)
HCT VFR BLD AUTO: 41.2 % (ref 37.5–51)
HGB BLD-MCNC: 13.8 G/DL (ref 13–17.7)
HOLD SPECIMEN: NORMAL
HOLD SPECIMEN: NORMAL
IMM GRANULOCYTES # BLD AUTO: 0.04 10*3/MM3 (ref 0–0.05)
IMM GRANULOCYTES NFR BLD AUTO: 0.6 % (ref 0–0.5)
LIPASE SERPL-CCNC: 30 U/L (ref 13–60)
LYMPHOCYTES # BLD AUTO: 1.97 10*3/MM3 (ref 0.7–3.1)
LYMPHOCYTES NFR BLD AUTO: 28.5 % (ref 19.6–45.3)
MCH RBC QN AUTO: 28.5 PG (ref 26.6–33)
MCHC RBC AUTO-ENTMCNC: 33.5 G/DL (ref 31.5–35.7)
MCV RBC AUTO: 84.9 FL (ref 79–97)
MONOCYTES # BLD AUTO: 0.72 10*3/MM3 (ref 0.1–0.9)
MONOCYTES NFR BLD AUTO: 10.4 % (ref 5–12)
NEUTROPHILS NFR BLD AUTO: 3.76 10*3/MM3 (ref 1.7–7)
NEUTROPHILS NFR BLD AUTO: 54.4 % (ref 42.7–76)
NRBC BLD AUTO-RTO: 0 /100 WBC (ref 0–0.2)
PLATELET # BLD AUTO: 201 10*3/MM3 (ref 140–450)
PMV BLD AUTO: 9.3 FL (ref 6–12)
POTASSIUM SERPL-SCNC: 3.3 MMOL/L (ref 3.5–5.2)
PROT SERPL-MCNC: 7.5 G/DL (ref 6–8.5)
RBC # BLD AUTO: 4.85 10*6/MM3 (ref 4.14–5.8)
SODIUM SERPL-SCNC: 139 MMOL/L (ref 136–145)
WBC # BLD AUTO: 6.91 10*3/MM3 (ref 3.4–10.8)
WHOLE BLOOD HOLD SPECIMEN: NORMAL
WHOLE BLOOD HOLD SPECIMEN: NORMAL

## 2020-10-09 PROCEDURE — 99443 PR PHYS/QHP TELEPHONE EVALUATION 21-30 MIN: CPT | Performed by: INTERNAL MEDICINE

## 2020-10-09 PROCEDURE — 25010000003 HYDROCORTISONE SOD SUCCINATE PF 250 MG RECONSTITUTED SOLUTION: Performed by: EMERGENCY MEDICINE

## 2020-10-09 PROCEDURE — 83605 ASSAY OF LACTIC ACID: CPT | Performed by: EMERGENCY MEDICINE

## 2020-10-09 PROCEDURE — 96374 THER/PROPH/DIAG INJ IV PUSH: CPT

## 2020-10-09 PROCEDURE — 85025 COMPLETE CBC W/AUTO DIFF WBC: CPT | Performed by: EMERGENCY MEDICINE

## 2020-10-09 PROCEDURE — 99284 EMERGENCY DEPT VISIT MOD MDM: CPT

## 2020-10-09 PROCEDURE — 25010000002 ONDANSETRON PER 1 MG: Performed by: EMERGENCY MEDICINE

## 2020-10-09 PROCEDURE — 25010000002 HYDROMORPHONE PER 4 MG: Performed by: EMERGENCY MEDICINE

## 2020-10-09 PROCEDURE — 96375 TX/PRO/DX INJ NEW DRUG ADDON: CPT

## 2020-10-09 PROCEDURE — 83690 ASSAY OF LIPASE: CPT | Performed by: EMERGENCY MEDICINE

## 2020-10-09 PROCEDURE — 80053 COMPREHEN METABOLIC PANEL: CPT | Performed by: EMERGENCY MEDICINE

## 2020-10-09 RX ORDER — ONDANSETRON 8 MG/1
8 TABLET, ORALLY DISINTEGRATING ORAL EVERY 8 HOURS PRN
Qty: 30 TABLET | Refills: 2 | Status: SHIPPED | OUTPATIENT
Start: 2020-10-09 | End: 2020-12-04 | Stop reason: SDUPTHER

## 2020-10-09 RX ORDER — PROMETHAZINE HYDROCHLORIDE 25 MG/1
25 SUPPOSITORY RECTAL EVERY 6 HOURS PRN
Qty: 20 SUPPOSITORY | Refills: 3 | Status: SHIPPED | OUTPATIENT
Start: 2020-10-09 | End: 2020-10-09 | Stop reason: SDUPTHER

## 2020-10-09 RX ORDER — ONDANSETRON 4 MG/1
4 TABLET, ORALLY DISINTEGRATING ORAL EVERY 6 HOURS PRN
Qty: 15 TABLET | Refills: 0 | Status: SHIPPED | OUTPATIENT
Start: 2020-10-09 | End: 2020-12-04

## 2020-10-09 RX ORDER — ONDANSETRON 4 MG/1
4 TABLET, ORALLY DISINTEGRATING ORAL EVERY 6 HOURS PRN
Qty: 15 TABLET | Refills: 3 | Status: SHIPPED | OUTPATIENT
Start: 2020-10-09 | End: 2020-10-09 | Stop reason: SDUPTHER

## 2020-10-09 RX ORDER — SODIUM CHLORIDE 0.9 % (FLUSH) 0.9 %
10 SYRINGE (ML) INJECTION AS NEEDED
Status: DISCONTINUED | OUTPATIENT
Start: 2020-10-09 | End: 2020-10-10 | Stop reason: HOSPADM

## 2020-10-09 RX ORDER — ONDANSETRON 2 MG/ML
4 INJECTION INTRAMUSCULAR; INTRAVENOUS ONCE
Status: COMPLETED | OUTPATIENT
Start: 2020-10-09 | End: 2020-10-09

## 2020-10-09 RX ORDER — HYDROMORPHONE HYDROCHLORIDE 1 MG/ML
0.5 INJECTION, SOLUTION INTRAMUSCULAR; INTRAVENOUS; SUBCUTANEOUS ONCE
Status: COMPLETED | OUTPATIENT
Start: 2020-10-09 | End: 2020-10-09

## 2020-10-09 RX ORDER — PROMETHAZINE HYDROCHLORIDE 25 MG/1
25 SUPPOSITORY RECTAL EVERY 6 HOURS PRN
Qty: 20 SUPPOSITORY | Refills: 0 | Status: SHIPPED | OUTPATIENT
Start: 2020-10-09 | End: 2023-02-15

## 2020-10-09 RX ADMIN — ONDANSETRON HYDROCHLORIDE 4 MG: 2 INJECTION, SOLUTION INTRAMUSCULAR; INTRAVENOUS at 22:31

## 2020-10-09 RX ADMIN — HYDROMORPHONE HYDROCHLORIDE 0.5 MG: 1 INJECTION, SOLUTION INTRAMUSCULAR; INTRAVENOUS; SUBCUTANEOUS at 22:29

## 2020-10-09 RX ADMIN — HYDROCORTISONE SODIUM SUCCINATE 100 MG: 250 INJECTION, POWDER, FOR SOLUTION INTRAMUSCULAR; INTRAVENOUS at 22:30

## 2020-10-09 RX ADMIN — SODIUM CHLORIDE 1000 ML: 9 INJECTION, SOLUTION INTRAVENOUS at 22:29

## 2020-10-09 NOTE — ED TRIAGE NOTES
.Pt masked on arrival, RN wearing mask/goggles during encounter    Pt reports history of bone cancer, has known fractures in ribs/back, was told by MD to come in, reports n/v and unable to keep meds down

## 2020-10-09 NOTE — TELEPHONE ENCOUNTER
PT'S WIFE CALLING BEC PT IS IN A LOT OF PAIN. SHE STATES THAT HIS PAIN IS RUNNING 8-10 ON PAIN SCALE. SHE CAN'T GET HIM IN THE CAR AND DOESN'T KNOW HOW SHE WILL GET HIM TO APPT LATER TODAY. SHE SAID PT'S PAIN IS IN HIS KNEES, BACK AND L SIDE. PT IS TAKING HYDROCODONE 1 Q 6 HRS AND IT DOESN'T HELP. PT IS NOT TAKING TRAMADOL BEC IT MAKES HIM VOMIT. SHE DOESN'T WANT TO CALL FOR EMS. ADVISED HER TO HAVE PT TRY TAKING 2 HYDROCODONES AND CALL BACK W/ UPDATE LATER. SHE IS AGREEABLE TO THAT PLAN. ALSO MADE DR. CERDA AWARE AND HE AGREED AS WELL.

## 2020-10-09 NOTE — PROGRESS NOTES
[facility] GROUP OUTPATIENT FOLLOW UP CLINIC VISIT    REASON FOR FOLLOW-UP:    1.  History of metastatic clear cell renal cell carcinoma.  All known disease had previously been resected..    HISTORY OF PRESENT ILLNESS:  Micah Krishna is a 75 y.o. male who returns today for follow up of the above issue.      Telephone only visit today as he was not able to come into the office secondary to pain.    At his last visit we reviewed his imaging.  He was complaining of intermittent rib pain, intermittent hip pain, intermittent shoulder pain, and intermittent back pain.  Over-the-counter medication was not helping and he was taking a lot of ibuprofen.    I prescribed tramadol but it turns out he did not tolerate this.  We prescribed hydrocodone/acetaminophen.  The hydrocodone did help with his pain.  However, he took it on an empty stomach and starting this morning he has been having some nausea and vomiting.  He is not been able to keep anything down all day.  He continues to have migratory pain which is worsening off of any pain medication.  He is using an injectable steroid rather than taking his steroid pills.  No lower extremity pain or weakness.  No focal weakness.    PAST MEDICAL, SURGICAL, FAMILY, AND SOCIAL HISTORIES were reviewed with the patient and in the electronic medical record, and were updated if indicated.    ALLERGIES:  Allergies   Allergen Reactions   • Shellfish-Derived Products Anaphylaxis   • Iodinated Diagnostic Agents Itching and Swelling   • Shellfish Allergy Unknown - Low Severity   • Adhesive Tape Hives       MEDICATIONS:  The medication list has been reviewed with the patient by the medical assistant, and the list has been updated in the electronic medical record, which I reviewed.  Medication dosages and frequencies were confirmed to be accurate.    REVIEW OF SYSTEMS:  PAIN:  See Vital Signs below.  GENERAL:  No fevers, chills, night sweats, or unintended weight loss.  SKIN:  No  rashes or non-healing lesions.  HEME/LYMPH:  No abnormal bleeding.  No palpable lymphadenopathy.  EYES:  No vision changes or diplopia.  ENT:  No sore throat or difficulty swallowing.  RESPIRATORY: Pleuritic chest pain improved  CARDIOVASCULAR: Dyspnea on exertion and chest pain with walking improved  GASTROINTESTINAL:  No nausea vomiting or diarrhea.  GENITOURINARY:  No dysuria or hematuria.  MUSCULOSKELETAL:  Persistent arthritis pain.  Migratory skeletal pain  NEUROLOGIC:  No dizziness, loss of consciousness, or seizures.  PSYCHIATRIC:  No depression, anxiety, or mood changes.  Reviewed 10/9/2020    There were no vitals filed for this visit.    PHYSICAL EXAMINATION:  Telephone only visit today    DIAGNOSTIC DATA:  Results for orders placed or performed in visit on 10/08/20   Comprehensive Metabolic Panel    Specimen: Blood   Result Value Ref Range    Glucose 129 (H) 74 - 124 mg/dL    BUN 14 6 - 20 mg/dL    Creatinine 1.18 0.70 - 1.30 mg/dL    Sodium 140 134 - 145 mmol/L    Potassium 3.7 3.5 - 4.7 mmol/L    Chloride 102 98 - 107 mmol/L    CO2 25.9 22.0 - 29.0 mmol/L    Calcium 9.6 8.5 - 10.2 mg/dL    Total Protein 7.5 6.3 - 8.0 g/dL    Albumin 4.40 3.50 - 5.20 g/dL    ALT (SGPT) 14 0 - 41 U/L    AST (SGOT) 17 0 - 40 U/L    Alkaline Phosphatase 163 (H) 38 - 116 U/L    Total Bilirubin 0.5 0.2 - 1.2 mg/dL    eGFR Non African Amer 60 (L) >60 mL/min/1.73    Globulin 3.1 1.8 - 3.5 gm/dL    A/G Ratio 1.4 1.1 - 2.4 g/dL    BUN/Creatinine Ratio 11.9 7.3 - 30.0    Anion Gap 12.1 5.0 - 15.0 mmol/L   Magnesium    Specimen: Blood   Result Value Ref Range    Magnesium 1.5 (C) 1.8 - 2.5 mg/dL   Phosphorus    Specimen: Blood   Result Value Ref Range    Phosphorus 4.0 2.5 - 4.5 mg/dL       IMAGING:  CT C/A/P 7/6/2020 with a RUL sub 6 mm pulmonary nodule in the RUL, stable since 1/17/2018, new 6 mm nodules in the medial RLL and RUL,  LLL nodule unchanged since 7/17/2018.  Healing left ninth rib fracture.    CT chest 9/28/2020 with  stable lung nodules, pathologic fracture of the right 8th rib, abnormal appearance of T8, narrowing of the thecal sack at this level, additional lucencies at several vertebral bodies such as T7 and T11, subacute healing fractures in the anterior right 6th rib and lateral left 9th rib.    CT head 9/28/2020 with calvarial metastatic disease involving the frontal and occipital bones.    Bone scan 9/28/2020 with multifocal uptake consistent with metastatic disease. Anterior frontal bone, upper C spine at C1 or C2, T and L spine at T3, T8, T11, multifocal rib uptake, abnormal uptake in the iliac wings/bodies and left femoral heads/acetabulum, distal left clavicle and both humeral heads.     PET scan from 10/8/2020.  Multifocal bone metastases throughout the skeleton.  Pathologic rib and vertebral body fracture.  Moderate FDG uptake in the right femoral neck with some sclerosis, new since 7/6/2020.  Mottled appearance of L3 and T8 vertebral bodies with pathologic compression fractures.  L3 fracture is a burst fracture with 20 to 25% loss of height and 4 to 5 mm of retropulsion in the central spinal canal.  Pathologic compression fracture at T8 with 10% loss of height.  New findings since 7/6/2020.  FDG avid soft tissue nodule extending into the central canal along the posterior aspect of the thecal sac at T8.  FDG uptake in the left clavicle.  Compression fracture of the right posterior eighth rib.    Images personally reviewed.      ASSESSMENT:  This is a 75 y.o. male with:  1.  Mild normocytic anemia: His hemoglobin has been stable.  2.  Adrenal insufficiency following bilateral adrenalectomy currently on replacement hormone therapy.  He follows with endocrinology.  · As of 10/9/2020 as he has not able to tolerate pills he is using an injectable corticosteroid.  · He requested a refill today and so I did refill this for him.  3.  History of metastatic renal cell carcinoma.  He had a left nephrectomy and adrenalectomy in  2000 and then a right adrenalectomy for metastatic disease in 2012.    · See below  4.  Hypertension:  Blood pressure was higher at his last visit likely secondary to ibuprofen use.  5.  Lower extremity edema:  No edema at his last visit.  6.  Chronic kidney disease: Status post left nephrectomy.  I believe that his creatinine is overall stable over the past few years.  7.  Dyspnea on exertion with right-sided chest pain with exertion: He had a negative cardiac evaluation by his cardiologist.  He is a former smoker.  PFTs 1/15/2020 with some air trapping, normal spirometry, no restriction, normal diffusion.  8.  Two new 6 mm nodules in the RUL and RLL, non-specific but because they are new they are concerning.  Stable on follow-up imaging  9.  Migratory skeletal pain: Metastatic disease apparent now on CT imaging and bone scan and now PET scan.  · Refer to radiation oncology  10.  Constipation: Improved with stool softeners and MiraLAX.  Continue these as necessary.  11.  Headaches: CT head without evidence for metastatic disease but there are calvarial bone lesions that are likely causing his headaches.  12. Evidence for bony metastatic disease on CT and bone scan imaging from 9/28/2020.  Likely metastatic renal cell carcinoma.  This may be difficult to prove.  No obvious soft tissue lesions on CT imaging but this has been done without contrast.  PET scan requested for further evaluation.  · No soft tissue lesions on CT imaging.  13.  Nausea and vomiting: Unclear etiology.  Consider the possibility of brain metastases as he has not had an MRI of the brain, only CT imaging.  · Prescription for ondansetron ODT tablets 8 mg every 8 hours as needed electronically sent to his pharmacy.    PLAN:   1. Continue hydrocodone/acetaminophen as needed for pain.  He can take 2 tablets at a time if needed.  2. Prescription for ondansetron electronically sent to his pharmacy.  3. Prescription for injectable corticosteroid  electronically sent to his pharmacy.  4. I advised him if his symptoms do not improve quickly this evening he needs to go to the emergency department at [facility] for further care particularly in light of his adrenal insufficiency and other endocrine issues and need for medication for this.  5. Refer to radiation oncology  6. We will make plans to start Votrient.  Dosing is 800 mg daily.  200 mg pills.  We will plan to start at 200 mg daily and increase over a few weeks from there.  7. Follow-up with me soon in the next couple of weeks.    You have chosen to receive care through a telephone visit. Do you consent to use a telephone visit for your medical care today? Yes    25 minutes

## 2020-10-10 VITALS
WEIGHT: 242 LBS | HEART RATE: 89 BPM | RESPIRATION RATE: 16 BRPM | OXYGEN SATURATION: 95 % | TEMPERATURE: 99.7 F | BODY MASS INDEX: 32.78 KG/M2 | SYSTOLIC BLOOD PRESSURE: 172 MMHG | DIASTOLIC BLOOD PRESSURE: 96 MMHG | HEIGHT: 72 IN

## 2020-10-10 NOTE — ED NOTES
I am wearing mask, goggles, and gloves. When designated too; I am also wearing an apron and a face shield. The patient is wearing a surgical mask.      Gordo Last RN  10/09/20 3631

## 2020-10-10 NOTE — ED PROVIDER NOTES
EMERGENCY DEPARTMENT ENCOUNTER    Room Number:  20/20  Date of encounter:  10/9/2020  PCP: Epley, James, APRN  Historian: Patient      HPI:  Chief Complaint: Nausea, vomiting, generalized pain  A complete HPI/ROS/PMH/PSH/SH/FH are unobtainable due to: N/A    Context: Micah Krishna is a 75 y.o. male who presents to the ED c/o nausea and vomiting since his PET scan yesterday morning.  He has been unable to keep down any of his meds since that time.  Emesis is described as clear and nonbloody.  He does not particularly complain of abdominal pain.  No diarrhea.  He has had some urinary frequency.  No cough, congestion or trouble breathing.  He has generalized arthralgias and myalgias due to his metastatic renal cell CA, and he has not had any pain medication in a day and a half.  He has adrenal insufficiency and takes IM Solu-Cortef twice daily, he had his last dose this morning.  He did have a chill this afternoon.      The patient was placed in a mask in triage, hand hygiene was performed before and after my interaction with the patient.  I wore a mask, safety glasses and gloves during my entire interaction with the patient.    PAST MEDICAL HISTORY  Active Ambulatory Problems     Diagnosis Date Noted   • Paresthesia of both hands 03/08/2016   • Diabetes mellitus due to underlying condition with hyperglycemia, with long-term current use of insulin (CMS/[facility]) 03/08/2016   • Adrenal insufficiency (CMS/[facility]) 04/14/2016   • Essential hypertension 04/14/2016   • Mixed hyperlipidemia 04/14/2016   • History of renal cell carcinoma 07/13/2016   • Chronic pain of right knee 01/13/2017   • Bilateral edema of lower extremity 08/21/2017   • Myalgia 10/23/2017   • Trigger index finger of right hand 12/20/2017   • Chronic right shoulder pain 06/20/2018   • Medicare annual wellness visit, initial 06/20/2018   • Chronic renal insufficiency, stage 3 (moderate) 10/22/2018   • Gastroesophageal reflux disease without esophagitis  2019   • Coronary artery disease involving native coronary artery 2019   • Dyspnea on exertion 01/10/2020   • Lung nodules 07/10/2020   • Closed fracture of one rib of left side with routine healing 07/10/2020   • Metastatic renal cell carcinoma (CMS/HCC) 10/09/2020   • Bone metastases (CMS/HCC) 10/09/2020   • Cancer associated pain 10/09/2020     Resolved Ambulatory Problems     Diagnosis Date Noted   • No Resolved Ambulatory Problems     Past Medical History:   Diagnosis Date   • Anemia    • BPH (benign prostatic hypertrophy)    • Cancer (CMS/HCC)    • COPD (chronic obstructive pulmonary disease) (CMS/HCC)    • Gout    • H/O Diastolic dysfunction, left ventricle    • H/O Statin intolerance    • H/O TIA (transient ischemic attack)    • History of obesity    • Renal cancer, left (CMS/HCC)          PAST SURGICAL HISTORY  Past Surgical History:   Procedure Laterality Date   • ADRENALECTOMY Right 2012    Gland removed - CA   • CARDIAC CATHETERIZATION     • CAROTID STENT      x2   • CORONARY ARTERY BYPASS GRAFT      Drug-eluting stent , taxus DS sent in distal RCA; LAD stent .   • CORONARY STENT PLACEMENT      LAD   • KNEE SURGERY Right 2017   • NEPHRECTOMY Left          FAMILY HISTORY  Family History   Problem Relation Age of Onset   • Cancer Mother         Hodgkin's lymphoma   • Hypertension Father    • Diabetes Father    • Heart disease Brother    • Heart disease Brother          SOCIAL HISTORY  Social History     Socioeconomic History   • Marital status:      Spouse name: Amna   • Number of children: Not on file   • Years of education: Not on file   • Highest education level: Not on file   Occupational History   • Occupation: Police work and [employer]     Employer: RETIRED   Tobacco Use   • Smoking status: Former Smoker     Packs/day: 1.50     Years: 40.00     Pack years: 60.00     Types: Cigarettes     Quit date:      Years since quittin.7   •  Smokeless tobacco: Never Used   Substance and Sexual Activity   • Alcohol use: No   • Drug use: No         ALLERGIES  Shellfish-derived products, Iodinated diagnostic agents, Shellfish allergy, and Adhesive tape        REVIEW OF SYSTEMS  Review of Systems   Constitutional: Positive for fatigue. Negative for activity change, appetite change and fever.   HENT: Negative for congestion and sore throat.    Eyes: Negative.    Respiratory: Negative for cough and shortness of breath.    Cardiovascular: Negative for chest pain and leg swelling.   Gastrointestinal: Positive for nausea and vomiting. Negative for abdominal pain and diarrhea.   Endocrine: Negative.    Genitourinary: Positive for frequency. Negative for decreased urine volume and dysuria.   Musculoskeletal: Positive for arthralgias and myalgias. Negative for neck pain.   Skin: Negative for rash and wound.   Allergic/Immunologic: Negative.    Neurological: Negative for weakness, numbness and headaches.   Hematological: Negative.    Psychiatric/Behavioral: Negative.    All other systems reviewed and are negative.       All systems reviewed and negative except for those discussed in HPI.       PHYSICAL EXAM    I have reviewed the triage vital signs and nursing notes.    ED Triage Vitals [10/09/20 1845]   Temp Heart Rate Resp BP SpO2   99.7 °F (37.6 °C) 103 16 144/93 95 %      Temp src Heart Rate Source Patient Position BP Location FiO2 (%)   -- -- -- -- --       Physical Exam   Constitutional: Pt. is oriented to person, place, and time and well-developed, well-nourished, and in no distress. No distress.   HENT: Normocephalic and atraumatic,  EOM are normal. Pupils are equal, round, and reactive to light. Oropharynx moist/nonerythematous.  Neck: Normal range of motion. Neck supple. No JVD present. No tracheal deviation present. No thyromegaly present.   Cardiovascular: Normal rate, regular rhythm and normal heart sounds. Exam reveals no gallop and no friction rub.    No murmur heard.  Pulmonary/Chest: Effort normal and breath sounds normal. No stridor. No respiratory distress. No wheezes, no rales.   Abdominal: Soft. Bowel sounds are normal. No distension. There is no tenderness. There is no rebound and no guarding.   Musculoskeletal: Normal range of motion. No edema, tenderness or deformity.   Neurological: Pt. is alert and oriented to person, place, and time. Pt. has normal sensation and normal strength. No cranial nerve deficit. GCS score is 15.   Skin: Skin is warm and dry. No rash noted. Pt. is not diaphoretic. No erythema.   Psychiatric: Mood, affect and judgment normal.   Nursing note and vitals reviewed.        LAB RESULTS  Recent Results (from the past 24 hour(s))   Comprehensive Metabolic Panel    Collection Time: 10/09/20 10:27 PM    Specimen: Blood   Result Value Ref Range    Glucose 108 (H) 65 - 99 mg/dL    BUN 19 8 - 23 mg/dL    Creatinine 1.16 0.76 - 1.27 mg/dL    Sodium 139 136 - 145 mmol/L    Potassium 3.3 (L) 3.5 - 5.2 mmol/L    Chloride 100 98 - 107 mmol/L    CO2 23.7 22.0 - 29.0 mmol/L    Calcium 9.8 8.6 - 10.5 mg/dL    Total Protein 7.5 6.0 - 8.5 g/dL    Albumin 4.40 3.50 - 5.20 g/dL    ALT (SGPT) 17 1 - 41 U/L    AST (SGOT) 19 1 - 40 U/L    Alkaline Phosphatase 174 (H) 39 - 117 U/L    Total Bilirubin 0.7 0.0 - 1.2 mg/dL    eGFR Non African Amer 61 >60 mL/min/1.73    Globulin 3.1 gm/dL    A/G Ratio 1.4 g/dL    BUN/Creatinine Ratio 16.4 7.0 - 25.0    Anion Gap 15.3 (H) 5.0 - 15.0 mmol/L   Lipase    Collection Time: 10/09/20 10:27 PM    Specimen: Blood   Result Value Ref Range    Lipase 30 13 - 60 U/L   Light Blue Top    Collection Time: 10/09/20 10:27 PM   Result Value Ref Range    Extra Tube hold for add-on    Green Top (Gel)    Collection Time: 10/09/20 10:27 PM   Result Value Ref Range    Extra Tube Hold for add-ons.    Lavender Top    Collection Time: 10/09/20 10:27 PM   Result Value Ref Range    Extra Tube hold for add-on    Gold Top - SST     Collection Time: 10/09/20 10:27 PM   Result Value Ref Range    Extra Tube Hold for add-ons.    CBC Auto Differential    Collection Time: 10/09/20 10:27 PM    Specimen: Blood   Result Value Ref Range    WBC 6.91 3.40 - 10.80 10*3/mm3    RBC 4.85 4.14 - 5.80 10*6/mm3    Hemoglobin 13.8 13.0 - 17.7 g/dL    Hematocrit 41.2 37.5 - 51.0 %    MCV 84.9 79.0 - 97.0 fL    MCH 28.5 26.6 - 33.0 pg    MCHC 33.5 31.5 - 35.7 g/dL    RDW 14.1 12.3 - 15.4 %    RDW-SD 43.9 37.0 - 54.0 fl    MPV 9.3 6.0 - 12.0 fL    Platelets 201 140 - 450 10*3/mm3    Neutrophil % 54.4 42.7 - 76.0 %    Lymphocyte % 28.5 19.6 - 45.3 %    Monocyte % 10.4 5.0 - 12.0 %    Eosinophil % 5.1 0.3 - 6.2 %    Basophil % 1.0 0.0 - 1.5 %    Immature Grans % 0.6 (H) 0.0 - 0.5 %    Neutrophils, Absolute 3.76 1.70 - 7.00 10*3/mm3    Lymphocytes, Absolute 1.97 0.70 - 3.10 10*3/mm3    Monocytes, Absolute 0.72 0.10 - 0.90 10*3/mm3    Eosinophils, Absolute 0.35 0.00 - 0.40 10*3/mm3    Basophils, Absolute 0.07 0.00 - 0.20 10*3/mm3    Immature Grans, Absolute 0.04 0.00 - 0.05 10*3/mm3    nRBC 0.0 0.0 - 0.2 /100 WBC   Lactic Acid, Plasma    Collection Time: 10/09/20 10:27 PM    Specimen: Blood   Result Value Ref Range    Lactate 1.4 0.5 - 2.0 mmol/L       Ordered the above labs and independently reviewed the results.        RADIOLOGY  No Radiology Exams Resulted Within Past 24 Hours    I ordered the above noted radiological studies. Reviewed by me and discussed with radiologist.  See dictation for official radiology interpretation.      PROCEDURES    Procedures      MEDICATIONS GIVEN IN ER    Medications   sodium chloride 0.9 % flush 10 mL (has no administration in time range)   sodium chloride 0.9 % flush 10 mL (has no administration in time range)   sodium chloride 0.9 % bolus 1,000 mL (1,000 mL Intravenous New Bag 10/9/20 2229)   HYDROmorphone (DILAUDID) injection 0.5 mg (0.5 mg Intravenous Given 10/9/20 2229)   ondansetron (ZOFRAN) injection 4 mg (4 mg Intravenous  Given 10/9/20 2231)   hydrocortisone sod succinate PF (Solu-CORTEF) injection 100 mg (100 mg Intravenous Given 10/9/20 2230)         PROGRESS, DATA ANALYSIS, CONSULTS, AND MEDICAL DECISION MAKING    Any/all labs have been independently reviewed by me.  Any/all radiology studies have been reviewed by me and discussed with radiologist dictating the report.   EKG's independently viewed and interpreted by me.  Discussion below represents my analysis of pertinent findings related to patient's condition, differential diagnosis, treatment plan and final disposition.      ED Course as of Oct 09 2346   Fri Oct 09, 2020   2150 Prior record review: He had a telephone visit with his oncologist today, ondansetron was called and secondary to nausea from pain medications.  He was advised to come to the emergency department should his symptoms not improve.    [WC]   2303 Lactate: 1.4 [WC]   2303 WBC: 6.91 [WC]   2303 BUN: 19 [WC]   2303 Creatinine: 1.16 [WC]   2303 Potassium(!): 3.3 [WC]   2341 Patient states he feels much better at the present time and would like to go home.  No emesis in the emergency department.  He has Zofran tablets at home, will prescribe Zofran ODT and Phenergan suppositories.  He has a refill of hydrocortisone waiting for him at his pharmacy.    [WC]      ED Course User Index  [WC] Jax Lomas MD       AS OF 23:46 EDT VITALS:    BP - 155/97  HR - 89  TEMP - 99.7 °F (37.6 °C)  02 SATS - 94%        DIAGNOSIS  Final diagnoses:   Non-intractable vomiting with nausea, unspecified vomiting type   Cancer related pain   Adrenal insufficiency (CMS/HCC)         DISPOSITION  Discharged           Jax Lomas MD  10/09/20 2346

## 2020-10-12 ENCOUNTER — DOCUMENTATION (OUTPATIENT)
Dept: ONCOLOGY | Facility: CLINIC | Age: 75
End: 2020-10-12

## 2020-10-12 NOTE — TELEPHONE ENCOUNTER
Votrient approved until 10/12/021-[facility]    I have routed the rx to Dr Cat for signature. Once signed it will be escribed to [facility].    Confirmation rec that Dr Cat approved and signed the Votrient rx. It was sent to [facility]

## 2020-10-12 NOTE — PROGRESS NOTES
Staff message rec from Dr Cat-pt will need Votrient. Plan is to start at 200 mg daily and increase.     I have submitted the PA to [facility] through covermymeds. PA submitted for full dose (800 mg daily)    Waiting for a decision.    Delmar Cat MD sent to [facility] Oral Onc Pool             Need to look into getting Votrient for him. Dosing is 800 mg daily. Plan to start at 200 mg daily and ramp up from there over a few weeks. Rx should be just for 800 mg daily though then we will give him instructions on how to proceed.     FYI He may end up in the ER over the weekend.     Thanks!  ROSANNE

## 2020-10-13 DIAGNOSIS — Z79.899 HIGH RISK MEDICATION USE: ICD-10-CM

## 2020-10-13 DIAGNOSIS — C64.9 METASTATIC RENAL CELL CARCINOMA, UNSPECIFIED LATERALITY (HCC): Primary | ICD-10-CM

## 2020-10-13 PROCEDURE — 93000 ELECTROCARDIOGRAM COMPLETE: CPT | Performed by: INTERNAL MEDICINE

## 2020-10-13 PROCEDURE — 93005 ELECTROCARDIOGRAM TRACING: CPT | Performed by: INTERNAL MEDICINE

## 2020-10-15 ENCOUNTER — MEDICATION THERAPY MANAGEMENT (OUTPATIENT)
Dept: PHARMACY | Facility: HOSPITAL | Age: 75
End: 2020-10-15

## 2020-10-15 ENCOUNTER — TELEPHONE (OUTPATIENT)
Dept: ONCOLOGY | Facility: CLINIC | Age: 75
End: 2020-10-15

## 2020-10-15 DIAGNOSIS — Z85.528 HISTORY OF RENAL CELL CARCINOMA: ICD-10-CM

## 2020-10-15 RX ORDER — HYDROCODONE BITARTRATE AND ACETAMINOPHEN 5; 325 MG/1; MG/1
TABLET ORAL
Qty: 60 TABLET | Refills: 0 | Status: SHIPPED | OUTPATIENT
Start: 2020-10-15 | End: 2020-10-23 | Stop reason: SDUPTHER

## 2020-10-15 NOTE — PROGRESS NOTES
Oral Chemotherapy Teaching      Patient Name/:  Micah Krishna   1945  Oral Chemotherapy Regimen:  Votrient (pazopanib) 200 mg tablets  Date Started Medication: Upon delivery    Initial Teaching Follow Up Comments     Safety     Storage instructions (away from children; away from heat/cold, sunlight, or moisture), handling - use of gloves (caregivers), washing hands after touching pills, managing waste     “How are you storing your medications?”, reminders on storage, proper handling (caregivers using gloves, washing hands, away from children, managing waste, etc.), disposal of medication with D/C or dosage change    Please keep Votrient in a cool, dry location, out of direct sunlight. Keep out of reach of any children or pets. Micah will wash his hands before and after medication administration. Caregivers should wear gloves while handling. If you stop this medication, please bring remainder to MD office for proper disposal.      Adherence      patient and/or caregiver on how to take medication, take with/without food, assess their adherence potential, stress importance of adherence, ways to manage adherence (pill boxes, phone reminders, calendars), what to do if miss a dose   “How are you taking your medication?” “How are you remembering to take your medication?”, “How many doses have you missed?”, determine reasons for non-adherence (not remembering, side effects, etc), ways to improve, overadherence? Remind patient of ways to improve/maintain adherence   Take Votrient 200 mg tablets:  Take 200 mg daily x 7 days; then 400 mg daily x 7 days; then 600 mg daily x 7 days; then 800 mg daily thereafter. Take Votrient on an empty stomach, either 1 hr before or 2 hrs after a meal. If you miss a dose, take within 12 hrs of the normal time. If > 12hrs, skip the dose and continue taking the following day as you normally would. DO NOT double up.     Side Effects/Adverse Reactions      patient on  potential side effects, s/s, ways to manage, when to call MD/seek help     Determine if patient experiencing side effects, ways to manage  We discussed the following side effects:  - fatigue  -N/V/D  - decreased appetite  - increased blood pressure  -decreased blood counts  -liver dysfunction  - changes in heart function or electrical activity.   - MI/stroke risk     Miscellaneous     Food interactions, DDIs, financial issues Determine if patient started any new medications since being placed on oral chemo (analyze for DDI) DDI identified: clopidogrel may increase serum concentration and pantoprazole may decrease serum concentration. AVOID grapefruit.     Additional Notes:  Education provided telephonically to Micah prior to therapy initiation. Micah verbalized understanding of medication regimen and side effect management. Micah has no yet received the medication. He asks me about his pain medication today. On 10/9/20 he called in re pain and he tells me that he was instructed to take Norco 2 tabs q 4hr, thus he will run out of medication before the weekend. I have reached out to the triage nurse pool for assistance with this. I will mail Micah written education materials. He will need to sign consents and CCA at his next appointment.     Thanks,   Mary Ko, ShruthiD

## 2020-10-15 NOTE — TELEPHONE ENCOUNTER
----- Message from Mary Ko RPH sent at 10/15/2020 11:27 AM EDT -----  Regarding: EKG  Please add EKG to appt on 10/23.     Thanks!  Mary

## 2020-10-16 ENCOUNTER — DOCUMENTATION (OUTPATIENT)
Dept: PHARMACY | Facility: HOSPITAL | Age: 75
End: 2020-10-16

## 2020-10-16 ENCOUNTER — MEDICATION THERAPY MANAGEMENT (OUTPATIENT)
Dept: PHARMACY | Facility: HOSPITAL | Age: 75
End: 2020-10-16

## 2020-10-16 NOTE — PROGRESS NOTES
MT telephone follow up- Votrient DDI    Delmar Cat MD [place], [place]anna [facility]             Well those two will balance things out then! I think we can keep him on all of his meds for now.     Yes, EKG would be good at some point. Can add on to an existing appt that he has.     Yes, that schedule looks perfect.     Thanks! [facility]    Previous Messages    ----- Message -----   From: Mary Ko RPH   Sent: 10/12/2020   2:43 PM EDT   To: Delmar Cat MD   Subject: RE: Votrient                                     Dr. Cat,     A few items to discuss:     1) I found two DDIs with Votrient   - pantoprazole- may decrease serum concentration of pazopanib   - clopidogrel- may increase serum concentration of pazopanib   I just wanted to make sure you are aware of these.     2) Would you like an EKG?     3) Would you like ramp up to go as follows:   - 200 mg daily x 7 days, then 400 mg daily x 7 days, then 600 mg daily x 7 days, then 800 mg daily thereafter?     Thanks,   Mary Ko, PharmD         I called Micah and we discussed, again, that Dr. Cat is aware of DDI and that he did not want to make any changes at this time.     Thanks,   Mary Ko, PharmD

## 2020-10-16 NOTE — PROGRESS NOTES
Received call from pt today. He was asking to speak with Mary. I took a message since she was at lunch. He wanted to make sure that Mary and Dr Cat went over his medications. His [facility] pharmacist had gone over drug/drug interactions with him and cautioned him on Plavix with his chemo medication. He stated that it would stay in his system longer and possibly cause more side effect. Stated that he would call and make sure that MD didn't want to change anything due to this drug/drug interaction. I told pt I would have Mary call him back.

## 2020-10-19 ENCOUNTER — DOCUMENTATION (OUTPATIENT)
Dept: ONCOLOGY | Facility: CLINIC | Age: 75
End: 2020-10-19

## 2020-10-19 NOTE — PROGRESS NOTES
Pt rec his delivery of Votrient on 10/17/2020-[facility]    Pt had a $60 copay and their copay assistance team secured him funding through [employer]. This covered his $60 copay.

## 2020-10-22 ENCOUNTER — CONSULT (OUTPATIENT)
Dept: RADIATION ONCOLOGY | Facility: HOSPITAL | Age: 75
End: 2020-10-22

## 2020-10-22 ENCOUNTER — APPOINTMENT (OUTPATIENT)
Dept: RADIATION ONCOLOGY | Facility: HOSPITAL | Age: 75
End: 2020-10-22

## 2020-10-22 VITALS
OXYGEN SATURATION: 96 % | DIASTOLIC BLOOD PRESSURE: 87 MMHG | SYSTOLIC BLOOD PRESSURE: 149 MMHG | TEMPERATURE: 97.9 F | BODY MASS INDEX: 30.79 KG/M2 | WEIGHT: 227 LBS | HEART RATE: 67 BPM

## 2020-10-22 DIAGNOSIS — C64.2 RENAL CELL CARCINOMA OF LEFT KIDNEY METASTATIC TO OTHER SITE (HCC): Primary | ICD-10-CM

## 2020-10-22 DIAGNOSIS — C79.51 BONE METASTASES: ICD-10-CM

## 2020-10-22 PROCEDURE — 77263 THER RADIOLOGY TX PLNG CPLX: CPT | Performed by: RADIOLOGY

## 2020-10-22 PROCEDURE — 77290 THER RAD SIMULAJ FIELD CPLX: CPT | Performed by: RADIOLOGY

## 2020-10-22 PROCEDURE — G0463 HOSPITAL OUTPT CLINIC VISIT: HCPCS | Performed by: RADIOLOGY

## 2020-10-22 PROCEDURE — 99204 OFFICE O/P NEW MOD 45 MIN: CPT | Performed by: RADIOLOGY

## 2020-10-22 PROCEDURE — 77334 RADIATION TREATMENT AID(S): CPT | Performed by: RADIOLOGY

## 2020-10-22 NOTE — PROGRESS NOTES
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 year old male who has a history of metastatic renal cell carcinoma, post left nephrectomy and adrenalectomy in 2000 and right adrenalectomy for metastatic disease in 2012. He has continued follow up without evidence of any disease since then.    He presented in mid-2020 with complaints of dyspnea, right sided chest pain with exertion and left sided pleuritic chest pain. He was found on CT chest on July 6, 2020 showed 2 small pulmonary nodules and follow up CT chest on September 28, 2020 showed stability of the nodules but mixed abnormality involving medial right 8th rib along with a pathologic fracture through this region and abnormal appearance of T8 with associated soft tissues within the posterior thecal sac causing narrowing. Additionally,  Numerous lucent lesions were noted in multiple vertebral bodies, including T7, T11 with subacute healing fractures in anterior right 6th rib, left 9th rib. CT of the head on the same date for headaches showed calvarial bony metastatic disease in the frontal and occipital bones. Finally, bone scan on the same date confirmed multifocal uptake consistent with bony mets in the frontal, upper cervical spine, thoracic and lumbar spine, worse at T3, T8 and T11, multiple ribs, pelvis in the iliacs, left femur/acetabulum, distal right femoral shaft, left clavicle, both humeral heads.     PET scan on October 8, 2020 showed uptake throughout the axial and appendicular skeleton but no other evidence of mets.  Given the above, he discussed with Dr. Cat initiation of Votrient and  I was asked to see the patient at the request of the referring provider noted above for advice and recommendations regarding this diagnosis.     Clinically, he is complaining of pain in the mid back and in the left hip/leg with standing. He is ambulating with a cane today. He is using the hydrocodone 1 every 4-5 hours during the day and 2 at night which is working fairly well. He is constipated and using a stool softener. We are joined by his wife, Amna on the phone.    Performance Status: (1) Restricted in physically strenuous activity, ambulatory and able to do work of light nature  Objective   Past Medical History: he  has a past medical history of Adrenal insufficiency (CMS/[facility]), Anemia, BPH (benign prostatic hypertrophy), Cancer (CMS/[facility]) (2012), COPD (chronic obstructive pulmonary disease) (CMS/[facility]), Gout, H/O Diastolic dysfunction, left ventricle, H/O Statin intolerance, H/O TIA (transient ischemic attack), History of obesity, Paresthesia of both hands, and Renal cancer, left (CMS/[facility]) (2000).    Past Surgical History:  he has a past surgical history that includes Coronary artery bypass graft; Nephrectomy (Left, 2000); Adrenalectomy (Right, 12/2012); Carotid stent; Cardiac catheterization (2004); Coronary stent placement (1997); and Knee surgery (Right, 01/2017).    Meds:    Current Outpatient Medications:   •  allopurinol (ZYLOPRIM) 300 MG tablet, Take 1 tablet by mouth Daily., Disp: 90 tablet, Rfl: 1  •  amLODIPine (NORVASC) 10 MG tablet, Take 1 tablet by mouth Daily., Disp: 90 tablet, Rfl: 2  •  azithromycin (Zithromax Z-Ruben) 250 MG tablet, Take 2 tablets the first day, then 1 tablet daily for 4 days., Disp: 6 tablet, Rfl: 0  •  bumetanide (BUMEX) 2 MG tablet, 1/2 to 1 tab daily as needed dependent edema, Disp: 90 tablet, Rfl: 1  •  cloNIDine (CATAPRES) 0.2 MG tablet, Take 1 tablet by mouth Daily., Disp: 90 tablet, Rfl: 1  •  clopidogrel (PLAVIX) 75 MG tablet, TAKE 1 TABLET BY MOUTH  DAILY, Disp: 90  tablet, Rfl: 0  •  finasteride (PROSCAR) 5 MG tablet, Take 1 tablet by mouth Daily., Disp: 90 tablet, Rfl: 0  •  fludrocortisone 0.1 MG tablet, Take 1 tablet by mouth Daily., Disp: 90 tablet, Rfl: 1  •  glucose blood (ONETOUCH VERIO) test strip, TEST BLOOD SUGAR FOUR TIMES DAILY., Disp: , Rfl:   •  HUMALOG KWIKPEN 100 UNIT/ML solution pen-injector, INJECT 2 UNITS FOR EACH 50 MG ABOVE 200, Disp: , Rfl: 1  •  HYDROcodone-acetaminophen (NORCO) 5-325 MG per tablet, Take 1-2 tablets every 6 hours as needed for pain, Disp: 60 tablet, Rfl: 0  •  hydrocortisone (CORTEF) 10 MG tablet, TAKE 1 TABLET BY MOUTH IN  THE MORNING AND 1/2 TABLET  IN THE EVENING, Disp: 135 tablet, Rfl: 0  •  hydrocortisone (CORTEF) 10 MG tablet, Take 1 tablet in the morning and take 1/2 tablet in the evening., Disp: 135 tablet, Rfl: 0  •  hydrocortisone (CORTEF) 5 MG tablet, TK 2 TS PO IN THE MORNING AND 1 T PO IN THE EVENING, Disp: , Rfl: 1  •  hydrocortisone sodium succinate (Solu-CORTEF) 100 MG injection, Inject 50 mg into the appropriate muscle as directed by prescriber Daily., Disp: 1 vial, Rfl: 2  •  icosapent ethyl (VASCEPA) 1 g capsule capsule, Take 1 g by mouth., Disp: , Rfl:   •  insulin detemir (LEVEMIR) 100 UNIT/ML injection, Inject  under the skin Daily., Disp: , Rfl:   •  Insulin Glargine (LANTUS SOLOSTAR) 100 UNIT/ML injection pen, Inject 30 Units under the skin 2 (Two) Times a Day., Disp: 45 mL, Rfl: 1  •  isosorbide mononitrate (IMDUR) 30 MG 24 hr tablet, Take 1 tablet by mouth 2 (Two) Times a Day., Disp: 180 tablet, Rfl: 2  •  metoprolol succinate XL (TOPROL-XL) 100 MG 24 hr tablet, TAKE 1 TABLET BY MOUTH  DAILY, Disp: 90 tablet, Rfl: 1  •  nitroglycerin (NITROLINGUAL) 0.4 MG/SPRAY spray, Place 1 spray under the tongue Every 5 (Five) Minutes As Needed for Chest Pain., Disp: 12 g, Rfl: 5  •  ondansetron ODT (ZOFRAN-ODT) 4 MG disintegrating tablet, Place 1 tablet under the tongue Every 6 (Six) Hours As Needed for Nausea or Vomiting.,  Disp: 15 tablet, Rfl: 0  •  ondansetron ODT (ZOFRAN-ODT) 8 MG disintegrating tablet, Place 1 tablet on the tongue Every 8 (Eight) Hours As Needed for Nausea or Vomiting., Disp: 30 tablet, Rfl: 2  •  pantoprazole (PROTONIX) 20 MG EC tablet, TAKE 1 TABLET BY MOUTH  DAILY, Disp: 90 tablet, Rfl: 1  •  PAZOPanib (VOTRIENT) 200 MG chemo tablet, Take 4 tablets by mouth Daily., Disp: 120 tablet, Rfl: 3  •  potassium chloride (K-DUR,KLOR-CON) 20 MEQ CR tablet, Take 1 tablet by mouth Daily. As needed take with water pill, Disp: 90 tablet, Rfl: 1  •  promethazine (PHENERGAN) 25 MG suppository, Insert 1 suppository into the rectum Every 6 (Six) Hours As Needed for Nausea or Vomiting., Disp: 20 suppository, Rfl: 0  •  rosuvastatin (CRESTOR) 20 MG tablet, Take 1 tablet by mouth Daily., Disp: 90 tablet, Rfl: 1  •  Syringe, Disposable, 1 ML misc, 1 mL Daily., Disp: 4 each, Rfl: 2  •  traMADol (ULTRAM) 50 MG tablet, Take 1 tablet by mouth Every 6 (Six) Hours As Needed for Moderate Pain ., Disp: 60 tablet, Rfl: 0  •  valsartan (DIOVAN) 80 MG tablet, Take 1 tablet by mouth Daily., Disp: 90 tablet, Rfl: 1  •  vitamin D (ERGOCALCIFEROL) 61505 units capsule capsule, TK 1 C PO Q 7 DAYS, Disp: , Rfl: 1    Allergies:    Allergies   Allergen Reactions   • Shellfish-Derived Products Anaphylaxis   • Iodinated Diagnostic Agents Itching and Swelling   • Shellfish Allergy Unknown - Low Severity   • Adhesive Tape Hives       Family History:  his family history includes Cancer in his mother; Diabetes in his father; Heart disease in his brother and brother; Hypertension in his father.    Social History:  he  reports that he quit smoking about 12 years ago. His smoking use included cigarettes. He has a 60.00 pack-year smoking history. He has never used smokeless tobacco. He reports that he does not drink alcohol or use drugs.    Pertinent Findings on   Review of Systems   Constitutional: Positive for appetite change and fatigue. Negative for chills,  diaphoresis, fever and unexpected weight change.   HENT:   Negative for hearing loss, lump/mass, mouth sores, nosebleeds, sore throat, tinnitus, trouble swallowing and voice change.    Eyes: Negative for eye problems.   Respiratory: Positive for cough. Negative for chest tightness, hemoptysis, shortness of breath and wheezing.    Cardiovascular: Negative for chest pain, leg swelling and palpitations.   Gastrointestinal: Positive for constipation and nausea. Negative for abdominal distention, abdominal pain, blood in stool, diarrhea, rectal pain and vomiting.   Endocrine: Negative for hot flashes.   Genitourinary: Negative for bladder incontinence, difficulty urinating, dysuria, frequency, hematuria, nocturia and pelvic pain.    Musculoskeletal: Positive for arthralgias, back pain, flank pain and myalgias. Negative for gait problem, neck pain and neck stiffness.   Skin: Negative for itching and rash.   Neurological: Positive for headaches. Negative for dizziness, extremity weakness, gait problem, light-headedness, numbness, seizures and speech difficulty.   Hematological: Negative for adenopathy. Does not bruise/bleed easily.   Psychiatric/Behavioral: Positive for sleep disturbance. Negative for confusion, decreased concentration, depression and suicidal ideas. The patient is not nervous/anxious.    :     Subjective   There were no vitals filed for this visit.    Pertinent Findings on:  Physical Exam  Vitals signs reviewed.   Constitutional:       Appearance: He is well-developed.   HENT:      Head: Normocephalic and atraumatic.   Neck:      Musculoskeletal: Normal range of motion.   Pulmonary:      Effort: Pulmonary effort is normal.   Abdominal:      Palpations: Abdomen is soft.   Musculoskeletal: Normal range of motion.      Comments: Pain in midline back, localized at mid-low T spine, near T8 level, without radiation; also pain in SI joint region and radiating down left leg with weight bearing   Skin:      General: Skin is warm and dry.   Neurological:      Mental Status: He is alert and oriented to person, place, and time.   Psychiatric:         Behavior: Behavior normal.         Thought Content: Thought content normal.         Judgment: Judgment normal.            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   After reviewing the imaging and his clinical complaints, I recommended we treat the T8 lesion as well as the left pelvis/femur region and he was agreeable.    We discussed a course of radiation therapy aimed at these areas to a total dose of 3000 cGy in 10 treatments over 2 weeks to each. We reviewed the logistics and goals of the course of treatment. We then discussed acute side effects which are expected be minimal but might include slight erythema of the skin, esophagitis from the T8 treatment and possible fatigue. We also discussed the long-term effect of the radiation therapy on the bone and I believe all questions were answered.      Regarding the pain medication, he is to continue his current regimen for now. He is running low but states he sees Dr. Cat tomorrow and will get a refill at that time. I assured him we could help with refills while here if needed.    We were able to take our treatment planning films today and will be getting the treatments underway Monday, 10/26. He wishes to be treated at [facility] which we will coordinate.       Objective     My assessment above comes from my review of the imaging, pathology, physician notes and other pertinent information as mentioned.    I spent greater than 45 minutes in face-to-face time with the patient and 25 minutes of that time were spent in counseling and  coordination of care, including review of imaging and pathology; indications, goals, logistics, alternatives and risks - both common and rare - for my recommendations as well as surveillance and potential outcomes.

## 2020-10-23 ENCOUNTER — APPOINTMENT (OUTPATIENT)
Dept: OTHER | Facility: HOSPITAL | Age: 75
End: 2020-10-23

## 2020-10-23 ENCOUNTER — MEDICATION THERAPY MANAGEMENT (OUTPATIENT)
Dept: PHARMACY | Facility: HOSPITAL | Age: 75
End: 2020-10-23

## 2020-10-23 ENCOUNTER — OFFICE VISIT (OUTPATIENT)
Dept: ONCOLOGY | Facility: CLINIC | Age: 75
End: 2020-10-23

## 2020-10-23 ENCOUNTER — CLINICAL SUPPORT (OUTPATIENT)
Dept: ONCOLOGY | Facility: HOSPITAL | Age: 75
End: 2020-10-23

## 2020-10-23 ENCOUNTER — APPOINTMENT (OUTPATIENT)
Dept: ONCOLOGY | Facility: HOSPITAL | Age: 75
End: 2020-10-23

## 2020-10-23 VITALS
DIASTOLIC BLOOD PRESSURE: 90 MMHG | BODY MASS INDEX: 31.45 KG/M2 | TEMPERATURE: 97.3 F | SYSTOLIC BLOOD PRESSURE: 178 MMHG | HEIGHT: 72 IN | HEART RATE: 74 BPM | WEIGHT: 232.2 LBS | OXYGEN SATURATION: 98 % | RESPIRATION RATE: 16 BRPM

## 2020-10-23 DIAGNOSIS — C79.51 BONE METASTASES: ICD-10-CM

## 2020-10-23 DIAGNOSIS — R53.81 OTHER MALAISE: ICD-10-CM

## 2020-10-23 DIAGNOSIS — Z85.528 HISTORY OF RENAL CELL CARCINOMA: ICD-10-CM

## 2020-10-23 DIAGNOSIS — C64.2 RENAL CELL CARCINOMA OF LEFT KIDNEY METASTATIC TO OTHER SITE (HCC): Primary | ICD-10-CM

## 2020-10-23 DIAGNOSIS — R53.1 WEAKNESS: ICD-10-CM

## 2020-10-23 PROCEDURE — 93010 ELECTROCARDIOGRAM REPORT: CPT | Performed by: INTERNAL MEDICINE

## 2020-10-23 PROCEDURE — 99214 OFFICE O/P EST MOD 30 MIN: CPT | Performed by: INTERNAL MEDICINE

## 2020-10-23 PROCEDURE — 77370 RADIATION PHYSICS CONSULT: CPT | Performed by: RADIOLOGY

## 2020-10-23 RX ORDER — HYDROCODONE BITARTRATE AND ACETAMINOPHEN 5; 325 MG/1; MG/1
TABLET ORAL
Qty: 120 TABLET | Refills: 0 | Status: SHIPPED | OUTPATIENT
Start: 2020-10-23 | End: 2020-12-04 | Stop reason: SDUPTHER

## 2020-10-23 NOTE — PROGRESS NOTES
MTM telephone follow up- Votrient    No answer this morning.  direct line [phone].     Thanks,   Shruthi ChristineD

## 2020-10-23 NOTE — PROGRESS NOTES
[facility] OUTPATIENT FOLLOW UP CLINIC VISIT    REASON FOR FOLLOW-UP:    1.  History of metastatic clear cell renal cell carcinoma.  All known disease had previously been resected..  2.  Pulmonary metastases discovered July 2020.  Bone metastases discovered September 2020.  3.  Votrient initiated 10/16/2020    HISTORY OF PRESENT ILLNESS:  Micah Krishna is a 75 y.o. male who returns today for follow up of the above issue.      He has been on 200 mg of Votrient daily for 1 week and tomorrow he will start 400 mg daily.  He tolerates the medication very well at this point without any adverse effects.    He continues to have back pain as well as some left leg pain as well.  He was seen by radiation oncology with plans to begin palliative radiation on Monday.  He continues hydrocodone/acetaminophen 5/325 mg tablets and he is taking 1 to 2 tablets every 4-6 hours.  This dosing does help adequately with his pain.  He takes 6 to 7 pills daily.  He continues to have some nausea which improves with the ondansetron.      PAST MEDICAL, SURGICAL, FAMILY, AND SOCIAL HISTORIES were reviewed with the patient and in the electronic medical record, and were updated if indicated.    ALLERGIES:  Allergies   Allergen Reactions   • Shellfish-Derived Products Anaphylaxis   • Iodinated Diagnostic Agents Itching and Swelling   • Shellfish Allergy Unknown - Low Severity   • Adhesive Tape Hives       MEDICATIONS:  The medication list has been reviewed with the patient by the medical assistant, and the list has been updated in the electronic medical record, which I reviewed.  Medication dosages and frequencies were confirmed to be accurate.    REVIEW OF SYSTEMS:  PAIN:  See Vital Signs below.  GENERAL:  No fevers, chills, night sweats, or unintended weight loss.  Fatigue.  SKIN:  No rashes or non-healing lesions.  HEME/LYMPH:  No abnormal bleeding.  No palpable lymphadenopathy.  EYES:  No vision changes or diplopia.  ENT:  No  "sore throat or difficulty swallowing.  RESPIRATORY: Pleuritic chest pain improved  CARDIOVASCULAR: Dyspnea on exertion and chest pain with walking improved  GASTROINTESTINAL:  No nausea vomiting or diarrhea.  GENITOURINARY:  No dysuria or hematuria.  MUSCULOSKELETAL: Back pain.  Left leg pain.  Some nausea.  NEUROLOGIC:  No dizziness, loss of consciousness, or seizures.  PSYCHIATRIC:  No depression, anxiety, or mood changes.  Reviewed 10/23/2020    Vitals:    10/23/20 1624   BP: 178/90   Pulse: 74   Resp: 16   Temp: 97.3 °F (36.3 °C)   TempSrc: Temporal   SpO2: 98%   Weight: 105 kg (232 lb 3.2 oz)   Height: 182.9 cm (72.01\")   PainSc:   5   PainLoc: Back       PHYSICAL EXAMINATION:  General:  No acute distress, awake, alert and oriented  Skin:  Warm and dry, no visible rash  HEENT:  Normocephalic/atraumatic.  Wearing a facemask.  Chest:  Normal respiratory effort.  Lungs clear to auscultation bilaterally.  Heart: Regular rate and rhythm without murmurs gallops or rubs  Extremities:  No visible clubbing, cyanosis, or edema  Neuro/psych:  Grossly non-focal.  Normal mood and affect.  Lymphatics: No cervical supraclavicular or axillary adenopathy abdomen: Soft, nontender, nondistended, normal active bowel sounds        DIAGNOSTIC DATA:  Results for orders placed or performed during the hospital encounter of 10/09/20   Comprehensive Metabolic Panel    Specimen: Blood   Result Value Ref Range    Glucose 108 (H) 65 - 99 mg/dL    BUN 19 8 - 23 mg/dL    Creatinine 1.16 0.76 - 1.27 mg/dL    Sodium 139 136 - 145 mmol/L    Potassium 3.3 (L) 3.5 - 5.2 mmol/L    Chloride 100 98 - 107 mmol/L    CO2 23.7 22.0 - 29.0 mmol/L    Calcium 9.8 8.6 - 10.5 mg/dL    Total Protein 7.5 6.0 - 8.5 g/dL    Albumin 4.40 3.50 - 5.20 g/dL    ALT (SGPT) 17 1 - 41 U/L    AST (SGOT) 19 1 - 40 U/L    Alkaline Phosphatase 174 (H) 39 - 117 U/L    Total Bilirubin 0.7 0.0 - 1.2 mg/dL    eGFR Non African Amer 61 >60 mL/min/1.73    Globulin 3.1 gm/dL    A/G " Ratio 1.4 g/dL    BUN/Creatinine Ratio 16.4 7.0 - 25.0    Anion Gap 15.3 (H) 5.0 - 15.0 mmol/L   Lipase    Specimen: Blood   Result Value Ref Range    Lipase 30 13 - 60 U/L   CBC Auto Differential    Specimen: Blood   Result Value Ref Range    WBC 6.91 3.40 - 10.80 10*3/mm3    RBC 4.85 4.14 - 5.80 10*6/mm3    Hemoglobin 13.8 13.0 - 17.7 g/dL    Hematocrit 41.2 37.5 - 51.0 %    MCV 84.9 79.0 - 97.0 fL    MCH 28.5 26.6 - 33.0 pg    MCHC 33.5 31.5 - 35.7 g/dL    RDW 14.1 12.3 - 15.4 %    RDW-SD 43.9 37.0 - 54.0 fl    MPV 9.3 6.0 - 12.0 fL    Platelets 201 140 - 450 10*3/mm3    Neutrophil % 54.4 42.7 - 76.0 %    Lymphocyte % 28.5 19.6 - 45.3 %    Monocyte % 10.4 5.0 - 12.0 %    Eosinophil % 5.1 0.3 - 6.2 %    Basophil % 1.0 0.0 - 1.5 %    Immature Grans % 0.6 (H) 0.0 - 0.5 %    Neutrophils, Absolute 3.76 1.70 - 7.00 10*3/mm3    Lymphocytes, Absolute 1.97 0.70 - 3.10 10*3/mm3    Monocytes, Absolute 0.72 0.10 - 0.90 10*3/mm3    Eosinophils, Absolute 0.35 0.00 - 0.40 10*3/mm3    Basophils, Absolute 0.07 0.00 - 0.20 10*3/mm3    Immature Grans, Absolute 0.04 0.00 - 0.05 10*3/mm3    nRBC 0.0 0.0 - 0.2 /100 WBC   Lactic Acid, Plasma    Specimen: Blood   Result Value Ref Range    Lactate 1.4 0.5 - 2.0 mmol/L   Light Blue Top   Result Value Ref Range    Extra Tube hold for add-on    Green Top (Gel)   Result Value Ref Range    Extra Tube Hold for add-ons.    Lavender Top   Result Value Ref Range    Extra Tube hold for add-on    Gold Top - SST   Result Value Ref Range    Extra Tube Hold for add-ons.        IMAGING:  CT C/A/P 7/6/2020 with a RUL sub 6 mm pulmonary nodule in the RUL, stable since 1/17/2018, new 6 mm nodules in the medial RLL and RUL,  LLL nodule unchanged since 7/17/2018.  Healing left ninth rib fracture.    CT chest 9/28/2020 with stable lung nodules, pathologic fracture of the right 8th rib, abnormal appearance of T8, narrowing of the thecal sack at this level, additional lucencies at several vertebral bodies such  as T7 and T11, subacute healing fractures in the anterior right 6th rib and lateral left 9th rib.    CT head 9/28/2020 with calvarial metastatic disease involving the frontal and occipital bones.    Bone scan 9/28/2020 with multifocal uptake consistent with metastatic disease. Anterior frontal bone, upper C spine at C1 or C2, T and L spine at T3, T8, T11, multifocal rib uptake, abnormal uptake in the iliac wings/bodies and left femoral heads/acetabulum, distal left clavicle and both humeral heads.     PET scan from 10/8/2020.  Multifocal bone metastases throughout the skeleton.  Pathologic rib and vertebral body fracture.  Moderate FDG uptake in the right femoral neck with some sclerosis, new since 7/6/2020.  Mottled appearance of L3 and T8 vertebral bodies with pathologic compression fractures.  L3 fracture is a burst fracture with 20 to 25% loss of height and 4 to 5 mm of retropulsion in the central spinal canal.  Pathologic compression fracture at T8 with 10% loss of height.  New findings since 7/6/2020.  FDG avid soft tissue nodule extending into the central canal along the posterior aspect of the thecal sac at T8.  FDG uptake in the left clavicle.  Compression fracture of the right posterior eighth rib.    ASSESSMENT:  This is a 75 y.o. male with:  1.  Mild normocytic anemia: His hemoglobin has been stable.  2.  Adrenal insufficiency following bilateral adrenalectomy currently on replacement hormone therapy.  He follows with endocrinology.  3.  History of metastatic renal cell carcinoma.  He had a left nephrectomy and adrenalectomy in 2000 and then a right adrenalectomy for metastatic disease in 2012.    · See below  4.  Hypertension:  Blood pressure remains a little high.  Continue monitoring.  5.  Lower extremity edema:  No edema at this point.  6.  Chronic kidney disease: Status post left nephrectomy.  I believe that his creatinine is overall stable over the past few years.  7.  Dyspnea on exertion with  right-sided chest pain with exertion: He had a negative cardiac evaluation by his cardiologist.  He is a former smoker.  PFTs 1/15/2020 with some air trapping, normal spirometry, no restriction, normal diffusion.  8.  Two new 6 mm nodules in the RUL and RLL, non-specific but because they are new they are concerning.  Stable on follow-up imaging  9.  Migratory skeletal pain: Metastatic disease apparent now on CT imaging and bone scan and now PET scan.  · Referred to radiation oncology with plans to begin palliative radiation to 2 locations on Monday.  10.  Constipation: Improved with stool softeners and MiraLAX.  Continue these as necessary.  11.  Headaches: CT head without evidence for metastatic disease but there are calvarial bone lesions that are likely causing his headaches.  He did not complain of headache today.  12. Evidence for bony metastatic disease on CT and bone scan imaging from 9/28/2020.  Likely metastatic renal cell carcinoma.    · No soft tissue lesions on CT and PET imaging.  13.  Nausea and vomiting: Unclear etiology.  CT head negative.  · Prescription for ondansetron ODT tablets 8 mg every 8 hours as needed.  This is helping with his pain.    PLAN:   1. Continue hydrocodone/acetaminophen as needed for pain.  He can take 2 tablets at a time if needed.  Medication refilled today.  2. Continue ondansetron as needed for nausea  3. Continue Votrient with an increase to 400 mg daily tomorrow.  He will take this for 7 days and then start 600 mg daily for 7 days and then if he tolerates this well increase to 800 mg daily  4. He will proceed with 10 fractions of radiation starting on Monday, 10/26/2020.  5. I will see him back in 2 weeks for follow-up with a CBC, CMP, magnesium, phosphorus, TSH, urinalysis.  Thyroid studies to be done about a month after that.  Occasional EKGs.    High risk medication requiring intensive monitoring.

## 2020-10-25 PROCEDURE — 77300 RADIATION THERAPY DOSE PLAN: CPT | Performed by: RADIOLOGY

## 2020-10-25 PROCEDURE — 77295 3-D RADIOTHERAPY PLAN: CPT | Performed by: RADIOLOGY

## 2020-10-25 PROCEDURE — 77334 RADIATION TREATMENT AID(S): CPT | Performed by: RADIOLOGY

## 2020-10-26 ENCOUNTER — TELEPHONE (OUTPATIENT)
Dept: GENERAL RADIOLOGY | Facility: HOSPITAL | Age: 75
End: 2020-10-26

## 2020-10-26 ENCOUNTER — MEDICATION THERAPY MANAGEMENT (OUTPATIENT)
Dept: PHARMACY | Facility: HOSPITAL | Age: 75
End: 2020-10-26

## 2020-10-26 VITALS — HEART RATE: 74 BPM

## 2020-10-26 PROCEDURE — 77412 RADIATION TX DELIVERY LVL 3: CPT | Performed by: RADIOLOGY

## 2020-10-26 PROCEDURE — 77295 3-D RADIOTHERAPY PLAN: CPT | Performed by: RADIOLOGY

## 2020-10-26 PROCEDURE — 77300 RADIATION THERAPY DOSE PLAN: CPT | Performed by: RADIOLOGY

## 2020-10-26 PROCEDURE — 77334 RADIATION TREATMENT AID(S): CPT | Performed by: RADIOLOGY

## 2020-10-26 PROCEDURE — 77427 RADIATION TX MANAGEMENT X5: CPT | Performed by: RADIOLOGY

## 2020-10-26 NOTE — PROGRESS NOTES
[facility] telephone follow up- Votrient    Mr. Krishna is doing well today. He is currently on Votrient 400 mg daily and tolerating it well. He has not had any issues or side effects thus far. Medication administration and adherence seem appropriate. He will call if any issues arise. He will need to sign consents and CCA at his next appointment. Pharmacy will continue to follow.     Thanks,   Mary Ko, PharmD

## 2020-10-26 NOTE — TELEPHONE ENCOUNTER
----- Message from Mary Ko [facility] sent at 10/26/2020  2:12 PM EDT -----  Regarding: oral chemo education  Please schedule a quick in person oral chemo education for Mr. Krisnha on 11/6/20. He needs to sign consents.     Thanks,   Mary

## 2020-10-27 PROCEDURE — 77280 THER RAD SIMULAJ FIELD SMPL: CPT | Performed by: RADIOLOGY

## 2020-10-27 PROCEDURE — 77336 RADIATION PHYSICS CONSULT: CPT | Performed by: RADIOLOGY

## 2020-10-27 PROCEDURE — 77412 RADIATION TX DELIVERY LVL 3: CPT | Performed by: RADIOLOGY

## 2020-10-28 ENCOUNTER — OFFICE VISIT (OUTPATIENT)
Dept: FAMILY MEDICINE CLINIC | Facility: CLINIC | Age: 75
End: 2020-10-28

## 2020-10-28 VITALS
HEART RATE: 74 BPM | DIASTOLIC BLOOD PRESSURE: 72 MMHG | WEIGHT: 225 LBS | BODY MASS INDEX: 30.48 KG/M2 | SYSTOLIC BLOOD PRESSURE: 108 MMHG | HEIGHT: 72 IN | OXYGEN SATURATION: 96 % | TEMPERATURE: 97.1 F

## 2020-10-28 DIAGNOSIS — Z23 NEED FOR IMMUNIZATION AGAINST INFLUENZA: Primary | ICD-10-CM

## 2020-10-28 PROCEDURE — 90694 VACC AIIV4 NO PRSRV 0.5ML IM: CPT | Performed by: NURSE PRACTITIONER

## 2020-10-28 PROCEDURE — G0008 ADMIN INFLUENZA VIRUS VAC: HCPCS | Performed by: NURSE PRACTITIONER

## 2020-10-28 PROCEDURE — 77412 RADIATION TX DELIVERY LVL 3: CPT | Performed by: RADIOLOGY

## 2020-10-28 PROCEDURE — 99213 OFFICE O/P EST LOW 20 MIN: CPT | Performed by: NURSE PRACTITIONER

## 2020-10-28 RX ORDER — VALSARTAN 80 MG/1
80 TABLET ORAL 2 TIMES DAILY
Qty: 60 TABLET | Refills: 1 | Status: SHIPPED | OUTPATIENT
Start: 2020-10-28 | End: 2020-10-28 | Stop reason: SDUPTHER

## 2020-10-28 RX ORDER — VALSARTAN 80 MG/1
80 TABLET ORAL 2 TIMES DAILY
Qty: 180 TABLET | Refills: 1 | Status: SHIPPED | OUTPATIENT
Start: 2020-10-28 | End: 2021-01-06

## 2020-10-29 ENCOUNTER — MEDICATION THERAPY MANAGEMENT (OUTPATIENT)
Dept: ONCOLOGY | Facility: HOSPITAL | Age: 75
End: 2020-10-29

## 2020-10-29 NOTE — PROGRESS NOTES
"Subjective   Micah Krishna is a 75 y.o. male.     Pleasant gentleman follow-up essential hypertension presently controlled  Blood pressure have been elevated significantly 160s and higher and he had increase his valsartan to twice daily 80 mg twice daily and is been much better 120s and 30s   he has no dizziness no orthostatic problems diabetes mellitus type 2 controlled  Adrenal insufficiency stable  Unfortunately patient's had recurrent malignancy with metastatic renal carcinoma, sees oncology regularly and unfortunately had recurrence with metastatic disease to his ribs  Hips he is presently getting chemotherapy as well as radiation treatments  He is trying to be hopeful despite the significant setback  Has hydrocodone for pain sees Dr. Delmar Cat who is overseeing patients cancer treatment     Hypertension  Pertinent negatives include no chest pain, palpitations or shortness of breath.        /72   Pulse 74   Temp 97.1 °F (36.2 °C) (Temporal)   Ht 182.9 cm (72.01\")   Wt 102 kg (225 lb)   SpO2 96%   BMI 30.51 kg/m²       The following portions of the patient's history were reviewed and updated as appropriate: allergies, current medications, past family history, past medical history, past social history, past surgical history and problem list.    Review of Systems   Constitutional: Negative for fatigue and fever.   HENT: Negative.  Negative for trouble swallowing.    Eyes: Negative.    Respiratory: Negative.  Negative for cough and shortness of breath.    Cardiovascular: Negative for chest pain, palpitations and leg swelling.   Gastrointestinal: Negative.  Negative for abdominal pain.   Genitourinary: Negative.    Musculoskeletal: Negative.    Skin: Negative.    Neurological: Negative.  Negative for dizziness and confusion.   Psychiatric/Behavioral: Negative.        Objective   Physical Exam  Vitals signs reviewed.   Constitutional:       Appearance: He is well-developed.   HENT:      Head: " Normocephalic.      Nose: Nose normal.   Eyes:      General: No scleral icterus.     Conjunctiva/sclera: Conjunctivae normal.      Pupils: Pupils are equal, round, and reactive to light.   Neck:      Musculoskeletal: Neck supple.      Thyroid: No thyromegaly.      Vascular: No JVD.   Cardiovascular:      Rate and Rhythm: Normal rate and regular rhythm.      Heart sounds: Normal heart sounds. No murmur. No friction rub. No gallop.    Pulmonary:      Effort: Pulmonary effort is normal. No respiratory distress.      Breath sounds: Normal breath sounds. No stridor. No wheezing or rales.   Abdominal:      General: Bowel sounds are normal. There is no distension.      Palpations: Abdomen is soft.      Tenderness: There is no abdominal tenderness.      Comments: No hepatosplenomegaly, no ascites,   Musculoskeletal:         General: No tenderness.   Lymphadenopathy:      Cervical: No cervical adenopathy.   Skin:     General: Skin is warm and dry.      Findings: No erythema or rash.   Neurological:      Mental Status: He is alert and oriented to person, place, and time.      Deep Tendon Reflexes: Reflexes are normal and symmetric.   Psychiatric:         Behavior: Behavior normal.         Thought Content: Thought content normal.         Judgment: Judgment normal.           Assessment/Plan   Diagnoses and all orders for this visit:    1. Need for immunization against influenza (Primary)  -     Fluad Quad >65 years    Other orders  -     Discontinue: valsartan (DIOVAN) 80 MG tablet; Take 1 tablet by mouth 2 (Two) Times a Day.  Dispense: 60 tablet; Refill: 1  -     valsartan (DIOVAN) 80 MG tablet; Take 1 tablet by mouth 2 (Two) Times a Day.  Dispense: 180 tablet; Refill: 1      Healthy diet regular exercise watch for orthostatic hypotension  Necessary changes as above his labs are up-to-date he will follow-up in 3 to 4 months continue to social distance    KN95 filtration masK preferable  Let me know what he needs during this  time treatment            There are no Patient Instructions on file for this visit.

## 2020-10-29 NOTE — PROGRESS NOTES
[facility] telelphone encounter re adherence and side effects ( Votrient)    Mr Tomi called the [facility] office this am and reported that he vomited this am.  He has notified radiation dept and they advised him not to come to xrt today and to call the pharmacy.  He took his Votrient at midnight and had coke for breakfast this am, then proceeded to become nauseated.  He also reports receiving the flu shot yesterday and that he has historically felt poorly after flu vaccinations for brief interval following.  We agreed to call tomorrow for a report on his status, and will in basket Dr Cat today with this information.

## 2020-10-30 ENCOUNTER — RADIATION ONCOLOGY WEEKLY ASSESSMENT (OUTPATIENT)
Dept: RADIATION ONCOLOGY | Facility: CLINIC | Age: 75
End: 2020-10-30

## 2020-10-30 ENCOUNTER — MEDICATION THERAPY MANAGEMENT (OUTPATIENT)
Dept: PHARMACY | Facility: HOSPITAL | Age: 75
End: 2020-10-30

## 2020-10-30 DIAGNOSIS — C64.2 RENAL CELL CARCINOMA OF LEFT KIDNEY METASTATIC TO OTHER SITE (HCC): Primary | ICD-10-CM

## 2020-10-30 DIAGNOSIS — C79.51 BONE METASTASES: ICD-10-CM

## 2020-10-30 PROCEDURE — 77412 RADIATION TX DELIVERY LVL 3: CPT | Performed by: RADIOLOGY

## 2020-10-30 NOTE — PROGRESS NOTES
[facility] telephone follow up- Votrient    Mr. Krishna is doing okay today. He is feeling better in regards to nausea/vomiting after receiving a flu shot. This morning he tells me that he went to  his solu-cortef injection from his [facility] pharmacy and they dispensed a powder vial with nothing to reconstitute the medication with. I have offered to provide some sterile water to the patient when he comes into the office He verbalized understanding. I have also offered to have his prescription transferred to [facility] pharmacy and he agrees to this. Pharmacy will continue to follow.     Thanks,   Mary Ko, PharmD

## 2020-10-30 NOTE — PROGRESS NOTES
Physician Weekly Management Note    Diagnosis:     Diagnosis Plan   1. Renal cell carcinoma of left kidney metastatic to other site (CMS/HCC)     2. Bone metastases (CMS/HCC)         RT Details:  fx 4 spine, fx 3 left hip    Notes on Treatment course, Films, Medical progress:  Doing well    Weekly Management:  Medication reviewed?   Yes  New medications given?   No  Problemlist reviewed?   Yes  Problem added?   No  Issues raised requiring referral to support services - task assigned to:  na    Technical aspects reviewed:  Weekly OBI approved?   Yes  Weekly port films approved?   Yes  Change requests noted on port film?   No  Patient setup and plan reviewed?   Yes    Chart Reviewed:  Continue current treatment plan?   Yes  Treatment plan change requested?   No  CBC reviewed?   No  Concurrent Chemo?   No    Objective     Toxicities:   none     Review of Systems   Constitutional: Negative.    Musculoskeletal: Positive for arthralgias and back pain.          There were no vitals filed for this visit.    Current Status 10/23/2020   ECOG score 0       Physical Exam  Constitutional:       Appearance: Normal appearance.   Neurological:      Mental Status: He is alert.             Problem Summary List    Diagnosis:     Diagnosis Plan   1. Renal cell carcinoma of left kidney metastatic to other site (CMS/HCC)     2. Bone metastases (CMS/HCC)       Pathology:   Bone mets    Past Medical History:   Diagnosis Date   • Adrenal insufficiency (CMS/HCC)     Result of bilateral adrenalectomy.   • Anemia     Normocytic, mild   • BPH (benign prostatic hypertrophy)    • Cancer (CMS/HCC) 2012    Right adrenal gland   • COPD (chronic obstructive pulmonary disease) (CMS/HCC)    • Gout    • H/O Diastolic dysfunction, left ventricle    • H/O Statin intolerance    • H/O TIA (transient ischemic attack)    • History of obesity    • Paresthesia of both hands    • Renal cancer, left (CMS/HCC) 2000         Past Surgical History:   Procedure  Laterality Date   • ADRENALECTOMY Right 12/2012    Gland removed - CA   • CARDIAC CATHETERIZATION  2004   • CAROTID STENT      x2   • CORONARY ARTERY BYPASS GRAFT      Drug-eluting stent 2004, taxus DS sent in distal RCA; LAD stent 1997.   • CORONARY STENT PLACEMENT  1997    LAD   • KNEE SURGERY Right 01/2017   • NEPHRECTOMY Left 2000         Current Outpatient Medications on File Prior to Visit   Medication Sig Dispense Refill   • allopurinol (ZYLOPRIM) 300 MG tablet Take 1 tablet by mouth Daily. 90 tablet 1   • amLODIPine (NORVASC) 10 MG tablet Take 1 tablet by mouth Daily. 90 tablet 2   • bumetanide (BUMEX) 2 MG tablet 1/2 to 1 tab daily as needed dependent edema 90 tablet 1   • cloNIDine (CATAPRES) 0.2 MG tablet Take 1 tablet by mouth Daily. 90 tablet 1   • clopidogrel (PLAVIX) 75 MG tablet TAKE 1 TABLET BY MOUTH  DAILY 90 tablet 0   • finasteride (PROSCAR) 5 MG tablet Take 1 tablet by mouth Daily. 90 tablet 0   • fludrocortisone 0.1 MG tablet Take 1 tablet by mouth Daily. 90 tablet 1   • glucose blood (ONETOUCH VERIO) test strip TEST BLOOD SUGAR FOUR TIMES DAILY.     • HUMALOG KWIKPEN 100 UNIT/ML solution pen-injector INJECT 2 UNITS FOR EACH 50 MG ABOVE 200  1   • HYDROcodone-acetaminophen (NORCO) 5-325 MG per tablet Take 1-2 tablets every 4 hours as needed for pain 120 tablet 0   • hydrocortisone (CORTEF) 10 MG tablet TAKE 1 TABLET BY MOUTH IN  THE MORNING AND 1/2 TABLET  IN THE EVENING 135 tablet 0   • hydrocortisone (CORTEF) 5 MG tablet TK 2 TS PO IN THE MORNING AND 1 T PO IN THE EVENING  1   • hydrocortisone sodium succinate (Solu-CORTEF) 100 MG injection Inject 50 mg into the appropriate muscle as directed by prescriber Daily. 1 vial 2   • insulin detemir (LEVEMIR) 100 UNIT/ML injection Inject  under the skin Daily.     • Insulin Glargine (LANTUS SOLOSTAR) 100 UNIT/ML injection pen Inject 30 Units under the skin 2 (Two) Times a Day. 45 mL 1   • isosorbide mononitrate (IMDUR) 30 MG 24 hr tablet Take 1  tablet by mouth 2 (Two) Times a Day. 180 tablet 2   • metoprolol succinate XL (TOPROL-XL) 100 MG 24 hr tablet TAKE 1 TABLET BY MOUTH  DAILY 90 tablet 1   • nitroglycerin (NITROLINGUAL) 0.4 MG/SPRAY spray Place 1 spray under the tongue Every 5 (Five) Minutes As Needed for Chest Pain. 12 g 5   • ondansetron ODT (ZOFRAN-ODT) 4 MG disintegrating tablet Place 1 tablet under the tongue Every 6 (Six) Hours As Needed for Nausea or Vomiting. 15 tablet 0   • ondansetron ODT (ZOFRAN-ODT) 8 MG disintegrating tablet Place 1 tablet on the tongue Every 8 (Eight) Hours As Needed for Nausea or Vomiting. 30 tablet 2   • PAZOPanib (VOTRIENT) 200 MG chemo tablet Take 4 tablets by mouth Daily. 120 tablet 3   • potassium chloride (K-DUR,KLOR-CON) 20 MEQ CR tablet Take 1 tablet by mouth Daily. As needed take with water pill 90 tablet 1   • promethazine (PHENERGAN) 25 MG suppository Insert 1 suppository into the rectum Every 6 (Six) Hours As Needed for Nausea or Vomiting. 20 suppository 0   • rosuvastatin (CRESTOR) 20 MG tablet Take 1 tablet by mouth Daily. 90 tablet 1   • Syringe, Disposable, 1 ML misc 1 mL Daily. 4 each 2   • valsartan (DIOVAN) 80 MG tablet Take 1 tablet by mouth 2 (Two) Times a Day. 180 tablet 1   • vitamin D (ERGOCALCIFEROL) 48321 units capsule capsule TK 1 C PO Q 7 DAYS  1     No current facility-administered medications on file prior to visit.        Allergies   Allergen Reactions   • Shellfish-Derived Products Anaphylaxis   • Iodinated Diagnostic Agents Itching and Swelling   • Shellfish Allergy Unknown - Low Severity   • Adhesive Tape Hives         Referring Provider:    No referring provider defined for this encounter.    Oncologist:  No primary care provider on file.      Seen and approved by:  Giselle Dillon MD  10/30/2020

## 2020-11-01 ENCOUNTER — APPOINTMENT (OUTPATIENT)
Dept: RADIATION ONCOLOGY | Facility: HOSPITAL | Age: 75
End: 2020-11-01

## 2020-11-02 PROCEDURE — 77412 RADIATION TX DELIVERY LVL 3: CPT | Performed by: RADIOLOGY

## 2020-11-02 RX ORDER — CLONIDINE HYDROCHLORIDE 0.2 MG/1
0.2 TABLET ORAL DAILY
Qty: 90 TABLET | Refills: 1 | Status: SHIPPED | OUTPATIENT
Start: 2020-11-02 | End: 2020-12-28 | Stop reason: SDUPTHER

## 2020-11-03 PROCEDURE — 77412 RADIATION TX DELIVERY LVL 3: CPT | Performed by: RADIOLOGY

## 2020-11-03 PROCEDURE — 77427 RADIATION TX MANAGEMENT X5: CPT | Performed by: RADIOLOGY

## 2020-11-04 ENCOUNTER — RADIATION ONCOLOGY WEEKLY ASSESSMENT (OUTPATIENT)
Dept: RADIATION ONCOLOGY | Facility: CLINIC | Age: 75
End: 2020-11-04

## 2020-11-04 DIAGNOSIS — C79.51 BONE METASTASES: ICD-10-CM

## 2020-11-04 DIAGNOSIS — C64.2 RENAL CELL CARCINOMA OF LEFT KIDNEY METASTATIC TO OTHER SITE (HCC): Primary | ICD-10-CM

## 2020-11-04 PROCEDURE — 77417 THER RADIOLOGY PORT IMAGE(S): CPT | Performed by: RADIOLOGY

## 2020-11-04 PROCEDURE — 77412 RADIATION TX DELIVERY LVL 3: CPT | Performed by: RADIOLOGY

## 2020-11-04 NOTE — PROGRESS NOTES
Physician Weekly Management Note    Diagnosis:   Renal cell carcinoma of kidney metastatic to other site (CMS/HCC)  Bone metastases (CMS/HCC)     Reason for Visit: Tx: 7/10 Spine;  6/10 Left Hip  Concurrent Chemo:   No    Notes on Treatment course, Films, Medical progress and Plan:  Doing well. Pain about the same. No new problems - questions about how radiation works, filming, normal tissue avoidance answered. Cont on.  Subjective   Performance Status:  (1) Restricted in physically strenuous activity, ambulatory and able to do work of light nature    ROS - Other than as listed above, as follows:  Constitutional - Normal - no complaints of fatigue, denies lack of appetite, fever, night sweats or change in weight.  Cardiovascular - Normal - denies arrhythmias, chest pain, dyspnea, edema, orthopnea or palpitations.  Respiratory - Normal - denies cough, dyspnea, hemoptysis, hiccoughs, pleuritic chest pain or wheezing.  Gastrointestinal - Normal - no complaints of constipation, abdominal pain, diarrhea, heartburn/dyspepsia, hematemesis, hemorrhoids, melena or GI bleeding, nausea, pain or cramping or vomiting.  Objective   PHYSICAL EXAM - Other than as listed above, as follows:  There were no vitals filed for this visit.      Constitutional - Normal - no evidence of impaired alertness, inadequate appearance, premature or advanced chronologic age, uncooperativeness, altered mood, affect or disorientation.    Problem added:  No problems updated.  Medications added: No orders of the defined types were placed in this encounter.    Ancillary referrals made: No orders of the defined types were placed in this encounter.      Technical aspects reviewed:  Weekly OBI approved if applicable? Yes  Weekly port films approved?   Yes  Change requests noted if applicable?  Yes  Patient setup and plan reviewed?  Yes    Chart Reviewed:  Continue current treatment plan?  Yes  Treatment plan change requested?  No    I have reviewed and  "marked as \"reviewed\" the current medications, allergies and problem list in the patients EMR.    I have reviewed the patient's medical, surgical  history in detail, reviewed any pertinent lab work  and updated the computerized patient record if needed.    Patient's Care Team:  Patient Care Team:  Epley, James, APRN as PCP - General (Family Medicine)  Delmar Cat MD as Consulting Physician (Hematology and Oncology)  Samir Dowd MD as Consulting Physician (Internal Medicine)  Stephanie Bowie MD as Consulting Physician (Radiation Oncology)        "

## 2020-11-05 PROCEDURE — 77412 RADIATION TX DELIVERY LVL 3: CPT | Performed by: RADIOLOGY

## 2020-11-06 ENCOUNTER — OFFICE VISIT (OUTPATIENT)
Dept: ONCOLOGY | Facility: CLINIC | Age: 75
End: 2020-11-06

## 2020-11-06 ENCOUNTER — LAB (OUTPATIENT)
Dept: OTHER | Facility: HOSPITAL | Age: 75
End: 2020-11-06

## 2020-11-06 ENCOUNTER — SPECIALTY PHARMACY (OUTPATIENT)
Dept: ONCOLOGY | Facility: CLINIC | Age: 75
End: 2020-11-06

## 2020-11-06 VITALS
HEIGHT: 72 IN | SYSTOLIC BLOOD PRESSURE: 164 MMHG | TEMPERATURE: 97.5 F | DIASTOLIC BLOOD PRESSURE: 104 MMHG | RESPIRATION RATE: 16 BRPM | HEART RATE: 78 BPM | BODY MASS INDEX: 30.51 KG/M2 | OXYGEN SATURATION: 95 % | WEIGHT: 225.3 LBS

## 2020-11-06 DIAGNOSIS — C64.2 RENAL CELL CARCINOMA OF LEFT KIDNEY METASTATIC TO OTHER SITE (HCC): ICD-10-CM

## 2020-11-06 DIAGNOSIS — I10 ESSENTIAL (PRIMARY) HYPERTENSION: ICD-10-CM

## 2020-11-06 DIAGNOSIS — Z85.528 HISTORY OF RENAL CELL CARCINOMA: ICD-10-CM

## 2020-11-06 DIAGNOSIS — R53.1 WEAKNESS: ICD-10-CM

## 2020-11-06 DIAGNOSIS — C79.51 BONE METASTASES: Primary | ICD-10-CM

## 2020-11-06 DIAGNOSIS — R53.81 OTHER MALAISE: ICD-10-CM

## 2020-11-06 LAB
ALBUMIN SERPL-MCNC: 3.8 G/DL (ref 3.5–5.2)
ALBUMIN/GLOB SERPL: 1.3 G/DL
ALP SERPL-CCNC: 144 U/L (ref 39–117)
ALT SERPL W P-5'-P-CCNC: 14 U/L (ref 1–41)
ANION GAP SERPL CALCULATED.3IONS-SCNC: 7.2 MMOL/L (ref 5–15)
AST SERPL-CCNC: 20 U/L (ref 1–40)
BASOPHILS # BLD AUTO: 0.02 10*3/MM3 (ref 0–0.2)
BASOPHILS NFR BLD AUTO: 0.5 % (ref 0–1.5)
BILIRUB SERPL-MCNC: 0.4 MG/DL (ref 0–1.2)
BILIRUB UR QL STRIP: NEGATIVE
BUN SERPL-MCNC: 18 MG/DL (ref 8–23)
BUN/CREAT SERPL: 16.5 (ref 7–25)
CALCIUM SPEC-SCNC: 9.2 MG/DL (ref 8.6–10.5)
CHLORIDE SERPL-SCNC: 104 MMOL/L (ref 98–107)
CLARITY UR: CLEAR
CO2 SERPL-SCNC: 28.8 MMOL/L (ref 22–29)
COLOR UR: YELLOW
CREAT SERPL-MCNC: 1.09 MG/DL (ref 0.76–1.27)
DEPRECATED RDW RBC AUTO: 39.9 FL (ref 37–54)
EOSINOPHIL # BLD AUTO: 0.22 10*3/MM3 (ref 0–0.4)
EOSINOPHIL NFR BLD AUTO: 5.5 % (ref 0.3–6.2)
ERYTHROCYTE [DISTWIDTH] IN BLOOD BY AUTOMATED COUNT: 13.9 % (ref 12.3–15.4)
GFR SERPL CREATININE-BSD FRML MDRD: 66 ML/MIN/1.73
GLOBULIN UR ELPH-MCNC: 3 GM/DL
GLUCOSE SERPL-MCNC: 147 MG/DL (ref 65–99)
GLUCOSE UR STRIP-MCNC: NEGATIVE MG/DL
HCT VFR BLD AUTO: 37 % (ref 37.5–51)
HGB BLD-MCNC: 12.5 G/DL (ref 13–17.7)
HGB UR QL STRIP.AUTO: ABNORMAL
IMM GRANULOCYTES # BLD AUTO: 0.01 10*3/MM3 (ref 0–0.05)
IMM GRANULOCYTES NFR BLD AUTO: 0.2 % (ref 0–0.5)
KETONES UR QL STRIP: NEGATIVE
LEUKOCYTE ESTERASE UR QL STRIP.AUTO: NEGATIVE
LYMPHOCYTES # BLD AUTO: 0.93 10*3/MM3 (ref 0.7–3.1)
LYMPHOCYTES NFR BLD AUTO: 23.1 % (ref 19.6–45.3)
MAGNESIUM SERPL-MCNC: 1.7 MG/DL (ref 1.6–2.4)
MCH RBC QN AUTO: 27.5 PG (ref 26.6–33)
MCHC RBC AUTO-ENTMCNC: 33.8 G/DL (ref 31.5–35.7)
MCV RBC AUTO: 81.5 FL (ref 79–97)
MONOCYTES # BLD AUTO: 0.25 10*3/MM3 (ref 0.1–0.9)
MONOCYTES NFR BLD AUTO: 6.2 % (ref 5–12)
NEUTROPHILS NFR BLD AUTO: 2.59 10*3/MM3 (ref 1.7–7)
NEUTROPHILS NFR BLD AUTO: 64.5 % (ref 42.7–76)
NITRITE UR QL STRIP: NEGATIVE
NRBC BLD AUTO-RTO: 0 /100 WBC (ref 0–0.2)
PH UR STRIP.AUTO: 6 [PH] (ref 5–8)
PHOSPHATE SERPL-MCNC: 3.4 MG/DL (ref 2.5–4.5)
PLATELET # BLD AUTO: 117 10*3/MM3 (ref 140–450)
PMV BLD AUTO: 9.1 FL (ref 6–12)
POTASSIUM SERPL-SCNC: 3.9 MMOL/L (ref 3.5–5.2)
PROT SERPL-MCNC: 6.8 G/DL (ref 6–8.5)
PROT UR QL STRIP: ABNORMAL
RBC # BLD AUTO: 4.54 10*6/MM3 (ref 4.14–5.8)
SODIUM SERPL-SCNC: 140 MMOL/L (ref 136–145)
SP GR UR STRIP: >=1.03 (ref 1–1.03)
T4 FREE SERPL-MCNC: 1.19 NG/DL (ref 0.93–1.7)
TSH SERPL DL<=0.05 MIU/L-ACNC: 1.27 UIU/ML (ref 0.27–4.2)
UROBILINOGEN UR QL STRIP: ABNORMAL
WBC # BLD AUTO: 4.02 10*3/MM3 (ref 3.4–10.8)

## 2020-11-06 PROCEDURE — 80053 COMPREHEN METABOLIC PANEL: CPT | Performed by: INTERNAL MEDICINE

## 2020-11-06 PROCEDURE — 99215 OFFICE O/P EST HI 40 MIN: CPT | Performed by: INTERNAL MEDICINE

## 2020-11-06 PROCEDURE — 77412 RADIATION TX DELIVERY LVL 3: CPT | Performed by: RADIOLOGY

## 2020-11-06 PROCEDURE — 85025 COMPLETE CBC W/AUTO DIFF WBC: CPT | Performed by: INTERNAL MEDICINE

## 2020-11-06 PROCEDURE — 84100 ASSAY OF PHOSPHORUS: CPT | Performed by: INTERNAL MEDICINE

## 2020-11-06 PROCEDURE — 77470 SPECIAL RADIATION TREATMENT: CPT | Performed by: RADIOLOGY

## 2020-11-06 PROCEDURE — 84439 ASSAY OF FREE THYROXINE: CPT | Performed by: INTERNAL MEDICINE

## 2020-11-06 PROCEDURE — 84443 ASSAY THYROID STIM HORMONE: CPT | Performed by: INTERNAL MEDICINE

## 2020-11-06 PROCEDURE — 81003 URINALYSIS AUTO W/O SCOPE: CPT | Performed by: INTERNAL MEDICINE

## 2020-11-06 PROCEDURE — 36415 COLL VENOUS BLD VENIPUNCTURE: CPT

## 2020-11-06 PROCEDURE — 83735 ASSAY OF MAGNESIUM: CPT | Performed by: INTERNAL MEDICINE

## 2020-11-06 NOTE — PROGRESS NOTES
[facility] in person encounter- Votrient + solu-cortef    Mr. Krishna is doing okay today. He is currently on Votrient 600 mg daily, with plans to increase to 800 mg daily next week. He admits to some queasiness that is relieved with Zofran and lower appetite. Mr. Krishna reports some constipation as well and we discussed the importance of using stool softeners on a schedule. He verbalized understanding. CCA and consents were signed today. I provided him with SWFI to use with his solu-cortef as his [facility] pharmacy did not provide him with any when they filled his prescription. We discussed how to reconstitute the vial with 2mL of SWFI. He verbalized understanding and tells me that he has needles and syringes at home. Pharmacy will continue to follow.     Thanks,   Mary Ko, PharmD

## 2020-11-06 NOTE — PROGRESS NOTES
[facility] GROUP OUTPATIENT FOLLOW UP CLINIC VISIT    REASON FOR FOLLOW-UP:    1.  History of metastatic clear cell renal cell carcinoma.  All known disease had previously been resected..  2.  Pulmonary metastases discovered July 2020.  Bone metastases discovered September 2020.  3.  Votrient initiated 10/16/2020    HISTORY OF PRESENT ILLNESS:  Micah Krishna is a 75 y.o. male who returns today for follow up of the above issue.      He continues Votrient currently at 400 mg daily with a planned increase to 800 mg daily today.  He tolerates this well.  He is currently receiving radiation as well.  His left leg pain has almost completely resolved.  The pain in his back between his shoulders, however, has not improved very much.  He is taking about 4 hydrocodone/acetaminophen tablets daily which is helping a lot with the pain.  He does complain of a nocturnal cough.  He relates this to COPD.  He has not been using his cough drops but I advised him that it is okay to use these today.  His blood pressure is also up today.  He states that it has been better at home.  He did stop his diuretic as he is trying to avoid constipation and thought that this would be helpful.  The other big issue is constipation.  He is using a stool softener but he is only using suppositories or enemas otherwise.  He states that he sleeps about 3 to 4 hours then wakes up and is not able to go back to sleep due to the cough.      PAST MEDICAL, SURGICAL, FAMILY, AND SOCIAL HISTORIES were reviewed with the patient and in the electronic medical record, and were updated if indicated.    ALLERGIES:  Allergies   Allergen Reactions   • Shellfish-Derived Products Anaphylaxis   • Iodinated Diagnostic Agents Itching and Swelling   • Shellfish Allergy Unknown - Low Severity   • Adhesive Tape Hives       MEDICATIONS:  The medication list has been reviewed with the patient by the medical assistant, and the list has been updated in the electronic  "medical record, which I reviewed.  Medication dosages and frequencies were confirmed to be accurate.    REVIEW OF SYSTEMS:  PAIN:  See Vital Signs below.  GENERAL:  No fevers, chills, night sweats, or unintended weight loss.  Fatigue.  Some insomnia.  SKIN:  No rashes or non-healing lesions.  HEME/LYMPH:  No abnormal bleeding.  No palpable lymphadenopathy.  EYES:  No vision changes or diplopia.  ENT:  No sore throat or difficulty swallowing.  RESPIRATORY: Pleuritic chest pain improved.  Cough mostly at night.  CARDIOVASCULAR: Dyspnea on exertion and chest pain with walking improved  GASTROINTESTINAL:  No nausea vomiting or diarrhea.  Constipation.  GENITOURINARY:  No dysuria or hematuria.  MUSCULOSKELETAL: Back pain.  Left leg pain has resolved.  NEUROLOGIC:  No dizziness, loss of consciousness, or seizures.  PSYCHIATRIC:  No depression, anxiety, or mood changes.  Reviewed 11/6/2020    Vitals:    11/06/20 1510   BP: (!) 164/104   Pulse: 78   Resp: 16   Temp: 97.5 °F (36.4 °C)   TempSrc: Temporal   SpO2: 95%   Weight: 102 kg (225 lb 4.8 oz)   Height: 182.9 cm (72.01\")   PainSc: 3  Comment: renal cancer   PainLoc: Back       PHYSICAL EXAMINATION:  General:  No acute distress, awake, alert and oriented  Skin:  Warm and dry, no visible rash  HEENT:  Normocephalic/atraumatic.  Wearing a facemask.  Chest:  Normal respiratory effort.  Lungs clear to auscultation bilaterally.  Heart: Regular rate and rhythm without murmurs gallops or rubs  Extremities:  No visible clubbing, cyanosis, or edema  Neuro/psych:  Grossly non-focal.  Normal mood and affect.  Lymphatics: No cervical supraclavicular or axillary adenopathy   Abdomen: Soft, nontender, nondistended, normal active bowel sounds        DIAGNOSTIC DATA:  Results for orders placed or performed in visit on 11/06/20   Urinalysis without microscopic (no culture) - Urine, Clean Catch    Specimen: Urine, Clean Catch   Result Value Ref Range    Color, UA Yellow Yellow, Straw    " Appearance, UA Clear Clear    pH, UA 6.0 5.0 - 8.0    Specific Gravity, UA >=1.030 1.005 - 1.030    Glucose, UA Negative Negative    Ketones, UA Negative Negative    Bilirubin, UA Negative Negative    Blood, UA Trace (A) Negative    Protein,  mg/dL (2+) (A) Negative    Leuk Esterase, UA Negative Negative    Nitrite, UA Negative Negative    Urobilinogen, UA 0.2 E.U./dL 0.2 - 1.0 E.U./dL   CBC Auto Differential    Specimen: Blood   Result Value Ref Range    WBC 4.02 3.40 - 10.80 10*3/mm3    RBC 4.54 4.14 - 5.80 10*6/mm3    Hemoglobin 12.5 (L) 13.0 - 17.7 g/dL    Hematocrit 37.0 (L) 37.5 - 51.0 %    MCV 81.5 79.0 - 97.0 fL    MCH 27.5 26.6 - 33.0 pg    MCHC 33.8 31.5 - 35.7 g/dL    RDW 13.9 12.3 - 15.4 %    RDW-SD 39.9 37.0 - 54.0 fl    MPV 9.1 6.0 - 12.0 fL    Platelets 117 (L) 140 - 450 10*3/mm3    Neutrophil % 64.5 42.7 - 76.0 %    Lymphocyte % 23.1 19.6 - 45.3 %    Monocyte % 6.2 5.0 - 12.0 %    Eosinophil % 5.5 0.3 - 6.2 %    Basophil % 0.5 0.0 - 1.5 %    Immature Grans % 0.2 0.0 - 0.5 %    Neutrophils, Absolute 2.59 1.70 - 7.00 10*3/mm3    Lymphocytes, Absolute 0.93 0.70 - 3.10 10*3/mm3    Monocytes, Absolute 0.25 0.10 - 0.90 10*3/mm3    Eosinophils, Absolute 0.22 0.00 - 0.40 10*3/mm3    Basophils, Absolute 0.02 0.00 - 0.20 10*3/mm3    Immature Grans, Absolute 0.01 0.00 - 0.05 10*3/mm3    nRBC 0.0 0.0 - 0.2 /100 WBC       IMAGING:  CT C/A/P 7/6/2020 with a RUL sub 6 mm pulmonary nodule in the RUL, stable since 1/17/2018, new 6 mm nodules in the medial RLL and RUL,  LLL nodule unchanged since 7/17/2018.  Healing left ninth rib fracture.    CT chest 9/28/2020 with stable lung nodules, pathologic fracture of the right 8th rib, abnormal appearance of T8, narrowing of the thecal sack at this level, additional lucencies at several vertebral bodies such as T7 and T11, subacute healing fractures in the anterior right 6th rib and lateral left 9th rib.    CT head 9/28/2020 with calvarial metastatic disease involving  the frontal and occipital bones.    Bone scan 9/28/2020 with multifocal uptake consistent with metastatic disease. Anterior frontal bone, upper C spine at C1 or C2, T and L spine at T3, T8, T11, multifocal rib uptake, abnormal uptake in the iliac wings/bodies and left femoral heads/acetabulum, distal left clavicle and both humeral heads.     PET scan from 10/8/2020.  Multifocal bone metastases throughout the skeleton.  Pathologic rib and vertebral body fracture.  Moderate FDG uptake in the right femoral neck with some sclerosis, new since 7/6/2020.  Mottled appearance of L3 and T8 vertebral bodies with pathologic compression fractures.  L3 fracture is a burst fracture with 20 to 25% loss of height and 4 to 5 mm of retropulsion in the central spinal canal.  Pathologic compression fracture at T8 with 10% loss of height.  New findings since 7/6/2020.  FDG avid soft tissue nodule extending into the central canal along the posterior aspect of the thecal sac at T8.  FDG uptake in the left clavicle.  Compression fracture of the right posterior eighth rib.    ASSESSMENT:  This is a 75 y.o. male with:  1.  Mild normocytic anemia: His hemoglobin has been stable.  2.  Adrenal insufficiency following bilateral adrenalectomy currently on replacement hormone therapy.  He follows with endocrinology.  3.  History of metastatic renal cell carcinoma.  He had a left nephrectomy and adrenalectomy in 2000 and then a right adrenalectomy for metastatic disease in 2012.    · See below  4.  Hypertension:  Blood pressure remains a little high.  Continue monitoring.  He stopped his diuretic and I advised him to resume this today.  5.  Lower extremity edema:  No edema at this point.  6.  Chronic kidney disease: Status post left nephrectomy.  Creatinine remained stable.  7.  Dyspnea on exertion with right-sided chest pain with exertion: He had a negative cardiac evaluation by his cardiologist.  He is a former smoker.  PFTs 1/15/2020 with some  air trapping, normal spirometry, no restriction, normal diffusion.  This resolved.  8.  Two new 6 mm nodules in the RUL and RLL, non-specific but because they are new they are concerning.  Stable on follow-up imaging  9.  Migratory skeletal pain: Metastatic disease apparent now on CT imaging and bone scan and now PET scan.  · Referred to radiation oncology   · Left leg pain has improved  · Back pain has not improved at this point yet  10.  Constipation: This is again worse.  He is only on a stool softener at this point.  I advised to Senokot and/or MiraLAX.  He continues suppositories and enemas as needed which is fine as well but the Senokot and MiraLAX can help and he may not need to use the suppositories and enemas.  11.  Headaches: CT head without evidence for metastatic disease but there are calvarial bone lesions that are likely causing his headaches.  No headache today.  12. Evidence for bony metastatic disease on CT and bone scan imaging from 9/28/2020.  Likely metastatic renal cell carcinoma.    · No soft tissue lesions on CT and PET imaging.  13.  Nausea and vomiting: Unclear etiology.  CT head negative.  · Prescription for ondansetron ODT tablets 8 mg every 8 hours as needed.  This is helping with his nausea.  · Nausea not too much of an issue at this point.  14.  Nocturnal cough: He will resume his cough drops.  We can prescribe something if necessary.  15.  Insomnia: He does have a sleep aid to use and I encouraged him to use this if needed as well.    PLAN:   1. Continue hydrocodone/acetaminophen as needed for pain.  He can take 2 tablets at a time if needed.  Refill not required today.  2. Continue ondansetron as needed for nausea  3. Continue Votrient with an increase to 800 mg daily tomorrow.    4. Continue radiation.  It may take some more time for radiation to help with his pain.  5. He will resume his diuretic and this may help his blood pressure  6. I will see him back in about 1 month for  follow-up with labs.    High risk medication requiring intensive monitoring.  40 minutes face-to-face today.  30 minutes spent counseling him regarding the above issues.

## 2020-11-09 ENCOUNTER — MEDICATION THERAPY MANAGEMENT (OUTPATIENT)
Dept: PHARMACY | Facility: HOSPITAL | Age: 75
End: 2020-11-09

## 2020-11-09 PROCEDURE — 77412 RADIATION TX DELIVERY LVL 3: CPT | Performed by: RADIOLOGY

## 2020-11-09 RX ORDER — CLOPIDOGREL BISULFATE 75 MG/1
75 TABLET ORAL DAILY
Qty: 90 TABLET | Refills: 0 | Status: SHIPPED | OUTPATIENT
Start: 2020-11-09 | End: 2020-12-07 | Stop reason: SDUPTHER

## 2020-11-09 NOTE — PROGRESS NOTES
[facility] Lab Review- Votrient      11/6/2020   WBC 3.40 - 10.80 10*3/mm3 4.02   Neutrophils Absolute 1.70 - 7.00 10*3/mm3 2.59   Hemoglobin 13.0 - 17.7 g/dL 12.5 (A)   Hematocrit 37.5 - 51.0 % 37.0 (A)   Platelets 140 - 450 10*3/mm3 117 (A)   Creatinine 0.76 - 1.27 mg/dL 1.09   eGFR Non African Am >60 mL/min/1.73 66   BUN 8 - 23 mg/dL 18   Sodium 136 - 145 mmol/L 140   Potassium 3.5 - 5.2 mmol/L 3.9   Glucose 65 - 99 mg/dL 147 (A)   Magnesium 1.6 - 2.4 mg/dL 1.7   Calcium 8.6 - 10.5 mg/dL 9.2   Albumin 3.50 - 5.20 g/dL 3.80   Total Protein 6.0 - 8.5 g/dL 6.8   AST (SGOT) 1 - 40 U/L 20   ALT (SGPT) 1 - 41 U/L 14   Alkaline Phosphatase 39 - 117 U/L 144 (A)   Total Bilirubin 0.0 - 1.2 mg/dL 0.4   Protein, UA Negative 100 mg/dL (2+) (A)   TSH 0.270 - 4.200 uIU/mL 1.270   Free T4 0.93 - 1.70 ng/dL 1.19   Phosphorus 2.5 - 4.5 mg/dL 3.4     MD dictation noted. Pharmacy will continue to follow.     Thanks,   Mary Ko, PharmD

## 2020-11-10 ENCOUNTER — DOCUMENTATION (OUTPATIENT)
Dept: RADIATION ONCOLOGY | Facility: HOSPITAL | Age: 75
End: 2020-11-10

## 2020-11-10 PROCEDURE — 77412 RADIATION TX DELIVERY LVL 3: CPT | Performed by: RADIOLOGY

## 2020-11-10 PROCEDURE — 77336 RADIATION PHYSICS CONSULT: CPT | Performed by: RADIOLOGY

## 2020-11-11 NOTE — PROGRESS NOTES
RADIATION THERAPY TREATMENT SUMMARY  Patient: Micah Krishna  YOB: 1945  Diagnosis:  Renal cell carcinoma of kidney metastatic to other site (CMS/HCC)    Micah Krishna has recently completed the course of radiation therapy prescribed for the above-mentioned diagnosis.  Below, please find the specifics of the course of treatment delivered:    Radiation Details:    Tx Start Date  10/26/2020   Tx End date  11/9/2020   Intent   Palliative   Total Treatments 10    Prescription Name T-SPINE T7/8  Primary/Boost  PRIMARY  Dose Per Fraction 300 cGy/Frac  Number of Fractions 10  Prescribe To  IsodoseLine 100 % 3000 cGy  300 cGy/Frac  Mode    Photon  Technique  3D CONFORMAL  Frequency  Once Daily  Energy   18X    Tx Start Date  10/27/2020   Tx End date  11/10/2020   Intent   Palliative   Total Treatments 10    Prescription Name LEFT HIP  Primary/Boost  PRIMARY  Dose Per Fraction 300 cGy/Frac  Number of Fractions 10  Prescribe To  IsodoseLine 100 % 3000 cGy  300 cGy/Frac  Mode    Photon  Technique  3D CONFORMAL  Frequency  Once Daily  Energy   18X    Tolerance and Toxicities: he tolerated the treatments well, requiring 1 treatment break on 10/29 due to vomiting. he completed the treatments in 14 elapsed days.    Follow-up Plans:  The patient has continued treatment and follow up established with the [facility] physicians. Therefore, I have not given the patient an appointment to return here but encouraged them to call if we could be of further help.

## 2020-11-13 ENCOUNTER — MEDICATION THERAPY MANAGEMENT (OUTPATIENT)
Dept: PHARMACY | Facility: HOSPITAL | Age: 75
End: 2020-11-13

## 2020-11-13 NOTE — PROGRESS NOTES
[facility] telephone follow up- Votrient    Mr. Krishna is doing well today. He tells me that some days are good and some days are bad. He does have some trouble getting nausea under control at times. He believes it is worse following activity, ie cleaning the carpets. We discussed resting more frequently and not overdoing it. He has been on 800 mg daily for about one week thus far. He will call with any worsening side effects. Pharmacy will continue to follow.     Thanks,   Mary Ko, PharmD

## 2020-11-20 RX ORDER — METOPROLOL SUCCINATE 100 MG/1
100 TABLET, EXTENDED RELEASE ORAL DAILY
Qty: 90 TABLET | Refills: 1 | Status: SHIPPED | OUTPATIENT
Start: 2020-11-20 | End: 2021-05-22 | Stop reason: SDUPTHER

## 2020-11-30 ENCOUNTER — MEDICATION THERAPY MANAGEMENT (OUTPATIENT)
Dept: PHARMACY | Facility: HOSPITAL | Age: 75
End: 2020-11-30

## 2020-11-30 NOTE — PROGRESS NOTES
[facility] telephone follow up- Vocharlieient     Mr. Krishna is doing well today. He hasn't had any new issues or side effects come up. He still has some constipation and fatigue. Overall, he feels like he is tolerating the medication well. Medication administration and adherence seem appropriate; he reports no missed doses since starting. He has stopped taking pantoprazole due to DDI with Votrient; he does have some heart burn flare ups off this medication and he is using TUMS as needed. Mr. Krishna has no other questions or concerns today. Pharmacy will continue to follow.     Thanks,  Mary Ko, PharmD

## 2020-12-02 ENCOUNTER — LAB (OUTPATIENT)
Dept: OTHER | Facility: HOSPITAL | Age: 75
End: 2020-12-02

## 2020-12-02 DIAGNOSIS — C79.51 BONE METASTASES: ICD-10-CM

## 2020-12-02 DIAGNOSIS — C64.2 RENAL CELL CARCINOMA OF LEFT KIDNEY METASTATIC TO OTHER SITE (HCC): ICD-10-CM

## 2020-12-02 DIAGNOSIS — I10 ESSENTIAL (PRIMARY) HYPERTENSION: ICD-10-CM

## 2020-12-02 LAB
ALBUMIN SERPL-MCNC: 4.2 G/DL (ref 3.5–5.2)
ALBUMIN/GLOB SERPL: 1.6 G/DL
ALP SERPL-CCNC: 173 U/L (ref 39–117)
ALT SERPL W P-5'-P-CCNC: 28 U/L (ref 1–41)
ANION GAP SERPL CALCULATED.3IONS-SCNC: 6.2 MMOL/L (ref 5–15)
AST SERPL-CCNC: 26 U/L (ref 1–40)
BASOPHILS # BLD AUTO: 0.02 10*3/MM3 (ref 0–0.2)
BASOPHILS NFR BLD AUTO: 0.6 % (ref 0–1.5)
BILIRUB SERPL-MCNC: 0.8 MG/DL (ref 0–1.2)
BUN SERPL-MCNC: 12 MG/DL (ref 8–23)
BUN/CREAT SERPL: 10.6 (ref 7–25)
CALCIUM SPEC-SCNC: 9.5 MG/DL (ref 8.6–10.5)
CHLORIDE SERPL-SCNC: 105 MMOL/L (ref 98–107)
CO2 SERPL-SCNC: 31.8 MMOL/L (ref 22–29)
CREAT SERPL-MCNC: 1.13 MG/DL (ref 0.76–1.27)
DEPRECATED RDW RBC AUTO: 50.9 FL (ref 37–54)
EOSINOPHIL # BLD AUTO: 0.17 10*3/MM3 (ref 0–0.4)
EOSINOPHIL NFR BLD AUTO: 5.4 % (ref 0.3–6.2)
ERYTHROCYTE [DISTWIDTH] IN BLOOD BY AUTOMATED COUNT: 18 % (ref 12.3–15.4)
GFR SERPL CREATININE-BSD FRML MDRD: 63 ML/MIN/1.73
GLOBULIN UR ELPH-MCNC: 2.6 GM/DL
GLUCOSE SERPL-MCNC: 108 MG/DL (ref 65–99)
HCT VFR BLD AUTO: 37.3 % (ref 37.5–51)
HGB BLD-MCNC: 12.5 G/DL (ref 13–17.7)
IMM GRANULOCYTES # BLD AUTO: 0.01 10*3/MM3 (ref 0–0.05)
IMM GRANULOCYTES NFR BLD AUTO: 0.3 % (ref 0–0.5)
LYMPHOCYTES # BLD AUTO: 0.96 10*3/MM3 (ref 0.7–3.1)
LYMPHOCYTES NFR BLD AUTO: 30.3 % (ref 19.6–45.3)
MAGNESIUM SERPL-MCNC: 1.5 MG/DL (ref 1.6–2.4)
MCH RBC QN AUTO: 28 PG (ref 26.6–33)
MCHC RBC AUTO-ENTMCNC: 33.5 G/DL (ref 31.5–35.7)
MCV RBC AUTO: 83.4 FL (ref 79–97)
MONOCYTES # BLD AUTO: 0.33 10*3/MM3 (ref 0.1–0.9)
MONOCYTES NFR BLD AUTO: 10.4 % (ref 5–12)
NEUTROPHILS NFR BLD AUTO: 1.68 10*3/MM3 (ref 1.7–7)
NEUTROPHILS NFR BLD AUTO: 53 % (ref 42.7–76)
NRBC BLD AUTO-RTO: 0 /100 WBC (ref 0–0.2)
PHOSPHATE SERPL-MCNC: 4 MG/DL (ref 2.5–4.5)
PLATELET # BLD AUTO: 94 10*3/MM3 (ref 140–450)
PMV BLD AUTO: 8.8 FL (ref 6–12)
POTASSIUM SERPL-SCNC: 4 MMOL/L (ref 3.5–5.2)
PROT SERPL-MCNC: 6.8 G/DL (ref 6–8.5)
RBC # BLD AUTO: 4.47 10*6/MM3 (ref 4.14–5.8)
SODIUM SERPL-SCNC: 143 MMOL/L (ref 136–145)
TSH SERPL DL<=0.05 MIU/L-ACNC: 3.04 UIU/ML (ref 0.27–4.2)
WBC # BLD AUTO: 3.17 10*3/MM3 (ref 3.4–10.8)

## 2020-12-02 PROCEDURE — 84100 ASSAY OF PHOSPHORUS: CPT | Performed by: INTERNAL MEDICINE

## 2020-12-02 PROCEDURE — 84443 ASSAY THYROID STIM HORMONE: CPT | Performed by: INTERNAL MEDICINE

## 2020-12-02 PROCEDURE — 36415 COLL VENOUS BLD VENIPUNCTURE: CPT

## 2020-12-02 PROCEDURE — 83735 ASSAY OF MAGNESIUM: CPT | Performed by: INTERNAL MEDICINE

## 2020-12-02 PROCEDURE — 85025 COMPLETE CBC W/AUTO DIFF WBC: CPT | Performed by: INTERNAL MEDICINE

## 2020-12-02 PROCEDURE — 80053 COMPREHEN METABOLIC PANEL: CPT | Performed by: INTERNAL MEDICINE

## 2020-12-04 ENCOUNTER — APPOINTMENT (OUTPATIENT)
Dept: OTHER | Facility: HOSPITAL | Age: 75
End: 2020-12-04

## 2020-12-04 ENCOUNTER — OFFICE VISIT (OUTPATIENT)
Dept: ONCOLOGY | Facility: CLINIC | Age: 75
End: 2020-12-04

## 2020-12-04 DIAGNOSIS — Z85.528 HISTORY OF RENAL CELL CARCINOMA: ICD-10-CM

## 2020-12-04 DIAGNOSIS — C64.2 RENAL CELL CARCINOMA OF LEFT KIDNEY METASTATIC TO OTHER SITE (HCC): ICD-10-CM

## 2020-12-04 DIAGNOSIS — R93.89 ABNORMAL FINDINGS ON DIAGNOSTIC IMAGING OF OTHER SPECIFIED BODY STRUCTURES: ICD-10-CM

## 2020-12-04 DIAGNOSIS — C79.51 BONE METASTASES: Primary | ICD-10-CM

## 2020-12-04 PROCEDURE — 99442 PR PHYS/QHP TELEPHONE EVALUATION 11-20 MIN: CPT | Performed by: INTERNAL MEDICINE

## 2020-12-04 RX ORDER — ONDANSETRON 8 MG/1
8 TABLET, ORALLY DISINTEGRATING ORAL EVERY 8 HOURS PRN
Qty: 30 TABLET | Refills: 2 | Status: SHIPPED | OUTPATIENT
Start: 2020-12-04

## 2020-12-04 RX ORDER — HYDROCODONE BITARTRATE AND ACETAMINOPHEN 5; 325 MG/1; MG/1
TABLET ORAL
Qty: 120 TABLET | Refills: 0 | Status: SHIPPED | OUTPATIENT
Start: 2020-12-04 | End: 2021-01-29 | Stop reason: SDUPTHER

## 2020-12-04 NOTE — PROGRESS NOTES
[facility] GROUP OUTPATIENT FOLLOW UP CLINIC VISIT    REASON FOR FOLLOW-UP:    1.  History of metastatic clear cell renal cell carcinoma.  All known disease had previously been resected..  2.  Pulmonary metastases discovered July 2020.  Bone metastases discovered September 2020.  3.  Votrient initiated 10/16/2020  4. Palliative radiation for pain complete on 11/10/2020    HISTORY OF PRESENT ILLNESS:  Micah Krishna is a 75 y.o. male who returns today for follow up of the above issue.      Continues Votrient 800 mg daily.    Completed radiation to T7-8 on 11/9 and to the L hip on 11/10. His pain has improved. He uses about three hydrocodone daily. Complains of worsening heartburn off of his PPI which he was advised to stop while on the Votrient. Some nausea, well controlled with ondansetron.        PAST MEDICAL, SURGICAL, FAMILY, AND SOCIAL HISTORIES were reviewed with the patient and in the electronic medical record, and were updated if indicated.    ALLERGIES:  Allergies   Allergen Reactions   • Shellfish-Derived Products Anaphylaxis   • Iodinated Diagnostic Agents Itching and Swelling   • Shellfish Allergy Unknown - Low Severity   • Adhesive Tape Hives       MEDICATIONS:  The medication list has been reviewed with the patient by the medical assistant, and the list has been updated in the electronic medical record, which I reviewed.  Medication dosages and frequencies were confirmed to be accurate.    REVIEW OF SYSTEMS:  PAIN:  See Vital Signs below.  GENERAL:  No fevers, chills, night sweats, or unintended weight loss.  Fatigue.  Some insomnia.  SKIN:  No rashes or non-healing lesions.  HEME/LYMPH:  No abnormal bleeding.  No palpable lymphadenopathy.  EYES:  No vision changes or diplopia.  ENT:  No sore throat or difficulty swallowing.  RESPIRATORY: Pleuritic chest pain improved.  Cough mostly at night.  CARDIOVASCULAR: Dyspnea on exertion and chest pain with walking improved  GASTROINTESTINAL:  Mild  nausea  GENITOURINARY:  No dysuria or hematuria.  MUSCULOSKELETAL: Back pain better.  Left leg pain has resolved.  NEUROLOGIC:  No dizziness, loss of consciousness, or seizures.  PSYCHIATRIC:  No depression, anxiety, or mood changes.  Reviewed 12/4/2020    There were no vitals filed for this visit.    PHYSICAL EXAMINATION:  Telephone visit only today    DIAGNOSTIC DATA:  Results for orders placed or performed in visit on 12/02/20   Comprehensive Metabolic Panel    Specimen: Blood   Result Value Ref Range    Glucose 108 (H) 65 - 99 mg/dL    BUN 12 8 - 23 mg/dL    Creatinine 1.13 0.76 - 1.27 mg/dL    Sodium 143 136 - 145 mmol/L    Potassium 4.0 3.5 - 5.2 mmol/L    Chloride 105 98 - 107 mmol/L    CO2 31.8 (H) 22.0 - 29.0 mmol/L    Calcium 9.5 8.6 - 10.5 mg/dL    Total Protein 6.8 6.0 - 8.5 g/dL    Albumin 4.20 3.50 - 5.20 g/dL    ALT (SGPT) 28 1 - 41 U/L    AST (SGOT) 26 1 - 40 U/L    Alkaline Phosphatase 173 (H) 39 - 117 U/L    Total Bilirubin 0.8 0.0 - 1.2 mg/dL    eGFR Non African Amer 63 >60 mL/min/1.73    Globulin 2.6 gm/dL    A/G Ratio 1.6 g/dL    BUN/Creatinine Ratio 10.6 7.0 - 25.0    Anion Gap 6.2 5.0 - 15.0 mmol/L   TSH    Specimen: Blood   Result Value Ref Range    TSH 3.040 0.270 - 4.200 uIU/mL   Phosphorus    Specimen: Blood   Result Value Ref Range    Phosphorus 4.0 2.5 - 4.5 mg/dL   Magnesium    Specimen: Blood   Result Value Ref Range    Magnesium 1.5 (L) 1.6 - 2.4 mg/dL   CBC Auto Differential    Specimen: Blood   Result Value Ref Range    WBC 3.17 (L) 3.40 - 10.80 10*3/mm3    RBC 4.47 4.14 - 5.80 10*6/mm3    Hemoglobin 12.5 (L) 13.0 - 17.7 g/dL    Hematocrit 37.3 (L) 37.5 - 51.0 %    MCV 83.4 79.0 - 97.0 fL    MCH 28.0 26.6 - 33.0 pg    MCHC 33.5 31.5 - 35.7 g/dL    RDW 18.0 (H) 12.3 - 15.4 %    RDW-SD 50.9 37.0 - 54.0 fl    MPV 8.8 6.0 - 12.0 fL    Platelets 94 (L) 140 - 450 10*3/mm3    Neutrophil % 53.0 42.7 - 76.0 %    Lymphocyte % 30.3 19.6 - 45.3 %    Monocyte % 10.4 5.0 - 12.0 %    Eosinophil %  5.4 0.3 - 6.2 %    Basophil % 0.6 0.0 - 1.5 %    Immature Grans % 0.3 0.0 - 0.5 %    Neutrophils, Absolute 1.68 (L) 1.70 - 7.00 10*3/mm3    Lymphocytes, Absolute 0.96 0.70 - 3.10 10*3/mm3    Monocytes, Absolute 0.33 0.10 - 0.90 10*3/mm3    Eosinophils, Absolute 0.17 0.00 - 0.40 10*3/mm3    Basophils, Absolute 0.02 0.00 - 0.20 10*3/mm3    Immature Grans, Absolute 0.01 0.00 - 0.05 10*3/mm3    nRBC 0.0 0.0 - 0.2 /100 WBC       IMAGING:  CT C/A/P 7/6/2020 with a RUL sub 6 mm pulmonary nodule in the RUL, stable since 1/17/2018, new 6 mm nodules in the medial RLL and RUL,  LLL nodule unchanged since 7/17/2018.  Healing left ninth rib fracture.    CT chest 9/28/2020 with stable lung nodules, pathologic fracture of the right 8th rib, abnormal appearance of T8, narrowing of the thecal sack at this level, additional lucencies at several vertebral bodies such as T7 and T11, subacute healing fractures in the anterior right 6th rib and lateral left 9th rib.    CT head 9/28/2020 with calvarial metastatic disease involving the frontal and occipital bones.    Bone scan 9/28/2020 with multifocal uptake consistent with metastatic disease. Anterior frontal bone, upper C spine at C1 or C2, T and L spine at T3, T8, T11, multifocal rib uptake, abnormal uptake in the iliac wings/bodies and left femoral heads/acetabulum, distal left clavicle and both humeral heads.     PET scan from 10/8/2020.  Multifocal bone metastases throughout the skeleton.  Pathologic rib and vertebral body fracture.  Moderate FDG uptake in the right femoral neck with some sclerosis, new since 7/6/2020.  Mottled appearance of L3 and T8 vertebral bodies with pathologic compression fractures.  L3 fracture is a burst fracture with 20 to 25% loss of height and 4 to 5 mm of retropulsion in the central spinal canal.  Pathologic compression fracture at T8 with 10% loss of height.  New findings since 7/6/2020.  FDG avid soft tissue nodule extending into the central canal  along the posterior aspect of the thecal sac at T8.  FDG uptake in the left clavicle.  Compression fracture of the right posterior eighth rib.    ASSESSMENT:  This is a 75 y.o. male with:  1.  Mild normocytic anemia: His hemoglobin has been stable.  2.  Adrenal insufficiency following bilateral adrenalectomy currently on replacement hormone therapy.  He follows with endocrinology.  3.  History of metastatic renal cell carcinoma.  He had a left nephrectomy and adrenalectomy in 2000 and then a right adrenalectomy for metastatic disease in 2012.    · See below  4.  Hypertension:  Blood pressure has been a little high. Advised previously to restart diuretic.  5.  Lower extremity edema:  No edema at this point.  6.  Chronic kidney disease: Status post left nephrectomy.  Creatinine remained stable.  7.  Dyspnea on exertion with right-sided chest pain with exertion: He had a negative cardiac evaluation by his cardiologist.  He is a former smoker.  PFTs 1/15/2020 with some air trapping, normal spirometry, no restriction, normal diffusion.  This resolved.  8.  Two new 6 mm nodules in the RUL and RLL, non-specific but because they are new they are concerning.  Stable on follow-up imaging  9.  Migratory skeletal pain: Metastatic disease apparent now on CT imaging and bone scan and now PET scan.  · Referred to radiation oncology   · Left leg pain has improved  · Back pain improved  · He continues hydrocodone/apap about three pills daily  10.  Constipation: Improved  11.  Headaches: CT head without evidence for metastatic disease but there are calvarial bone lesions that are likely causing his headaches.  No headache today.  12. Evidence for bony metastatic disease on CT and bone scan imaging from 9/28/2020.  Likely metastatic renal cell carcinoma.    · No soft tissue lesions on CT and PET imaging.  13.  Nausea and vomiting: Unclear etiology.  CT head negative.  · Prescription for ondansetron ODT tablets 8 mg every 8 hours as  needed.  This is helping with his nausea.  · Nausea well controlled  14.  Nocturnal cough: He will resume his cough drops.  We can prescribe something if necessary. Not discussed today.  15.  Insomnia: He does have a sleep aid to use and I encouraged him to use this if needed as well.  16. Heartburn: off his PPI. Taking calcium. Advised bid H2B.  17. Mild hypomagnesemia: monitor. If muscle cramps advised to call us.    PLAN:   1. Continue hydrocodone/acetaminophen as needed for pain.  He can take 2 tablets at a time if needed.  Refilled today.  2. Continue ondansetron as needed for nausea. Refilled today  3. Continue Votrient at 800 mg daily .    4. Continue diuretic and BP meds  5. I will see him back in about 6 weeks for follow-up with labs.    You have chosen to receive care through a telephone visit. Do you consent to use a telephone visit for your medical care today? Yes    15 minutes

## 2020-12-07 ENCOUNTER — MEDICATION THERAPY MANAGEMENT (OUTPATIENT)
Dept: PHARMACY | Facility: HOSPITAL | Age: 75
End: 2020-12-07

## 2020-12-07 NOTE — PROGRESS NOTES
[facility] Lab Review- Votrient       12/2/2020   WBC 3.40 - 10.80 10*3/mm3 3.17 (A)   Neutrophils Absolute 1.70 - 7.00 10*3/mm3 1.68 (A)   Hemoglobin 13.0 - 17.7 g/dL 12.5 (A)   Hematocrit 37.5 - 51.0 % 37.3 (A)   Platelets 140 - 450 10*3/mm3 94 (A)   Creatinine 0.76 - 1.27 mg/dL 1.13   eGFR Non African Am >60 mL/min/1.73 63   BUN 8 - 23 mg/dL 12   Sodium 136 - 145 mmol/L 143   Potassium 3.5 - 5.2 mmol/L 4.0   Glucose 65 - 99 mg/dL 108 (A)   Magnesium 1.6 - 2.4 mg/dL 1.5 (A)   Calcium 8.6 - 10.5 mg/dL 9.5   Albumin 3.50 - 5.20 g/dL 4.20   Total Protein 6.0 - 8.5 g/dL 6.8   AST (SGOT) 1 - 40 U/L 26   ALT (SGPT) 1 - 41 U/L 28   Alkaline Phosphatase 39 - 117 U/L 173 (A)   Total Bilirubin 0.0 - 1.2 mg/dL 0.8   TSH 0.270 - 4.200 uIU/mL 3.040   Phosphorus 2.5 - 4.5 mg/dL 4.0     MD dictation noted. Pharmacy will continue to follow.     Thanks,   Mary Ko, PharmD

## 2020-12-08 RX ORDER — CLOPIDOGREL BISULFATE 75 MG/1
75 TABLET ORAL DAILY
Qty: 90 TABLET | Refills: 3 | Status: SHIPPED | OUTPATIENT
Start: 2020-12-08 | End: 2022-03-04

## 2020-12-08 RX ORDER — HYDROCORTISONE 10 MG/1
TABLET ORAL
Qty: 135 TABLET | Refills: 1 | Status: SHIPPED | OUTPATIENT
Start: 2020-12-08 | End: 2021-08-02 | Stop reason: SDUPTHER

## 2020-12-08 RX ORDER — HYDROCORTISONE 10 MG/1
TABLET ORAL
Qty: 135 TABLET | Refills: 0 | OUTPATIENT
Start: 2020-12-08

## 2020-12-29 RX ORDER — ROSUVASTATIN CALCIUM 20 MG/1
20 TABLET, COATED ORAL DAILY
Qty: 90 TABLET | Refills: 1 | Status: SHIPPED | OUTPATIENT
Start: 2020-12-29

## 2020-12-29 RX ORDER — CLONIDINE HYDROCHLORIDE 0.2 MG/1
0.2 TABLET ORAL DAILY
Qty: 90 TABLET | Refills: 1 | Status: SHIPPED | OUTPATIENT
Start: 2020-12-29 | End: 2021-09-09 | Stop reason: SDUPTHER

## 2020-12-29 RX ORDER — ISOSORBIDE MONONITRATE 30 MG/1
30 TABLET, EXTENDED RELEASE ORAL 2 TIMES DAILY
Qty: 180 TABLET | Refills: 1 | Status: SHIPPED | OUTPATIENT
Start: 2020-12-29 | End: 2021-09-09 | Stop reason: SDUPTHER

## 2020-12-29 RX ORDER — AMLODIPINE BESYLATE 10 MG/1
10 TABLET ORAL DAILY
Qty: 90 TABLET | Refills: 1 | Status: SHIPPED | OUTPATIENT
Start: 2020-12-29 | End: 2021-09-26 | Stop reason: SDUPTHER

## 2020-12-29 RX ORDER — ALLOPURINOL 300 MG/1
300 TABLET ORAL DAILY
Qty: 90 TABLET | Refills: 1 | Status: SHIPPED | OUTPATIENT
Start: 2020-12-29 | End: 2021-09-26 | Stop reason: SDUPTHER

## 2020-12-30 ENCOUNTER — MEDICATION THERAPY MANAGEMENT (OUTPATIENT)
Dept: PHARMACY | Facility: HOSPITAL | Age: 75
End: 2020-12-30

## 2020-12-30 RX ORDER — FINASTERIDE 5 MG/1
5 TABLET, FILM COATED ORAL DAILY
Qty: 90 TABLET | Refills: 0 | Status: SHIPPED | OUTPATIENT
Start: 2020-12-30 | End: 2021-06-06 | Stop reason: SDUPTHER

## 2020-12-30 NOTE — PROGRESS NOTES
[facility] telephone follow up- Votrient     No answer today.  direct line [phone].     Thanks,   Shruthi ChristineD

## 2021-01-06 ENCOUNTER — OFFICE VISIT (OUTPATIENT)
Dept: FAMILY MEDICINE CLINIC | Facility: CLINIC | Age: 76
End: 2021-01-06

## 2021-01-06 ENCOUNTER — MEDICATION THERAPY MANAGEMENT (OUTPATIENT)
Dept: PHARMACY | Facility: HOSPITAL | Age: 76
End: 2021-01-06

## 2021-01-06 VITALS
HEART RATE: 73 BPM | HEIGHT: 72 IN | TEMPERATURE: 97.3 F | DIASTOLIC BLOOD PRESSURE: 88 MMHG | BODY MASS INDEX: 30.02 KG/M2 | OXYGEN SATURATION: 97 % | WEIGHT: 221.6 LBS | SYSTOLIC BLOOD PRESSURE: 144 MMHG

## 2021-01-06 DIAGNOSIS — C79.51 BONE METASTASES: ICD-10-CM

## 2021-01-06 DIAGNOSIS — I10 ESSENTIAL HYPERTENSION: Primary | ICD-10-CM

## 2021-01-06 DIAGNOSIS — C64.9 METASTATIC RENAL CELL CARCINOMA, UNSPECIFIED LATERALITY (HCC): ICD-10-CM

## 2021-01-06 DIAGNOSIS — R97.20 ELEVATED PSA: ICD-10-CM

## 2021-01-06 PROBLEM — E87.6 DECREASED POTASSIUM IN THE BLOOD: Status: ACTIVE | Noted: 2021-01-06

## 2021-01-06 PROBLEM — G45.9 TIA (TRANSIENT ISCHEMIC ATTACK): Status: ACTIVE | Noted: 2017-08-29

## 2021-01-06 PROBLEM — R05.9 COUGH: Status: ACTIVE | Noted: 2021-01-06

## 2021-01-06 PROBLEM — Z78.9 STATIN INTOLERANCE: Status: ACTIVE | Noted: 2018-02-23

## 2021-01-06 PROBLEM — M10.9 GOUT: Status: ACTIVE | Noted: 2021-01-06

## 2021-01-06 PROBLEM — H93.13 TINNITUS OF BOTH EARS: Status: ACTIVE | Noted: 2021-01-06

## 2021-01-06 PROBLEM — E66.9 OBESITY (BMI 30-39.9): Status: ACTIVE | Noted: 2017-08-29

## 2021-01-06 PROBLEM — Z95.5 PRESENCE OF STENT IN CORONARY ARTERY: Status: ACTIVE | Noted: 2021-01-06

## 2021-01-06 PROBLEM — E55.9 VITAMIN D DEFICIENCY: Status: ACTIVE | Noted: 2020-05-29

## 2021-01-06 PROBLEM — R50.9 FEBRILE ILLNESS, ACUTE: Status: ACTIVE | Noted: 2017-03-04

## 2021-01-06 PROBLEM — M54.9 BACK ACHE: Status: ACTIVE | Noted: 2017-03-17

## 2021-01-06 PROBLEM — J44.9 COPD (CHRONIC OBSTRUCTIVE PULMONARY DISEASE): Status: ACTIVE | Noted: 2021-01-06

## 2021-01-06 PROBLEM — IMO0001 AORTIC SCLEROSIS: Status: ACTIVE | Noted: 2021-01-06

## 2021-01-06 PROBLEM — R11.2 INTRACTABLE NAUSEA AND VOMITING: Status: ACTIVE | Noted: 2017-03-05

## 2021-01-06 PROBLEM — R79.89 ACTH ELEVATION: Status: ACTIVE | Noted: 2020-09-11

## 2021-01-06 PROBLEM — E27.0 ACTH ELEVATION: Status: ACTIVE | Noted: 2020-09-11

## 2021-01-06 PROBLEM — E89.6 H/O TOTAL ADRENALECTOMY (HCC): Status: ACTIVE | Noted: 2017-03-17

## 2021-01-06 PROCEDURE — 99214 OFFICE O/P EST MOD 30 MIN: CPT | Performed by: NURSE PRACTITIONER

## 2021-01-06 RX ORDER — VALSARTAN 160 MG/1
TABLET ORAL
Qty: 180 TABLET | Refills: 1 | Status: SHIPPED | OUTPATIENT
Start: 2021-01-06 | End: 2022-05-29 | Stop reason: SDUPTHER

## 2021-01-06 RX ORDER — ERGOCALCIFEROL 1.25 MG/1
CAPSULE ORAL
Qty: 28 CAPSULE | Refills: 0 | Status: SHIPPED | OUTPATIENT
Start: 2021-01-06 | End: 2021-06-26 | Stop reason: SDUPTHER

## 2021-01-06 NOTE — PROGRESS NOTES
[facility] telephone follow up- Votrient    Mr. Krishna is doing well today. He continues on Votrient 800 mg daily. He reports some pain in his back and occasional stomach pain. Otherwise, he feels well overall. He is concerned that his PSA on Monday resulted at >100. I encouraged him to speak with Dr. Cat regarding this at his next follow up appointment on 1/15. Mr. Krishna also tells me that his blood pressure medication was increased today by his APRN; medication list reflects this. He has no additional questions or concerns today. Pharmacy will continue to follow.     Thanks,   Mary Ko, PharmD

## 2021-01-06 NOTE — TELEPHONE ENCOUNTER
Votrient refill request rec electronically from [facility]. Per last office note from Dr Cat-pt is to continue 800 mg daily.    I have routed the rx to Dr Cat for signature. Once signed it will be escribed to [facility]    Confirmation rec that Dr Cat has signed the Votrient rx. This was escribed to [facility]

## 2021-01-06 NOTE — PROGRESS NOTES
Elevated blood pressure periods of blood pressure up to 180s over 99 range at times sometimes wakes him up no chest pain no shortness of breath    Taking valsartan 80 mg twice a day    He doubled up on it a couple days when needed and this took care of his blood pressure

## 2021-01-06 NOTE — PROGRESS NOTES
"Chief Complaint  Hypertension (follow up )    Subjective          Micah Krishna presents to [facility] for   Very pleasant patient here follow-up essential hypertension he is have periods of blood pressure up to the 180/99 range without chest pain shortness of breath weakness or other complaints several occasions it was during the night he has no history of sleep apnea no daytime somnolence feels good presently  He thinks is related to his chemo infusions he is seeing oncology Dr. Cat presently for metastatic renal cancer with metastasis to bone,  Takes valsartan 80 mg twice a day he is increased it to 2 tablets twice a day on several occasions and help bring his blood pressure down    Recent PSA greater than 100 Dr. Cat want him to make sure he discussed this with me at this time patient does not wish to see urology, as this would likely be prostate cancer  Bone metastasis, is related to his renal carcinoma at this time.    Urinary complaints  He has no cough congestion fever chills he has been social distancing trying to be optimistic          Hypertension  The current episode started more than 1 year ago. The problem has been waxing and waning since onset. The problem is resistant. Pertinent negatives include no anxiety, blurred vision, chest pain, neck pain, orthopnea, palpitations, peripheral edema, PND, shortness of breath or sweats. Agents associated with hypertension include steroids. Risk factors for coronary artery disease include diabetes mellitus, dyslipidemia, obesity and sedentary lifestyle. Compliance problems include exercise and medication side effects.        Objective   Vital Signs:   /88   Pulse 73   Temp 97.3 °F (36.3 °C) (Temporal)   Ht 182.9 cm (72.01\")   Wt 101 kg (221 lb 9.6 oz)   SpO2 97%   BMI 30.05 kg/m²     Physical Exam  Vitals signs reviewed.   Constitutional:       General: He is not in acute distress.     Appearance: He is " well-developed. He is not diaphoretic.   HENT:      Head: Normocephalic and atraumatic.      Nose: Nose normal.   Eyes:      Conjunctiva/sclera: Conjunctivae normal.      Pupils: Pupils are equal, round, and reactive to light.   Neck:      Musculoskeletal: Neck supple.      Vascular: No JVD.   Cardiovascular:      Rate and Rhythm: Normal rate and regular rhythm.      Heart sounds: Normal heart sounds. No murmur.   Pulmonary:      Effort: Pulmonary effort is normal. No respiratory distress.      Breath sounds: Normal breath sounds. No wheezing.   Abdominal:      General: Bowel sounds are normal. There is no distension.      Palpations: Abdomen is soft. There is no mass.      Tenderness: There is no abdominal tenderness. There is no guarding.      Hernia: No hernia is present.   Musculoskeletal:         General: No tenderness.   Lymphadenopathy:      Cervical: No cervical adenopathy.   Skin:     General: Skin is warm and dry.   Neurological:      Mental Status: He is alert and oriented to person, place, and time.   Psychiatric:         Behavior: Behavior normal.         Thought Content: Thought content normal.         Judgment: Judgment normal.        Result Review :                 Assessment and Plan    Problem List Items Addressed This Visit        Unprioritized    Essential hypertension - Primary    Relevant Medications    valsartan (DIOVAN) 160 MG tablet    Metastatic renal cell carcinoma (CMS/HCC)    Bone metastases (CMS/HCC)      Other Visit Diagnoses     Elevated PSA              Follow Up   No follow-ups on file.  Patient was given instructions and counseling regarding his condition or for health maintenance advice. Please see specific information pulled into the AVS if appropriate.     We will adjust patient's medication and he can increase his medication to valsartan 160 to 320 mg as needed  I have musa in his judgment and we discussed guidelines today  No need to adjust for borderline or mild elevations  we should treat trends    Most likely has prostate cancer  He declines any urology evaluation I would suggest he discuss this with Dr. Cat at the patient Dr. Cat feels like he should pursue evaluation and treatment of prostate cancer he should go forward otherwise he should focus on cancer at hand and recovery    Continue social distance he will let me know what I can do for him if I hear to think about vaccines for Covid I will certainly reach out to him    He will follow-up in 3 months  Sooner for any problems        Answers for HPI/ROS submitted by the patient on 12/30/2020   Hypertension  What is the primary reason for your visit?: High Blood Pressure

## 2021-01-06 NOTE — PATIENT INSTRUCTIONS
Discharge instructions    Valsartan 160 mg a day you can take this twice a day if need be  See me some blood pressure readings in 1 week    If you have blood pressure spikes frequently during the night, we should get a sleep study  Chest pain shortness of breath weakness emergency room    Recheck in 3 months  Vitamin D insufficient deficient      PSA elevated greater than 100 quite suspicious for prostate cancer follow-up with your oncologist to discuss your next

## 2021-01-15 ENCOUNTER — TRANSCRIBE ORDERS (OUTPATIENT)
Dept: ADMINISTRATIVE | Facility: HOSPITAL | Age: 76
End: 2021-01-15

## 2021-01-15 ENCOUNTER — OFFICE VISIT (OUTPATIENT)
Dept: ONCOLOGY | Facility: CLINIC | Age: 76
End: 2021-01-15

## 2021-01-15 ENCOUNTER — LAB (OUTPATIENT)
Dept: OTHER | Facility: HOSPITAL | Age: 76
End: 2021-01-15

## 2021-01-15 VITALS
WEIGHT: 217.6 LBS | DIASTOLIC BLOOD PRESSURE: 89 MMHG | BODY MASS INDEX: 29.47 KG/M2 | SYSTOLIC BLOOD PRESSURE: 160 MMHG | HEIGHT: 72 IN | RESPIRATION RATE: 16 BRPM | HEART RATE: 72 BPM | TEMPERATURE: 97.8 F | OXYGEN SATURATION: 97 %

## 2021-01-15 DIAGNOSIS — C64.9 METASTATIC RENAL CELL CARCINOMA, UNSPECIFIED LATERALITY (HCC): ICD-10-CM

## 2021-01-15 DIAGNOSIS — R93.89 ABNORMAL FINDINGS ON DIAGNOSTIC IMAGING OF OTHER SPECIFIED BODY STRUCTURES: ICD-10-CM

## 2021-01-15 DIAGNOSIS — R80.9 PROTEINURIA, UNSPECIFIED TYPE: ICD-10-CM

## 2021-01-15 DIAGNOSIS — Z01.818 OTHER SPECIFIED PRE-OPERATIVE EXAMINATION: Primary | ICD-10-CM

## 2021-01-15 DIAGNOSIS — R97.20 ELEVATED PSA: Primary | ICD-10-CM

## 2021-01-15 DIAGNOSIS — R97.20 ELEVATED PSA: ICD-10-CM

## 2021-01-15 DIAGNOSIS — C79.51 BONE METASTASES: ICD-10-CM

## 2021-01-15 DIAGNOSIS — Z85.528 HISTORY OF RENAL CELL CARCINOMA: ICD-10-CM

## 2021-01-15 LAB
ALBUMIN SERPL-MCNC: 4 G/DL (ref 3.5–5.2)
ALBUMIN/GLOB SERPL: 1.6 G/DL
ALP SERPL-CCNC: 113 U/L (ref 39–117)
ALT SERPL W P-5'-P-CCNC: 20 U/L (ref 1–41)
ANION GAP SERPL CALCULATED.3IONS-SCNC: 7.1 MMOL/L (ref 5–15)
AST SERPL-CCNC: 20 U/L (ref 1–40)
BACTERIA UR QL AUTO: NORMAL /HPF
BASOPHILS # BLD AUTO: 0.02 10*3/MM3 (ref 0–0.2)
BASOPHILS NFR BLD AUTO: 0.5 % (ref 0–1.5)
BILIRUB SERPL-MCNC: 0.9 MG/DL (ref 0–1.2)
BILIRUB UR QL STRIP: NEGATIVE
BUN SERPL-MCNC: 21 MG/DL (ref 8–23)
BUN/CREAT SERPL: 19.6 (ref 7–25)
CALCIUM SPEC-SCNC: 9.3 MG/DL (ref 8.6–10.5)
CHLORIDE SERPL-SCNC: 102 MMOL/L (ref 98–107)
CLARITY UR: CLEAR
CO2 SERPL-SCNC: 29.9 MMOL/L (ref 22–29)
COLOR UR: YELLOW
CREAT SERPL-MCNC: 1.07 MG/DL (ref 0.76–1.27)
DEPRECATED RDW RBC AUTO: 66.9 FL (ref 37–54)
EOSINOPHIL # BLD AUTO: 0.14 10*3/MM3 (ref 0–0.4)
EOSINOPHIL NFR BLD AUTO: 3.4 % (ref 0.3–6.2)
ERYTHROCYTE [DISTWIDTH] IN BLOOD BY AUTOMATED COUNT: 20.1 % (ref 12.3–15.4)
GFR SERPL CREATININE-BSD FRML MDRD: 67 ML/MIN/1.73
GLOBULIN UR ELPH-MCNC: 2.5 GM/DL
GLUCOSE SERPL-MCNC: 104 MG/DL (ref 65–99)
GLUCOSE UR STRIP-MCNC: NEGATIVE MG/DL
HCT VFR BLD AUTO: 37.7 % (ref 37.5–51)
HGB BLD-MCNC: 12.6 G/DL (ref 13–17.7)
HGB UR QL STRIP.AUTO: NEGATIVE
HYALINE CASTS UR QL AUTO: NORMAL /LPF
IMM GRANULOCYTES # BLD AUTO: 0.01 10*3/MM3 (ref 0–0.05)
IMM GRANULOCYTES NFR BLD AUTO: 0.2 % (ref 0–0.5)
KETONES UR QL STRIP: NEGATIVE
LEUKOCYTE ESTERASE UR QL STRIP.AUTO: NEGATIVE
LYMPHOCYTES # BLD AUTO: 1.05 10*3/MM3 (ref 0.7–3.1)
LYMPHOCYTES NFR BLD AUTO: 25.4 % (ref 19.6–45.3)
MAGNESIUM SERPL-MCNC: 1.5 MG/DL (ref 1.6–2.4)
MCH RBC QN AUTO: 30.6 PG (ref 26.6–33)
MCHC RBC AUTO-ENTMCNC: 33.4 G/DL (ref 31.5–35.7)
MCV RBC AUTO: 91.5 FL (ref 79–97)
MONOCYTES # BLD AUTO: 0.41 10*3/MM3 (ref 0.1–0.9)
MONOCYTES NFR BLD AUTO: 9.9 % (ref 5–12)
NEUTROPHILS NFR BLD AUTO: 2.51 10*3/MM3 (ref 1.7–7)
NEUTROPHILS NFR BLD AUTO: 60.6 % (ref 42.7–76)
NITRITE UR QL STRIP: NEGATIVE
NRBC BLD AUTO-RTO: 0 /100 WBC (ref 0–0.2)
PH UR STRIP.AUTO: 6 [PH] (ref 5–8)
PHOSPHATE SERPL-MCNC: 4.3 MG/DL (ref 2.5–4.5)
PLATELET # BLD AUTO: 87 10*3/MM3 (ref 140–450)
PMV BLD AUTO: 9.2 FL (ref 6–12)
POTASSIUM SERPL-SCNC: 3.7 MMOL/L (ref 3.5–5.2)
PROT SERPL-MCNC: 6.5 G/DL (ref 6–8.5)
PROT UR QL STRIP: ABNORMAL
PSA SERPL-MCNC: >100 NG/ML (ref 0–4)
RBC # BLD AUTO: 4.12 10*6/MM3 (ref 4.14–5.8)
RBC # UR: NORMAL /HPF
REF LAB TEST METHOD: NORMAL
SODIUM SERPL-SCNC: 139 MMOL/L (ref 136–145)
SP GR UR STRIP: 1.02 (ref 1–1.03)
SQUAMOUS #/AREA URNS HPF: NORMAL /HPF
TSH SERPL DL<=0.05 MIU/L-ACNC: 5.32 UIU/ML (ref 0.27–4.2)
UROBILINOGEN UR QL STRIP: ABNORMAL
WBC # BLD AUTO: 4.14 10*3/MM3 (ref 3.4–10.8)
WBC UR QL AUTO: NORMAL /HPF

## 2021-01-15 PROCEDURE — 81001 URINALYSIS AUTO W/SCOPE: CPT | Performed by: INTERNAL MEDICINE

## 2021-01-15 PROCEDURE — 85025 COMPLETE CBC W/AUTO DIFF WBC: CPT | Performed by: INTERNAL MEDICINE

## 2021-01-15 PROCEDURE — 84100 ASSAY OF PHOSPHORUS: CPT | Performed by: INTERNAL MEDICINE

## 2021-01-15 PROCEDURE — 84443 ASSAY THYROID STIM HORMONE: CPT | Performed by: INTERNAL MEDICINE

## 2021-01-15 PROCEDURE — G2212 PROLONG OUTPT/OFFICE VIS: HCPCS | Performed by: INTERNAL MEDICINE

## 2021-01-15 PROCEDURE — 84153 ASSAY OF PSA TOTAL: CPT | Performed by: INTERNAL MEDICINE

## 2021-01-15 PROCEDURE — 83735 ASSAY OF MAGNESIUM: CPT | Performed by: INTERNAL MEDICINE

## 2021-01-15 PROCEDURE — 36415 COLL VENOUS BLD VENIPUNCTURE: CPT

## 2021-01-15 PROCEDURE — 99215 OFFICE O/P EST HI 40 MIN: CPT | Performed by: INTERNAL MEDICINE

## 2021-01-15 PROCEDURE — 80053 COMPREHEN METABOLIC PANEL: CPT | Performed by: INTERNAL MEDICINE

## 2021-01-15 NOTE — H&P (VIEW-ONLY)
"[facility] GROUP OUTPATIENT FOLLOW UP CLINIC VISIT    REASON FOR FOLLOW-UP:    1.  History of metastatic clear cell renal cell carcinoma.  All known disease had previously been resected..  2.  Pulmonary metastases discovered July 2020.  Bone metastases discovered September 2020.  3.  Votrient initiated 10/16/2020  4. Palliative radiation for pain complete on 11/10/2020    HISTO 2 RY OF PRESENT ILLNESS:  Micah Krishna is a 75 y.o. male who returns today for follow up of the above issue.      Continues Votrient 800 mg daily.    He tolerates this very well at this point.  He is having a little bit of worsening right shoulder discomfort which is easily controlled with pain medication. Energy level is reasonable. He is losing weight with diet and exercise.      REVIEW OF SYSTEMS:  Mild increase in right shoulder pain    Vitals:    01/15/21 0807   BP: 160/89   Pulse: 72   Resp: 16   Temp: 97.8 °F (36.6 °C)   TempSrc: Temporal   SpO2: 97%   Weight: 98.7 kg (217 lb 9.6 oz)   Height: 182.9 cm (72.01\")   PainSc: 0-No pain  Comment: bone mestates       PHYSICAL EXAMINATION:  General:  No acute distress, awake, alert and oriented  Skin:  Warm and dry, no visible rash  HEENT:  Normocephalic/atraumatic.  Wearing a facemask.  Chest:  Normal respiratory effort. CTAB  Heart: RRR  Extremities:  No visible clubbing, cyanosis, or edema  Neuro/psych:  Grossly non-focal.  Normal mood and affect.        DIAGNOSTIC DATA:  Urinalysis With Microscopic - Urine, Clean Catch (01/15/2021 09:15)  PSA Diagnostic (01/15/2021 08:07)  TSH (01/15/2021 08:07)  Phosphorus (01/15/2021 08:07)  Magnesium (01/15/2021 08:07)  Comprehensive Metabolic Panel (01/15/2021 08:07)  CBC & Differential (01/15/2021 08:07)        IMAGING:    NM Pet Skull Base To Mid Thigh (10/08/2020 08:37)    I did rereview his PET scan from 10/8/2020 today.    ASSESSMENT:  This is a 75 y.o. male with:    *Mild normocytic anemia: His hemoglobin has been stable.    *Adrenal " insufficiency following bilateral adrenalectomy   · On replacement hormone therapy.    · He follows with endocrinology.    *History of metastatic renal cell carcinoma.  He had a left nephrectomy and adrenalectomy in 2000 and then a right adrenalectomy for metastatic disease in 2012.      *New metastatic disease diagnosed as he presented with worsening pain  · CT C/A/P 7/6/2020 with a RUL sub 6 mm pulmonary nodule in the RUL, stable since 1/17/2018, new 6 mm nodules in the medial RLL and RUL,  LLL nodule unchanged since 7/17/2018.  Healing left ninth rib fracture.  · CT chest 9/28/2020 with stable lung nodules, pathologic fracture of the right 8th rib, abnormal appearance of T8, narrowing of the thecal sack at this level, additional lucencies at several vertebral bodies such as T7 and T11, subacute healing fractures in the anterior right 6th rib and lateral left 9th rib.  · CT head 9/28/2020 with calvarial metastatic disease involving the frontal and occipital bones.  · Bone scan 9/28/2020 with multifocal uptake consistent with metastatic disease. Anterior frontal bone, upper C spine at C1 or C2, T and L spine at T3, T8, T11, multifocal rib uptake, abnormal uptake in the iliac wings/bodies and left femoral heads/acetabulum, distal left clavicle and both humeral heads.   · PET scan from 10/8/2020.  Multifocal bone metastases throughout the skeleton.  Pathologic rib and vertebral body fracture.  Moderate FDG uptake in the right femoral neck with some sclerosis, new since 7/6/2020.  Mottled appearance of L3 and T8 vertebral bodies with pathologic compression fractures.  L3 fracture is a burst fracture with 20 to 25% loss of height and 4 to 5 mm of retropulsion in the central spinal canal.  Pathologic compression fracture at T8 with 10% loss of height.  New findings since 7/6/2020.  FDG avid soft tissue nodule extending into the central canal along the posterior aspect of the thecal sac at T8.  FDG uptake in the left  clavicle.  Compression fracture of the right posterior eighth rib.  · Votrient initiated 10/16/2020  · Palliative radiation for pain complete on 11/10/2020    *Chronic kidney disease:   · Status post left nephrectomy.    · Creatinine has been stable.    *Migratory skeletal pain:   · Metastatic disease apparent now on CT imaging and bone scan and now PET scan.  · Completed radiation   · Left leg pain has improved  · Back pain improved  · He continues hydrocodone/apap about three pills daily    *Nausea and vomiting:   · Unclear etiology.  CT head negative.  · Prescription for ondansetron ODT tablets 8 mg every 8 hours as needed.  This is helping with his nausea.  · Nausea well controlled    *Heartburn: off his PPI. Taking calcium. Advised bid H2B  .  *Mild hypomagnesemia: monitor. If muscle cramps advised to call us.    *Elevated PSA:   · He has a history of very mild elevation of his PSA in the past with a PSA of 5.92 on 10/17/2018.    · His PSA on 1/4/2021 was greater than 100  · PSA repeated today is also greater than 100  · He is completely asymptomatic without clinical evidence for prostatitis.    · I did communicate with Dr. Zapata with radiology and all findings on imaging would support more metastatic renal cell carcinoma rather than metastatic prostate cancer since the bony lesions are lytic.  There is nothing obvious in his prostate on current imaging.  · I communicated with Dr. Cabrera with urology.  He is in agreement with a bone biopsy and he will see him in the office for evaluation.  · One of the bony lesions is amenable to CT-guided biopsy per Dr. Zapata.    *Proteinuria: Urinalysis today shows greater than 300 mg/dL of protein (3+).  We will check a 24-hour urine protein level.  This may be secondary to Votrient.    PLAN:   1. Continue hydrocodone/acetaminophen as needed for pain.    2. Continue ondansetron as needed for nausea.  3. Continue Votrient at 800 mg daily  4. 24-hour urine protein  5. I  will communicate with Dr. Cabrera with urology  6. The patient may require a CT-guided bone biopsy  7. Bone biopsy of a pelvic metastatic lesion  8. Plan CT and bone scan images in the next 1-2 weeks  9. I will see him back in about 2 weeks for follow-up with labs to review his imaging.    I spent 69 minutes in this visit today reviewing his record, speaking with him, examining him, documenting the encounter, and coordinating care with other physicians.

## 2021-01-18 ENCOUNTER — MEDICATION THERAPY MANAGEMENT (OUTPATIENT)
Dept: PHARMACY | Facility: HOSPITAL | Age: 76
End: 2021-01-18

## 2021-01-18 ENCOUNTER — TELEPHONE (OUTPATIENT)
Dept: ONCOLOGY | Facility: CLINIC | Age: 76
End: 2021-01-18

## 2021-01-18 ENCOUNTER — LAB (OUTPATIENT)
Dept: LAB | Facility: HOSPITAL | Age: 76
End: 2021-01-18

## 2021-01-18 DIAGNOSIS — Z01.818 OTHER SPECIFIED PRE-OPERATIVE EXAMINATION: ICD-10-CM

## 2021-01-18 PROCEDURE — U0004 COV-19 TEST NON-CDC HGH THRU: HCPCS

## 2021-01-18 PROCEDURE — C9803 HOPD COVID-19 SPEC COLLECT: HCPCS

## 2021-01-18 NOTE — TELEPHONE ENCOUNTER
----- Message from Delmar Cat MD sent at 1/15/2021  2:15 PM EST -----  Regarding: bone bx and urology  Yee,    In addition to the prior checkout orders, he needs a bone biopsy and referral to urology.  I have placed the orders for both of these.    Thanks, ROSANNE

## 2021-01-19 LAB — SARS-COV-2 RNA RESP QL NAA+PROBE: NOT DETECTED

## 2021-01-20 ENCOUNTER — HOSPITAL ENCOUNTER (OUTPATIENT)
Dept: CT IMAGING | Facility: HOSPITAL | Age: 76
Discharge: HOME | End: 2021-01-20
Admitting: INTERNAL MEDICINE

## 2021-01-20 ENCOUNTER — APPOINTMENT (OUTPATIENT)
Dept: CT IMAGING | Facility: HOSPITAL | Age: 76
End: 2021-01-20

## 2021-01-20 VITALS
DIASTOLIC BLOOD PRESSURE: 86 MMHG | TEMPERATURE: 97.7 F | SYSTOLIC BLOOD PRESSURE: 141 MMHG | RESPIRATION RATE: 18 BRPM | BODY MASS INDEX: 29.12 KG/M2 | HEART RATE: 75 BPM | WEIGHT: 215 LBS | HEIGHT: 72 IN | OXYGEN SATURATION: 97 %

## 2021-01-20 DIAGNOSIS — C64.9 METASTATIC RENAL CELL CARCINOMA, UNSPECIFIED LATERALITY (HCC): ICD-10-CM

## 2021-01-20 DIAGNOSIS — R97.20 ELEVATED PSA: ICD-10-CM

## 2021-01-20 DIAGNOSIS — C79.51 BONE METASTASES: ICD-10-CM

## 2021-01-20 LAB
INR PPP: 1.1 (ref 0.8–1.2)
PROTHROMBIN TIME: 13.4 SECONDS (ref 12.8–15.2)

## 2021-01-20 PROCEDURE — 77012 CT SCAN FOR NEEDLE BIOPSY: CPT

## 2021-01-20 PROCEDURE — 88305 TISSUE EXAM BY PATHOLOGIST: CPT | Performed by: INTERNAL MEDICINE

## 2021-01-20 PROCEDURE — 25010000003 LIDOCAINE 1 % SOLUTION: Performed by: RADIOLOGY

## 2021-01-20 PROCEDURE — 85610 PROTHROMBIN TIME: CPT

## 2021-01-20 PROCEDURE — 88342 IMHCHEM/IMCYTCHM 1ST ANTB: CPT | Performed by: INTERNAL MEDICINE

## 2021-01-20 PROCEDURE — 88341 IMHCHEM/IMCYTCHM EA ADD ANTB: CPT | Performed by: INTERNAL MEDICINE

## 2021-01-20 RX ORDER — SODIUM CHLORIDE 0.9 % (FLUSH) 0.9 %
10 SYRINGE (ML) INJECTION AS NEEDED
Status: CANCELLED | OUTPATIENT
Start: 2021-01-25

## 2021-01-20 RX ORDER — SODIUM CHLORIDE 9 MG/ML
25 INJECTION, SOLUTION INTRAVENOUS ONCE
Status: CANCELLED | OUTPATIENT
Start: 2021-01-25 | End: 2021-01-20

## 2021-01-20 RX ORDER — SODIUM CHLORIDE 0.9 % (FLUSH) 0.9 %
3 SYRINGE (ML) INJECTION EVERY 12 HOURS SCHEDULED
Status: CANCELLED | OUTPATIENT
Start: 2021-01-25

## 2021-01-20 RX ORDER — LIDOCAINE HYDROCHLORIDE 10 MG/ML
20 INJECTION, SOLUTION INFILTRATION; PERINEURAL ONCE
Status: COMPLETED | OUTPATIENT
Start: 2021-01-20 | End: 2021-01-20

## 2021-01-20 RX ADMIN — LIDOCAINE HYDROCHLORIDE 20 ML: 10 INJECTION, SOLUTION INFILTRATION; PERINEURAL at 08:28

## 2021-01-20 NOTE — DISCHARGE INSTRUCTIONS
EDUCATION /DISCHARGE INSTRUCTIONS  CT/US guided biopsy:  A biopsy is a procedure done to remove tissue for further analysis.  Before images are taken to locate the target area.  Images can be obtained using ultrasound, CT or MRI.  A physician will clean your skin with antiseptic soap, place a sterile towel around the site and administer a local anesthetic to numb the area.  The physician will then insert a special needle.  Sometimes images are taken of the needle after it is inserted to ensure the needle is in the correct area to be biopsied.   A sample is obtained and sent to the laboratory for study.  Occasionally the laboratory is unable to make a diagnosis from the sample and the procedure may need to be repeated.  Within a week the radiologist will send a report to your physician.  A pathologist will also examine the tissue and send a report.    Risks of the procedure include but are not limited to:   *  Bleeding    *  Infection   *  Puncture of surrounding organs *  Death       Benefits of the procedure:  Using x-ray helps to locate the area that requires a biopsy. The procedure is less invasive than a surgical procedure, there are no large incisions and it does not require anesthesia.    Alternatives to the procedure:  A biopsy can be performed surgically.  Risks of a surgical biopsy include exposure to anesthesia, infection, excessive bleeding and injury to abdominal organs.  A benefit of surgical biopsy is the ability to see the area to be biopsied and remove of a larger piece of tissue.    THIS EDUCATION INFORMATION WAS REVIEWED PRIOR TO PROCEDURE AND CONSENT. Patient initials__________________Time___07:30________________    Post Procedure:    *  Expect the biopsy site may be tender up to one week.    *  Rest today (no pushing pulling or straining).   *  Slowly increase activity tomorrow.    *  If you received sedation do not drive for 24 hours.   *  Keep dressing clean and dry.   *  Leave dressing on  puncture site for 24 hours.    *  You may shower when dressing removed.  Call your doctor if experiencing:   *  Signs of infection such as redness, swelling, excessive pain and / or foul        smelling drainage from the puncture site.   *  Chills or fever over 101 degrees (by mouth).   *  Unrelieved pain.   *  Any new or severe symptoms.   *  If experiencing sudden / severe shortness of breath or chest pain go to the       nearest emergency room.   Following the procedure:     Follow-up with the ordering physician as directed.    Continue to take other medications as directed by your physician unless    otherwise instructed.   If applicable, resume taking your blood thinners or Aspirin on _January 21, 2021 after 9:00 AM__________.    If you have any concerns please call the Radiology Nurses Desk at [phone].  You are the most important factor in your recovery.  Follow the above instructions carefully.

## 2021-01-20 NOTE — NURSING NOTE
Pt in xray triage for bone bx of pelvic region  Pt wearing mask; RN wearing mask and goggles for all interactions

## 2021-01-21 ENCOUNTER — TELEPHONE (OUTPATIENT)
Dept: INTERVENTIONAL RADIOLOGY/VASCULAR | Facility: HOSPITAL | Age: 76
End: 2021-01-21

## 2021-01-21 LAB
CYTO UR: NORMAL
LAB AP CASE REPORT: NORMAL
LAB AP CLINICAL INFORMATION: NORMAL
LAB AP DIAGNOSIS COMMENT: NORMAL
LAB AP SPECIAL STAINS: NORMAL
PATH REPORT.FINAL DX SPEC: NORMAL
PATH REPORT.GROSS SPEC: NORMAL

## 2021-01-21 RX ORDER — FLUDROCORTISONE ACETATE 0.1 MG/1
0.1 TABLET ORAL DAILY
Qty: 90 TABLET | Refills: 3 | Status: SHIPPED | OUTPATIENT
Start: 2021-01-21 | End: 2021-10-27 | Stop reason: SDUPTHER

## 2021-01-25 ENCOUNTER — HOSPITAL ENCOUNTER (OUTPATIENT)
Dept: NUCLEAR MEDICINE | Facility: HOSPITAL | Age: 76
Discharge: HOME | End: 2021-01-25

## 2021-01-25 DIAGNOSIS — C79.51 BONE METASTASES: ICD-10-CM

## 2021-01-25 DIAGNOSIS — C64.9 METASTATIC RENAL CELL CARCINOMA, UNSPECIFIED LATERALITY (HCC): ICD-10-CM

## 2021-01-25 DIAGNOSIS — R97.20 ELEVATED PSA: ICD-10-CM

## 2021-01-25 PROCEDURE — 0 TECHNETIUM MEDRONATE KIT: Performed by: INTERNAL MEDICINE

## 2021-01-25 PROCEDURE — A9503 TC99M MEDRONATE: HCPCS | Performed by: INTERNAL MEDICINE

## 2021-01-25 PROCEDURE — 78306 BONE IMAGING WHOLE BODY: CPT

## 2021-01-25 RX ORDER — TC 99M MEDRONATE 20 MG/10ML
21.9 INJECTION, POWDER, LYOPHILIZED, FOR SOLUTION INTRAVENOUS
Status: COMPLETED | OUTPATIENT
Start: 2021-01-25 | End: 2021-01-25

## 2021-01-25 RX ADMIN — Medication 21.9 MILLICURIE: at 08:50

## 2021-01-26 ENCOUNTER — HOSPITAL ENCOUNTER (OUTPATIENT)
Dept: CT IMAGING | Facility: HOSPITAL | Age: 76
Discharge: HOME | End: 2021-01-26
Admitting: INTERNAL MEDICINE

## 2021-01-26 DIAGNOSIS — C79.51 BONE METASTASES: ICD-10-CM

## 2021-01-26 DIAGNOSIS — C64.9 METASTATIC RENAL CELL CARCINOMA, UNSPECIFIED LATERALITY (HCC): ICD-10-CM

## 2021-01-26 DIAGNOSIS — R97.20 ELEVATED PSA: ICD-10-CM

## 2021-01-26 PROCEDURE — 74176 CT ABD & PELVIS W/O CONTRAST: CPT

## 2021-01-26 PROCEDURE — 71250 CT THORAX DX C-: CPT

## 2021-01-26 PROCEDURE — 0 DIATRIZOATE MEGLUMINE & SODIUM PER 1 ML: Performed by: INTERNAL MEDICINE

## 2021-01-26 RX ADMIN — DIATRIZOATE MEGLUMINE AND DIATRIZOATE SODIUM 30 ML: 660; 100 LIQUID ORAL; RECTAL at 08:25

## 2021-01-29 ENCOUNTER — LAB (OUTPATIENT)
Dept: OTHER | Facility: HOSPITAL | Age: 76
End: 2021-01-29

## 2021-01-29 ENCOUNTER — OFFICE VISIT (OUTPATIENT)
Dept: ONCOLOGY | Facility: CLINIC | Age: 76
End: 2021-01-29

## 2021-01-29 ENCOUNTER — DOCUMENTATION (OUTPATIENT)
Dept: ONCOLOGY | Facility: CLINIC | Age: 76
End: 2021-01-29

## 2021-01-29 VITALS
HEART RATE: 61 BPM | DIASTOLIC BLOOD PRESSURE: 93 MMHG | TEMPERATURE: 97.3 F | RESPIRATION RATE: 16 BRPM | WEIGHT: 221.8 LBS | OXYGEN SATURATION: 96 % | BODY MASS INDEX: 30.04 KG/M2 | HEIGHT: 72 IN | SYSTOLIC BLOOD PRESSURE: 167 MMHG

## 2021-01-29 DIAGNOSIS — R97.20 ELEVATED PSA: ICD-10-CM

## 2021-01-29 DIAGNOSIS — C79.51 PROSTATE CANCER METASTATIC TO BONE (HCC): ICD-10-CM

## 2021-01-29 DIAGNOSIS — C79.51 BONE METASTASES: Primary | ICD-10-CM

## 2021-01-29 DIAGNOSIS — C64.9 METASTATIC RENAL CELL CARCINOMA, UNSPECIFIED LATERALITY (HCC): ICD-10-CM

## 2021-01-29 DIAGNOSIS — R80.9 PROTEINURIA, UNSPECIFIED TYPE: ICD-10-CM

## 2021-01-29 DIAGNOSIS — Z85.528 HISTORY OF RENAL CELL CARCINOMA: ICD-10-CM

## 2021-01-29 DIAGNOSIS — C61 PROSTATE CANCER METASTATIC TO BONE (HCC): ICD-10-CM

## 2021-01-29 DIAGNOSIS — C79.51 BONE METASTASES: ICD-10-CM

## 2021-01-29 LAB
BASOPHILS # BLD AUTO: 0.02 10*3/MM3 (ref 0–0.2)
BASOPHILS NFR BLD AUTO: 0.5 % (ref 0–1.5)
DEPRECATED RDW RBC AUTO: 65.1 FL (ref 37–54)
EOSINOPHIL # BLD AUTO: 0.1 10*3/MM3 (ref 0–0.4)
EOSINOPHIL NFR BLD AUTO: 2.5 % (ref 0.3–6.2)
ERYTHROCYTE [DISTWIDTH] IN BLOOD BY AUTOMATED COUNT: 19.3 % (ref 12.3–15.4)
HCT VFR BLD AUTO: 35.5 % (ref 37.5–51)
HGB BLD-MCNC: 12.2 G/DL (ref 13–17.7)
IMM GRANULOCYTES # BLD AUTO: 0.04 10*3/MM3 (ref 0–0.05)
IMM GRANULOCYTES NFR BLD AUTO: 1 % (ref 0–0.5)
LYMPHOCYTES # BLD AUTO: 0.71 10*3/MM3 (ref 0.7–3.1)
LYMPHOCYTES NFR BLD AUTO: 18.1 % (ref 19.6–45.3)
MCH RBC QN AUTO: 31.5 PG (ref 26.6–33)
MCHC RBC AUTO-ENTMCNC: 34.4 G/DL (ref 31.5–35.7)
MCV RBC AUTO: 91.7 FL (ref 79–97)
MONOCYTES # BLD AUTO: 0.35 10*3/MM3 (ref 0.1–0.9)
MONOCYTES NFR BLD AUTO: 8.9 % (ref 5–12)
NEUTROPHILS NFR BLD AUTO: 2.71 10*3/MM3 (ref 1.7–7)
NEUTROPHILS NFR BLD AUTO: 69 % (ref 42.7–76)
NRBC BLD AUTO-RTO: 0 /100 WBC (ref 0–0.2)
PLATELET # BLD AUTO: 106 10*3/MM3 (ref 140–450)
PMV BLD AUTO: 9.5 FL (ref 6–12)
PROT 24H UR-MRATE: 494.4 MG/24HOURS (ref 0–150)
RBC # BLD AUTO: 3.87 10*6/MM3 (ref 4.14–5.8)
WBC # BLD AUTO: 3.93 10*3/MM3 (ref 3.4–10.8)

## 2021-01-29 PROCEDURE — 85025 COMPLETE CBC W/AUTO DIFF WBC: CPT | Performed by: INTERNAL MEDICINE

## 2021-01-29 PROCEDURE — 81050 URINALYSIS VOLUME MEASURE: CPT | Performed by: INTERNAL MEDICINE

## 2021-01-29 PROCEDURE — 84156 ASSAY OF PROTEIN URINE: CPT | Performed by: INTERNAL MEDICINE

## 2021-01-29 PROCEDURE — 36415 COLL VENOUS BLD VENIPUNCTURE: CPT

## 2021-01-29 PROCEDURE — 99215 OFFICE O/P EST HI 40 MIN: CPT | Performed by: INTERNAL MEDICINE

## 2021-01-29 RX ORDER — HYDROCODONE BITARTRATE AND ACETAMINOPHEN 5; 325 MG/1; MG/1
TABLET ORAL
Qty: 120 TABLET | Refills: 0 | Status: SHIPPED | OUTPATIENT
Start: 2021-01-29 | End: 2021-03-05 | Stop reason: SDUPTHER

## 2021-01-29 NOTE — PROGRESS NOTES
Staff message rec from Dr Cat-He asked me to cancel the Votrient rx for pt.    I have notified BrinnavaRrita SP that pt will no longer need the Votrient. The rx has been canceled.    Delmar Cat MD sent to Radha Oneill; [facility]             Radha,     He has metastatic prostate ca, not kidney. No more Votrient.       Thanks!  ROSANNE

## 2021-01-29 NOTE — PROGRESS NOTES
"[facility] GROUP OUTPATIENT FOLLOW UP CLINIC VISIT    REASON FOR FOLLOW-UP:    1.  History of metastatic clear cell renal cell carcinoma.  All known disease had previously been resected..  2.  Pulmonary metastases discovered July 2020.  Bone metastases discovered September 2020.  3.  Votrient initiated 10/16/2020  4. Palliative radiation for pain complete on 11/10/2020    HISTO 2 RY OF PRESENT ILLNESS:  Micah Krishna is a 75 y.o. male who returns today for follow up of the above issue.      Continues Votrient 800 mg daily.  He has tolerated this well.    He returns today to follow-up his bone biopsy which does confirm metastatic prostate cancer.  He also returns to follow-up imaging.    He is having some right hip pain.  Pain medication he takes for this is helping.  Otherwise his pain is acceptable.  Energy level is reasonable.  He continues diet and exercise.      REVIEW OF SYSTEMS:  Right hip pain    Vitals:    01/29/21 0822   BP: 167/93  Comment: just took bp meds   Pulse: 61   Resp: 16   Temp: 97.3 °F (36.3 °C)   TempSrc: Temporal   SpO2: 96%   Weight: 101 kg (221 lb 12.8 oz)   Height: 182.9 cm (72.01\")   PainSc: 0-No pain  Comment: elevated PSA       PHYSICAL EXAMINATION:  General:  No acute distress, awake, alert and oriented  Skin:  Warm and dry, no visible rash  HEENT:  Normocephalic/atraumatic.  Wearing a facemask.  Chest:  Normal respiratory effort.   Extremities:  No visible clubbing, cyanosis, or edema  Neuro/psych:  Grossly non-focal.  Normal mood and affect.        DIAGNOSTIC DATA:  Tissue Pathology Exam (01/20/2021 08:31)    24-hour urine protein pending      IMAGING:    CT Abdomen Pelvis Without Contrast (01/26/2021 09:37)  CT CHEST WITHOUT CONTRAST DIAGNOSTIC (01/26/2021 09:37)  NM Bone Scan Whole Body (01/25/2021 12:24)    I personally reviewed CT and bone scan images.  Widespread metastatic bone disease.    ASSESSMENT:  This is a 75 y.o. male with:    *Mild normocytic anemia: His " hemoglobin has been stable.    *Adrenal insufficiency following bilateral adrenalectomy   · On replacement hormone therapy.    · He follows with endocrinology.    *History of metastatic renal cell carcinoma.  He had a left nephrectomy and adrenalectomy in 2000 and then a right adrenalectomy for metastatic disease in 2012.      *New metastatic bone disease diagnosed as he presented with worsening pain  · Presumed to be metastatic renal cell carcinoma.  · Imaging discussed with radiology.  Imaging consistent with metastatic renal cell carcinoma  · However, his PSA was noted to be greater than 100  · As discussed below, bone biopsy subsequently confirmed metastatic prostate cancer  · CT C/A/P 7/6/2020 with a RUL sub 6 mm pulmonary nodule in the RUL, stable since 1/17/2018, new 6 mm nodules in the medial RLL and RUL,  LLL nodule unchanged since 7/17/2018.  Healing left ninth rib fracture.  · CT chest 9/28/2020 with stable lung nodules, pathologic fracture of the right 8th rib, abnormal appearance of T8, narrowing of the thecal sack at this level, additional lucencies at several vertebral bodies such as T7 and T11, subacute healing fractures in the anterior right 6th rib and lateral left 9th rib.  · CT head 9/28/2020 with calvarial metastatic disease involving the frontal and occipital bones.  · Bone scan 9/28/2020 with multifocal uptake consistent with metastatic disease. Anterior frontal bone, upper C spine at C1 or C2, T and L spine at T3, T8, T11, multifocal rib uptake, abnormal uptake in the iliac wings/bodies and left femoral heads/acetabulum, distal left clavicle and both humeral heads.   · PET scan from 10/8/2020.  Multifocal bone metastases throughout the skeleton.  Pathologic rib and vertebral body fracture.  Moderate FDG uptake in the right femoral neck with some sclerosis, new since 7/6/2020.  Mottled appearance of L3 and T8 vertebral bodies with pathologic compression fractures.  L3 fracture is a burst  fracture with 20 to 25% loss of height and 4 to 5 mm of retropulsion in the central spinal canal.  Pathologic compression fracture at T8 with 10% loss of height.  New findings since 7/6/2020.  FDG avid soft tissue nodule extending into the central canal along the posterior aspect of the thecal sac at T8.  FDG uptake in the left clavicle.  Compression fracture of the right posterior eighth rib.  · Votrient initiated 10/16/2020  · Palliative radiation for pain complete on 11/10/2020  · He did subsequently presented with an elevated PSA  · Bone biopsy performed on 1/20/2021 does confirm metastatic prostate cancer.    *Chronic kidney disease:   · Status post left nephrectomy.    · Creatinine has been stable.    *Migratory skeletal pain:   · Metastatic disease apparent now on CT imaging and bone scan and now PET scan.  · Completed radiation   · Left leg pain has improved  · Back pain improved  · He continues hydrocodone/apap about three pills daily    *Nausea and vomiting:   · Unclear etiology.  CT head negative.  · Prescription for ondansetron ODT tablets 8 mg every 8 hours as needed.  This is helping with his nausea.  · Nausea well controlled    *Heartburn: off his PPI. Taking calcium. Advised bid H2B.  Can resume PPI after he stops Votrient  .  *Mild hypomagnesemia: monitor. If muscle cramps advised to call us.    *Elevated PSA:   · He has a history of very mild elevation of his PSA in the past with a PSA of 5.92 on 10/17/2018.    · His PSA on 1/4/2021 was greater than 100  · PSA repeated today is also greater than 100  · He is completely asymptomatic without clinical evidence for prostatitis.    · I did communicate with Dr. Zapata with radiology and all findings on imaging would support more metastatic renal cell carcinoma rather than metastatic prostate cancer since the bony lesions are lytic.  There is nothing obvious in his prostate on current imaging.  · I communicated with Dr. Cabrera with urology.  He is in  agreement with a bone biopsy and he will see him in the office for evaluation.  · One of the bony lesions is amenable to CT-guided biopsy per Dr. Zapata.  · CT-guided needle biopsy of the bone performed on 1/20/2021 confirms metastatic prostate cancer.    *Proteinuria: Urinalysis showed greater than 300 mg/dL of protein (3+).  Likely due to Votrient.  24-hour urine protein pending.    *New 40% T2 compression fracture.  Asymptomatic.  He is not interested in further radiation at this time.  Might consider referral to neurosurgery should he become more symptomatic or if this worsens.    PLAN:   1. Discontinue Votrient  2. Next week I will give him a Firmagon injection and an Xgeva injection  3. 4 weeks after that start Eligard and continue Xgeva  4. Treatment will then be with Eligard every 3 months and Xgeva monthly for now  5. I will also inquire with our pharmacists about starting abiraterone.  It is noted that he is on adrenal replacement therapy already for adrenal insufficiency.  6. To summarize, Firmagon and Xgeva in 1 week.  In 5 weeks, Eligard and Xgeva.  Xgeva in 9 weeks.  I will see him back in 13 weeks with Xgeva.    40 minutes today

## 2021-02-05 ENCOUNTER — INFUSION (OUTPATIENT)
Dept: ONCOLOGY | Facility: HOSPITAL | Age: 76
End: 2021-02-05

## 2021-02-05 ENCOUNTER — LAB (OUTPATIENT)
Dept: OTHER | Facility: HOSPITAL | Age: 76
End: 2021-02-05

## 2021-02-05 VITALS
WEIGHT: 221 LBS | TEMPERATURE: 97.8 F | DIASTOLIC BLOOD PRESSURE: 91 MMHG | SYSTOLIC BLOOD PRESSURE: 172 MMHG | BODY MASS INDEX: 29.97 KG/M2 | OXYGEN SATURATION: 99 % | HEART RATE: 70 BPM

## 2021-02-05 DIAGNOSIS — C79.51 PROSTATE CANCER METASTATIC TO BONE (HCC): ICD-10-CM

## 2021-02-05 DIAGNOSIS — C79.51 PROSTATE CANCER METASTATIC TO BONE (HCC): Primary | ICD-10-CM

## 2021-02-05 DIAGNOSIS — C61 PROSTATE CANCER METASTATIC TO BONE (HCC): ICD-10-CM

## 2021-02-05 DIAGNOSIS — C61 PROSTATE CANCER METASTATIC TO BONE (HCC): Primary | ICD-10-CM

## 2021-02-05 LAB
ALBUMIN SERPL-MCNC: 4.1 G/DL (ref 3.5–5.2)
ALBUMIN/GLOB SERPL: 1.8 G/DL
ALP SERPL-CCNC: 94 U/L (ref 39–117)
ALT SERPL W P-5'-P-CCNC: 15 U/L (ref 1–41)
ANION GAP SERPL CALCULATED.3IONS-SCNC: 6.2 MMOL/L (ref 5–15)
AST SERPL-CCNC: 16 U/L (ref 1–40)
BASOPHILS # BLD AUTO: 0.02 10*3/MM3 (ref 0–0.2)
BASOPHILS NFR BLD AUTO: 0.5 % (ref 0–1.5)
BILIRUB SERPL-MCNC: 0.6 MG/DL (ref 0–1.2)
BUN SERPL-MCNC: 14 MG/DL (ref 8–23)
BUN/CREAT SERPL: 13.6 (ref 7–25)
CALCIUM SPEC-SCNC: 9.5 MG/DL (ref 8.6–10.5)
CHLORIDE SERPL-SCNC: 105 MMOL/L (ref 98–107)
CO2 SERPL-SCNC: 31.8 MMOL/L (ref 22–29)
CREAT SERPL-MCNC: 1.03 MG/DL (ref 0.76–1.27)
DEPRECATED RDW RBC AUTO: 65.8 FL (ref 37–54)
EOSINOPHIL # BLD AUTO: 0.1 10*3/MM3 (ref 0–0.4)
EOSINOPHIL NFR BLD AUTO: 2.3 % (ref 0.3–6.2)
ERYTHROCYTE [DISTWIDTH] IN BLOOD BY AUTOMATED COUNT: 18.5 % (ref 12.3–15.4)
GFR SERPL CREATININE-BSD FRML MDRD: 70 ML/MIN/1.73
GLOBULIN UR ELPH-MCNC: 2.3 GM/DL
GLUCOSE SERPL-MCNC: 116 MG/DL (ref 65–99)
HCT VFR BLD AUTO: 34.8 % (ref 37.5–51)
HGB BLD-MCNC: 11.3 G/DL (ref 13–17.7)
IMM GRANULOCYTES # BLD AUTO: 0.01 10*3/MM3 (ref 0–0.05)
IMM GRANULOCYTES NFR BLD AUTO: 0.2 % (ref 0–0.5)
LYMPHOCYTES # BLD AUTO: 0.76 10*3/MM3 (ref 0.7–3.1)
LYMPHOCYTES NFR BLD AUTO: 17.8 % (ref 19.6–45.3)
MAGNESIUM SERPL-MCNC: 1.5 MG/DL (ref 1.6–2.4)
MCH RBC QN AUTO: 31.3 PG (ref 26.6–33)
MCHC RBC AUTO-ENTMCNC: 32.5 G/DL (ref 31.5–35.7)
MCV RBC AUTO: 96.4 FL (ref 79–97)
MONOCYTES # BLD AUTO: 0.42 10*3/MM3 (ref 0.1–0.9)
MONOCYTES NFR BLD AUTO: 9.9 % (ref 5–12)
NEUTROPHILS NFR BLD AUTO: 2.95 10*3/MM3 (ref 1.7–7)
NEUTROPHILS NFR BLD AUTO: 69.3 % (ref 42.7–76)
NRBC BLD AUTO-RTO: 0 /100 WBC (ref 0–0.2)
PHOSPHATE SERPL-MCNC: 4.7 MG/DL (ref 2.5–4.5)
PLATELET # BLD AUTO: 98 10*3/MM3 (ref 140–450)
PMV BLD AUTO: 8.8 FL (ref 6–12)
POTASSIUM SERPL-SCNC: 3.7 MMOL/L (ref 3.5–5.2)
PROT SERPL-MCNC: 6.4 G/DL (ref 6–8.5)
RBC # BLD AUTO: 3.61 10*6/MM3 (ref 4.14–5.8)
SODIUM SERPL-SCNC: 143 MMOL/L (ref 136–145)
WBC # BLD AUTO: 4.26 10*3/MM3 (ref 3.4–10.8)

## 2021-02-05 PROCEDURE — 96372 THER/PROPH/DIAG INJ SC/IM: CPT

## 2021-02-05 PROCEDURE — 80053 COMPREHEN METABOLIC PANEL: CPT | Performed by: INTERNAL MEDICINE

## 2021-02-05 PROCEDURE — 84100 ASSAY OF PHOSPHORUS: CPT | Performed by: INTERNAL MEDICINE

## 2021-02-05 PROCEDURE — 83735 ASSAY OF MAGNESIUM: CPT | Performed by: INTERNAL MEDICINE

## 2021-02-05 PROCEDURE — 96401 CHEMO ANTI-NEOPL SQ/IM: CPT

## 2021-02-05 PROCEDURE — 85025 COMPLETE CBC W/AUTO DIFF WBC: CPT | Performed by: INTERNAL MEDICINE

## 2021-02-05 PROCEDURE — 25010000002 MAGNESIUM SULFATE 2 GM/50ML SOLUTION: Performed by: INTERNAL MEDICINE

## 2021-02-05 PROCEDURE — 96365 THER/PROPH/DIAG IV INF INIT: CPT

## 2021-02-05 PROCEDURE — 25010000002 DENOSUMAB 120 MG/1.7ML SOLUTION: Performed by: INTERNAL MEDICINE

## 2021-02-05 PROCEDURE — 25010000002 DEGARELIX ACETATE 120 MG/VIAL RECONSTITUTED SOLUTION: Performed by: INTERNAL MEDICINE

## 2021-02-05 PROCEDURE — 36415 COLL VENOUS BLD VENIPUNCTURE: CPT

## 2021-02-05 RX ORDER — MAGNESIUM SULFATE HEPTAHYDRATE 40 MG/ML
2 INJECTION, SOLUTION INTRAVENOUS ONCE
Status: COMPLETED | OUTPATIENT
Start: 2021-02-05 | End: 2021-02-05

## 2021-02-05 RX ORDER — MAGNESIUM OXIDE 400 MG/1
400 TABLET ORAL DAILY
Status: CANCELLED | OUTPATIENT
Start: 2021-02-05

## 2021-02-05 RX ORDER — MAGNESIUM OXIDE 400 MG/1
400 TABLET ORAL DAILY
Qty: 30 TABLET | Refills: 2 | Status: SHIPPED | OUTPATIENT
Start: 2021-02-05 | End: 2021-04-27

## 2021-02-05 RX ADMIN — DEGARELIX 120 MG: KIT at 08:36

## 2021-02-05 RX ADMIN — DENOSUMAB 120 MG: 120 INJECTION SUBCUTANEOUS at 09:50

## 2021-02-05 RX ADMIN — MAGNESIUM SULFATE IN WATER 2 G: 40 INJECTION, SOLUTION INTRAVENOUS at 08:42

## 2021-02-05 NOTE — NURSING NOTE
Pt here for first time xgeva and firmagon. Information packet from chemo care given to pt with indications and side effects reviewed with pt. Pt denies any recent major dental work. CMP, Mag and Phos with noted mag of 1.5 reviewed with Dr Cat who spoke with Brittnee in pharmacy with verbal order to give 2 gm of magnesium IV per protocol and to send in a prescription for mag ox 400 mg po daily. Pharmacy verified with pt and instructions given to pt regarding prescription. Pt vu      Lab Results   Component Value Date    GLUCOSE 116 (H) 02/05/2021    BUN 14 02/05/2021    CREATININE 1.03 02/05/2021    EGFRIFNONA 70 02/05/2021    EGFRIFAFRI 61 05/09/2019    BCR 13.6 02/05/2021    K 3.7 02/05/2021    CO2 31.8 (H) 02/05/2021    CALCIUM 9.5 02/05/2021    PROTENTOTREF 7.1 01/18/2019    ALBUMIN 4.10 02/05/2021    LABIL2 1.6 01/04/2021    AST 16 02/05/2021    ALT 15 02/05/2021

## 2021-02-25 ENCOUNTER — IMMUNIZATION (OUTPATIENT)
Dept: VACCINE CLINIC | Facility: HOSPITAL | Age: 76
End: 2021-02-25

## 2021-02-25 PROCEDURE — 91300 HC SARSCOV02 VAC 30MCG/0.3ML IM: CPT | Performed by: INTERNAL MEDICINE

## 2021-02-25 PROCEDURE — 0001A: CPT | Performed by: INTERNAL MEDICINE

## 2021-03-05 ENCOUNTER — INFUSION (OUTPATIENT)
Dept: ONCOLOGY | Facility: HOSPITAL | Age: 76
End: 2021-03-05

## 2021-03-05 ENCOUNTER — OFFICE VISIT (OUTPATIENT)
Dept: ONCOLOGY | Facility: CLINIC | Age: 76
End: 2021-03-05

## 2021-03-05 ENCOUNTER — LAB (OUTPATIENT)
Dept: OTHER | Facility: HOSPITAL | Age: 76
End: 2021-03-05

## 2021-03-05 VITALS
BODY MASS INDEX: 31.21 KG/M2 | RESPIRATION RATE: 17 BRPM | SYSTOLIC BLOOD PRESSURE: 159 MMHG | DIASTOLIC BLOOD PRESSURE: 95 MMHG | HEIGHT: 72 IN | HEART RATE: 72 BPM | OXYGEN SATURATION: 97 % | WEIGHT: 230.4 LBS | TEMPERATURE: 96.9 F

## 2021-03-05 DIAGNOSIS — C79.51 PROSTATE CANCER METASTATIC TO BONE (HCC): ICD-10-CM

## 2021-03-05 DIAGNOSIS — C61 PROSTATE CANCER METASTATIC TO BONE (HCC): Primary | ICD-10-CM

## 2021-03-05 DIAGNOSIS — C61 PROSTATE CANCER METASTATIC TO BONE (HCC): ICD-10-CM

## 2021-03-05 DIAGNOSIS — Z85.528 HISTORY OF RENAL CELL CARCINOMA: ICD-10-CM

## 2021-03-05 DIAGNOSIS — C79.51 PROSTATE CANCER METASTATIC TO BONE (HCC): Primary | ICD-10-CM

## 2021-03-05 LAB
ALBUMIN SERPL-MCNC: 4.2 G/DL (ref 3.5–5.2)
ALBUMIN/GLOB SERPL: 1.6 G/DL
ALP SERPL-CCNC: 74 U/L (ref 39–117)
ALT SERPL W P-5'-P-CCNC: 14 U/L (ref 1–41)
ANION GAP SERPL CALCULATED.3IONS-SCNC: 9.3 MMOL/L (ref 5–15)
AST SERPL-CCNC: 15 U/L (ref 1–40)
BASOPHILS # BLD AUTO: 0.03 10*3/MM3 (ref 0–0.2)
BASOPHILS NFR BLD AUTO: 0.7 % (ref 0–1.5)
BILIRUB SERPL-MCNC: 0.4 MG/DL (ref 0–1.2)
BUN SERPL-MCNC: 21 MG/DL (ref 8–23)
BUN/CREAT SERPL: 21.2 (ref 7–25)
CALCIUM SPEC-SCNC: 9.4 MG/DL (ref 8.6–10.5)
CHLORIDE SERPL-SCNC: 108 MMOL/L (ref 98–107)
CO2 SERPL-SCNC: 23.7 MMOL/L (ref 22–29)
CREAT SERPL-MCNC: 0.99 MG/DL (ref 0.76–1.27)
DEPRECATED RDW RBC AUTO: 46.9 FL (ref 37–54)
EOSINOPHIL # BLD AUTO: 0.18 10*3/MM3 (ref 0–0.4)
EOSINOPHIL NFR BLD AUTO: 4.1 % (ref 0.3–6.2)
ERYTHROCYTE [DISTWIDTH] IN BLOOD BY AUTOMATED COUNT: 13.2 % (ref 12.3–15.4)
GFR SERPL CREATININE-BSD FRML MDRD: 73 ML/MIN/1.73
GLOBULIN UR ELPH-MCNC: 2.6 GM/DL
GLUCOSE SERPL-MCNC: 171 MG/DL (ref 65–99)
HCT VFR BLD AUTO: 35 % (ref 37.5–51)
HGB BLD-MCNC: 11.6 G/DL (ref 13–17.7)
IMM GRANULOCYTES # BLD AUTO: 0.02 10*3/MM3 (ref 0–0.05)
IMM GRANULOCYTES NFR BLD AUTO: 0.5 % (ref 0–0.5)
LYMPHOCYTES # BLD AUTO: 1.02 10*3/MM3 (ref 0.7–3.1)
LYMPHOCYTES NFR BLD AUTO: 23.1 % (ref 19.6–45.3)
MAGNESIUM SERPL-MCNC: 1.8 MG/DL (ref 1.6–2.4)
MCH RBC QN AUTO: 31.6 PG (ref 26.6–33)
MCHC RBC AUTO-ENTMCNC: 33.1 G/DL (ref 31.5–35.7)
MCV RBC AUTO: 95.4 FL (ref 79–97)
MONOCYTES # BLD AUTO: 0.43 10*3/MM3 (ref 0.1–0.9)
MONOCYTES NFR BLD AUTO: 9.7 % (ref 5–12)
NEUTROPHILS NFR BLD AUTO: 2.74 10*3/MM3 (ref 1.7–7)
NEUTROPHILS NFR BLD AUTO: 61.9 % (ref 42.7–76)
NRBC BLD AUTO-RTO: 0 /100 WBC (ref 0–0.2)
PHOSPHATE SERPL-MCNC: 3.7 MG/DL (ref 2.5–4.5)
PLATELET # BLD AUTO: 116 10*3/MM3 (ref 140–450)
PMV BLD AUTO: 9.6 FL (ref 6–12)
POTASSIUM SERPL-SCNC: 4 MMOL/L (ref 3.5–5.2)
PROT SERPL-MCNC: 6.8 G/DL (ref 6–8.5)
PSA SERPL-MCNC: 1.17 NG/ML (ref 0–4)
RBC # BLD AUTO: 3.67 10*6/MM3 (ref 4.14–5.8)
SODIUM SERPL-SCNC: 141 MMOL/L (ref 136–145)
WBC # BLD AUTO: 4.42 10*3/MM3 (ref 3.4–10.8)

## 2021-03-05 PROCEDURE — 84153 ASSAY OF PSA TOTAL: CPT | Performed by: INTERNAL MEDICINE

## 2021-03-05 PROCEDURE — 96372 THER/PROPH/DIAG INJ SC/IM: CPT

## 2021-03-05 PROCEDURE — 80053 COMPREHEN METABOLIC PANEL: CPT | Performed by: INTERNAL MEDICINE

## 2021-03-05 PROCEDURE — 85025 COMPLETE CBC W/AUTO DIFF WBC: CPT | Performed by: INTERNAL MEDICINE

## 2021-03-05 PROCEDURE — 84100 ASSAY OF PHOSPHORUS: CPT | Performed by: INTERNAL MEDICINE

## 2021-03-05 PROCEDURE — 36415 COLL VENOUS BLD VENIPUNCTURE: CPT

## 2021-03-05 PROCEDURE — 25010000002 DENOSUMAB 120 MG/1.7ML SOLUTION: Performed by: INTERNAL MEDICINE

## 2021-03-05 PROCEDURE — 25010000002 LEUPROLIDE ACETATE (3 MONTH) PER 7.5 MG: Performed by: INTERNAL MEDICINE

## 2021-03-05 PROCEDURE — 99214 OFFICE O/P EST MOD 30 MIN: CPT | Performed by: NURSE PRACTITIONER

## 2021-03-05 PROCEDURE — 96402 CHEMO HORMON ANTINEOPL SQ/IM: CPT

## 2021-03-05 PROCEDURE — 83735 ASSAY OF MAGNESIUM: CPT | Performed by: INTERNAL MEDICINE

## 2021-03-05 RX ADMIN — DENOSUMAB 120 MG: 120 INJECTION SUBCUTANEOUS at 08:49

## 2021-03-05 RX ADMIN — LEUPROLIDE ACETATE 22.5 MG: KIT at 09:15

## 2021-03-05 NOTE — NURSING NOTE
Seen per JUSTIN Austin; to proceed with first dose Lupron and monthly Xgeva. Info printed and reviewed from [facility] on Lupron; copy to him. He will receive Lupron every 3 months and Xgeva monthly. Lab results given .

## 2021-03-05 NOTE — PROGRESS NOTES
"[facility] GROUP OUTPATIENT FOLLOW UP CLINIC VISIT    REASON FOR FOLLOW-UP:    1.  History of metastatic clear cell renal cell carcinoma.  All known disease had previously been resected..  2.  Pulmonary metastases discovered July 2020.  Bone metastases discovered September 2020.  3.  Votrient initiated 10/16/2020  4. Palliative radiation for pain complete on 11/10/2020    HISTO 2 RY OF PRESENT ILLNESS:  Micah Krishna is a 75 y.o. male who returns today for follow up of the above issue.  The patient is here today for his first dose of Eligard and ongoing monthly Xgeva.  Overall, he is feeling well.  He does have chronic hip pain though this is well controlled with Norco 2-3 times per day.  He denies any new pain.  He is eating and drinking adequately.  His bowels and urination are regular.    He has noted some progressive exertional dyspnea with exercise.  He did have PFTs performed and will contact his primary care provider about obtaining an inhaler for exercise.  He denies any associated chest pressure.    His CBC today is reasonably stable.  He has no other concerns.        REVIEW OF SYSTEMS:  Right hip pain, stable.  Unchanged.    Vitals:    03/05/21 0738   BP: 159/95   Pulse: 72   Resp: 17   Temp: 96.9 °F (36.1 °C)   TempSrc: Skin   SpO2: 97%   Weight: 105 kg (230 lb 6.4 oz)   Height: 182.9 cm (72.01\")   PainSc: 0-No pain       Physical Exam  Vitals signs reviewed.   Constitutional:       General: He is not in acute distress.     Appearance: He is well-developed. He is not diaphoretic.   HENT:      Head: Normocephalic.   Eyes:      Conjunctiva/sclera: Conjunctivae normal.      Pupils: Pupils are equal, round, and reactive to light.   Cardiovascular:      Rate and Rhythm: Normal rate and regular rhythm.      Heart sounds: Normal heart sounds.   Pulmonary:      Effort: No respiratory distress.      Breath sounds: No wheezing or rales.   Abdominal:      General: Bowel sounds are normal. There is no " distension.      Palpations: Abdomen is soft. There is no mass.      Tenderness: There is no abdominal tenderness. There is no guarding or rebound.      Hernia: No hernia is present.   Musculoskeletal: Normal range of motion.   Lymphadenopathy:      Head:      Right side of head: No preauricular, posterior auricular or occipital adenopathy.      Left side of head: No preauricular, posterior auricular or occipital adenopathy.      Cervical: No cervical adenopathy.   Skin:     General: Skin is warm and dry.      Coloration: Skin is not pale.      Findings: No erythema or rash.      Nails: There is no clubbing.     Neurological:      Mental Status: He is alert and oriented to person, place, and time.      Cranial Nerves: No cranial nerve deficit.      Coordination: Coordination normal.   Psychiatric:         Behavior: Behavior normal.         Thought Content: Thought content normal.         Judgment: Judgment normal.               DIAGNOSTIC DATA:  Tissue Pathology Exam (01/20/2021 08:31)    24-hour urine protein pending      IMAGING:    CT Abdomen Pelvis Without Contrast (01/26/2021 09:37)  CT CHEST WITHOUT CONTRAST DIAGNOSTIC (01/26/2021 09:37)  NM Bone Scan Whole Body (01/25/2021 12:24)    Dr. Cat reviewed CT and bone scan images.  Widespread metastatic bone disease.    ASSESSMENT:  This is a 75 y.o. male with:    *Mild normocytic anemia:   3/5/21.  Hemoglobin slightly improved to 11.6    *Adrenal insufficiency following bilateral adrenalectomy   · On replacement hormone therapy.    · He follows with endocrinology.    *History of metastatic renal cell carcinoma.  He had a left nephrectomy and adrenalectomy in 2000 and then a right adrenalectomy for metastatic disease in 2012.      *New metastatic bone disease diagnosed as he presented with worsening pain  · Presumed to be metastatic renal cell carcinoma.  · Imaging discussed with radiology.  Imaging consistent with metastatic renal cell carcinoma  · However, his  PSA was noted to be greater than 100  · As discussed below, bone biopsy subsequently confirmed metastatic prostate cancer  · CT C/A/P 7/6/2020 with a RUL sub 6 mm pulmonary nodule in the RUL, stable since 1/17/2018, new 6 mm nodules in the medial RLL and RUL,  LLL nodule unchanged since 7/17/2018.  Healing left ninth rib fracture.  · CT chest 9/28/2020 with stable lung nodules, pathologic fracture of the right 8th rib, abnormal appearance of T8, narrowing of the thecal sack at this level, additional lucencies at several vertebral bodies such as T7 and T11, subacute healing fractures in the anterior right 6th rib and lateral left 9th rib.  · CT head 9/28/2020 with calvarial metastatic disease involving the frontal and occipital bones.  · Bone scan 9/28/2020 with multifocal uptake consistent with metastatic disease. Anterior frontal bone, upper C spine at C1 or C2, T and L spine at T3, T8, T11, multifocal rib uptake, abnormal uptake in the iliac wings/bodies and left femoral heads/acetabulum, distal left clavicle and both humeral heads.   · PET scan from 10/8/2020.  Multifocal bone metastases throughout the skeleton.  Pathologic rib and vertebral body fracture.  Moderate FDG uptake in the right femoral neck with some sclerosis, new since 7/6/2020.  Mottled appearance of L3 and T8 vertebral bodies with pathologic compression fractures.  L3 fracture is a burst fracture with 20 to 25% loss of height and 4 to 5 mm of retropulsion in the central spinal canal.  Pathologic compression fracture at T8 with 10% loss of height.  New findings since 7/6/2020.  FDG avid soft tissue nodule extending into the central canal along the posterior aspect of the thecal sac at T8.  FDG uptake in the left clavicle.  Compression fracture of the right posterior eighth rib.  · Votrient initiated 10/16/2020  · Palliative radiation for pain complete on 11/10/2020  · He did subsequently presented with an elevated PSA  · Bone biopsy performed on  1/20/2021 does confirm metastatic prostate cancer.  · 3/5/21. The patient returns today for his first dose of Eligard and monthly Xgeva.  Overall, he is feeling well with the exception of chronic, right hip pain.  He has no new concerns today and we will proceed as scheduled    *Chronic kidney disease:   · Status post left nephrectomy.    · 3/5/21.  Creatinine 0.99    *Migratory skeletal pain:   · Metastatic disease apparent now on CT imaging and bone scan and now PET scan.  · Completed radiation   · Left leg pain has improved  · Back pain improved  · 3/5/21. He continues hydrocodone/apap 2-3 tablets per day    *Nausea and vomiting:   · Unclear etiology.  CT head negative.  · Prescription for ondansetron ODT tablets 8 mg every 8 hours as needed.  This is helping with his nausea.  · Nausea well controlled  · 3/5/21.  Not an issue today    *Heartburn: off his PPI. Taking calcium. Advised bid H2B.    Has resumed PPI with discontinuation of Votrient    *Mild hypomagnesemia: monitor. If muscle cramps advised to call us.  3/5/21.  Magnesium level has normalized to 1.8 today    *Elevated PSA:   · He has a history of very mild elevation of his PSA in the past with a PSA of 5.92 on 10/17/2018.    · His PSA on 1/4/2021 was greater than 100  · PSA repeated today is also greater than 100  · He is completely asymptomatic without clinical evidence for prostatitis.    · Dr. Cat did communicate with Dr. Zapata with radiology and all findings on imaging would support more metastatic renal cell carcinoma rather than metastatic prostate cancer since the bony lesions are lytic.  There is nothing obvious in his prostate on current imaging.  · Dr. Cat communicated with Dr. Cabrera with urology.  He is in agreement with a bone biopsy and he will see him in the office for evaluation.  · One of the bony lesions is amenable to CT-guided biopsy per Dr. Zapata.  · CT-guided needle biopsy of the bone performed on 1/20/2021 confirms  metastatic prostate cancer.  · 3/5/21.  Please see above.  The patient received 1 dose of Firmagon and now will begin monthly Eligard    *New 40% T2 compression fracture.  Asymptomatic.  He is not interested in further radiation at this time.  Might consider referral to neurosurgery should he become more symptomatic or if this worsens.    PLAN:   1. Proceed with Eligard and Xgeva today  2. Treatment will then be with Eligard every 3 months and Xgeva monthly for now  3. The patient return on April 2, 2021 for nurse practitioner reevaluation with me and his next dose of Xgeva   4. MD reevaluation on April 30, 2021 with Xgeva  5. The patient will continue Norco as needed for cancer related pain.  He does not require refills today  6. I have asked him to call our office with any new issues or concerns prior to his next visit.  He has verbalized understanding    -The patient is on drug therapy requiring extensive monitoring

## 2021-03-08 RX ORDER — HYDROCODONE BITARTRATE AND ACETAMINOPHEN 5; 325 MG/1; MG/1
TABLET ORAL
Qty: 120 TABLET | Refills: 0 | Status: SHIPPED | OUTPATIENT
Start: 2021-03-08 | End: 2021-04-30 | Stop reason: SDUPTHER

## 2021-03-18 ENCOUNTER — IMMUNIZATION (OUTPATIENT)
Dept: VACCINE CLINIC | Facility: HOSPITAL | Age: 76
End: 2021-03-18

## 2021-03-18 PROCEDURE — 91300 HC SARSCOV02 VAC 30MCG/0.3ML IM: CPT | Performed by: INTERNAL MEDICINE

## 2021-03-18 PROCEDURE — 0002A: CPT | Performed by: INTERNAL MEDICINE

## 2021-04-02 ENCOUNTER — OFFICE VISIT (OUTPATIENT)
Dept: ONCOLOGY | Facility: CLINIC | Age: 76
End: 2021-04-02

## 2021-04-02 ENCOUNTER — INFUSION (OUTPATIENT)
Dept: ONCOLOGY | Facility: HOSPITAL | Age: 76
End: 2021-04-02

## 2021-04-02 ENCOUNTER — LAB (OUTPATIENT)
Dept: OTHER | Facility: HOSPITAL | Age: 76
End: 2021-04-02

## 2021-04-02 VITALS
SYSTOLIC BLOOD PRESSURE: 127 MMHG | WEIGHT: 231.9 LBS | TEMPERATURE: 96.9 F | HEIGHT: 72 IN | DIASTOLIC BLOOD PRESSURE: 78 MMHG | HEART RATE: 76 BPM | BODY MASS INDEX: 31.41 KG/M2 | RESPIRATION RATE: 17 BRPM | OXYGEN SATURATION: 95 %

## 2021-04-02 DIAGNOSIS — C61 PROSTATE CANCER METASTATIC TO BONE (HCC): Primary | ICD-10-CM

## 2021-04-02 DIAGNOSIS — C79.51 PROSTATE CANCER METASTATIC TO BONE (HCC): ICD-10-CM

## 2021-04-02 DIAGNOSIS — C79.51 PROSTATE CANCER METASTATIC TO BONE (HCC): Primary | ICD-10-CM

## 2021-04-02 DIAGNOSIS — C64.9 METASTATIC RENAL CELL CARCINOMA, UNSPECIFIED LATERALITY (HCC): ICD-10-CM

## 2021-04-02 DIAGNOSIS — C61 PROSTATE CANCER METASTATIC TO BONE (HCC): ICD-10-CM

## 2021-04-02 LAB
ALBUMIN SERPL-MCNC: 4.2 G/DL (ref 3.5–5.2)
ALBUMIN/GLOB SERPL: 1.6 G/DL
ALP SERPL-CCNC: 58 U/L (ref 39–117)
ALT SERPL W P-5'-P-CCNC: 12 U/L (ref 1–41)
ANION GAP SERPL CALCULATED.3IONS-SCNC: 8.4 MMOL/L (ref 5–15)
AST SERPL-CCNC: 13 U/L (ref 1–40)
BASOPHILS # BLD AUTO: 0.04 10*3/MM3 (ref 0–0.2)
BASOPHILS NFR BLD AUTO: 0.8 % (ref 0–1.5)
BILIRUB SERPL-MCNC: 0.3 MG/DL (ref 0–1.2)
BUN SERPL-MCNC: 22 MG/DL (ref 8–23)
BUN/CREAT SERPL: 19.8 (ref 7–25)
CALCIUM SPEC-SCNC: 9.3 MG/DL (ref 8.6–10.5)
CHLORIDE SERPL-SCNC: 106 MMOL/L (ref 98–107)
CO2 SERPL-SCNC: 25.6 MMOL/L (ref 22–29)
CREAT SERPL-MCNC: 1.11 MG/DL (ref 0.76–1.27)
DEPRECATED RDW RBC AUTO: 43.9 FL (ref 37–54)
EOSINOPHIL # BLD AUTO: 0.22 10*3/MM3 (ref 0–0.4)
EOSINOPHIL NFR BLD AUTO: 4.3 % (ref 0.3–6.2)
ERYTHROCYTE [DISTWIDTH] IN BLOOD BY AUTOMATED COUNT: 13.1 % (ref 12.3–15.4)
GFR SERPL CREATININE-BSD FRML MDRD: 64 ML/MIN/1.73
GLOBULIN UR ELPH-MCNC: 2.7 GM/DL
GLUCOSE SERPL-MCNC: 168 MG/DL (ref 65–99)
HCT VFR BLD AUTO: 35.3 % (ref 37.5–51)
HGB BLD-MCNC: 11.8 G/DL (ref 13–17.7)
IMM GRANULOCYTES # BLD AUTO: 0.03 10*3/MM3 (ref 0–0.05)
IMM GRANULOCYTES NFR BLD AUTO: 0.6 % (ref 0–0.5)
LYMPHOCYTES # BLD AUTO: 1.02 10*3/MM3 (ref 0.7–3.1)
LYMPHOCYTES NFR BLD AUTO: 19.8 % (ref 19.6–45.3)
MAGNESIUM SERPL-MCNC: 1.9 MG/DL (ref 1.6–2.4)
MCH RBC QN AUTO: 30.6 PG (ref 26.6–33)
MCHC RBC AUTO-ENTMCNC: 33.4 G/DL (ref 31.5–35.7)
MCV RBC AUTO: 91.7 FL (ref 79–97)
MONOCYTES # BLD AUTO: 0.5 10*3/MM3 (ref 0.1–0.9)
MONOCYTES NFR BLD AUTO: 9.7 % (ref 5–12)
NEUTROPHILS NFR BLD AUTO: 3.35 10*3/MM3 (ref 1.7–7)
NEUTROPHILS NFR BLD AUTO: 64.8 % (ref 42.7–76)
NRBC BLD AUTO-RTO: 0 /100 WBC (ref 0–0.2)
PHOSPHATE SERPL-MCNC: 4.3 MG/DL (ref 2.5–4.5)
PLATELET # BLD AUTO: 122 10*3/MM3 (ref 140–450)
PMV BLD AUTO: 9.1 FL (ref 6–12)
POTASSIUM SERPL-SCNC: 4.3 MMOL/L (ref 3.5–5.2)
PROT SERPL-MCNC: 6.9 G/DL (ref 6–8.5)
PSA SERPL-MCNC: 0.28 NG/ML (ref 0–4)
RBC # BLD AUTO: 3.85 10*6/MM3 (ref 4.14–5.8)
SODIUM SERPL-SCNC: 140 MMOL/L (ref 136–145)
WBC # BLD AUTO: 5.16 10*3/MM3 (ref 3.4–10.8)

## 2021-04-02 PROCEDURE — 25010000002 DENOSUMAB 120 MG/1.7ML SOLUTION: Performed by: INTERNAL MEDICINE

## 2021-04-02 PROCEDURE — 85025 COMPLETE CBC W/AUTO DIFF WBC: CPT | Performed by: INTERNAL MEDICINE

## 2021-04-02 PROCEDURE — 84100 ASSAY OF PHOSPHORUS: CPT | Performed by: INTERNAL MEDICINE

## 2021-04-02 PROCEDURE — 80053 COMPREHEN METABOLIC PANEL: CPT | Performed by: INTERNAL MEDICINE

## 2021-04-02 PROCEDURE — 84153 ASSAY OF PSA TOTAL: CPT | Performed by: INTERNAL MEDICINE

## 2021-04-02 PROCEDURE — 83735 ASSAY OF MAGNESIUM: CPT | Performed by: INTERNAL MEDICINE

## 2021-04-02 PROCEDURE — 96372 THER/PROPH/DIAG INJ SC/IM: CPT

## 2021-04-02 PROCEDURE — 99214 OFFICE O/P EST MOD 30 MIN: CPT | Performed by: NURSE PRACTITIONER

## 2021-04-02 PROCEDURE — 36415 COLL VENOUS BLD VENIPUNCTURE: CPT

## 2021-04-02 RX ADMIN — DENOSUMAB 120 MG: 120 INJECTION SUBCUTANEOUS at 08:27

## 2021-04-02 NOTE — PROGRESS NOTES
"[facility] GROUP OUTPATIENT FOLLOW UP CLINIC VISIT    REASON FOR FOLLOW-UP:    1.  History of metastatic clear cell renal cell carcinoma.  All known disease had previously been resected..  2.  Pulmonary metastases discovered July 2020.  Bone metastases discovered September 2020.  3.  Votrient initiated 10/16/2020  4. Palliative radiation for pain complete on 11/10/2020  5.  1/4/2021 significantly elevated PSA > 100.  6.  1/20/2021 bone biopsy confirms metastatic prostate cancer    HISTORY OF PRESENT ILLNESS:  Micah Krishna is a 76 y.o. male with the above-mentioned history who returns today for follow-up due for monthly Xgeva.  He continues to report he is doing quite well.  Is completed both of his Covid vaccines without any side effects.  He continues to have intermittent pain, but it is easily controlled with hydrocodone.  No new complaints of pain today.  Patient has noticed significant issues with hot flashes multiple times a day.  He states that for now they are tolerable.    Patient does note some narcotic induced constipation which is relieved with MiraLAX.    Patient does report slightly increased shortness of breath with the cooler air this morning, but states he has COPD, and was recently started on inhaler by his primary care provider.      REVIEW OF SYSTEMS:  As per HPI    Vitals:    04/02/21 0743   BP: 127/78   Pulse: 76   Resp: 17   Temp: 96.9 °F (36.1 °C)   TempSrc: Skin   SpO2: 95%   Weight: 105 kg (231 lb 14.4 oz)   Height: 182.9 cm (72.01\")   PainSc: 0-No pain       Physical Exam  Vitals reviewed.   Constitutional:       General: He is not in acute distress.     Appearance: Normal appearance. He is well-developed.   HENT:      Head: Normocephalic and atraumatic.      Mouth/Throat:      Pharynx: No oropharyngeal exudate.   Eyes:      Pupils: Pupils are equal, round, and reactive to light.   Cardiovascular:      Rate and Rhythm: Normal rate and regular rhythm.      Heart sounds: Normal " heart sounds. No murmur heard.     Pulmonary:      Effort: Pulmonary effort is normal. No respiratory distress.      Breath sounds: Normal breath sounds. No wheezing, rhonchi or rales.   Abdominal:      General: Bowel sounds are normal. There is no distension.      Palpations: Abdomen is soft.   Musculoskeletal:         General: Normal range of motion.      Cervical back: Normal range of motion.   Skin:     General: Skin is warm and dry.      Findings: No rash.   Neurological:      Mental Status: He is alert and oriented to person, place, and time.         DIAGNOSTIC DATA:  PSA Diagnostic (04/02/2021 07:20)  Phosphorus (04/02/2021 07:20)  Magnesium (04/02/2021 07:20)  Comprehensive metabolic panel (04/02/2021 07:20)  CBC and Differential (04/02/2021 07:20)      IMAGING:    CT Abdomen Pelvis Without Contrast (01/26/2021 09:37)  CT CHEST WITHOUT CONTRAST DIAGNOSTIC (01/26/2021 09:37)  NM Bone Scan Whole Body (01/25/2021 12:24)    ASSESSMENT:  This is a 76 y.o. male with:    *Mild normocytic anemia:   3/5/21.  Hemoglobin slightly improved to 11.8    *Adrenal insufficiency following bilateral adrenalectomy   · On replacement hormone therapy.    · He follows with endocrinology.    *History of metastatic renal cell carcinoma.  He had a left nephrectomy and adrenalectomy in 2000 and then a right adrenalectomy for metastatic disease in 2012.      *New metastatic bone disease diagnosed as he presented with worsening pain  · Presumed to be metastatic renal cell carcinoma.  · Imaging discussed with radiology.  Imaging consistent with metastatic renal cell carcinoma  · However, his PSA was noted to be greater than 100  · As discussed below, bone biopsy subsequently confirmed metastatic prostate cancer  · CT C/A/P 7/6/2020 with a RUL sub 6 mm pulmonary nodule in the RUL, stable since 1/17/2018, new 6 mm nodules in the medial RLL and RUL,  LLL nodule unchanged since 7/17/2018.  Healing left ninth rib fracture.  · CT chest  9/28/2020 with stable lung nodules, pathologic fracture of the right 8th rib, abnormal appearance of T8, narrowing of the thecal sack at this level, additional lucencies at several vertebral bodies such as T7 and T11, subacute healing fractures in the anterior right 6th rib and lateral left 9th rib.  · CT head 9/28/2020 with calvarial metastatic disease involving the frontal and occipital bones.  · Bone scan 9/28/2020 with multifocal uptake consistent with metastatic disease. Anterior frontal bone, upper C spine at C1 or C2, T and L spine at T3, T8, T11, multifocal rib uptake, abnormal uptake in the iliac wings/bodies and left femoral heads/acetabulum, distal left clavicle and both humeral heads.   · PET scan from 10/8/2020.  Multifocal bone metastases throughout the skeleton.  Pathologic rib and vertebral body fracture.  Moderate FDG uptake in the right femoral neck with some sclerosis, new since 7/6/2020.  Mottled appearance of L3 and T8 vertebral bodies with pathologic compression fractures.  L3 fracture is a burst fracture with 20 to 25% loss of height and 4 to 5 mm of retropulsion in the central spinal canal.  Pathologic compression fracture at T8 with 10% loss of height.  New findings since 7/6/2020.  FDG avid soft tissue nodule extending into the central canal along the posterior aspect of the thecal sac at T8.  FDG uptake in the left clavicle.  Compression fracture of the right posterior eighth rib.  · Votrient initiated 10/16/2020  · Palliative radiation for pain complete on 11/10/2020  · He did subsequently presented with an elevated PSA >100.  · Bone biopsy performed on 1/20/2021 confirmed metastatic prostate cancer.  · Votrient discontinued  · 2/5/2021 patient given Firmagon and Xgeva.  With plans to start Eligard 4 weeks later, and continue monthly Xgeva.    · 3/5/21.First dose of Eligard and monthly Xgeva.  Eligard will be given every 3 months.  · 4/2/2021 here for her monthly Xgeva, overall doing  well.  No new complaints of pain today.  Patient extremely surprised that the significant decrease in his PSA from greater than 100 on 1/15/2021, to 1.17 on 3/5/2021.  PSA today, 4/2/2021 is 0.275.    *Chronic kidney disease:   · Status post left nephrectomy.    · 4/2/2021 creatinine 1.11.    *Migratory skeletal pain:   · Metastatic disease apparent now on CT imaging and bone scan and now PET scan.  · Completed radiation   · Left leg pain has improved  · Back pain improved  · 4/2/2021 patient continues on Norco 5/325 2 to 3 tablets/day with good pain control.      *Nausea and vomiting:   · Unclear etiology.  CT head negative.  · Prescription for ondansetron ODT tablets 8 mg every 8 hours as needed.  This is helping with his nausea.  · Nausea well controlled    *Heartburn: off his PPI. Taking calcium. Advised bid H2B.    Has resumed PPI with discontinuation of Votrient    *Mild hypomagnesemia: monitor. If muscle cramps advised to call us.  · 4/2/2021 normal magnesium level at 1.9.  He continues on magnesium oxide 400 mg daily.    *Elevated PSA:   · He has a history of very mild elevation of his PSA in the past with a PSA of 5.92 on 10/17/2018.    · His PSA on 1/4/2021 was greater than 100  · PSA repeated today is also greater than 100  · He is completely asymptomatic without clinical evidence for prostatitis.    · Dr. Cat did communicate with Dr. Zapata with radiology and all findings on imaging would support more metastatic renal cell carcinoma rather than metastatic prostate cancer since the bony lesions are lytic.  There is nothing obvious in his prostate on current imaging.  · Dr. Cat communicated with Dr. Cabrera with urology.  He is in agreement with a bone biopsy and he will see him in the office for evaluation.  · One of the bony lesions is amenable to CT-guided biopsy per Dr. Zapata.  · CT-guided needle biopsy of the bone performed on 1/20/2021 confirms metastatic prostate cancer.  · 3/5/21.  Please see  above.  The patient received 1 dose of Firmagon and now will begin Eligard every 3 months.    *New 40% T2 compression fracture.  Asymptomatic.  He is not interested in further radiation at this time.  Might consider referral to neurosurgery should he become more symptomatic or if this worsens.    PLAN:   1. Proceed with monthly Xgeva today.  2. Return in 1 month for follow-up visit with Dr. Cat for reevaluation prior to his next dose of monthly Xgeva.  3. Patient due for his next dose of Eligard 5/28/2021.  4. Continue Norco 5/325 as needed for pain.  5. Call/return sooner should he develop any new concerns or problems.        Patient continues with hives with occasional requiring close monitoring for toxicity.

## 2021-04-06 ENCOUNTER — OFFICE VISIT (OUTPATIENT)
Dept: FAMILY MEDICINE CLINIC | Facility: CLINIC | Age: 76
End: 2021-04-06

## 2021-04-06 VITALS
BODY MASS INDEX: 31.29 KG/M2 | OXYGEN SATURATION: 99 % | HEIGHT: 72 IN | SYSTOLIC BLOOD PRESSURE: 134 MMHG | HEART RATE: 67 BPM | DIASTOLIC BLOOD PRESSURE: 83 MMHG | WEIGHT: 231 LBS | TEMPERATURE: 96.9 F

## 2021-04-06 DIAGNOSIS — N18.2 CRI (CHRONIC RENAL INSUFFICIENCY), STAGE 2 (MILD): ICD-10-CM

## 2021-04-06 DIAGNOSIS — I25.10 CORONARY ARTERY DISEASE INVOLVING NATIVE CORONARY ARTERY OF NATIVE HEART WITHOUT ANGINA PECTORIS: ICD-10-CM

## 2021-04-06 DIAGNOSIS — N28.9 RENAL INSUFFICIENCY, MILD: ICD-10-CM

## 2021-04-06 DIAGNOSIS — C61 PROSTATE CANCER METASTATIC TO BONE (HCC): ICD-10-CM

## 2021-04-06 DIAGNOSIS — E78.2 MIXED HYPERLIPIDEMIA: ICD-10-CM

## 2021-04-06 DIAGNOSIS — I10 ESSENTIAL HYPERTENSION: Primary | ICD-10-CM

## 2021-04-06 DIAGNOSIS — J30.9 ALLERGIC RHINITIS, UNSPECIFIED SEASONALITY, UNSPECIFIED TRIGGER: ICD-10-CM

## 2021-04-06 DIAGNOSIS — C79.51 PROSTATE CANCER METASTATIC TO BONE (HCC): ICD-10-CM

## 2021-04-06 PROCEDURE — 99214 OFFICE O/P EST MOD 30 MIN: CPT | Performed by: NURSE PRACTITIONER

## 2021-04-06 NOTE — PATIENT INSTRUCTIONS
Discharge instructions    Okay to get the Shingrix in 3 to 4 weeks the first, then get the second 1 to    You could consider getting a Tdap at any time your tetanus is still considered up-to-date unless you get a deep wound  The pertussis portion immunity tends to wane after 2 years  Certainly nothing wrong with going ahead and get this there is no clear guidelines to do so however      Pneumonia shot Pneumovax 23 should be updated next year    Pneumovax will check today on your chart      For postnasal drip try  Flonase 2 sprays each nostril at bedtime  Astelin 2 sprays each nostril at bedtime only    Give it about 6 weeks and see if her symptoms do not diminish some if not  Continue Flonase  Discontinue Astelin  Try OTC nasal chrome allergy medicine it is a mast cell stabilizer previously prescription [email or web address] $20 will last about 2 months

## 2021-04-15 NOTE — PROGRESS NOTES
"Chief Complaint  Hypertension (follow up )    Subjective          Micah Krishna presents to [facility]  Pleasant patient here today follow-up essential hypertension.  Overall has been doing well.  Diabetes mellitus type 2 controlled hyperlipidemia mixed takes a statin stable,  He has known history of renal cell carcinoma with left nephrectomy and partial adrenal which has been stable unfortunately he has prostate cancer  With metastasis to bone.  Overall, he is doing well considering he is not in severe pain.  He is already gotten the vaccine.  No new complaints no chest pain no new shortness of breath no new weakness or other complaints.    He also complains of allergies stuffy nose, occasional cough does not feel ill does not think he is sick gets this every year postnasal drip sneezing.    Hypertension  Pertinent negatives include no chest pain or headaches.   Cough  This is a recurrent problem. The current episode started more than 1 year ago. The problem has been unchanged. The problem occurs every few hours. The cough is non-productive. Associated symptoms include chills, nasal congestion, postnasal drip and rhinorrhea. Pertinent negatives include no chest pain, ear congestion, ear pain, fever, headaches, heartburn, hemoptysis, myalgias, rash, sore throat, weight loss or wheezing. The symptoms are aggravated by exercise and lying down.       Objective   Vital Signs:   /83   Pulse 67   Temp 96.9 °F (36.1 °C) (Temporal)   Ht 182.9 cm (72.01\")   Wt 105 kg (231 lb)   SpO2 99%   BMI 31.32 kg/m²     Physical Exam   Result Review :                 Assessment and Plan    Diagnoses and all orders for this visit:    1. Allergic rhinitis, unspecified seasonality, unspecified trigger (Primary)    2. Coronary artery disease involving native coronary artery of native heart without angina pectoris    3. Essential hypertension    4. Mixed hyperlipidemia    5. Renal insufficiency, " mild    6. CRI (chronic renal insufficiency), stage 2 (mild)        Follow Up   Return in about 3 months (around 7/6/2021).  Patient was given instructions and counseling regarding his condition or for health maintenance advice. Please see specific information pulled into the AVS if appropriate.     Encourage patient continue healthy diet regular exercise see patient instructions

## 2021-04-29 NOTE — PROGRESS NOTES
"[facility] GROUP OUTPATIENT FOLLOW UP CLINIC VISIT    REASON FOR FOLLOW-UP:    1.  History of metastatic clear cell renal cell carcinoma.  All known disease had previously been resected..  2.  Pulmonary metastases discovered July 2020.  Bone metastases discovered September 2020.  3.  Votrient initiated 10/16/2020  4. Palliative radiation for pain complete on 11/10/2020  5.  1/4/2021 significantly elevated PSA > 100.  6.  1/20/2021 bone biopsy confirms metastatic prostate cancer    HISTORY OF PRESENT ILLNESS:  Micah Krishna is a 76 y.o. male with the above-mentioned history who returns today for follow-up due for monthly Xgeva.      Overall he is doing well.  He takes hydrocodone 1-2 times per day.  His pain is adequately controlled.  His biggest complaint is hot flashes.  He has about a dozen a day.  He notes the most discomfort from these at night.  He is not interested in taking any additional medication for these, however.  Energy level is adequate.  He remains active.      REVIEW OF SYSTEMS:  As per HPI    Vitals:    04/30/21 0743   BP: 148/80   Pulse: 68   Resp: 16   Temp: 98 °F (36.7 °C)   TempSrc: Temporal   SpO2: 97%   Weight: 107 kg (235 lb 8 oz)   Height: 182.9 cm (72.01\")   PainSc: 2  Comment: bone metastases   PainLoc: Groin     General:  No acute distress, awake, alert and oriented  Skin:  Warm and dry, no visible rash  HEENT:  Normocephalic/atraumatic.  Wearing a facemask.  Chest:  Normal respiratory effort  Extremities:  No visible clubbing, cyanosis, or edema  Neuro/psych:  Grossly non-focal.  Normal mood and affect.          DIAGNOSTIC DATA:  Phosphorus (04/30/2021 07:19)  Magnesium (04/30/2021 07:19)  Comprehensive metabolic panel (04/30/2021 07:19)  CBC and Differential (04/30/2021 07:19)      IMAGING:      ASSESSMENT:  This is a 76 y.o. male with:    *Mild normocytic anemia:   3/5/21.  Hemoglobin slightly improved to 11.8    *Adrenal insufficiency following bilateral adrenalectomy "   · On replacement hormone therapy.    · He follows with endocrinology.    *History of metastatic renal cell carcinoma.  He had a left nephrectomy and adrenalectomy in 2000 and then a right adrenalectomy for metastatic disease in 2012.      *New metastatic bone disease diagnosed as he presented with worsening pain  · Presumed to be metastatic renal cell carcinoma.  · Imaging discussed with radiology.  Imaging consistent with metastatic renal cell carcinoma  · However, his PSA was noted to be greater than 100  · As discussed below, bone biopsy subsequently confirmed metastatic prostate cancer  · CT C/A/P 7/6/2020 with a RUL sub 6 mm pulmonary nodule in the RUL, stable since 1/17/2018, new 6 mm nodules in the medial RLL and RUL,  LLL nodule unchanged since 7/17/2018.  Healing left ninth rib fracture.  · CT chest 9/28/2020 with stable lung nodules, pathologic fracture of the right 8th rib, abnormal appearance of T8, narrowing of the thecal sack at this level, additional lucencies at several vertebral bodies such as T7 and T11, subacute healing fractures in the anterior right 6th rib and lateral left 9th rib.  · CT head 9/28/2020 with calvarial metastatic disease involving the frontal and occipital bones.  · Bone scan 9/28/2020 with multifocal uptake consistent with metastatic disease. Anterior frontal bone, upper C spine at C1 or C2, T and L spine at T3, T8, T11, multifocal rib uptake, abnormal uptake in the iliac wings/bodies and left femoral heads/acetabulum, distal left clavicle and both humeral heads.   · PET scan from 10/8/2020.  Multifocal bone metastases throughout the skeleton.  Pathologic rib and vertebral body fracture.  Moderate FDG uptake in the right femoral neck with some sclerosis, new since 7/6/2020.  Mottled appearance of L3 and T8 vertebral bodies with pathologic compression fractures.  L3 fracture is a burst fracture with 20 to 25% loss of height and 4 to 5 mm of retropulsion in the central spinal  canal.  Pathologic compression fracture at T8 with 10% loss of height.  New findings since 7/6/2020.  FDG avid soft tissue nodule extending into the central canal along the posterior aspect of the thecal sac at T8.  FDG uptake in the left clavicle.  Compression fracture of the right posterior eighth rib.  · Votrient initiated 10/16/2020  · Palliative radiation for pain complete on 11/10/2020  · He did subsequently presented with an elevated PSA >100.  · Bone biopsy performed on 1/20/2021 confirmed metastatic prostate cancer.  · Votrient discontinued  · 2/5/2021 patient given Firmagon and Xgeva.  With plans to start Eligard 4 weeks later, and continue monthly Xgeva.    · 3/5/21.First dose of Eligard and monthly Xgeva.  Eligard will be given every 3 months.  · 4/2/2021 here for her monthly Xgeva, overall doing well.  No new complaints of pain today.  Patient extremely surprised that the significant decrease in his PSA from greater than 100 on 1/15/2021, to 1.17 on 3/5/2021.  PSA  4/2/2021 is 0.275.    *Chronic kidney disease:   · Status post left nephrectomy.    · Creatinine 1.14.    *Migratory skeletal pain:   · Metastatic disease apparent now on CT imaging and bone scan and now PET scan.  · Completed radiation   · Left leg pain has improved  · Back pain improved  · 4/2/2021 patient continues on Norco 5/325 2 to 3 tablets/day with good pain control.  This remains stable as of 4/30/2021.    *Nausea and vomiting:   · Unclear etiology.  CT head negative.  · Prescription for ondansetron ODT tablets 8 mg every 8 hours as needed.  This is helping with his nausea.  · Nausea well controlled    *Heartburn: off his PPI. Taking calcium. Advised bid H2B.    Has resumed PPI with discontinuation of Votrient    *Mild hypomagnesemia: monitor. If muscle cramps advised to call us.  · 4/2/2021 normal magnesium level at 1.9.  He continues on magnesium oxide 400 mg daily.    *Elevated PSA:   · He has a history of very mild elevation of  his PSA in the past with a PSA of 5.92 on 10/17/2018.    · His PSA on 1/4/2021 was greater than 100  · PSA repeated today is also greater than 100  · He is completely asymptomatic without clinical evidence for prostatitis.    · Dr. Cat did communicate with Dr. Zapata with radiology and all findings on imaging would support more metastatic renal cell carcinoma rather than metastatic prostate cancer since the bony lesions are lytic.  There is nothing obvious in his prostate on current imaging.  · Dr. Cat communicated with Dr. Cabrera with urology.  He is in agreement with a bone biopsy and he will see him in the office for evaluation.  · One of the bony lesions is amenable to CT-guided biopsy per Dr. Zapata.  · CT-guided needle biopsy of the bone performed on 1/20/2021 confirms metastatic prostate cancer.  · 3/5/21.  Please see above.  The patient received 1 dose of Firmagon and is on Eligard every 3 months.  · PSA 4/2/2021 0.275    *New 40% T2 compression fracture.  Asymptomatic.  He is not interested in further radiation at this time.  Might consider referral to neurosurgery should he become more symptomatic or if this worsens.    *Hot flashes: An adverse effect of ADT.  I offered gabapentin or venlafaxine but he is not interested in taking additional medication at this time.    PLAN:   1. Proceed with monthly Xgeva today.  2. Patient due for his next dose of Eligard 5/28/2021.  3. Continue Norco 5/325 as needed for pain.  Refilled today.  4. NP in four weeks with Xgeva and Eligard. I will see him back in 9 weeks with Xgeva.  CT and bone scan imaging done prior to that visit.

## 2021-04-30 ENCOUNTER — INFUSION (OUTPATIENT)
Dept: ONCOLOGY | Facility: HOSPITAL | Age: 76
End: 2021-04-30

## 2021-04-30 ENCOUNTER — LAB (OUTPATIENT)
Dept: OTHER | Facility: HOSPITAL | Age: 76
End: 2021-04-30

## 2021-04-30 ENCOUNTER — OFFICE VISIT (OUTPATIENT)
Dept: ONCOLOGY | Facility: CLINIC | Age: 76
End: 2021-04-30

## 2021-04-30 VITALS
TEMPERATURE: 98 F | RESPIRATION RATE: 16 BRPM | BODY MASS INDEX: 31.9 KG/M2 | HEART RATE: 68 BPM | DIASTOLIC BLOOD PRESSURE: 80 MMHG | HEIGHT: 72 IN | WEIGHT: 235.5 LBS | SYSTOLIC BLOOD PRESSURE: 148 MMHG | OXYGEN SATURATION: 97 %

## 2021-04-30 DIAGNOSIS — C79.51 PROSTATE CANCER METASTATIC TO BONE (HCC): Primary | ICD-10-CM

## 2021-04-30 DIAGNOSIS — C79.51 PROSTATE CANCER METASTATIC TO BONE (HCC): ICD-10-CM

## 2021-04-30 DIAGNOSIS — C61 PROSTATE CANCER METASTATIC TO BONE (HCC): Primary | ICD-10-CM

## 2021-04-30 DIAGNOSIS — C61 PROSTATE CANCER METASTATIC TO BONE (HCC): ICD-10-CM

## 2021-04-30 DIAGNOSIS — Z85.528 HISTORY OF RENAL CELL CARCINOMA: ICD-10-CM

## 2021-04-30 LAB
ALBUMIN SERPL-MCNC: 4.2 G/DL (ref 3.5–5.2)
ALBUMIN/GLOB SERPL: 1.5 G/DL
ALP SERPL-CCNC: 49 U/L (ref 39–117)
ALT SERPL W P-5'-P-CCNC: 12 U/L (ref 1–41)
ANION GAP SERPL CALCULATED.3IONS-SCNC: 9.7 MMOL/L (ref 5–15)
AST SERPL-CCNC: 13 U/L (ref 1–40)
BASOPHILS # BLD AUTO: 0.04 10*3/MM3 (ref 0–0.2)
BASOPHILS NFR BLD AUTO: 0.7 % (ref 0–1.5)
BILIRUB SERPL-MCNC: 0.3 MG/DL (ref 0–1.2)
BUN SERPL-MCNC: 20 MG/DL (ref 8–23)
BUN/CREAT SERPL: 17.5 (ref 7–25)
CALCIUM SPEC-SCNC: 9.6 MG/DL (ref 8.6–10.5)
CHLORIDE SERPL-SCNC: 104 MMOL/L (ref 98–107)
CO2 SERPL-SCNC: 26.3 MMOL/L (ref 22–29)
CREAT SERPL-MCNC: 1.14 MG/DL (ref 0.76–1.27)
DEPRECATED RDW RBC AUTO: 42.7 FL (ref 37–54)
EOSINOPHIL # BLD AUTO: 0.26 10*3/MM3 (ref 0–0.4)
EOSINOPHIL NFR BLD AUTO: 4.6 % (ref 0.3–6.2)
ERYTHROCYTE [DISTWIDTH] IN BLOOD BY AUTOMATED COUNT: 13.1 % (ref 12.3–15.4)
GFR SERPL CREATININE-BSD FRML MDRD: 62 ML/MIN/1.73
GLOBULIN UR ELPH-MCNC: 2.8 GM/DL
GLUCOSE SERPL-MCNC: 162 MG/DL (ref 65–99)
HCT VFR BLD AUTO: 35.9 % (ref 37.5–51)
HGB BLD-MCNC: 11.9 G/DL (ref 13–17.7)
IMM GRANULOCYTES # BLD AUTO: 0.04 10*3/MM3 (ref 0–0.05)
IMM GRANULOCYTES NFR BLD AUTO: 0.7 % (ref 0–0.5)
LYMPHOCYTES # BLD AUTO: 1.08 10*3/MM3 (ref 0.7–3.1)
LYMPHOCYTES NFR BLD AUTO: 19.2 % (ref 19.6–45.3)
MAGNESIUM SERPL-MCNC: 1.7 MG/DL (ref 1.6–2.4)
MCH RBC QN AUTO: 29.3 PG (ref 26.6–33)
MCHC RBC AUTO-ENTMCNC: 33.1 G/DL (ref 31.5–35.7)
MCV RBC AUTO: 88.4 FL (ref 79–97)
MONOCYTES # BLD AUTO: 0.54 10*3/MM3 (ref 0.1–0.9)
MONOCYTES NFR BLD AUTO: 9.6 % (ref 5–12)
NEUTROPHILS NFR BLD AUTO: 3.67 10*3/MM3 (ref 1.7–7)
NEUTROPHILS NFR BLD AUTO: 65.2 % (ref 42.7–76)
NRBC BLD AUTO-RTO: 0 /100 WBC (ref 0–0.2)
PHOSPHATE SERPL-MCNC: 4.1 MG/DL (ref 2.5–4.5)
PLATELET # BLD AUTO: 123 10*3/MM3 (ref 140–450)
PMV BLD AUTO: 9.3 FL (ref 6–12)
POTASSIUM SERPL-SCNC: 4.1 MMOL/L (ref 3.5–5.2)
PROT SERPL-MCNC: 7 G/DL (ref 6–8.5)
RBC # BLD AUTO: 4.06 10*6/MM3 (ref 4.14–5.8)
SODIUM SERPL-SCNC: 140 MMOL/L (ref 136–145)
WBC # BLD AUTO: 5.63 10*3/MM3 (ref 3.4–10.8)

## 2021-04-30 PROCEDURE — 85025 COMPLETE CBC W/AUTO DIFF WBC: CPT | Performed by: INTERNAL MEDICINE

## 2021-04-30 PROCEDURE — 84100 ASSAY OF PHOSPHORUS: CPT | Performed by: INTERNAL MEDICINE

## 2021-04-30 PROCEDURE — 25010000002 DENOSUMAB 120 MG/1.7ML SOLUTION: Performed by: INTERNAL MEDICINE

## 2021-04-30 PROCEDURE — 36415 COLL VENOUS BLD VENIPUNCTURE: CPT

## 2021-04-30 PROCEDURE — 96372 THER/PROPH/DIAG INJ SC/IM: CPT

## 2021-04-30 PROCEDURE — 99214 OFFICE O/P EST MOD 30 MIN: CPT | Performed by: INTERNAL MEDICINE

## 2021-04-30 PROCEDURE — 80053 COMPREHEN METABOLIC PANEL: CPT | Performed by: INTERNAL MEDICINE

## 2021-04-30 PROCEDURE — 83735 ASSAY OF MAGNESIUM: CPT | Performed by: INTERNAL MEDICINE

## 2021-04-30 RX ORDER — HYDROCODONE BITARTRATE AND ACETAMINOPHEN 5; 325 MG/1; MG/1
1 TABLET ORAL EVERY 8 HOURS PRN
Qty: 120 TABLET | Refills: 0 | Status: SHIPPED | OUTPATIENT
Start: 2021-04-30 | End: 2021-07-02 | Stop reason: SDUPTHER

## 2021-04-30 RX ADMIN — DENOSUMAB 120 MG: 120 INJECTION SUBCUTANEOUS at 08:30

## 2021-05-24 RX ORDER — METOPROLOL SUCCINATE 100 MG/1
100 TABLET, EXTENDED RELEASE ORAL DAILY
Qty: 90 TABLET | Refills: 1 | Status: SHIPPED | OUTPATIENT
Start: 2021-05-24 | End: 2021-10-01

## 2021-05-28 ENCOUNTER — LAB (OUTPATIENT)
Dept: OTHER | Facility: HOSPITAL | Age: 76
End: 2021-05-28

## 2021-05-28 ENCOUNTER — OFFICE VISIT (OUTPATIENT)
Dept: ONCOLOGY | Facility: CLINIC | Age: 76
End: 2021-05-28

## 2021-05-28 ENCOUNTER — INFUSION (OUTPATIENT)
Dept: ONCOLOGY | Facility: HOSPITAL | Age: 76
End: 2021-05-28

## 2021-05-28 VITALS
RESPIRATION RATE: 17 BRPM | BODY MASS INDEX: 32.28 KG/M2 | HEIGHT: 72 IN | HEART RATE: 74 BPM | OXYGEN SATURATION: 96 % | TEMPERATURE: 97.1 F | DIASTOLIC BLOOD PRESSURE: 76 MMHG | SYSTOLIC BLOOD PRESSURE: 128 MMHG | WEIGHT: 238.3 LBS

## 2021-05-28 DIAGNOSIS — C79.51 PROSTATE CANCER METASTATIC TO BONE (HCC): Primary | ICD-10-CM

## 2021-05-28 DIAGNOSIS — C61 PROSTATE CANCER METASTATIC TO BONE (HCC): Primary | ICD-10-CM

## 2021-05-28 DIAGNOSIS — C79.51 PROSTATE CANCER METASTATIC TO BONE (HCC): ICD-10-CM

## 2021-05-28 DIAGNOSIS — C61 PROSTATE CANCER METASTATIC TO BONE (HCC): ICD-10-CM

## 2021-05-28 LAB
ALBUMIN SERPL-MCNC: 4.1 G/DL (ref 3.5–5.2)
ALBUMIN/GLOB SERPL: 1.4 G/DL
ALP SERPL-CCNC: 55 U/L (ref 39–117)
ALT SERPL W P-5'-P-CCNC: 11 U/L (ref 1–41)
ANION GAP SERPL CALCULATED.3IONS-SCNC: 10.7 MMOL/L (ref 5–15)
AST SERPL-CCNC: 13 U/L (ref 1–40)
BASOPHILS # BLD AUTO: 0.05 10*3/MM3 (ref 0–0.2)
BASOPHILS NFR BLD AUTO: 0.8 % (ref 0–1.5)
BILIRUB SERPL-MCNC: 0.3 MG/DL (ref 0–1.2)
BUN SERPL-MCNC: 25 MG/DL (ref 8–23)
BUN/CREAT SERPL: 21.6 (ref 7–25)
CALCIUM SPEC-SCNC: 9.8 MG/DL (ref 8.6–10.5)
CHLORIDE SERPL-SCNC: 108 MMOL/L (ref 98–107)
CO2 SERPL-SCNC: 24.3 MMOL/L (ref 22–29)
CREAT SERPL-MCNC: 1.16 MG/DL (ref 0.76–1.27)
DEPRECATED RDW RBC AUTO: 43.3 FL (ref 37–54)
EOSINOPHIL # BLD AUTO: 0.34 10*3/MM3 (ref 0–0.4)
EOSINOPHIL NFR BLD AUTO: 5.4 % (ref 0.3–6.2)
ERYTHROCYTE [DISTWIDTH] IN BLOOD BY AUTOMATED COUNT: 13.7 % (ref 12.3–15.4)
GFR SERPL CREATININE-BSD FRML MDRD: 61 ML/MIN/1.73
GLOBULIN UR ELPH-MCNC: 3 GM/DL
GLUCOSE SERPL-MCNC: 136 MG/DL (ref 65–99)
HCT VFR BLD AUTO: 35 % (ref 37.5–51)
HGB BLD-MCNC: 11.7 G/DL (ref 13–17.7)
IMM GRANULOCYTES # BLD AUTO: 0.04 10*3/MM3 (ref 0–0.05)
IMM GRANULOCYTES NFR BLD AUTO: 0.6 % (ref 0–0.5)
LYMPHOCYTES # BLD AUTO: 1.28 10*3/MM3 (ref 0.7–3.1)
LYMPHOCYTES NFR BLD AUTO: 20.5 % (ref 19.6–45.3)
MAGNESIUM SERPL-MCNC: 1.9 MG/DL (ref 1.6–2.4)
MCH RBC QN AUTO: 29 PG (ref 26.6–33)
MCHC RBC AUTO-ENTMCNC: 33.4 G/DL (ref 31.5–35.7)
MCV RBC AUTO: 86.6 FL (ref 79–97)
MONOCYTES # BLD AUTO: 0.53 10*3/MM3 (ref 0.1–0.9)
MONOCYTES NFR BLD AUTO: 8.5 % (ref 5–12)
NEUTROPHILS NFR BLD AUTO: 4.01 10*3/MM3 (ref 1.7–7)
NEUTROPHILS NFR BLD AUTO: 64.2 % (ref 42.7–76)
NRBC BLD AUTO-RTO: 0 /100 WBC (ref 0–0.2)
PHOSPHATE SERPL-MCNC: 4.5 MG/DL (ref 2.5–4.5)
PLATELET # BLD AUTO: 139 10*3/MM3 (ref 140–450)
PMV BLD AUTO: 9.3 FL (ref 6–12)
POTASSIUM SERPL-SCNC: 4.1 MMOL/L (ref 3.5–5.2)
PROT SERPL-MCNC: 7.1 G/DL (ref 6–8.5)
PSA SERPL-MCNC: 0.08 NG/ML (ref 0–4)
RBC # BLD AUTO: 4.04 10*6/MM3 (ref 4.14–5.8)
SODIUM SERPL-SCNC: 143 MMOL/L (ref 136–145)
WBC # BLD AUTO: 6.25 10*3/MM3 (ref 3.4–10.8)

## 2021-05-28 PROCEDURE — 80053 COMPREHEN METABOLIC PANEL: CPT | Performed by: INTERNAL MEDICINE

## 2021-05-28 PROCEDURE — 99214 OFFICE O/P EST MOD 30 MIN: CPT | Performed by: NURSE PRACTITIONER

## 2021-05-28 PROCEDURE — 84153 ASSAY OF PSA TOTAL: CPT | Performed by: INTERNAL MEDICINE

## 2021-05-28 PROCEDURE — 83735 ASSAY OF MAGNESIUM: CPT | Performed by: INTERNAL MEDICINE

## 2021-05-28 PROCEDURE — 85025 COMPLETE CBC W/AUTO DIFF WBC: CPT | Performed by: INTERNAL MEDICINE

## 2021-05-28 PROCEDURE — 25010000002 LEUPROLIDE ACETATE (3 MONTH) PER 7.5 MG: Performed by: INTERNAL MEDICINE

## 2021-05-28 PROCEDURE — 84100 ASSAY OF PHOSPHORUS: CPT | Performed by: INTERNAL MEDICINE

## 2021-05-28 PROCEDURE — 96402 CHEMO HORMON ANTINEOPL SQ/IM: CPT

## 2021-05-28 PROCEDURE — 25010000002 DENOSUMAB 120 MG/1.7ML SOLUTION: Performed by: INTERNAL MEDICINE

## 2021-05-28 PROCEDURE — 36415 COLL VENOUS BLD VENIPUNCTURE: CPT

## 2021-05-28 PROCEDURE — 96372 THER/PROPH/DIAG INJ SC/IM: CPT

## 2021-05-28 RX ORDER — PANTOPRAZOLE SODIUM 20 MG/1
TABLET, DELAYED RELEASE ORAL
COMMUNITY
Start: 2021-04-27 | End: 2021-10-01

## 2021-05-28 RX ORDER — MAGNESIUM OXIDE 400 MG/1
TABLET ORAL
COMMUNITY
Start: 2021-04-27 | End: 2022-05-20 | Stop reason: SDUPTHER

## 2021-05-28 RX ORDER — ISOSORBIDE MONONITRATE 30 MG/1
30 TABLET, EXTENDED RELEASE ORAL DAILY
COMMUNITY
End: 2022-04-11

## 2021-05-28 RX ORDER — VALSARTAN 160 MG/1
TABLET ORAL
COMMUNITY
Start: 2021-01-06 | End: 2021-10-22 | Stop reason: SDUPTHER

## 2021-05-28 RX ADMIN — LEUPROLIDE ACETATE 22.5 MG: KIT at 08:59

## 2021-05-28 RX ADMIN — DENOSUMAB 120 MG: 120 INJECTION SUBCUTANEOUS at 08:44

## 2021-05-28 NOTE — PROGRESS NOTES
"[facility] GROUP OUTPATIENT FOLLOW UP CLINIC VISIT    REASON FOR FOLLOW-UP:    1.  History of metastatic clear cell renal cell carcinoma.  All known disease had previously been resected..  2.  Pulmonary metastases discovered July 2020.  Bone metastases discovered September 2020.  3.  Votrient initiated 10/16/2020  4. Palliative radiation for pain complete on 11/10/2020  5.  1/4/2021 significantly elevated PSA > 100.  6.  1/20/2021 bone biopsy confirms metastatic prostate cancer    HISTORY OF PRESENT ILLNESS:  Micah Krishna is a 76 y.o. male with the above-mentioned history who returns today for follow-up due for monthly Xgeva and his next dose of Eligard.     Overall, he is feeling quite well.  He did have a fall in his driveway after tripping on a garden hose a few weeks ago.  He has a few abrasions on his arms that are healing.    Otherwise, he denies any new pain.  He is eating and drinking adequately.  His bowels and urination have been regular.  Energy level is adequate.    He does have persistent hot flashes though still is disinterested in taking any medication for relief.    His CBC and chemistries are stable from previous readings.  His PSA is down further to 0.076.  He is quite pleased with this.    He has no other concerns.      REVIEW OF SYSTEMS:  As per HPI    Vitals:    05/28/21 0751   BP: 128/76   Pulse: 74   Resp: 17   Temp: 97.1 °F (36.2 °C)   TempSrc: Temporal   SpO2: 96%   Weight: 108 kg (238 lb 4.8 oz)   Height: 182.9 cm (72.01\")   PainSc: 0-No pain     General:  No acute distress, awake, alert and oriented  Skin:  Warm and dry, no visible rash  HEENT:  Normocephalic/atraumatic.  Wearing a facemask.  Chest:  Normal respiratory effort  Extremities:  No visible clubbing, cyanosis, or edema  Neuro/psych:  Grossly non-focal.  Normal mood and affect.          DIAGNOSTIC DATA:  WBC   Date Value Ref Range Status   05/28/2021 6.25 3.40 - 10.80 10*3/mm3 Final   04/05/2021 6.45 4.5 - 11.0 10*3/uL " Final     RBC   Date Value Ref Range Status   05/28/2021 4.04 (L) 4.14 - 5.80 10*6/mm3 Final   04/05/2021 3.85 (L) 4.5 - 5.9 10*6/uL Final     Hemoglobin   Date Value Ref Range Status   05/28/2021 11.7 (L) 13.0 - 17.7 g/dL Final   04/05/2021 11.9 (L) 13.5 - 17.5 g/dL Final     Hematocrit   Date Value Ref Range Status   05/28/2021 35.0 (L) 37.5 - 51.0 % Final   04/05/2021 37.1 (L) 41.0 - 53.0 % Final     MCV   Date Value Ref Range Status   05/28/2021 86.6 79.0 - 97.0 fL Final   04/05/2021 96.4 80.0 - 100.0 fL Final     MCH   Date Value Ref Range Status   05/28/2021 29.0 26.6 - 33.0 pg Final   04/05/2021 30.9 26.0 - 34.0 pg Final     MCHC   Date Value Ref Range Status   05/28/2021 33.4 31.5 - 35.7 g/dL Final   04/05/2021 32.1 31.0 - 37.0 g/dL Final     RDW   Date Value Ref Range Status   05/28/2021 13.7 12.3 - 15.4 % Final   04/05/2021 13.6 12.0 - 16.8 % Final     RDW-SD   Date Value Ref Range Status   05/28/2021 43.3 37.0 - 54.0 fl Final     MPV   Date Value Ref Range Status   05/28/2021 9.3 6.0 - 12.0 fL Final   04/05/2021 9.7 8.4 - 12.4 fL Final     Platelets   Date Value Ref Range Status   05/28/2021 139 (L) 140 - 450 10*3/mm3 Final   04/05/2021 136 (L) 140 - 440 10*3/uL Final     Neutrophil Rel %   Date Value Ref Range Status   04/05/2021 72.7 45 - 80 % Final     Neutrophil %   Date Value Ref Range Status   05/28/2021 64.2 42.7 - 76.0 % Final     Lymphocyte Rel %   Date Value Ref Range Status   04/05/2021 14.3 (L) 15 - 50 % Final     Lymphocyte %   Date Value Ref Range Status   05/28/2021 20.5 19.6 - 45.3 % Final     Monocyte Rel %   Date Value Ref Range Status   04/05/2021 8.1 0 - 15 % Final     Monocyte %   Date Value Ref Range Status   05/28/2021 8.5 5.0 - 12.0 % Final     Eosinophil %   Date Value Ref Range Status   05/28/2021 5.4 0.3 - 6.2 % Final   04/05/2021 3.3 0 - 7 % Final     Basophil Rel %   Date Value Ref Range Status   04/05/2021 0.8 0 - 2 % Final     Basophil %   Date Value Ref Range Status    05/28/2021 0.8 0.0 - 1.5 % Final     Immature Grans %   Date Value Ref Range Status   05/28/2021 0.6 (H) 0.0 - 0.5 % Final   04/05/2021 0.8 0.0 - 1.0 % Final     Neutrophils Absolute   Date Value Ref Range Status   04/05/2021 4.70 2.0 - 8.8 10*3/uL Final     Neutrophils, Absolute   Date Value Ref Range Status   05/28/2021 4.01 1.70 - 7.00 10*3/mm3 Final     Lymphocytes Absolute   Date Value Ref Range Status   04/05/2021 0.92 0.7 - 5.5 10*3/uL Final     Lymphocytes, Absolute   Date Value Ref Range Status   05/28/2021 1.28 0.70 - 3.10 10*3/mm3 Final     Monocytes Absolute   Date Value Ref Range Status   04/05/2021 0.52 0.0 - 1.7 10*3/uL Final     Monocytes, Absolute   Date Value Ref Range Status   05/28/2021 0.53 0.10 - 0.90 10*3/mm3 Final     Eosinophils Absolute   Date Value Ref Range Status   04/05/2021 0.21 0.0 - 0.8 10*3/uL Final     Eosinophils, Absolute   Date Value Ref Range Status   05/28/2021 0.34 0.00 - 0.40 10*3/mm3 Final     Basophils Absolute   Date Value Ref Range Status   04/05/2021 0.05 0.0 - 0.2 10*3/uL Final     Basophils, Absolute   Date Value Ref Range Status   05/28/2021 0.05 0.00 - 0.20 10*3/mm3 Final     Immature Grans, Absolute   Date Value Ref Range Status   05/28/2021 0.04 0.00 - 0.05 10*3/mm3 Final   04/05/2021 0.05 0.00 - 0.10 10*3/uL Final     nRBC   Date Value Ref Range Status   05/28/2021 0.0 0.0 - 0.2 /100 WBC Final     Lab Results   Component Value Date    GLUCOSE 136 (H) 05/28/2021    BUN 25 (H) 05/28/2021    CREATININE 1.16 05/28/2021    EGFRIFNONA 61 05/28/2021    EGFRIFAFRI 61 05/09/2019    BCR 21.6 05/28/2021    K 4.1 05/28/2021    CO2 24.3 05/28/2021    CALCIUM 9.8 05/28/2021    PROTENTOTREF 7.1 01/18/2019    ALBUMIN 4.10 05/28/2021    LABIL2 1.5 04/05/2021    AST 13 05/28/2021    ALT 11 05/28/2021         IMAGING:      ASSESSMENT:  This is a 76 y.o. male with:    *Mild normocytic anemia:   5/28/21.  Hemoglobin today stable at 11.7    *Adrenal insufficiency following bilateral  adrenalectomy   · On replacement hormone therapy.    · He follows with endocrinology.    *History of metastatic renal cell carcinoma.  He had a left nephrectomy and adrenalectomy in 2000 and then a right adrenalectomy for metastatic disease in 2012.      *New metastatic bone disease diagnosed as he presented with worsening pain  · Presumed to be metastatic renal cell carcinoma.  · Imaging discussed with radiology.  Imaging consistent with metastatic renal cell carcinoma  · However, his PSA was noted to be greater than 100  · As discussed below, bone biopsy subsequently confirmed metastatic prostate cancer  · CT C/A/P 7/6/2020 with a RUL sub 6 mm pulmonary nodule in the RUL, stable since 1/17/2018, new 6 mm nodules in the medial RLL and RUL,  LLL nodule unchanged since 7/17/2018.  Healing left ninth rib fracture.  · CT chest 9/28/2020 with stable lung nodules, pathologic fracture of the right 8th rib, abnormal appearance of T8, narrowing of the thecal sack at this level, additional lucencies at several vertebral bodies such as T7 and T11, subacute healing fractures in the anterior right 6th rib and lateral left 9th rib.  · CT head 9/28/2020 with calvarial metastatic disease involving the frontal and occipital bones.  · Bone scan 9/28/2020 with multifocal uptake consistent with metastatic disease. Anterior frontal bone, upper C spine at C1 or C2, T and L spine at T3, T8, T11, multifocal rib uptake, abnormal uptake in the iliac wings/bodies and left femoral heads/acetabulum, distal left clavicle and both humeral heads.   · PET scan from 10/8/2020.  Multifocal bone metastases throughout the skeleton.  Pathologic rib and vertebral body fracture.  Moderate FDG uptake in the right femoral neck with some sclerosis, new since 7/6/2020.  Mottled appearance of L3 and T8 vertebral bodies with pathologic compression fractures.  L3 fracture is a burst fracture with 20 to 25% loss of height and 4 to 5 mm of retropulsion in the  central spinal canal.  Pathologic compression fracture at T8 with 10% loss of height.  New findings since 7/6/2020.  FDG avid soft tissue nodule extending into the central canal along the posterior aspect of the thecal sac at T8.  FDG uptake in the left clavicle.  Compression fracture of the right posterior eighth rib.  · Votrient initiated 10/16/2020  · Palliative radiation for pain complete on 11/10/2020  · He did subsequently presented with an elevated PSA >100.  · Bone biopsy performed on 1/20/2021 confirmed metastatic prostate cancer.  · Votrient discontinued  · 2/5/2021 patient given Firmagon and Xgeva.  With plans to start Eligard 4 weeks later, and continue monthly Xgeva.    · 3/5/21.First dose of Eligard and monthly Xgeva.  Eligard will be given every 3 months.  · 5/28/21.  The patient is here today for his next every 3-month dose of Eligard.  He is also due for monthly Xgeva.  He denies any new pain and is feeling well.  He is quite happy with a further decrease in his PSA level.  We will proceed as scheduled.  He will undergo repeat CT imaging and bone scan on June 28.  He will see Dr. Cat for review on July 2    *Chronic kidney disease:   · Status post left nephrectomy.    · Creatinine 1.16 today    *Migratory skeletal pain:   · Metastatic disease apparent now on CT imaging and bone scan and now PET scan.  · Completed radiation   · Left leg pain has improved  · Back pain improved  · 4/2/2021 patient continues on Norco 5/325 2 to 3 tablets/day with good pain control.  This remains stable as of 4/30/2021.  · 5/28/21.  The patient continues on [place] 5/325 with 2 to 3 tablets/day.  Pain is stable.  He did not require refills today    *Nausea and vomiting:   · Unclear etiology.  CT head negative.  · Prescription for ondansetron ODT tablets 8 mg every 8 hours as needed.  This is helping with his nausea.  · Nausea not an issue today    *Heartburn: off his PPI. Taking calcium. Advised bid H2B.    Has resumed  PPI with discontinuation of Votrient  · Nonissue today    *Mild hypomagnesemia: monitor. If muscle cramps advised to call us.  · 4/2/2021 normal magnesium level at 1.9.  He continues on magnesium oxide 400 mg daily.  · 5/28/21.  Magnesium level 1.9 today    *Elevated PSA:   · He has a history of very mild elevation of his PSA in the past with a PSA of 5.92 on 10/17/2018.    · His PSA on 1/4/2021 was greater than 100  · PSA repeated today is also greater than 100  · He is completely asymptomatic without clinical evidence for prostatitis.    · Dr. Cat did communicate with Dr. Zapata with radiology and all findings on imaging would support more metastatic renal cell carcinoma rather than metastatic prostate cancer since the bony lesions are lytic.  There is nothing obvious in his prostate on current imaging.  · Dr. Cat communicated with Dr. Cabrera with urology.  He is in agreement with a bone biopsy and he will see him in the office for evaluation.  · One of the bony lesions is amenable to CT-guided biopsy per Dr. Zapata.  · CT-guided needle biopsy of the bone performed on 1/20/2021 confirms metastatic prostate cancer.  · 3/5/21.  Please see above.  The patient received 1 dose of Firmagon and is on Eligard every 3 months.  · 5/28/21.  PSA further decreased to 0.076    *New 40% T2 compression fracture.  Asymptomatic.  He is not interested in further radiation at this time.  Might consider referral to neurosurgery should he become more symptomatic or if this worsens.    *Hot flashes: An adverse effect of ADT.  Dr. Cat has previously offered gabapentin or venlafaxine but he is not interested in taking additional medication at this time.  · Stable as of today    PLAN:   1. Proceed with monthly Xgeva today.  2. Proceed with every 3-month Eligard today  3. Continue Norco 5/325 as needed for pain.  The patient did not require refills today  4. As outlined above, the patient will undergo CT imaging and bone scan within  the next few weeks.  He will see Dr. Cat for review and his next monthly dose of Xgeva on July 2 5. I have asked him to call with any new issues or concerns prior to his next visit.  He has verbalized understanding    -The patient is on drug therapy requiring extensive monitoring

## 2021-06-07 RX ORDER — FINASTERIDE 5 MG/1
5 TABLET, FILM COATED ORAL DAILY
Qty: 90 TABLET | Refills: 1 | Status: SHIPPED | OUTPATIENT
Start: 2021-06-07 | End: 2021-09-09 | Stop reason: SDUPTHER

## 2021-06-28 ENCOUNTER — HOSPITAL ENCOUNTER (OUTPATIENT)
Dept: CT IMAGING | Facility: HOSPITAL | Age: 76
Discharge: HOME | End: 2021-06-28
Admitting: INTERNAL MEDICINE

## 2021-06-28 ENCOUNTER — HOSPITAL ENCOUNTER (OUTPATIENT)
Dept: NUCLEAR MEDICINE | Facility: HOSPITAL | Age: 76
Discharge: HOME | End: 2021-06-28

## 2021-06-28 DIAGNOSIS — C61 PROSTATE CANCER METASTATIC TO BONE (HCC): ICD-10-CM

## 2021-06-28 DIAGNOSIS — C79.51 PROSTATE CANCER METASTATIC TO BONE (HCC): ICD-10-CM

## 2021-06-28 PROCEDURE — 71250 CT THORAX DX C-: CPT

## 2021-06-28 PROCEDURE — 74176 CT ABD & PELVIS W/O CONTRAST: CPT

## 2021-06-28 PROCEDURE — 78306 BONE IMAGING WHOLE BODY: CPT

## 2021-06-28 PROCEDURE — A9503 TC99M MEDRONATE: HCPCS | Performed by: INTERNAL MEDICINE

## 2021-06-28 PROCEDURE — 0 TECHNETIUM MEDRONATE KIT: Performed by: INTERNAL MEDICINE

## 2021-06-28 RX ORDER — TC 99M MEDRONATE 20 MG/10ML
22.3 INJECTION, POWDER, LYOPHILIZED, FOR SOLUTION INTRAVENOUS
Status: COMPLETED | OUTPATIENT
Start: 2021-06-28 | End: 2021-06-28

## 2021-06-28 RX ORDER — ERGOCALCIFEROL 1.25 MG/1
CAPSULE ORAL
Qty: 14 CAPSULE | Refills: 0 | Status: SHIPPED | OUTPATIENT
Start: 2021-06-28 | End: 2021-09-26 | Stop reason: SDUPTHER

## 2021-06-28 RX ADMIN — Medication 22.3 MILLICURIE: at 08:57

## 2021-07-02 ENCOUNTER — LAB (OUTPATIENT)
Dept: OTHER | Facility: HOSPITAL | Age: 76
End: 2021-07-02

## 2021-07-02 ENCOUNTER — INFUSION (OUTPATIENT)
Dept: ONCOLOGY | Facility: HOSPITAL | Age: 76
End: 2021-07-02

## 2021-07-02 ENCOUNTER — OFFICE VISIT (OUTPATIENT)
Dept: ONCOLOGY | Facility: CLINIC | Age: 76
End: 2021-07-02

## 2021-07-02 VITALS
OXYGEN SATURATION: 98 % | BODY MASS INDEX: 32.3 KG/M2 | DIASTOLIC BLOOD PRESSURE: 89 MMHG | HEART RATE: 71 BPM | WEIGHT: 238.5 LBS | SYSTOLIC BLOOD PRESSURE: 159 MMHG | TEMPERATURE: 97.3 F | RESPIRATION RATE: 16 BRPM | HEIGHT: 72 IN

## 2021-07-02 DIAGNOSIS — C79.51 PROSTATE CANCER METASTATIC TO BONE (HCC): Primary | ICD-10-CM

## 2021-07-02 DIAGNOSIS — C79.51 PROSTATE CANCER METASTATIC TO BONE (HCC): ICD-10-CM

## 2021-07-02 DIAGNOSIS — Z85.528 HISTORY OF RENAL CELL CARCINOMA: ICD-10-CM

## 2021-07-02 DIAGNOSIS — C61 PROSTATE CANCER METASTATIC TO BONE (HCC): ICD-10-CM

## 2021-07-02 DIAGNOSIS — C61 PROSTATE CANCER METASTATIC TO BONE (HCC): Primary | ICD-10-CM

## 2021-07-02 LAB
ALBUMIN SERPL-MCNC: 4.1 G/DL (ref 3.5–5.2)
ALBUMIN/GLOB SERPL: 1.6 G/DL
ALP SERPL-CCNC: 46 U/L (ref 39–117)
ALT SERPL W P-5'-P-CCNC: 12 U/L (ref 1–41)
ANION GAP SERPL CALCULATED.3IONS-SCNC: 10.4 MMOL/L (ref 5–15)
AST SERPL-CCNC: 13 U/L (ref 1–40)
BASOPHILS # BLD AUTO: 0.05 10*3/MM3 (ref 0–0.2)
BASOPHILS NFR BLD AUTO: 0.9 % (ref 0–1.5)
BILIRUB SERPL-MCNC: 0.3 MG/DL (ref 0–1.2)
BUN SERPL-MCNC: 20 MG/DL (ref 8–23)
BUN/CREAT SERPL: 18 (ref 7–25)
CALCIUM SPEC-SCNC: 9.3 MG/DL (ref 8.6–10.5)
CHLORIDE SERPL-SCNC: 105 MMOL/L (ref 98–107)
CO2 SERPL-SCNC: 24.6 MMOL/L (ref 22–29)
CREAT SERPL-MCNC: 1.11 MG/DL (ref 0.76–1.27)
DEPRECATED RDW RBC AUTO: 43.3 FL (ref 37–54)
EOSINOPHIL # BLD AUTO: 0.23 10*3/MM3 (ref 0–0.4)
EOSINOPHIL NFR BLD AUTO: 4.1 % (ref 0.3–6.2)
ERYTHROCYTE [DISTWIDTH] IN BLOOD BY AUTOMATED COUNT: 13.8 % (ref 12.3–15.4)
GFR SERPL CREATININE-BSD FRML MDRD: 64 ML/MIN/1.73
GLOBULIN UR ELPH-MCNC: 2.5 GM/DL
GLUCOSE SERPL-MCNC: 174 MG/DL (ref 65–99)
HCT VFR BLD AUTO: 33.9 % (ref 37.5–51)
HGB BLD-MCNC: 11.5 G/DL (ref 13–17.7)
IMM GRANULOCYTES # BLD AUTO: 0.04 10*3/MM3 (ref 0–0.05)
IMM GRANULOCYTES NFR BLD AUTO: 0.7 % (ref 0–0.5)
LYMPHOCYTES # BLD AUTO: 1.03 10*3/MM3 (ref 0.7–3.1)
LYMPHOCYTES NFR BLD AUTO: 18.3 % (ref 19.6–45.3)
MAGNESIUM SERPL-MCNC: 1.8 MG/DL (ref 1.6–2.4)
MCH RBC QN AUTO: 28.8 PG (ref 26.6–33)
MCHC RBC AUTO-ENTMCNC: 33.9 G/DL (ref 31.5–35.7)
MCV RBC AUTO: 84.8 FL (ref 79–97)
MONOCYTES # BLD AUTO: 0.51 10*3/MM3 (ref 0.1–0.9)
MONOCYTES NFR BLD AUTO: 9.1 % (ref 5–12)
NEUTROPHILS NFR BLD AUTO: 3.77 10*3/MM3 (ref 1.7–7)
NEUTROPHILS NFR BLD AUTO: 66.9 % (ref 42.7–76)
NRBC BLD AUTO-RTO: 0 /100 WBC (ref 0–0.2)
PHOSPHATE SERPL-MCNC: 4 MG/DL (ref 2.5–4.5)
PLATELET # BLD AUTO: 139 10*3/MM3 (ref 140–450)
PMV BLD AUTO: 9.2 FL (ref 6–12)
POTASSIUM SERPL-SCNC: 4.4 MMOL/L (ref 3.5–5.2)
PROT SERPL-MCNC: 6.6 G/DL (ref 6–8.5)
RBC # BLD AUTO: 4 10*6/MM3 (ref 4.14–5.8)
SODIUM SERPL-SCNC: 140 MMOL/L (ref 136–145)
WBC # BLD AUTO: 5.63 10*3/MM3 (ref 3.4–10.8)

## 2021-07-02 PROCEDURE — 84100 ASSAY OF PHOSPHORUS: CPT | Performed by: INTERNAL MEDICINE

## 2021-07-02 PROCEDURE — 99214 OFFICE O/P EST MOD 30 MIN: CPT | Performed by: INTERNAL MEDICINE

## 2021-07-02 PROCEDURE — 80053 COMPREHEN METABOLIC PANEL: CPT | Performed by: INTERNAL MEDICINE

## 2021-07-02 PROCEDURE — 83735 ASSAY OF MAGNESIUM: CPT | Performed by: INTERNAL MEDICINE

## 2021-07-02 PROCEDURE — 25010000002 DENOSUMAB 120 MG/1.7ML SOLUTION: Performed by: INTERNAL MEDICINE

## 2021-07-02 PROCEDURE — 36415 COLL VENOUS BLD VENIPUNCTURE: CPT

## 2021-07-02 PROCEDURE — 85025 COMPLETE CBC W/AUTO DIFF WBC: CPT | Performed by: INTERNAL MEDICINE

## 2021-07-02 RX ORDER — HYDROCODONE BITARTRATE AND ACETAMINOPHEN 5; 325 MG/1; MG/1
1 TABLET ORAL EVERY 6 HOURS PRN
Qty: 120 TABLET | Refills: 0 | Status: SHIPPED | OUTPATIENT
Start: 2021-07-02 | End: 2021-08-12 | Stop reason: SDUPTHER

## 2021-07-02 RX ADMIN — DENOSUMAB 120 MG: 120 INJECTION SUBCUTANEOUS at 08:44

## 2021-07-02 NOTE — PROGRESS NOTES
[facility] GROUP OUTPATIENT FOLLOW UP CLINIC VISIT    REASON FOR FOLLOW-UP:    1.  History of metastatic clear cell renal cell carcinoma.  All known disease had previously been resected..  2.  Pulmonary metastases discovered July 2020.  Bone metastases discovered September 2020.  3.  Votrient initiated 10/16/2020  4. Palliative radiation for pain complete on 11/10/2020  5.  1/4/2021 significantly elevated PSA > 100.  6.  1/20/2021 bone biopsy confirms metastatic prostate cancer    HISTORY OF PRESENT ILLNESS:  Micah Krishna is a 76 y.o. male with the above-mentioned history who returns today for follow-up due for monthly Xgeva.    He continues to have some low back pain but otherwise has been feeling well.  He ran out of his hydrocodone and has been taking ibuprofen usually about 4/day but sometimes as many as 9 if his pain is worse.  This has controlled his pain.  He has remained active.  He continues to have a mild chronic cough and plans to discuss this with primary care next week.    REVIEW OF SYSTEMS:  As per HPI    There were no vitals filed for this visit.  General:  No acute distress, awake, alert and oriented  Skin:  Warm and dry, no visible rash  HEENT:  Normocephalic/atraumatic.  Wearing a facemask.  Chest:  Normal respiratory effort.  Lungs clear to auscultation bilaterally.  Heart: Regular rate and rhythm  Extremities:  No visible clubbing, cyanosis, or edema  Neuro/psych:  Grossly non-focal.  Normal mood and affect.          DIAGNOSTIC DATA:  CBC and Differential (07/02/2021 07:32)  Phosphorus (07/02/2021 08:16)  Comprehensive metabolic panel (07/02/2021 07:32)  Magnesium (07/02/2021 07:32)  Phosphorus (07/02/2021 07:32)      IMAGING:    NM Bone Scan Whole Body (06/28/2021 13:38)  CT Abdomen Pelvis Without Contrast (06/28/2021 09:37)  CT Chest Without Contrast Diagnostic (06/28/2021 09:37)    CT and bone scan images personally reviewed. No evidence for progression.  Stable compression  fractures.    ASSESSMENT:  This is a 76 y.o. male with:    *Mild normocytic anemia:   · Hgb 11.5 today, stable    *Adrenal insufficiency following bilateral adrenalectomy   · On replacement hormone therapy.    · He follows with endocrinology.    *History of metastatic renal cell carcinoma.    · He had a left nephrectomy and adrenalectomy in 2000 and then a right adrenalectomy for metastatic disease in 2012.      *New metastatic bone disease diagnosed as he presented with worsening pain  · Presumed to be metastatic renal cell carcinoma.  · Imaging discussed with radiology.  Imaging consistent with metastatic renal cell carcinoma  · However, his PSA was noted to be greater than 100  · As discussed below, bone biopsy subsequently confirmed metastatic prostate cancer  · CT C/A/P 7/6/2020 with a RUL sub 6 mm pulmonary nodule in the RUL, stable since 1/17/2018, new 6 mm nodules in the medial RLL and RUL,  LLL nodule unchanged since 7/17/2018.  Healing left ninth rib fracture.  · CT chest 9/28/2020 with stable lung nodules, pathologic fracture of the right 8th rib, abnormal appearance of T8, narrowing of the thecal sack at this level, additional lucencies at several vertebral bodies such as T7 and T11, subacute healing fractures in the anterior right 6th rib and lateral left 9th rib.  · CT head 9/28/2020 with calvarial metastatic disease involving the frontal and occipital bones.  · Bone scan 9/28/2020 with multifocal uptake consistent with metastatic disease. Anterior frontal bone, upper C spine at C1 or C2, T and L spine at T3, T8, T11, multifocal rib uptake, abnormal uptake in the iliac wings/bodies and left femoral heads/acetabulum, distal left clavicle and both humeral heads.   · PET scan from 10/8/2020.  Multifocal bone metastases throughout the skeleton.  Pathologic rib and vertebral body fracture.  Moderate FDG uptake in the right femoral neck with some sclerosis, new since 7/6/2020.  Mottled appearance of L3 and  T8 vertebral bodies with pathologic compression fractures.  L3 fracture is a burst fracture with 20 to 25% loss of height and 4 to 5 mm of retropulsion in the central spinal canal.  Pathologic compression fracture at T8 with 10% loss of height.  New findings since 7/6/2020.  FDG avid soft tissue nodule extending into the central canal along the posterior aspect of the thecal sac at T8.  FDG uptake in the left clavicle.  Compression fracture of the right posterior eighth rib.  · Votrient initiated 10/16/2020  · Palliative radiation for pain complete on 11/10/2020  · He did subsequently presented with an elevated PSA >100.  · Bone biopsy performed on 1/20/2021 confirmed metastatic prostate cancer.  · Votrient discontinued  · 2/5/2021 patient given Firmagon and Xgeva.  With plans to start Eligard 4 weeks later, and continue monthly Xgeva.    · 3/5/21.First dose of Eligard and monthly Xgeva.  Eligard will be given every 3 months.  · 5/28/21.  The patient is here today for his next every 3-month dose of Eligard.  He is also due for monthly Xgeva.  He denies any new pain and is feeling well.  He is quite happy with a further decrease in his PSA level.  We will proceed as scheduled.  He will undergo repeat CT imaging and bone scan on June 28.  He will see Dr. Cat for review on July 2    *Chronic kidney disease:   · Status post left nephrectomy.    · Creatinine stable at 1.11 today in spite of more NSAID use    *Migratory skeletal pain:   · Metastatic disease apparent now on CT imaging and bone scan and now PET scan.  · Completed radiation   · Left leg pain has improved  · Back pain improved  · 4/2/2021 patient continues on Norco 5/325 2 to 3 tablets/day with good pain control.  This remains stable as of 4/30/2021.  · 5/28/21.  The patient continues on [place] 5/325 with 2 to 3 tablets/day.  Pain is stable.   · 7/2/2021: He ran out of the hydrocodone/acetaminophen and has been using more ibuprofen which has controlled his  pain but I encouraged him not to use as much ibuprofen to protect his kidney.    *Mild hypomagnesemia: monitor. If muscle cramps advised to call us.  · 4/2/2021 normal magnesium level at 1.9.  He continues on magnesium oxide 400 mg daily.  · 5/28/21.  Magnesium level 1.8 today    *Elevated PSA, subsequent biopsy confirmed metastatic prostate cancer:   · He has a history of very mild elevation of his PSA in the past with a PSA of 5.92 on 10/17/2018.    · His PSA on 1/4/2021 was greater than 100  · PSA repeated today is also greater than 100  · He is completely asymptomatic without clinical evidence for prostatitis.    · Dr. Cat did communicate with Dr. Zapata with radiology and all findings on imaging would support more metastatic renal cell carcinoma rather than metastatic prostate cancer since the bony lesions are lytic.  There is nothing obvious in his prostate on current imaging.  · Dr. Cat communicated with Dr. Cabrera with urology.  He is in agreement with a bone biopsy and he will see him in the office for evaluation.  · One of the bony lesions is amenable to CT-guided biopsy per Dr. Zapata.  · CT-guided needle biopsy of the bone performed on 1/20/2021 confirms metastatic prostate cancer.  · 3/5/21.  Please see above.  The patient received 1 dose of Firmagon and is on Eligard every 3 months.  · 5/28/21.  PSA further decreased to 0.076    *New 40% T2 compression fracture.  Asymptomatic.  He is not interested in further radiation at this time.  Might consider referral to neurosurgery should he become more symptomatic or if this worsens.    *Hot flashes: An adverse effect of ADT.  Dr. Cat has previously offered gabapentin or venlafaxine but he is not interested in taking additional medication at this time.  · He did not complain of these as of 7/2    PLAN:   1. Proceed with monthly Xgeva today.  2. Proceed with every 3-month Eligard in two months  3. Continue Norco 5/325 as needed for pain.  Refilled today.   I encouraged him to avoid NSAIDs as much as possible.  4. I have asked him to notify us with any new issues or concerns prior to his next visit.  He has verbalized understanding    -The patient is on drug therapy requiring extensive monitoring

## 2021-07-06 ENCOUNTER — OFFICE VISIT (OUTPATIENT)
Dept: FAMILY MEDICINE CLINIC | Facility: CLINIC | Age: 76
End: 2021-07-06

## 2021-07-06 VITALS
SYSTOLIC BLOOD PRESSURE: 112 MMHG | BODY MASS INDEX: 32.17 KG/M2 | WEIGHT: 237.5 LBS | HEIGHT: 72 IN | OXYGEN SATURATION: 93 % | TEMPERATURE: 96.9 F | DIASTOLIC BLOOD PRESSURE: 80 MMHG | HEART RATE: 74 BPM

## 2021-07-06 DIAGNOSIS — E08.65 DIABETES MELLITUS DUE TO UNDERLYING CONDITION WITH HYPERGLYCEMIA, WITH LONG-TERM CURRENT USE OF INSULIN (HCC): ICD-10-CM

## 2021-07-06 DIAGNOSIS — Z79.4 DIABETES MELLITUS DUE TO UNDERLYING CONDITION WITH HYPERGLYCEMIA, WITH LONG-TERM CURRENT USE OF INSULIN (HCC): ICD-10-CM

## 2021-07-06 DIAGNOSIS — E78.2 MIXED HYPERLIPIDEMIA: ICD-10-CM

## 2021-07-06 DIAGNOSIS — I10 ESSENTIAL HYPERTENSION: Primary | ICD-10-CM

## 2021-07-06 DIAGNOSIS — R06.09 DYSPNEA ON EXERTION: ICD-10-CM

## 2021-07-06 PROCEDURE — 99213 OFFICE O/P EST LOW 20 MIN: CPT | Performed by: NURSE PRACTITIONER

## 2021-07-06 RX ORDER — ALBUTEROL SULFATE 90 UG/1
2 AEROSOL, METERED RESPIRATORY (INHALATION) EVERY 4 HOURS PRN
Qty: 6.7 G | Refills: 5 | Status: SHIPPED | OUTPATIENT
Start: 2021-07-06 | End: 2021-10-01

## 2021-07-06 NOTE — PROGRESS NOTES
"Subjective   Micah Krishna is a 76 y.o. male.     Pleasant gentleman here today for follow-up essential hypertension presently controlled diabetes mellitus type 2 controlled sees endocrinology as well as history of adrenal insufficiency related to renal carcinoma and status post left complete nephrectomy partial right.  Unfortunately also has prostate cancer with metastasis,   COPD which has been stable, he does not use inhalers, he no longer smokes.  He does have some dyspnea with activity without chest pain is up-to-date with cardiology is having no nausea vomiting diaphoresis chest pressures with activity    Does have some mild dyspnea we tried inhaled steroid did not help much he is not tried albuterol we will do this today.  No acute problems.  He is here with his pleasant wife.          Hypertension  Pertinent negatives include no chest pain, palpitations or shortness of breath.        /80   Pulse 74   Temp 96.9 °F (36.1 °C) (Temporal)   Ht 182.9 cm (72.01\")   Wt 108 kg (237 lb 8 oz)   SpO2 93%   BMI 32.20 kg/m²       The following portions of the patient's history were reviewed and updated as appropriate: allergies, current medications, past family history, past medical history, past social history, past surgical history and problem list.    Review of Systems   Constitutional: Negative for fatigue and fever.   HENT: Negative.  Negative for trouble swallowing.    Eyes: Negative.    Respiratory: Negative.  Negative for cough and shortness of breath.    Cardiovascular: Negative for chest pain, palpitations and leg swelling.   Gastrointestinal: Negative.  Negative for abdominal pain.   Genitourinary: Negative.    Musculoskeletal: Negative.    Skin: Negative.    Neurological: Negative.  Negative for dizziness and confusion.   Psychiatric/Behavioral: Negative.        Objective   Physical Exam  Vitals reviewed.   Constitutional:       Appearance: He is well-developed.   HENT:      Head: Normocephalic. "      Nose: Nose normal.   Eyes:      General: No scleral icterus.     Conjunctiva/sclera: Conjunctivae normal.      Pupils: Pupils are equal, round, and reactive to light.   Neck:      Thyroid: No thyromegaly.      Vascular: No JVD.   Cardiovascular:      Rate and Rhythm: Normal rate and regular rhythm.      Heart sounds: Normal heart sounds. No murmur heard.   No friction rub. No gallop.    Pulmonary:      Effort: Pulmonary effort is normal. No respiratory distress.      Breath sounds: Normal breath sounds. No stridor. No wheezing or rales.   Abdominal:      General: Bowel sounds are normal. There is no distension.      Palpations: Abdomen is soft.      Tenderness: There is no abdominal tenderness.      Comments: No hepatosplenomegaly, no ascites,   Musculoskeletal:         General: No tenderness.      Cervical back: Neck supple.      Right lower leg: No edema.      Left lower leg: No edema.   Lymphadenopathy:      Cervical: No cervical adenopathy.   Skin:     General: Skin is warm and dry.      Findings: No erythema or rash.   Neurological:      Mental Status: He is alert and oriented to person, place, and time.      Deep Tendon Reflexes: Reflexes are normal and symmetric.   Psychiatric:         Behavior: Behavior normal.         Thought Content: Thought content normal.         Judgment: Judgment normal.           Assessment/Plan   Diagnoses and all orders for this visit:    1. Essential hypertension (Primary)    2. Mixed hyperlipidemia    3. Diabetes mellitus due to underlying condition with hyperglycemia, with long-term current use of insulin (CMS/[facility])    Other orders  -     albuterol sulfate  (90 Base) MCG/ACT inhaler; Inhale 2 puffs Every 4 (Four) Hours As Needed for Wheezing. And prior to physical exertion  Dispense: 6.7 g; Refill: 5          Patient just had a CT of his chest no new breathing issues since no new chest pain no evidence of any metastasis he does not wish to see a pulmonary specialist  just has some mild chronic dyspnea without chest pain or any exertional chest pains  We will try albuterol if this does not help he should see pulmonary  Although we could try Spiriva as we are sending him on the referral he will let me know if not improving certainly any chest pain increased shortness of breath dyspnea should follow with oncology as well as consider repeating  Chest CT  No history of PE or pulmonary emboli no calf pain or tenderness no chronic tachycardia to suggest PE    He has a mild cough when he lays down at night if he takes the Flonase regularly he does not have this issue he is having no reflux he has no chronic cough otherwise or difficulty swallowing    We will follow-up in 3 months continue present medications we will try the albuterol        There are no Patient Instructions on file for this visit.

## 2021-07-06 NOTE — PATIENT INSTRUCTIONS
Trial albuterol 2 puffs  Prior to activities such as walking,  As needed or on a routine schedule every 4 hours such as 3-4 times a day    Continue present plan plenty of fluids follow-up  In 3 months

## 2021-07-28 NOTE — PROGRESS NOTES
"[facility] GROUP OUTPATIENT FOLLOW UP CLINIC VISIT    REASON FOR FOLLOW-UP:    1.  History of metastatic clear cell renal cell carcinoma.  All known disease had previously been resected..  2.  Pulmonary metastases discovered July 2020.  Bone metastases discovered September 2020.  3.  Votrient initiated 10/16/2020  4. Palliative radiation for pain complete on 11/10/2020  5.  1/4/2021 significantly elevated PSA > 100.  6.  1/20/2021 bone biopsy confirms metastatic prostate cancer    HISTORY OF PRESENT ILLNESS:  Micah Krishna is a 76 y.o. male with the above-mentioned history who returns today for follow-up, due for monthly Xgeva.    He overall feels well.  His ACTH level was elevated and an MRI of the pituitary gland is planned.  His pain is reasonably well controlled.  He takes 2 pain pills daily and sometimes a couple of more depending on his activity level.  Mild cough which she relates to COPD has persisted.      REVIEW OF SYSTEMS:  As per HPI    PE:  Vitals:    07/30/21 0740   BP: 121/73   Pulse: 71   Resp: 16   Temp: 97.3 °F (36.3 °C)   TempSrc: Temporal   SpO2: 97%   Weight: 108 kg (238 lb 4.8 oz)   Height: 182.9 cm (72.01\")   PainSc: 0-No pain  Comment: prostate cancer to bone     General:  No acute distress, awake, alert and oriented  Skin:  Warm and dry, no visible rash  HEENT:  Normocephalic/atraumatic.  Wearing a facemask.  Chest:  Normal respiratory effort.  Lungs with mild scattered rhonchi.  No wheezing.  Heart: Regular rate and rhythm  Extremities:  No visible clubbing, cyanosis, or edema  Neuro/psych:  Grossly non-focal.  Normal mood and affect.    DIAGNOSTIC DATA:  Phosphorus (07/30/2021 07:27)  PSA Diagnostic (07/30/2021 07:27)  Magnesium (07/30/2021 07:27)  Comprehensive metabolic panel (07/30/2021 07:27)  CBC and Differential (07/30/2021 07:27)      IMAGING:    None reviewed      ASSESSMENT:  This is a 76 y.o. male with:    *Mild normocytic anemia:   · Hgb 11.3 today, stable    *Adrenal " insufficiency following bilateral adrenalectomy   · On replacement hormone therapy.    · He follows with endocrinology.    *History of metastatic renal cell carcinoma.    · He had a left nephrectomy and adrenalectomy in 2000 and then a right adrenalectomy for metastatic disease in 2012.      *New metastatic bone disease diagnosed as he presented with worsening pain  · Presumed to be metastatic renal cell carcinoma.  · Imaging discussed with radiology.  Imaging consistent with metastatic renal cell carcinoma  · However, his PSA was noted to be greater than 100  · As discussed below, bone biopsy subsequently confirmed metastatic prostate cancer  · CT C/A/P 7/6/2020 with a RUL sub 6 mm pulmonary nodule in the RUL, stable since 1/17/2018, new 6 mm nodules in the medial RLL and RUL,  LLL nodule unchanged since 7/17/2018.  Healing left ninth rib fracture.  · CT chest 9/28/2020 with stable lung nodules, pathologic fracture of the right 8th rib, abnormal appearance of T8, narrowing of the thecal sack at this level, additional lucencies at several vertebral bodies such as T7 and T11, subacute healing fractures in the anterior right 6th rib and lateral left 9th rib.  · CT head 9/28/2020 with calvarial metastatic disease involving the frontal and occipital bones.  · Bone scan 9/28/2020 with multifocal uptake consistent with metastatic disease. Anterior frontal bone, upper C spine at C1 or C2, T and L spine at T3, T8, T11, multifocal rib uptake, abnormal uptake in the iliac wings/bodies and left femoral heads/acetabulum, distal left clavicle and both humeral heads.   · PET scan from 10/8/2020.  Multifocal bone metastases throughout the skeleton.  Pathologic rib and vertebral body fracture.  Moderate FDG uptake in the right femoral neck with some sclerosis, new since 7/6/2020.  Mottled appearance of L3 and T8 vertebral bodies with pathologic compression fractures.  L3 fracture is a burst fracture with 20 to 25% loss of height  and 4 to 5 mm of retropulsion in the central spinal canal.  Pathologic compression fracture at T8 with 10% loss of height.  New findings since 7/6/2020.  FDG avid soft tissue nodule extending into the central canal along the posterior aspect of the thecal sac at T8.  FDG uptake in the left clavicle.  Compression fracture of the right posterior eighth rib.  · Votrient initiated 10/16/2020  · Palliative radiation for pain complete on 11/10/2020  · He did subsequently presented with an elevated PSA >100.  · Bone biopsy performed on 1/20/2021 confirmed metastatic prostate cancer.  · Votrient discontinued  · 2/5/2021 patient given Firmagon and Xgeva.  With plans to start Eligard 4 weeks later, and continue monthly Xgeva.    · 3/5/21.First dose of Eligard and monthly Xgeva.  Eligard will be given every 3 months.    *Chronic kidney disease:   · Status post left nephrectomy.    · Creatinine 1.12 today     *Migratory skeletal pain:   · Metastatic disease apparent now on CT imaging and bone scan and now PET scan.  · Completed radiation   · Left leg pain has improved  · Back pain improved  · 4/2/2021 patient continues on Norco 5/325 2 to 3 tablets/day with good pain control.  This remains stable as of 4/30/2021.  · 5/28/21.  The patient continues on [place] 5/325 with 2 to 3 tablets/day.  Pain is stable.   · 7/2/2021: He ran out of the hydrocodone/acetaminophen and has been using more ibuprofen which has controlled his pain but I encouraged him not to use as much ibuprofen to protect his kidney.  · Pain is stable as of 7/30/2021.    *Mild hypomagnesemia: monitor. If muscle cramps advised to call us.  · 4/2/2021 normal magnesium level at 1.9.  He continues on magnesium oxide 400 mg daily.  · 5/28/21.  Magnesium level 1.8 again today    *Elevated PSA, subsequent biopsy confirmed metastatic prostate cancer:   · He has a history of very mild elevation of his PSA in the past with a PSA of 5.92 on 10/17/2018.    · His PSA on 1/4/2021  was greater than 100  · PSA repeated today is also greater than 100  · He is completely asymptomatic without clinical evidence for prostatitis.    · Dr. Cat did communicate with Dr. Zapata with radiology and all findings on imaging would support more metastatic renal cell carcinoma rather than metastatic prostate cancer since the bony lesions are lytic.  There is nothing obvious in his prostate on current imaging.  · Dr. Cat communicated with Dr. Cabrera with urology.  He is in agreement with a bone biopsy and he will see him in the office for evaluation.  · One of the bony lesions is amenable to CT-guided biopsy per Dr. Zapata.  · CT-guided needle biopsy of the bone performed on 1/20/2021 confirms metastatic prostate cancer.  · 3/5/21.  Please see above.  The patient received 1 dose of Firmagon and is on Eligard every 3 months.  · 5/28/21.  PSA further decreased to 0.076  · Follow-up PSA from today 0.035    *New 40% T2 compression fracture.  Asymptomatic.  He is not interested in further radiation at this time.  Might consider referral to neurosurgery should he become more symptomatic or if this worsens.    *Hot flashes: An adverse effect of ADT.  Dr. Cat has previously offered gabapentin or venlafaxine but he is not interested in taking additional medication at this time.  · He did not complain of these as of 7/2    PLAN:   1. Proceed with monthly Xgeva today.  2. Proceed with every 3-month Eligard in 4 weeks  3. Continue Norco 5/325 as needed for pain. I encouraged him to avoid NSAIDs as much as possible.  4. Follow-up with me in 4 weeks with labs, Xgeva, Eligard that day    The patient is on drug therapy requiring extensive monitoring

## 2021-07-30 ENCOUNTER — INFUSION (OUTPATIENT)
Dept: ONCOLOGY | Facility: HOSPITAL | Age: 76
End: 2021-07-30

## 2021-07-30 ENCOUNTER — LAB (OUTPATIENT)
Dept: OTHER | Facility: HOSPITAL | Age: 76
End: 2021-07-30

## 2021-07-30 ENCOUNTER — OFFICE VISIT (OUTPATIENT)
Dept: ONCOLOGY | Facility: CLINIC | Age: 76
End: 2021-07-30

## 2021-07-30 VITALS
WEIGHT: 238.3 LBS | OXYGEN SATURATION: 97 % | BODY MASS INDEX: 32.28 KG/M2 | HEART RATE: 71 BPM | DIASTOLIC BLOOD PRESSURE: 73 MMHG | HEIGHT: 72 IN | TEMPERATURE: 97.3 F | SYSTOLIC BLOOD PRESSURE: 121 MMHG | RESPIRATION RATE: 16 BRPM

## 2021-07-30 DIAGNOSIS — C79.51 PROSTATE CANCER METASTATIC TO BONE (HCC): Primary | ICD-10-CM

## 2021-07-30 DIAGNOSIS — C79.51 PROSTATE CANCER METASTATIC TO BONE (HCC): ICD-10-CM

## 2021-07-30 DIAGNOSIS — C61 PROSTATE CANCER METASTATIC TO BONE (HCC): Primary | ICD-10-CM

## 2021-07-30 DIAGNOSIS — G89.3 CANCER ASSOCIATED PAIN: ICD-10-CM

## 2021-07-30 DIAGNOSIS — C61 PROSTATE CANCER METASTATIC TO BONE (HCC): ICD-10-CM

## 2021-07-30 LAB
ALBUMIN SERPL-MCNC: 4 G/DL (ref 3.5–5.2)
ALBUMIN/GLOB SERPL: 1.3 G/DL
ALP SERPL-CCNC: 43 U/L (ref 39–117)
ALT SERPL W P-5'-P-CCNC: 15 U/L (ref 1–41)
ANION GAP SERPL CALCULATED.3IONS-SCNC: 9.9 MMOL/L (ref 5–15)
AST SERPL-CCNC: 15 U/L (ref 1–40)
BASOPHILS # BLD AUTO: 0.05 10*3/MM3 (ref 0–0.2)
BASOPHILS NFR BLD AUTO: 0.9 % (ref 0–1.5)
BILIRUB SERPL-MCNC: 0.4 MG/DL (ref 0–1.2)
BUN SERPL-MCNC: 19 MG/DL (ref 8–23)
BUN/CREAT SERPL: 17 (ref 7–25)
CALCIUM SPEC-SCNC: 9.3 MG/DL (ref 8.6–10.5)
CHLORIDE SERPL-SCNC: 107 MMOL/L (ref 98–107)
CO2 SERPL-SCNC: 25.1 MMOL/L (ref 22–29)
CREAT SERPL-MCNC: 1.12 MG/DL (ref 0.76–1.27)
DEPRECATED RDW RBC AUTO: 43.1 FL (ref 37–54)
EOSINOPHIL # BLD AUTO: 0.25 10*3/MM3 (ref 0–0.4)
EOSINOPHIL NFR BLD AUTO: 4.4 % (ref 0.3–6.2)
ERYTHROCYTE [DISTWIDTH] IN BLOOD BY AUTOMATED COUNT: 13.8 % (ref 12.3–15.4)
GFR SERPL CREATININE-BSD FRML MDRD: 63 ML/MIN/1.73
GLOBULIN UR ELPH-MCNC: 3 GM/DL
GLUCOSE SERPL-MCNC: 154 MG/DL (ref 65–99)
HCT VFR BLD AUTO: 34.2 % (ref 37.5–51)
HGB BLD-MCNC: 11.3 G/DL (ref 13–17.7)
IMM GRANULOCYTES # BLD AUTO: 0.04 10*3/MM3 (ref 0–0.05)
IMM GRANULOCYTES NFR BLD AUTO: 0.7 % (ref 0–0.5)
LYMPHOCYTES # BLD AUTO: 1.28 10*3/MM3 (ref 0.7–3.1)
LYMPHOCYTES NFR BLD AUTO: 22.7 % (ref 19.6–45.3)
MAGNESIUM SERPL-MCNC: 1.8 MG/DL (ref 1.6–2.4)
MCH RBC QN AUTO: 28.2 PG (ref 26.6–33)
MCHC RBC AUTO-ENTMCNC: 33 G/DL (ref 31.5–35.7)
MCV RBC AUTO: 85.3 FL (ref 79–97)
MONOCYTES # BLD AUTO: 0.5 10*3/MM3 (ref 0.1–0.9)
MONOCYTES NFR BLD AUTO: 8.8 % (ref 5–12)
NEUTROPHILS NFR BLD AUTO: 3.53 10*3/MM3 (ref 1.7–7)
NEUTROPHILS NFR BLD AUTO: 62.5 % (ref 42.7–76)
NRBC BLD AUTO-RTO: 0 /100 WBC (ref 0–0.2)
PHOSPHATE SERPL-MCNC: 3.6 MG/DL (ref 2.5–4.5)
PLATELET # BLD AUTO: 121 10*3/MM3 (ref 140–450)
PMV BLD AUTO: 9.2 FL (ref 6–12)
POTASSIUM SERPL-SCNC: 4.5 MMOL/L (ref 3.5–5.2)
PROT SERPL-MCNC: 7 G/DL (ref 6–8.5)
PSA SERPL-MCNC: 0.04 NG/ML (ref 0–4)
RBC # BLD AUTO: 4.01 10*6/MM3 (ref 4.14–5.8)
SODIUM SERPL-SCNC: 142 MMOL/L (ref 136–145)
WBC # BLD AUTO: 5.65 10*3/MM3 (ref 3.4–10.8)

## 2021-07-30 PROCEDURE — 84100 ASSAY OF PHOSPHORUS: CPT | Performed by: INTERNAL MEDICINE

## 2021-07-30 PROCEDURE — 25010000002 DENOSUMAB 120 MG/1.7ML SOLUTION: Performed by: INTERNAL MEDICINE

## 2021-07-30 PROCEDURE — 84153 ASSAY OF PSA TOTAL: CPT | Performed by: INTERNAL MEDICINE

## 2021-07-30 PROCEDURE — 85025 COMPLETE CBC W/AUTO DIFF WBC: CPT | Performed by: INTERNAL MEDICINE

## 2021-07-30 PROCEDURE — 80053 COMPREHEN METABOLIC PANEL: CPT | Performed by: INTERNAL MEDICINE

## 2021-07-30 PROCEDURE — 83735 ASSAY OF MAGNESIUM: CPT | Performed by: INTERNAL MEDICINE

## 2021-07-30 PROCEDURE — 99214 OFFICE O/P EST MOD 30 MIN: CPT | Performed by: INTERNAL MEDICINE

## 2021-07-30 PROCEDURE — 96372 THER/PROPH/DIAG INJ SC/IM: CPT

## 2021-07-30 PROCEDURE — 36415 COLL VENOUS BLD VENIPUNCTURE: CPT

## 2021-07-30 RX ADMIN — DENOSUMAB 120 MG: 120 INJECTION SUBCUTANEOUS at 08:38

## 2021-08-03 RX ORDER — HYDROCORTISONE 10 MG/1
TABLET ORAL
Qty: 135 TABLET | Refills: 3 | Status: SHIPPED | OUTPATIENT
Start: 2021-08-03 | End: 2022-05-29 | Stop reason: SDUPTHER

## 2021-08-12 DIAGNOSIS — Z85.528 HISTORY OF RENAL CELL CARCINOMA: ICD-10-CM

## 2021-08-12 RX ORDER — HYDROCODONE BITARTRATE AND ACETAMINOPHEN 5; 325 MG/1; MG/1
1 TABLET ORAL EVERY 6 HOURS PRN
Qty: 120 TABLET | Refills: 0 | Status: CANCELLED | OUTPATIENT
Start: 2021-08-12

## 2021-08-12 RX ORDER — HYDROCODONE BITARTRATE AND ACETAMINOPHEN 5; 325 MG/1; MG/1
1 TABLET ORAL EVERY 6 HOURS PRN
Qty: 120 TABLET | Refills: 0 | Status: SHIPPED | OUTPATIENT
Start: 2021-08-12 | End: 2021-10-22 | Stop reason: SDUPTHER

## 2021-08-27 ENCOUNTER — INFUSION (OUTPATIENT)
Dept: ONCOLOGY | Facility: HOSPITAL | Age: 76
End: 2021-08-27

## 2021-08-27 ENCOUNTER — LAB (OUTPATIENT)
Dept: OTHER | Facility: HOSPITAL | Age: 76
End: 2021-08-27

## 2021-08-27 ENCOUNTER — OFFICE VISIT (OUTPATIENT)
Dept: ONCOLOGY | Facility: CLINIC | Age: 76
End: 2021-08-27

## 2021-08-27 VITALS
OXYGEN SATURATION: 95 % | HEIGHT: 72 IN | DIASTOLIC BLOOD PRESSURE: 83 MMHG | HEART RATE: 71 BPM | TEMPERATURE: 97.1 F | WEIGHT: 236.6 LBS | BODY MASS INDEX: 32.05 KG/M2 | RESPIRATION RATE: 16 BRPM | SYSTOLIC BLOOD PRESSURE: 123 MMHG

## 2021-08-27 DIAGNOSIS — C61 PROSTATE CANCER METASTATIC TO BONE (HCC): Primary | ICD-10-CM

## 2021-08-27 DIAGNOSIS — C79.51 PROSTATE CANCER METASTATIC TO BONE (HCC): Primary | ICD-10-CM

## 2021-08-27 DIAGNOSIS — C79.51 PROSTATE CANCER METASTATIC TO BONE (HCC): ICD-10-CM

## 2021-08-27 DIAGNOSIS — C61 PROSTATE CANCER METASTATIC TO BONE (HCC): ICD-10-CM

## 2021-08-27 LAB
ALBUMIN SERPL-MCNC: 4.3 G/DL (ref 3.5–5.2)
ALBUMIN/GLOB SERPL: 1.5 G/DL
ALP SERPL-CCNC: 42 U/L (ref 39–117)
ALT SERPL W P-5'-P-CCNC: 18 U/L (ref 1–41)
ANION GAP SERPL CALCULATED.3IONS-SCNC: 10 MMOL/L (ref 5–15)
AST SERPL-CCNC: 18 U/L (ref 1–40)
BASOPHILS # BLD AUTO: 0.05 10*3/MM3 (ref 0–0.2)
BASOPHILS NFR BLD AUTO: 0.8 % (ref 0–1.5)
BILIRUB SERPL-MCNC: 0.3 MG/DL (ref 0–1.2)
BUN SERPL-MCNC: 21 MG/DL (ref 8–23)
BUN/CREAT SERPL: 17.5 (ref 7–25)
CALCIUM SPEC-SCNC: 9.3 MG/DL (ref 8.6–10.5)
CHLORIDE SERPL-SCNC: 106 MMOL/L (ref 98–107)
CO2 SERPL-SCNC: 25 MMOL/L (ref 22–29)
CREAT SERPL-MCNC: 1.2 MG/DL (ref 0.76–1.27)
DEPRECATED RDW RBC AUTO: 45 FL (ref 37–54)
EOSINOPHIL # BLD AUTO: 0.26 10*3/MM3 (ref 0–0.4)
EOSINOPHIL NFR BLD AUTO: 4.2 % (ref 0.3–6.2)
ERYTHROCYTE [DISTWIDTH] IN BLOOD BY AUTOMATED COUNT: 14.1 % (ref 12.3–15.4)
GFR SERPL CREATININE-BSD FRML MDRD: 59 ML/MIN/1.73
GLOBULIN UR ELPH-MCNC: 2.8 GM/DL
GLUCOSE SERPL-MCNC: 169 MG/DL (ref 65–99)
HCT VFR BLD AUTO: 35 % (ref 37.5–51)
HGB BLD-MCNC: 11.5 G/DL (ref 13–17.7)
IMM GRANULOCYTES # BLD AUTO: 0.02 10*3/MM3 (ref 0–0.05)
IMM GRANULOCYTES NFR BLD AUTO: 0.3 % (ref 0–0.5)
LYMPHOCYTES # BLD AUTO: 1.16 10*3/MM3 (ref 0.7–3.1)
LYMPHOCYTES NFR BLD AUTO: 18.6 % (ref 19.6–45.3)
MAGNESIUM SERPL-MCNC: 1.8 MG/DL (ref 1.6–2.4)
MCH RBC QN AUTO: 28.4 PG (ref 26.6–33)
MCHC RBC AUTO-ENTMCNC: 32.9 G/DL (ref 31.5–35.7)
MCV RBC AUTO: 86.4 FL (ref 79–97)
MONOCYTES # BLD AUTO: 0.55 10*3/MM3 (ref 0.1–0.9)
MONOCYTES NFR BLD AUTO: 8.8 % (ref 5–12)
NEUTROPHILS NFR BLD AUTO: 4.2 10*3/MM3 (ref 1.7–7)
NEUTROPHILS NFR BLD AUTO: 67.3 % (ref 42.7–76)
NRBC BLD AUTO-RTO: 0 /100 WBC (ref 0–0.2)
PHOSPHATE SERPL-MCNC: 3.3 MG/DL (ref 2.5–4.5)
PLATELET # BLD AUTO: 146 10*3/MM3 (ref 140–450)
PMV BLD AUTO: 9.6 FL (ref 6–12)
POTASSIUM SERPL-SCNC: 4.4 MMOL/L (ref 3.5–5.2)
PROT SERPL-MCNC: 7.1 G/DL (ref 6–8.5)
PSA SERPL-MCNC: 0.03 NG/ML (ref 0–4)
RBC # BLD AUTO: 4.05 10*6/MM3 (ref 4.14–5.8)
SODIUM SERPL-SCNC: 141 MMOL/L (ref 136–145)
WBC # BLD AUTO: 6.24 10*3/MM3 (ref 3.4–10.8)

## 2021-08-27 PROCEDURE — 84153 ASSAY OF PSA TOTAL: CPT | Performed by: INTERNAL MEDICINE

## 2021-08-27 PROCEDURE — 85025 COMPLETE CBC W/AUTO DIFF WBC: CPT | Performed by: INTERNAL MEDICINE

## 2021-08-27 PROCEDURE — 83735 ASSAY OF MAGNESIUM: CPT | Performed by: INTERNAL MEDICINE

## 2021-08-27 PROCEDURE — 25010000002 DENOSUMAB 120 MG/1.7ML SOLUTION: Performed by: INTERNAL MEDICINE

## 2021-08-27 PROCEDURE — 99215 OFFICE O/P EST HI 40 MIN: CPT | Performed by: INTERNAL MEDICINE

## 2021-08-27 PROCEDURE — 80053 COMPREHEN METABOLIC PANEL: CPT | Performed by: INTERNAL MEDICINE

## 2021-08-27 PROCEDURE — 96372 THER/PROPH/DIAG INJ SC/IM: CPT

## 2021-08-27 PROCEDURE — 25010000002 LEUPROLIDE ACETATE (3 MONTH) PER 7.5 MG: Performed by: INTERNAL MEDICINE

## 2021-08-27 PROCEDURE — 36415 COLL VENOUS BLD VENIPUNCTURE: CPT

## 2021-08-27 PROCEDURE — 84100 ASSAY OF PHOSPHORUS: CPT | Performed by: INTERNAL MEDICINE

## 2021-08-27 PROCEDURE — 96402 CHEMO HORMON ANTINEOPL SQ/IM: CPT

## 2021-08-27 RX ORDER — VENLAFAXINE HYDROCHLORIDE 37.5 MG/1
37.5 CAPSULE, EXTENDED RELEASE ORAL DAILY
Qty: 30 CAPSULE | Refills: 5 | Status: SHIPPED | OUTPATIENT
Start: 2021-08-27 | End: 2023-02-15

## 2021-08-27 RX ADMIN — LEUPROLIDE ACETATE 22.5 MG: KIT at 08:55

## 2021-08-27 RX ADMIN — DENOSUMAB 120 MG: 120 INJECTION SUBCUTANEOUS at 09:08

## 2021-08-27 NOTE — PROGRESS NOTES
"[facility] GROUP OUTPATIENT FOLLOW UP CLINIC VISIT    REASON FOR FOLLOW-UP:    1.  History of metastatic clear cell renal cell carcinoma.  All known disease had previously been resected..  2.  Pulmonary metastases discovered July 2020.  Bone metastases discovered September 2020.  3.  Votrient initiated 10/16/2020  4. Palliative radiation for pain complete on 11/10/2020  5.  1/4/2021 significantly elevated PSA > 100.  6.  1/20/2021 bone biopsy confirms metastatic prostate cancer    HISTORY OF PRESENT ILLNESS:  Micah Krishna is a 76 y.o. male with the above-mentioned history who returns today for follow-up    Overall he has continued to do reasonably well.  However, he has had worsening hot flashes over the past couple of weeks.  These occur several times a day and are severe enough that if he is in bed he has to change his bed sheets.  He would like some medication for this.  His pain is under adequate control at this point.  He takes between 1 and 3 pain pills daily.  His energy level is reasonable.      REVIEW OF SYSTEMS:  As per HPI    PE:  Vitals:    08/27/21 0758   BP: 123/83   Pulse: 71   Resp: 16   Temp: 97.1 °F (36.2 °C)   TempSrc: Temporal   SpO2: 95%   Weight: 107 kg (236 lb 9.6 oz)   Height: 182.9 cm (72.01\")   PainSc: 0-No pain     General:  No acute distress, awake, alert and oriented  Skin:  Warm and dry, no visible rash  HEENT:  Normocephalic/atraumatic.  Wearing a facemask.  Chest:  Normal respiratory effort.  Lungs with mild scattered rhonchi.  No wheezing.  Heart: Regular rate and rhythm  Extremities:  No visible clubbing, cyanosis, or edema  Neuro/psych:  Grossly non-focal.  Normal mood and affect.    DIAGNOSTIC DATA:  CBC and Differential (08/27/2021 07:27)      IMAGING:    None reviewed      ASSESSMENT:  This is a 76 y.o. male with:    *Mild normocytic anemia:   · Hgb 11.5 today, stable    *Adrenal insufficiency following bilateral adrenalectomy   · On replacement hormone therapy.  "   · He follows with endocrinology.    *History of metastatic renal cell carcinoma.    · He had a left nephrectomy and adrenalectomy in 2000 and then a right adrenalectomy for metastatic disease in 2012.      *New metastatic bone disease diagnosed as he presented with worsening pain  · Presumed to be metastatic renal cell carcinoma.  · Imaging discussed with radiology.  Imaging consistent with metastatic renal cell carcinoma  · However, his PSA was noted to be greater than 100  · As discussed below, bone biopsy subsequently confirmed metastatic prostate cancer  · CT C/A/P 7/6/2020 with a RUL sub 6 mm pulmonary nodule in the RUL, stable since 1/17/2018, new 6 mm nodules in the medial RLL and RUL,  LLL nodule unchanged since 7/17/2018.  Healing left ninth rib fracture.  · CT chest 9/28/2020 with stable lung nodules, pathologic fracture of the right 8th rib, abnormal appearance of T8, narrowing of the thecal sack at this level, additional lucencies at several vertebral bodies such as T7 and T11, subacute healing fractures in the anterior right 6th rib and lateral left 9th rib.  · CT head 9/28/2020 with calvarial metastatic disease involving the frontal and occipital bones.  · Bone scan 9/28/2020 with multifocal uptake consistent with metastatic disease. Anterior frontal bone, upper C spine at C1 or C2, T and L spine at T3, T8, T11, multifocal rib uptake, abnormal uptake in the iliac wings/bodies and left femoral heads/acetabulum, distal left clavicle and both humeral heads.   · PET scan from 10/8/2020.  Multifocal bone metastases throughout the skeleton.  Pathologic rib and vertebral body fracture.  Moderate FDG uptake in the right femoral neck with some sclerosis, new since 7/6/2020.  Mottled appearance of L3 and T8 vertebral bodies with pathologic compression fractures.  L3 fracture is a burst fracture with 20 to 25% loss of height and 4 to 5 mm of retropulsion in the central spinal canal.  Pathologic compression  fracture at T8 with 10% loss of height.  New findings since 7/6/2020.  FDG avid soft tissue nodule extending into the central canal along the posterior aspect of the thecal sac at T8.  FDG uptake in the left clavicle.  Compression fracture of the right posterior eighth rib.  · Votrient initiated 10/16/2020  · Palliative radiation for pain complete on 11/10/2020  · He did subsequently presented with an elevated PSA >100.  · Bone biopsy performed on 1/20/2021 confirmed metastatic prostate cancer.  · Votrient discontinued  · 2/5/2021 patient given Firmagon and Xgeva.  With plans to start Eligard 4 weeks later, and continue monthly Xgeva.    · 3/5/21.First dose of Eligard and monthly Xgeva.  Eligard will be given every 3 months.    *Chronic kidney disease:   · Status post left nephrectomy.    · Creatinine 1.20 today     *Migratory skeletal pain:   · Metastatic disease apparent now on CT imaging and bone scan and now PET scan.  · Completed radiation   · Left leg pain has improved  · Back pain improved  · 4/2/2021 patient continues on Norco 5/325 2 to 3 tablets/day with good pain control.  This remains stable as of 4/30/2021.  · 5/28/21.  The patient continues on [place] 5/325 with 2 to 3 tablets/day.  Pain is stable.   · 7/2/2021: He ran out of the hydrocodone/acetaminophen and has been using more ibuprofen which has controlled his pain but I encouraged him not to use as much ibuprofen to protect his kidney.  · Pain is stable as of 7/30/2021.    *Mild hypomagnesemia: monitor. If muscle cramps advised to call us.  · 4/2/2021 normal magnesium level at 1.9.  He continues on magnesium oxide 400 mg daily.  · 5/28/21.  Magnesium level 1.8 again today    *Elevated PSA, subsequent biopsy confirmed metastatic prostate cancer:   · He has a history of very mild elevation of his PSA in the past with a PSA of 5.92 on 10/17/2018.    · His PSA on 1/4/2021 was greater than 100  · PSA repeated today is also greater than 100  · He is  completely asymptomatic without clinical evidence for prostatitis.    · Dr. Cat did communicate with Dr. Zapata with radiology and all findings on imaging would support more metastatic renal cell carcinoma rather than metastatic prostate cancer since the bony lesions are lytic.  There is nothing obvious in his prostate on current imaging.  · Dr. Cat communicated with Dr. Cabrera with urology.  He is in agreement with a bone biopsy and he will see him in the office for evaluation.  · One of the bony lesions is amenable to CT-guided biopsy per Dr. Zapata.  · CT-guided needle biopsy of the bone performed on 1/20/2021 confirms metastatic prostate cancer.  · 3/5/21.  Please see above.  The patient received 1 dose of Firmagon and is on Eligard every 3 months.  · 5/28/21.  PSA further decreased to 0.076  · Follow-up PSA from today 0.032    *New 40% T2 compression fracture.  Asymptomatic.  He is not interested in further radiation at this time.  Might consider referral to neurosurgery should he become more symptomatic or if this worsens.    *Hot flashes: An adverse effect of ADT.  Dr. Cat has previously offered gabapentin or venlafaxine but he is not interested in taking additional medication at this time.  · He did not complain of these as of 7/2    *Vasomotor symptoms related to ADT  · These have worsened significantly over the past few weeks  · Plan venlafaxine    *He did have an MRI of the pituitary gland on 8/16/2021 as ordered by his endocrinologist.  Pituitary appears normal.  There is abnormal enhancement of the clivus suspected to be related to his metastatic prostate cancer.  There is an 11 mm lesion overlying the left frontal convexity suspected to be a meningioma.  The patient states that he was referred to neurosurgery but is not necessarily interested in pursuing this.  We can repeat an MRI in the future if desired.    PLAN:   1. Proceed with monthly Xgeva today.  2. Proceed with every 3-month Eligard  today  3. Continue Norco 5/325 as needed for pain. I previously encouraged him to avoid NSAIDs as much as possible.  4. Prescription for venlafaxine XR 37.5 mg once daily electronically sent to his pharmacy today  5. Xgeva in four weeks, see me in 8 weeks    The patient is on drug therapy requiring extensive monitoring    I spent 40 minutes in this visit today reviewing his record, communicating with him, examining him, documenting the encounter.

## 2021-09-01 ENCOUNTER — IMMUNIZATION (OUTPATIENT)
Dept: VACCINE CLINIC | Facility: HOSPITAL | Age: 76
End: 2021-09-01

## 2021-09-01 PROCEDURE — 0003A: CPT | Performed by: INTERNAL MEDICINE

## 2021-09-01 PROCEDURE — 91300 HC SARSCOV02 VAC 30MCG/0.3ML IM: CPT | Performed by: INTERNAL MEDICINE

## 2021-09-10 RX ORDER — ISOSORBIDE MONONITRATE 30 MG/1
30 TABLET, EXTENDED RELEASE ORAL 2 TIMES DAILY
Qty: 180 TABLET | Refills: 1 | Status: SHIPPED | OUTPATIENT
Start: 2021-09-10 | End: 2021-10-22 | Stop reason: SDUPTHER

## 2021-09-10 RX ORDER — CLONIDINE HYDROCHLORIDE 0.2 MG/1
0.2 TABLET ORAL DAILY
Qty: 90 TABLET | Refills: 1 | Status: SHIPPED | OUTPATIENT
Start: 2021-09-10 | End: 2022-01-25

## 2021-09-10 RX ORDER — FINASTERIDE 5 MG/1
5 TABLET, FILM COATED ORAL DAILY
Qty: 90 TABLET | Refills: 1 | Status: SHIPPED | OUTPATIENT
Start: 2021-09-10 | End: 2022-01-25

## 2021-09-10 NOTE — TELEPHONE ENCOUNTER
Rx Refill Note  Requested Prescriptions     Pending Prescriptions Disp Refills   • isosorbide mononitrate (IMDUR) 30 MG 24 hr tablet 180 tablet 1     Sig: Take 1 tablet by mouth 2 (Two) Times a Day.   • cloNIDine (CATAPRES) 0.2 MG tablet 90 tablet 1     Sig: Take 1 tablet by mouth Daily.   • finasteride (PROSCAR) 5 MG tablet 90 tablet 1     Sig: Take 1 tablet by mouth Daily.      Last office visit with prescribing clinician: 7/6/2021      Next office visit with prescribing clinician: 10/6/2021            Fuad Bautista Rep  09/10/21, 10:19 EDT

## 2021-09-24 ENCOUNTER — LAB (OUTPATIENT)
Dept: OTHER | Facility: HOSPITAL | Age: 76
End: 2021-09-24

## 2021-09-24 ENCOUNTER — INFUSION (OUTPATIENT)
Dept: ONCOLOGY | Facility: HOSPITAL | Age: 76
End: 2021-09-24

## 2021-09-24 VITALS
BODY MASS INDEX: 32.37 KG/M2 | RESPIRATION RATE: 18 BRPM | OXYGEN SATURATION: 98 % | WEIGHT: 239 LBS | TEMPERATURE: 96.9 F | HEIGHT: 72 IN | HEART RATE: 66 BPM | SYSTOLIC BLOOD PRESSURE: 129 MMHG | DIASTOLIC BLOOD PRESSURE: 80 MMHG

## 2021-09-24 DIAGNOSIS — C79.51 PROSTATE CANCER METASTATIC TO BONE (HCC): ICD-10-CM

## 2021-09-24 DIAGNOSIS — C61 PROSTATE CANCER METASTATIC TO BONE (HCC): ICD-10-CM

## 2021-09-24 DIAGNOSIS — C61 PROSTATE CANCER METASTATIC TO BONE (HCC): Primary | ICD-10-CM

## 2021-09-24 DIAGNOSIS — C79.51 PROSTATE CANCER METASTATIC TO BONE (HCC): Primary | ICD-10-CM

## 2021-09-24 LAB
ALBUMIN SERPL-MCNC: 4.3 G/DL (ref 3.5–5.2)
ALBUMIN/GLOB SERPL: 1.5 G/DL
ALP SERPL-CCNC: 41 U/L (ref 39–117)
ALT SERPL W P-5'-P-CCNC: 17 U/L (ref 1–41)
ANION GAP SERPL CALCULATED.3IONS-SCNC: 10.2 MMOL/L (ref 5–15)
AST SERPL-CCNC: 17 U/L (ref 1–40)
BASOPHILS # BLD AUTO: 0.05 10*3/MM3 (ref 0–0.2)
BASOPHILS NFR BLD AUTO: 0.8 % (ref 0–1.5)
BILIRUB SERPL-MCNC: 0.4 MG/DL (ref 0–1.2)
BUN SERPL-MCNC: 18 MG/DL (ref 8–23)
BUN/CREAT SERPL: 17.1 (ref 7–25)
CALCIUM SPEC-SCNC: 9.3 MG/DL (ref 8.6–10.5)
CHLORIDE SERPL-SCNC: 104 MMOL/L (ref 98–107)
CO2 SERPL-SCNC: 25.8 MMOL/L (ref 22–29)
CREAT SERPL-MCNC: 1.05 MG/DL (ref 0.76–1.27)
DEPRECATED RDW RBC AUTO: 43.8 FL (ref 37–54)
EOSINOPHIL # BLD AUTO: 0.3 10*3/MM3 (ref 0–0.4)
EOSINOPHIL NFR BLD AUTO: 4.8 % (ref 0.3–6.2)
ERYTHROCYTE [DISTWIDTH] IN BLOOD BY AUTOMATED COUNT: 14 % (ref 12.3–15.4)
GFR SERPL CREATININE-BSD FRML MDRD: 68 ML/MIN/1.73
GLOBULIN UR ELPH-MCNC: 2.9 GM/DL
GLUCOSE SERPL-MCNC: 174 MG/DL (ref 65–99)
HCT VFR BLD AUTO: 35 % (ref 37.5–51)
HGB BLD-MCNC: 11.6 G/DL (ref 13–17.7)
IMM GRANULOCYTES # BLD AUTO: 0.03 10*3/MM3 (ref 0–0.05)
IMM GRANULOCYTES NFR BLD AUTO: 0.5 % (ref 0–0.5)
LYMPHOCYTES # BLD AUTO: 1.47 10*3/MM3 (ref 0.7–3.1)
LYMPHOCYTES NFR BLD AUTO: 23.7 % (ref 19.6–45.3)
MAGNESIUM SERPL-MCNC: 1.8 MG/DL (ref 1.6–2.4)
MCH RBC QN AUTO: 28.6 PG (ref 26.6–33)
MCHC RBC AUTO-ENTMCNC: 33.1 G/DL (ref 31.5–35.7)
MCV RBC AUTO: 86.4 FL (ref 79–97)
MONOCYTES # BLD AUTO: 0.57 10*3/MM3 (ref 0.1–0.9)
MONOCYTES NFR BLD AUTO: 9.2 % (ref 5–12)
NEUTROPHILS NFR BLD AUTO: 3.77 10*3/MM3 (ref 1.7–7)
NEUTROPHILS NFR BLD AUTO: 61 % (ref 42.7–76)
NRBC BLD AUTO-RTO: 0 /100 WBC (ref 0–0.2)
PHOSPHATE SERPL-MCNC: 3.5 MG/DL (ref 2.5–4.5)
PLATELET # BLD AUTO: 123 10*3/MM3 (ref 140–450)
PMV BLD AUTO: 9.2 FL (ref 6–12)
POTASSIUM SERPL-SCNC: 4.5 MMOL/L (ref 3.5–5.2)
PROT SERPL-MCNC: 7.2 G/DL (ref 6–8.5)
PSA SERPL-MCNC: 0.03 NG/ML (ref 0–4)
RBC # BLD AUTO: 4.05 10*6/MM3 (ref 4.14–5.8)
SODIUM SERPL-SCNC: 140 MMOL/L (ref 136–145)
WBC # BLD AUTO: 6.19 10*3/MM3 (ref 3.4–10.8)

## 2021-09-24 PROCEDURE — 25010000002 DENOSUMAB 120 MG/1.7ML SOLUTION: Performed by: INTERNAL MEDICINE

## 2021-09-24 PROCEDURE — 83735 ASSAY OF MAGNESIUM: CPT | Performed by: INTERNAL MEDICINE

## 2021-09-24 PROCEDURE — 85025 COMPLETE CBC W/AUTO DIFF WBC: CPT | Performed by: INTERNAL MEDICINE

## 2021-09-24 PROCEDURE — 84153 ASSAY OF PSA TOTAL: CPT | Performed by: INTERNAL MEDICINE

## 2021-09-24 PROCEDURE — 84100 ASSAY OF PHOSPHORUS: CPT | Performed by: INTERNAL MEDICINE

## 2021-09-24 PROCEDURE — 80053 COMPREHEN METABOLIC PANEL: CPT | Performed by: INTERNAL MEDICINE

## 2021-09-24 PROCEDURE — 36415 COLL VENOUS BLD VENIPUNCTURE: CPT

## 2021-09-24 PROCEDURE — 96372 THER/PROPH/DIAG INJ SC/IM: CPT

## 2021-09-24 RX ADMIN — DENOSUMAB 120 MG: 120 INJECTION SUBCUTANEOUS at 08:12

## 2021-09-27 RX ORDER — AMLODIPINE BESYLATE 10 MG/1
10 TABLET ORAL DAILY
Qty: 90 TABLET | Refills: 1 | Status: SHIPPED | OUTPATIENT
Start: 2021-09-27 | End: 2022-02-22

## 2021-09-27 RX ORDER — ERGOCALCIFEROL 1.25 MG/1
CAPSULE ORAL
Qty: 14 CAPSULE | Refills: 0 | Status: SHIPPED | OUTPATIENT
Start: 2021-09-27 | End: 2022-05-29 | Stop reason: SDUPTHER

## 2021-09-27 RX ORDER — ALLOPURINOL 300 MG/1
300 TABLET ORAL DAILY
Qty: 90 TABLET | Refills: 1 | Status: SHIPPED | OUTPATIENT
Start: 2021-09-27 | End: 2022-02-22

## 2021-10-01 RX ORDER — PANTOPRAZOLE SODIUM 20 MG/1
TABLET, DELAYED RELEASE ORAL
Qty: 90 TABLET | Refills: 1 | Status: SHIPPED | OUTPATIENT
Start: 2021-10-01 | End: 2022-08-19

## 2021-10-01 RX ORDER — METOPROLOL SUCCINATE 100 MG/1
100 TABLET, EXTENDED RELEASE ORAL DAILY
Qty: 90 TABLET | Refills: 1 | Status: SHIPPED | OUTPATIENT
Start: 2021-10-01 | End: 2022-05-16

## 2021-10-01 RX ORDER — ALBUTEROL SULFATE 90 UG/1
AEROSOL, METERED RESPIRATORY (INHALATION)
Qty: 51 G | Refills: 1 | Status: SHIPPED | OUTPATIENT
Start: 2021-10-01 | End: 2022-10-03

## 2021-10-06 ENCOUNTER — OFFICE VISIT (OUTPATIENT)
Dept: FAMILY MEDICINE CLINIC | Facility: CLINIC | Age: 76
End: 2021-10-06

## 2021-10-06 VITALS
HEIGHT: 72 IN | SYSTOLIC BLOOD PRESSURE: 142 MMHG | TEMPERATURE: 97.1 F | RESPIRATION RATE: 14 BRPM | BODY MASS INDEX: 32.13 KG/M2 | DIASTOLIC BLOOD PRESSURE: 87 MMHG | OXYGEN SATURATION: 97 % | WEIGHT: 237.2 LBS | HEART RATE: 82 BPM

## 2021-10-06 DIAGNOSIS — R68.89 DECREASED STRENGTH, ENDURANCE, AND MOBILITY: ICD-10-CM

## 2021-10-06 DIAGNOSIS — C79.51 PROSTATE CANCER METASTATIC TO BONE (HCC): ICD-10-CM

## 2021-10-06 DIAGNOSIS — C61 PROSTATE CANCER METASTATIC TO BONE (HCC): ICD-10-CM

## 2021-10-06 DIAGNOSIS — Z74.09 DECREASED STRENGTH, ENDURANCE, AND MOBILITY: ICD-10-CM

## 2021-10-06 DIAGNOSIS — R26.81 GAIT INSTABILITY: ICD-10-CM

## 2021-10-06 DIAGNOSIS — J44.9 CHRONIC OBSTRUCTIVE PULMONARY DISEASE, UNSPECIFIED COPD TYPE (HCC): Primary | ICD-10-CM

## 2021-10-06 DIAGNOSIS — R53.1 DECREASED STRENGTH, ENDURANCE, AND MOBILITY: ICD-10-CM

## 2021-10-06 PROCEDURE — 99214 OFFICE O/P EST MOD 30 MIN: CPT | Performed by: NURSE PRACTITIONER

## 2021-10-06 RX ORDER — MELOXICAM 7.5 MG/1
7.5 TABLET ORAL DAILY
Qty: 90 TABLET | Refills: 1 | Status: SHIPPED | OUTPATIENT
Start: 2021-10-06 | End: 2023-02-15

## 2021-10-06 NOTE — PROGRESS NOTES
"Chief Complaint  Follow-up (3 MONTH F/U)    Subjective          Micah Krishna presents to [facility]  Pleasant patient here for follow-up, he has malignancy, with metastatic disease he has both renal carcinoma prostate cancer as well  with metastasis to his hip and spine.  Has chronic back pain  COPD chronic shortness of breath, no chest pain no fevers no night sweats.  Presently taking hydrocodone 10 mg 4 times a day but still has considerable amount of pain  Takes Plavix for atherosclerotic disease history of TIA, he has no history of GI bleeds no renal failure no absolute contraindications to anti-inflammatories but he does have a significant potential increased risk of bleeding with the Plavix  Tylenol is limited help and more conservative measures have been ineffective    He is having more difficulty with ambulation long distances such as walking in the office he has to take breaks to make it and he would like a handicap sticker  Has a walker at night if needed but does not want to use he does well no falls recently    He has a cough over the last year mostly at night and some congestion  More chronic congestion  No significant cough throughout the day or new chest pains.    Cough  This is a recurrent problem. The current episode started more than 1 year ago. The problem has been waxing and waning. The problem occurs every few hours. The cough is non-productive. Associated symptoms include nasal congestion, postnasal drip, rhinorrhea and shortness of breath. Pertinent negatives include no chest pain, chills, ear pain, fever, heartburn, hemoptysis, sweats or weight loss. The symptoms are aggravated by lying down. Risk factors for lung disease include animal exposure and smoking/tobacco exposure.       Objective   Vital Signs:   /87   Pulse 82   Temp 97.1 °F (36.2 °C) (Infrared)   Resp 14   Ht 182.9 cm (72\")   Wt 108 kg (237 lb 3.2 oz)   SpO2 97%   BMI 32.17 kg/m²   "   Physical Exam  Vitals reviewed.   Constitutional:       Appearance: Normal appearance. He is well-developed. He is obese.      Comments: Pleasant no distress patient's baseline   HENT:      Head: Normocephalic.      Nose: Nose normal.   Eyes:      General: No scleral icterus.     Conjunctiva/sclera: Conjunctivae normal.      Pupils: Pupils are equal, round, and reactive to light.   Neck:      Thyroid: No thyromegaly.      Vascular: No JVD.   Cardiovascular:      Rate and Rhythm: Normal rate and regular rhythm.      Heart sounds: Normal heart sounds. No murmur heard.   No friction rub. No gallop.    Pulmonary:      Effort: Pulmonary effort is normal. No respiratory distress.      Breath sounds: Normal breath sounds. No stridor. No wheezing or rales.      Comments: Chest is clear on exam respirations are normal unlabored  He clears his throat several times to demonstrate his congestion.  Abdominal:      General: Bowel sounds are normal. There is no distension.      Palpations: Abdomen is soft.      Tenderness: There is no abdominal tenderness.      Comments: No hepatosplenomegaly, no ascites,   Musculoskeletal:         General: No tenderness.      Cervical back: Neck supple.      Comments: Poor posture walks forward, gait a little slow but gets along okay, no notable dyspnea walking down the [place].   Lymphadenopathy:      Cervical: No cervical adenopathy.   Skin:     General: Skin is warm and dry.      Findings: No erythema or rash.   Neurological:      Mental Status: He is alert and oriented to person, place, and time.      Deep Tendon Reflexes: Reflexes are normal and symmetric.   Psychiatric:         Behavior: Behavior normal.         Thought Content: Thought content normal.         Judgment: Judgment normal.        Result Review :                 Assessment and Plan    Diagnoses and all orders for this visit:    1. Chronic obstructive pulmonary disease, unspecified COPD type (HCC) (Primary)    Other orders  -      Fluticasone-Umeclidin-Vilant (Trelegy Ellipta) 100-62.5-25 MCG/INH inhaler; Inhale 1 puff Daily.  Dispense: 3 each; Refill: 3  -     meloxicam (Mobic) 7.5 MG tablet; Take 1 tablet by mouth Daily. With food and water for chronic pain and inflammation  Dispense: 90 tablet; Refill: 1      I spent 38  minutes caring for Micah on this date of service. This time includes time spent by me in the following activities:preparing for the visit, reviewing tests, performing a medically appropriate examination and/or evaluation , ordering medications, tests, or procedures, documenting information in the medical record and care coordination  Follow Up   Return in about 3 months (around 1/6/2022).  Patient was given instructions and counseling regarding his condition or for health maintenance advice. Please see specific information pulled into the AVS if appropriate.     Conservative measures or not effective as quality life has been affected there is no risk-free anti-inflammatory and they can significantly increase risk of stroke heart attack gastric bleeding kidney failure  As well as he takes Plavix this would increase the risk    We discussed strategies to mitigate the risk including decreasing the dose of meloxicam taking with food and water will monitor kidney function and make sure blood pressures controlled to minimize any additional cardiovascular risk.    He should try this for at least 6 weeks we will try Trelegy as well  To improve his breathing, albuterol more frequently  And a strategy for postnasal drip at night likely contributing his congestion in the morning.    Should have any new chronic cough new chest pain new shortness of breath or other issues he should follow-up with oncology and have another chest CT    Quite a bit of time teaching discussed the risk of inhaled steroids as well we will start out with the inhaled steroid but we may be able to discontinue this component  Which may increase risk of  pneumonias in some patients.  Office visit 38 minutes    Discharge instructions continue present plan      Consider physical therapy at any point for endurance training, strengthening.    Postnasal drip plan for nighttime cough, try  Zyrtec 10 mg 1/2 tablet at bedtime generic fine  Flonase 2 sprays each nostril at bedtime    If thicker congestion  To thin, guaifenesin OTC       Trial Trelegy 1 inhalation daily to help control symptoms of COPD and open up airways.  Albuterol 2 puffs  3-4 times a day.    Meloxicam 7.5 mg daily  With food and water water to protect your kidneys take with food to protect your stomach    A 6-week trial    Send me a message in 6 weeks I want to know how your nighttime cough is, how your breathing is with walking,  How your pain level is    Thank you     Answers for HPI/ROS submitted by the patient on 9/29/2021  What is the primary reason for your visit?: Cough

## 2021-10-06 NOTE — PATIENT INSTRUCTIONS
Discharge instructions continue present plan      Consider physical therapy at any point for endurance training, strengthening.    Postnasal drip plan for nighttime cough, try  Zyrtec 10 mg 1/2 tablet at bedtime generic fine  Flonase 2 sprays each nostril at bedtime    If thicker congestion  To thin, guaifenesin OTC       Trial Trelegy 1 inhalation daily to help control symptoms of COPD and open up airways.  Albuterol 2 puffs  3-4 times a day.    Meloxicam 7.5 mg daily  With food and water water to protect your kidneys take with food to protect your stomach    A 6-week trial    Send me a message in 6 weeks I want to know how your nighttime cough is, how your breathing is with walking,  How your pain level is    Thank you

## 2021-10-21 NOTE — PROGRESS NOTES
"[facility] GROUP OUTPATIENT FOLLOW UP CLINIC VISIT    REASON FOR FOLLOW-UP:    1.  History of metastatic clear cell renal cell carcinoma.  All known disease had previously been resected..  2.  Pulmonary metastases discovered July 2020.  Bone metastases discovered September 2020.  3.  Votrient initiated 10/16/2020  4. Palliative radiation for pain complete on 11/10/2020  5.  1/4/2021 significantly elevated PSA > 100.  6.  1/20/2021 bone biopsy confirms metastatic prostate cancer    HISTORY OF PRESENT ILLNESS:  Micah Krishna is a 76 y.o. male with the above-mentioned history who returns today for follow-up    He continues to do about the same.  His back pain waxes and wanes.  It has been a little bit worse over the past couple of days after doing some yard work.  Hot flashes persist.  He has not regularly taken the venlafaxine.  They might be a little bit better at this point.  In spite of all of this, he remains active.    REVIEW OF SYSTEMS:  As per HPI    PE:  Vitals:    10/22/21 0742   BP: 144/81   Pulse: 70   Resp: 16   Temp: 97.6 °F (36.4 °C)   TempSrc: Temporal   SpO2: 96%   Weight: 109 kg (239 lb 12.8 oz)   Height: 182.9 cm (72.01\")   PainSc: 0-No pain  Comment: bone cancer     General:  No acute distress, awake, alert and oriented  Skin:  Warm and dry, no visible rash  HEENT:  Normocephalic/atraumatic.  Wearing a facemask.  Lymphatics: No palpable cervical supraclavicular adenopathy  Chest:  Normal respiratory effort.  Lungs with mild scattered rhonchi.  No wheezing.  Heart: Regular rate and rhythm  Extremities:  No visible clubbing, cyanosis, or edema  Neuro/psych:  Grossly non-focal.  Normal mood and affect.    DIAGNOSTIC DATA:  CBC and Differential (10/22/2021 07:32)  PSA (10/22/2021 08:24)  Comprehensive metabolic panel (10/22/2021 07:32)  Magnesium (10/22/2021 07:32)  Phosphorus (10/22/2021 07:32)      IMAGING:    None reviewed      ASSESSMENT:  This is a 76 y.o. male with:    *Mild normocytic " anemia:   · Hgb 11.2 today, stable    *Adrenal insufficiency following bilateral adrenalectomy   · On replacement hormone therapy.    · He follows with endocrinology.    *History of metastatic renal cell carcinoma.    · He had a left nephrectomy and adrenalectomy in 2000 and then a right adrenalectomy for metastatic disease in 2012.      *New metastatic bone disease diagnosed as he presented with worsening pain  · Presumed to be metastatic renal cell carcinoma.  · Imaging discussed with radiology.  Imaging consistent with metastatic renal cell carcinoma  · However, his PSA was noted to be greater than 100  · As discussed below, bone biopsy subsequently confirmed metastatic prostate cancer  · CT C/A/P 7/6/2020 with a RUL sub 6 mm pulmonary nodule in the RUL, stable since 1/17/2018, new 6 mm nodules in the medial RLL and RUL,  LLL nodule unchanged since 7/17/2018.  Healing left ninth rib fracture.  · CT chest 9/28/2020 with stable lung nodules, pathologic fracture of the right 8th rib, abnormal appearance of T8, narrowing of the thecal sack at this level, additional lucencies at several vertebral bodies such as T7 and T11, subacute healing fractures in the anterior right 6th rib and lateral left 9th rib.  · CT head 9/28/2020 with calvarial metastatic disease involving the frontal and occipital bones.  · Bone scan 9/28/2020 with multifocal uptake consistent with metastatic disease. Anterior frontal bone, upper C spine at C1 or C2, T and L spine at T3, T8, T11, multifocal rib uptake, abnormal uptake in the iliac wings/bodies and left femoral heads/acetabulum, distal left clavicle and both humeral heads.   · PET scan from 10/8/2020.  Multifocal bone metastases throughout the skeleton.  Pathologic rib and vertebral body fracture.  Moderate FDG uptake in the right femoral neck with some sclerosis, new since 7/6/2020.  Mottled appearance of L3 and T8 vertebral bodies with pathologic compression fractures.  L3 fracture is a  burst fracture with 20 to 25% loss of height and 4 to 5 mm of retropulsion in the central spinal canal.  Pathologic compression fracture at T8 with 10% loss of height.  New findings since 7/6/2020.  FDG avid soft tissue nodule extending into the central canal along the posterior aspect of the thecal sac at T8.  FDG uptake in the left clavicle.  Compression fracture of the right posterior eighth rib.  · Votrient initiated 10/16/2020  · Palliative radiation for pain complete on 11/10/2020  · He did subsequently presented with an elevated PSA >100.  · Bone biopsy performed on 1/20/2021 confirmed metastatic prostate cancer.  · Votrient discontinued  · 2/5/2021 patient given Firmagon and Xgeva.  With plans to start Eligard 4 weeks later, and continue monthly Xgeva.    · 3/5/21.First dose of Eligard and monthly Xgeva.  Eligard will be given every 3 months.    *Chronic kidney disease:   · Status post left nephrectomy.    · Creatinine stable at 1.1 today     *Migratory skeletal pain:   · Metastatic disease apparent now on CT imaging and bone scan and now PET scan.  · Completed radiation   · Left leg pain has improved  · Back pain improved  · 4/2/2021 patient continues on Norco 5/325 2 to 3 tablets/day with good pain control.  This remains stable as of 4/30/2021.  · 5/28/21.  The patient continues on [place] 5/325 with 2 to 3 tablets/day.  Pain is stable.   · 7/2/2021: He ran out of the hydrocodone/acetaminophen and has been using more ibuprofen which has controlled his pain but I encouraged him not to use as much ibuprofen to protect his kidney.  · Pain is stable as of 7/30/2021.  · 10/22/2021: Pain is stable.  Pain medication refilled.  He was started on meloxicam by primary care which she will start in the near future.    *Mild hypomagnesemia: monitor. If muscle cramps advised to call us.  · 4/2/2021 normal magnesium level at 1.9.  He continues on magnesium oxide 400 mg daily.  · 5/28/21.  Magnesium normal at 1.9  today    *Elevated PSA, subsequent biopsy confirmed metastatic prostate cancer:   · He has a history of very mild elevation of his PSA in the past with a PSA of 5.92 on 10/17/2018.    · His PSA on 1/4/2021 was greater than 100  · PSA repeated today is also greater than 100  · He is completely asymptomatic without clinical evidence for prostatitis.    · Dr. Cat did communicate with Dr. Zapata with radiology and all findings on imaging would support more metastatic renal cell carcinoma rather than metastatic prostate cancer since the bony lesions are lytic.  There is nothing obvious in his prostate on current imaging.  · Dr. Cat communicated with Dr. Cabrera with urology.  He is in agreement with a bone biopsy and he will see him in the office for evaluation.  · One of the bony lesions is amenable to CT-guided biopsy per Dr. Zapata.  · CT-guided needle biopsy of the bone performed on 1/20/2021 confirms metastatic prostate cancer.  · 3/5/21.  Please see above.  The patient received 1 dose of Firmagon and is on Eligard every 3 months.  · 5/28/21.  PSA further decreased to 0.076 and then 0.032  · PSA pending today    *New 40% T2 compression fracture.  Asymptomatic.  He is not interested in further radiation at this time.  Might consider referral to neurosurgery should he become more symptomatic or if this worsens.    *Vasomotor symptoms related to ADT  · These have worsened significantly over the past few weeks  · Plan venlafaxine    *He did have an MRI of the pituitary gland on 8/16/2021 as ordered by his endocrinologist.  Pituitary appears normal.  There is abnormal enhancement of the clivus suspected to be related to his metastatic prostate cancer.  There is an 11 mm lesion overlying the left frontal convexity suspected to be a meningioma.  The patient states that he was referred to neurosurgery but is not necessarily interested in pursuing this.  We can repeat an MRI in the future if desired.    PLAN:   1. Proceed  with monthly Xgeva today.  2. Continue Norco 5/325 as needed for pain.  Refilled today.  I previously encouraged him to avoid NSAIDs as much as possible.  3. He will start meloxicam 7.5 mg daily as prescribed by primary care.  4. Continue venlafaxine XR 37.5 mg once daily.  He will use this more regularly.  Increase the dose if it is not effective.  Consider gabapentin due to night sweats.  5. Eligard and Xgeva are due in four weeks. See me in 8 weeks with Xgeva    The patient is on drug therapy requiring extensive monitoring

## 2021-10-22 ENCOUNTER — OFFICE VISIT (OUTPATIENT)
Dept: ONCOLOGY | Facility: CLINIC | Age: 76
End: 2021-10-22

## 2021-10-22 ENCOUNTER — INFUSION (OUTPATIENT)
Dept: ONCOLOGY | Facility: HOSPITAL | Age: 76
End: 2021-10-22

## 2021-10-22 ENCOUNTER — LAB (OUTPATIENT)
Dept: OTHER | Facility: HOSPITAL | Age: 76
End: 2021-10-22

## 2021-10-22 VITALS
WEIGHT: 239.8 LBS | HEART RATE: 70 BPM | DIASTOLIC BLOOD PRESSURE: 81 MMHG | BODY MASS INDEX: 32.48 KG/M2 | SYSTOLIC BLOOD PRESSURE: 144 MMHG | RESPIRATION RATE: 16 BRPM | HEIGHT: 72 IN | OXYGEN SATURATION: 96 % | TEMPERATURE: 97.6 F

## 2021-10-22 DIAGNOSIS — C61 PROSTATE CANCER METASTATIC TO BONE (HCC): ICD-10-CM

## 2021-10-22 DIAGNOSIS — C61 PROSTATE CANCER METASTATIC TO BONE (HCC): Primary | ICD-10-CM

## 2021-10-22 DIAGNOSIS — C79.51 PROSTATE CANCER METASTATIC TO BONE (HCC): Primary | ICD-10-CM

## 2021-10-22 DIAGNOSIS — C79.51 PROSTATE CANCER METASTATIC TO BONE (HCC): ICD-10-CM

## 2021-10-22 DIAGNOSIS — Z85.528 HISTORY OF RENAL CELL CARCINOMA: ICD-10-CM

## 2021-10-22 LAB
ALBUMIN SERPL-MCNC: 4.3 G/DL (ref 3.5–5.2)
ALBUMIN/GLOB SERPL: 1.5 G/DL
ALP SERPL-CCNC: 42 U/L (ref 39–117)
ALT SERPL W P-5'-P-CCNC: 16 U/L (ref 1–41)
ANION GAP SERPL CALCULATED.3IONS-SCNC: 11.5 MMOL/L (ref 5–15)
AST SERPL-CCNC: 16 U/L (ref 1–40)
BASOPHILS # BLD AUTO: 0.05 10*3/MM3 (ref 0–0.2)
BASOPHILS NFR BLD AUTO: 0.9 % (ref 0–1.5)
BILIRUB SERPL-MCNC: 0.2 MG/DL (ref 0–1.2)
BUN SERPL-MCNC: 17 MG/DL (ref 8–23)
BUN/CREAT SERPL: 15.5 (ref 7–25)
CALCIUM SPEC-SCNC: 9.2 MG/DL (ref 8.6–10.5)
CHLORIDE SERPL-SCNC: 108 MMOL/L (ref 98–107)
CO2 SERPL-SCNC: 22.5 MMOL/L (ref 22–29)
CREAT SERPL-MCNC: 1.1 MG/DL (ref 0.76–1.27)
DEPRECATED RDW RBC AUTO: 43.9 FL (ref 37–54)
EOSINOPHIL # BLD AUTO: 0.24 10*3/MM3 (ref 0–0.4)
EOSINOPHIL NFR BLD AUTO: 4.3 % (ref 0.3–6.2)
ERYTHROCYTE [DISTWIDTH] IN BLOOD BY AUTOMATED COUNT: 14 % (ref 12.3–15.4)
GFR SERPL CREATININE-BSD FRML MDRD: 65 ML/MIN/1.73
GLOBULIN UR ELPH-MCNC: 2.8 GM/DL
GLUCOSE SERPL-MCNC: 170 MG/DL (ref 65–99)
HCT VFR BLD AUTO: 34.3 % (ref 37.5–51)
HGB BLD-MCNC: 11.2 G/DL (ref 13–17.7)
IMM GRANULOCYTES # BLD AUTO: 0.03 10*3/MM3 (ref 0–0.05)
IMM GRANULOCYTES NFR BLD AUTO: 0.5 % (ref 0–0.5)
LYMPHOCYTES # BLD AUTO: 1.18 10*3/MM3 (ref 0.7–3.1)
LYMPHOCYTES NFR BLD AUTO: 21.1 % (ref 19.6–45.3)
MAGNESIUM SERPL-MCNC: 1.9 MG/DL (ref 1.6–2.4)
MCH RBC QN AUTO: 28.3 PG (ref 26.6–33)
MCHC RBC AUTO-ENTMCNC: 32.7 G/DL (ref 31.5–35.7)
MCV RBC AUTO: 86.6 FL (ref 79–97)
MONOCYTES # BLD AUTO: 0.54 10*3/MM3 (ref 0.1–0.9)
MONOCYTES NFR BLD AUTO: 9.6 % (ref 5–12)
NEUTROPHILS NFR BLD AUTO: 3.56 10*3/MM3 (ref 1.7–7)
NEUTROPHILS NFR BLD AUTO: 63.6 % (ref 42.7–76)
NRBC BLD AUTO-RTO: 0 /100 WBC (ref 0–0.2)
PHOSPHATE SERPL-MCNC: 3.4 MG/DL (ref 2.5–4.5)
PLATELET # BLD AUTO: 131 10*3/MM3 (ref 140–450)
PMV BLD AUTO: 9.2 FL (ref 6–12)
POTASSIUM SERPL-SCNC: 4.1 MMOL/L (ref 3.5–5.2)
PROT SERPL-MCNC: 7.1 G/DL (ref 6–8.5)
PSA SERPL-MCNC: 0.02 NG/ML (ref 0–4)
RBC # BLD AUTO: 3.96 10*6/MM3 (ref 4.14–5.8)
SODIUM SERPL-SCNC: 142 MMOL/L (ref 136–145)
WBC # BLD AUTO: 5.6 10*3/MM3 (ref 3.4–10.8)

## 2021-10-22 PROCEDURE — 84153 ASSAY OF PSA TOTAL: CPT | Performed by: INTERNAL MEDICINE

## 2021-10-22 PROCEDURE — 36415 COLL VENOUS BLD VENIPUNCTURE: CPT

## 2021-10-22 PROCEDURE — 96372 THER/PROPH/DIAG INJ SC/IM: CPT

## 2021-10-22 PROCEDURE — 99214 OFFICE O/P EST MOD 30 MIN: CPT | Performed by: INTERNAL MEDICINE

## 2021-10-22 PROCEDURE — 85025 COMPLETE CBC W/AUTO DIFF WBC: CPT | Performed by: INTERNAL MEDICINE

## 2021-10-22 PROCEDURE — 83735 ASSAY OF MAGNESIUM: CPT | Performed by: INTERNAL MEDICINE

## 2021-10-22 PROCEDURE — 80053 COMPREHEN METABOLIC PANEL: CPT | Performed by: INTERNAL MEDICINE

## 2021-10-22 PROCEDURE — 84100 ASSAY OF PHOSPHORUS: CPT | Performed by: INTERNAL MEDICINE

## 2021-10-22 PROCEDURE — 25010000002 DENOSUMAB 120 MG/1.7ML SOLUTION: Performed by: INTERNAL MEDICINE

## 2021-10-22 RX ORDER — HYDROCODONE BITARTRATE AND ACETAMINOPHEN 5; 325 MG/1; MG/1
1 TABLET ORAL EVERY 6 HOURS PRN
Qty: 120 TABLET | Refills: 0 | Status: SHIPPED | OUTPATIENT
Start: 2021-10-22 | End: 2021-12-17

## 2021-10-22 RX ADMIN — DENOSUMAB 120 MG: 120 INJECTION SUBCUTANEOUS at 08:39

## 2021-10-27 RX ORDER — FLUDROCORTISONE ACETATE 0.1 MG/1
0.1 TABLET ORAL DAILY
Qty: 90 TABLET | Refills: 3 | Status: SHIPPED | OUTPATIENT
Start: 2021-10-27 | End: 2021-12-17 | Stop reason: SDUPTHER

## 2021-10-27 NOTE — TELEPHONE ENCOUNTER
Rx Refill Note  Requested Prescriptions     Pending Prescriptions Disp Refills   • fludrocortisone 0.1 MG tablet 90 tablet 3     Sig: Take 1 tablet by mouth Daily.      Last office visit with prescribing clinician: 10/6/2021      Next office visit with prescribing clinician: 1/12/2022            Fuad Bautista Rep  10/27/21, 11:56 EDT

## 2021-11-03 ENCOUNTER — TELEPHONE (OUTPATIENT)
Dept: ONCOLOGY | Facility: CLINIC | Age: 76
End: 2021-11-03

## 2021-11-03 NOTE — TELEPHONE ENCOUNTER
Pt's wife called stating that she had an abnormal colonoscopy and wanted to know if she needed to be seen by Dr. Cat. Advised her that since she is not a patient of ours, she would need to contact who ordered the colonoscopy or her PCP for them to review the results. If they feel pt needs to be seen in our office, they will send a referral to us. She v/u.

## 2021-11-19 ENCOUNTER — LAB (OUTPATIENT)
Dept: OTHER | Facility: HOSPITAL | Age: 76
End: 2021-11-19

## 2021-11-19 ENCOUNTER — INFUSION (OUTPATIENT)
Dept: ONCOLOGY | Facility: HOSPITAL | Age: 76
End: 2021-11-19

## 2021-11-19 DIAGNOSIS — C61 PROSTATE CANCER METASTATIC TO BONE (HCC): Primary | ICD-10-CM

## 2021-11-19 DIAGNOSIS — C79.51 PROSTATE CANCER METASTATIC TO BONE (HCC): ICD-10-CM

## 2021-11-19 DIAGNOSIS — C79.51 PROSTATE CANCER METASTATIC TO BONE (HCC): Primary | ICD-10-CM

## 2021-11-19 DIAGNOSIS — C61 PROSTATE CANCER METASTATIC TO BONE (HCC): ICD-10-CM

## 2021-11-19 LAB
ALBUMIN SERPL-MCNC: 4.3 G/DL (ref 3.5–5.2)
ALBUMIN/GLOB SERPL: 1.5 G/DL
ALP SERPL-CCNC: 41 U/L (ref 39–117)
ALT SERPL W P-5'-P-CCNC: 17 U/L (ref 1–41)
ANION GAP SERPL CALCULATED.3IONS-SCNC: 9.9 MMOL/L (ref 5–15)
AST SERPL-CCNC: 20 U/L (ref 1–40)
BASOPHILS # BLD AUTO: 0.05 10*3/MM3 (ref 0–0.2)
BASOPHILS NFR BLD AUTO: 1 % (ref 0–1.5)
BILIRUB SERPL-MCNC: 0.3 MG/DL (ref 0–1.2)
BUN SERPL-MCNC: 14 MG/DL (ref 8–23)
BUN/CREAT SERPL: 13.2 (ref 7–25)
CALCIUM SPEC-SCNC: 9.4 MG/DL (ref 8.6–10.5)
CHLORIDE SERPL-SCNC: 108 MMOL/L (ref 98–107)
CO2 SERPL-SCNC: 23.1 MMOL/L (ref 22–29)
CREAT SERPL-MCNC: 1.06 MG/DL (ref 0.76–1.27)
DEPRECATED RDW RBC AUTO: 42.8 FL (ref 37–54)
EOSINOPHIL # BLD AUTO: 0.33 10*3/MM3 (ref 0–0.4)
EOSINOPHIL NFR BLD AUTO: 6.3 % (ref 0.3–6.2)
ERYTHROCYTE [DISTWIDTH] IN BLOOD BY AUTOMATED COUNT: 14 % (ref 12.3–15.4)
GFR SERPL CREATININE-BSD FRML MDRD: 68 ML/MIN/1.73
GLOBULIN UR ELPH-MCNC: 2.9 GM/DL
GLUCOSE SERPL-MCNC: 150 MG/DL (ref 65–99)
HCT VFR BLD AUTO: 34.8 % (ref 37.5–51)
HGB BLD-MCNC: 11.6 G/DL (ref 13–17.7)
IMM GRANULOCYTES # BLD AUTO: 0.03 10*3/MM3 (ref 0–0.05)
IMM GRANULOCYTES NFR BLD AUTO: 0.6 % (ref 0–0.5)
LYMPHOCYTES # BLD AUTO: 1.17 10*3/MM3 (ref 0.7–3.1)
LYMPHOCYTES NFR BLD AUTO: 22.5 % (ref 19.6–45.3)
MAGNESIUM SERPL-MCNC: 1.7 MG/DL (ref 1.6–2.4)
MCH RBC QN AUTO: 28.4 PG (ref 26.6–33)
MCHC RBC AUTO-ENTMCNC: 33.3 G/DL (ref 31.5–35.7)
MCV RBC AUTO: 85.3 FL (ref 79–97)
MONOCYTES # BLD AUTO: 0.45 10*3/MM3 (ref 0.1–0.9)
MONOCYTES NFR BLD AUTO: 8.6 % (ref 5–12)
NEUTROPHILS NFR BLD AUTO: 3.18 10*3/MM3 (ref 1.7–7)
NEUTROPHILS NFR BLD AUTO: 61 % (ref 42.7–76)
NRBC BLD AUTO-RTO: 0 /100 WBC (ref 0–0.2)
PHOSPHATE SERPL-MCNC: 3.6 MG/DL (ref 2.5–4.5)
PLATELET # BLD AUTO: 122 10*3/MM3 (ref 140–450)
PMV BLD AUTO: 9.4 FL (ref 6–12)
POTASSIUM SERPL-SCNC: 4.4 MMOL/L (ref 3.5–5.2)
PROT SERPL-MCNC: 7.2 G/DL (ref 6–8.5)
PSA SERPL-MCNC: 0.01 NG/ML (ref 0–4)
RBC # BLD AUTO: 4.08 10*6/MM3 (ref 4.14–5.8)
SODIUM SERPL-SCNC: 141 MMOL/L (ref 136–145)
WBC NRBC COR # BLD: 5.21 10*3/MM3 (ref 3.4–10.8)

## 2021-11-19 PROCEDURE — 83735 ASSAY OF MAGNESIUM: CPT | Performed by: INTERNAL MEDICINE

## 2021-11-19 PROCEDURE — 96402 CHEMO HORMON ANTINEOPL SQ/IM: CPT

## 2021-11-19 PROCEDURE — 36415 COLL VENOUS BLD VENIPUNCTURE: CPT

## 2021-11-19 PROCEDURE — 85025 COMPLETE CBC W/AUTO DIFF WBC: CPT | Performed by: INTERNAL MEDICINE

## 2021-11-19 PROCEDURE — 25010000002 LEUPROLIDE ACETATE (3 MONTH) PER 7.5 MG: Performed by: INTERNAL MEDICINE

## 2021-11-19 PROCEDURE — 96372 THER/PROPH/DIAG INJ SC/IM: CPT

## 2021-11-19 PROCEDURE — 84153 ASSAY OF PSA TOTAL: CPT | Performed by: INTERNAL MEDICINE

## 2021-11-19 PROCEDURE — 80053 COMPREHEN METABOLIC PANEL: CPT | Performed by: INTERNAL MEDICINE

## 2021-11-19 PROCEDURE — 25010000002 DENOSUMAB 120 MG/1.7ML SOLUTION: Performed by: INTERNAL MEDICINE

## 2021-11-19 PROCEDURE — 84100 ASSAY OF PHOSPHORUS: CPT | Performed by: INTERNAL MEDICINE

## 2021-11-19 RX ADMIN — LEUPROLIDE ACETATE 22.5 MG: KIT at 08:12

## 2021-11-19 RX ADMIN — DENOSUMAB 120 MG: 120 INJECTION SUBCUTANEOUS at 08:12

## 2021-11-19 NOTE — NURSING NOTE
Pt arrived ambulatory for Xgeva and Lupron injections.  Pt denies complaints.  Injections given without incidence.  Next appt reviewed and pt instructed to call sooner with concerns.

## 2021-12-17 ENCOUNTER — OFFICE VISIT (OUTPATIENT)
Dept: ONCOLOGY | Facility: CLINIC | Age: 76
End: 2021-12-17

## 2021-12-17 ENCOUNTER — LAB (OUTPATIENT)
Dept: OTHER | Facility: HOSPITAL | Age: 76
End: 2021-12-17

## 2021-12-17 ENCOUNTER — INFUSION (OUTPATIENT)
Dept: ONCOLOGY | Facility: HOSPITAL | Age: 76
End: 2021-12-17

## 2021-12-17 VITALS
HEART RATE: 73 BPM | TEMPERATURE: 98.5 F | HEIGHT: 72 IN | RESPIRATION RATE: 16 BRPM | DIASTOLIC BLOOD PRESSURE: 78 MMHG | BODY MASS INDEX: 31.49 KG/M2 | SYSTOLIC BLOOD PRESSURE: 149 MMHG | OXYGEN SATURATION: 96 % | WEIGHT: 232.5 LBS

## 2021-12-17 DIAGNOSIS — C61 PROSTATE CANCER METASTATIC TO BONE (HCC): ICD-10-CM

## 2021-12-17 DIAGNOSIS — C79.51 PROSTATE CANCER METASTATIC TO BONE (HCC): Primary | ICD-10-CM

## 2021-12-17 DIAGNOSIS — C61 PROSTATE CANCER METASTATIC TO BONE (HCC): Primary | ICD-10-CM

## 2021-12-17 DIAGNOSIS — C79.51 PROSTATE CANCER METASTATIC TO BONE (HCC): ICD-10-CM

## 2021-12-17 LAB
ALBUMIN SERPL-MCNC: 4.4 G/DL (ref 3.5–5.2)
ALBUMIN/GLOB SERPL: 1.5 G/DL
ALP SERPL-CCNC: 41 U/L (ref 39–117)
ALT SERPL W P-5'-P-CCNC: 13 U/L (ref 1–41)
ANION GAP SERPL CALCULATED.3IONS-SCNC: 9.3 MMOL/L (ref 5–15)
AST SERPL-CCNC: 17 U/L (ref 1–40)
BASOPHILS # BLD AUTO: 0.06 10*3/MM3 (ref 0–0.2)
BASOPHILS NFR BLD AUTO: 1.1 % (ref 0–1.5)
BILIRUB SERPL-MCNC: 0.3 MG/DL (ref 0–1.2)
BUN SERPL-MCNC: 23 MG/DL (ref 8–23)
BUN/CREAT SERPL: 20 (ref 7–25)
CALCIUM SPEC-SCNC: 9.6 MG/DL (ref 8.6–10.5)
CHLORIDE SERPL-SCNC: 107 MMOL/L (ref 98–107)
CO2 SERPL-SCNC: 26.7 MMOL/L (ref 22–29)
CREAT SERPL-MCNC: 1.15 MG/DL (ref 0.76–1.27)
DEPRECATED RDW RBC AUTO: 43.9 FL (ref 37–54)
EOSINOPHIL # BLD AUTO: 0.35 10*3/MM3 (ref 0–0.4)
EOSINOPHIL NFR BLD AUTO: 6.7 % (ref 0.3–6.2)
ERYTHROCYTE [DISTWIDTH] IN BLOOD BY AUTOMATED COUNT: 13.9 % (ref 12.3–15.4)
GFR SERPL CREATININE-BSD FRML MDRD: 62 ML/MIN/1.73
GLOBULIN UR ELPH-MCNC: 2.9 GM/DL
GLUCOSE SERPL-MCNC: 154 MG/DL (ref 65–99)
HCT VFR BLD AUTO: 34.7 % (ref 37.5–51)
HGB BLD-MCNC: 11.2 G/DL (ref 13–17.7)
IMM GRANULOCYTES # BLD AUTO: 0.04 10*3/MM3 (ref 0–0.05)
IMM GRANULOCYTES NFR BLD AUTO: 0.8 % (ref 0–0.5)
LYMPHOCYTES # BLD AUTO: 1.36 10*3/MM3 (ref 0.7–3.1)
LYMPHOCYTES NFR BLD AUTO: 25.9 % (ref 19.6–45.3)
MAGNESIUM SERPL-MCNC: 1.9 MG/DL (ref 1.6–2.4)
MCH RBC QN AUTO: 28 PG (ref 26.6–33)
MCHC RBC AUTO-ENTMCNC: 32.3 G/DL (ref 31.5–35.7)
MCV RBC AUTO: 86.8 FL (ref 79–97)
MONOCYTES # BLD AUTO: 0.47 10*3/MM3 (ref 0.1–0.9)
MONOCYTES NFR BLD AUTO: 9 % (ref 5–12)
NEUTROPHILS NFR BLD AUTO: 2.97 10*3/MM3 (ref 1.7–7)
NEUTROPHILS NFR BLD AUTO: 56.5 % (ref 42.7–76)
NRBC BLD AUTO-RTO: 0 /100 WBC (ref 0–0.2)
PHOSPHATE SERPL-MCNC: 3.7 MG/DL (ref 2.5–4.5)
PLATELET # BLD AUTO: 143 10*3/MM3 (ref 140–450)
PMV BLD AUTO: 9.5 FL (ref 6–12)
POTASSIUM SERPL-SCNC: 4.5 MMOL/L (ref 3.5–5.2)
PROT SERPL-MCNC: 7.3 G/DL (ref 6–8.5)
PSA SERPL-MCNC: 0.01 NG/ML (ref 0–4)
RBC # BLD AUTO: 4 10*6/MM3 (ref 4.14–5.8)
SODIUM SERPL-SCNC: 143 MMOL/L (ref 136–145)
WBC NRBC COR # BLD: 5.25 10*3/MM3 (ref 3.4–10.8)

## 2021-12-17 PROCEDURE — 80053 COMPREHEN METABOLIC PANEL: CPT | Performed by: INTERNAL MEDICINE

## 2021-12-17 PROCEDURE — 84153 ASSAY OF PSA TOTAL: CPT | Performed by: INTERNAL MEDICINE

## 2021-12-17 PROCEDURE — 25010000002 DENOSUMAB 120 MG/1.7ML SOLUTION: Performed by: INTERNAL MEDICINE

## 2021-12-17 PROCEDURE — 96372 THER/PROPH/DIAG INJ SC/IM: CPT

## 2021-12-17 PROCEDURE — 85025 COMPLETE CBC W/AUTO DIFF WBC: CPT | Performed by: INTERNAL MEDICINE

## 2021-12-17 PROCEDURE — 36415 COLL VENOUS BLD VENIPUNCTURE: CPT

## 2021-12-17 PROCEDURE — 84100 ASSAY OF PHOSPHORUS: CPT | Performed by: INTERNAL MEDICINE

## 2021-12-17 PROCEDURE — 83735 ASSAY OF MAGNESIUM: CPT | Performed by: INTERNAL MEDICINE

## 2021-12-17 PROCEDURE — 99214 OFFICE O/P EST MOD 30 MIN: CPT | Performed by: INTERNAL MEDICINE

## 2021-12-17 RX ORDER — HYDROCODONE BITARTRATE AND ACETAMINOPHEN 7.5; 325 MG/1; MG/1
1 TABLET ORAL EVERY 6 HOURS PRN
Qty: 120 TABLET | Refills: 0 | Status: SHIPPED | OUTPATIENT
Start: 2021-12-17 | End: 2022-01-16

## 2021-12-17 RX ORDER — HYDROCODONE BITARTRATE AND ACETAMINOPHEN 7.5; 325 MG/1; MG/1
1 TABLET ORAL EVERY 6 HOURS PRN
Qty: 120 TABLET | Refills: 0 | Status: SHIPPED | OUTPATIENT
Start: 2021-12-17 | End: 2021-12-17 | Stop reason: SDUPTHER

## 2021-12-17 RX ADMIN — DENOSUMAB 120 MG: 120 INJECTION SUBCUTANEOUS at 08:42

## 2021-12-17 NOTE — PROGRESS NOTES
"[facility] GROUP OUTPATIENT FOLLOW UP CLINIC VISIT    REASON FOR FOLLOW-UP:    1.  History of metastatic clear cell renal cell carcinoma.  All known disease had previously been resected..  2.  Pulmonary metastases discovered July 2020.  Bone metastases discovered September 2020.  3.  Votrient initiated 10/16/2020  4. Palliative radiation for pain complete on 11/10/2020  5.  1/4/2021 significantly elevated PSA > 100.  6.  1/20/2021 bone biopsy confirms metastatic prostate cancer    HISTORY OF PRESENT ILLNESS:  Micah Krishna is a 76 y.o. male with the above-mentioned history who returns today for follow-up    He has been having a little bit more back pain and desires a stronger pain medication.  Otherwise he has been doing well.  Mild hot flashes persist.  He has taken meloxicam on a couple of occasions.  He states that after that he got very constipated and then had some nausea vomiting and was very fatigued for a day.  This happened about 3 times.    REVIEW OF SYSTEMS:  As per HPI    PE:  Vitals:    12/17/21 0754   BP: 149/78   Pulse: 73   Resp: 16   Temp: 98.5 °F (36.9 °C)   TempSrc: Temporal   SpO2: 96%   Weight: 105 kg (232 lb 8 oz)   Height: 182.9 cm (72.01\")   PainSc: 0-No pain  Comment: bone cancer     General:  No acute distress, awake, alert and oriented  Skin:  Warm and dry, no visible rash  HEENT:  Normocephalic/atraumatic.  Wearing a facemask again today.  Chest:  Normal respiratory effort.  Lungs with mild scattered rhonchi.  No wheezing.  Heart: Regular rate and rhythm  Extremities:  No visible clubbing, cyanosis, or edema  Neuro/psych:  Grossly non-focal.  Normal mood and affect.    DIAGNOSTIC DATA:  PSA (12/17/2021 07:25)  Phosphorus (12/17/2021 07:25)  Magnesium (12/17/2021 07:25)  Comprehensive metabolic panel (12/17/2021 07:25)  CBC and Differential (12/17/2021 07:25)        IMAGING:    None reviewed      ASSESSMENT:  This is a 76 y.o. male with:    *Mild normocytic anemia:   · Hgb mildly " low at 11.2 today, stable    *Adrenal insufficiency following bilateral adrenalectomy   · On replacement hormone therapy.    · He follows with endocrinology.    *History of metastatic renal cell carcinoma.    · He had a left nephrectomy and adrenalectomy in 2000 and then a right adrenalectomy for metastatic disease in 2012.      *New metastatic bone disease diagnosed as he presented with worsening pain  · Presumed to be metastatic renal cell carcinoma.  · Imaging discussed with radiology.  Imaging consistent with metastatic renal cell carcinoma  · However, his PSA was noted to be greater than 100  · As discussed below, bone biopsy subsequently confirmed metastatic prostate cancer  · CT C/A/P 7/6/2020 with a RUL sub 6 mm pulmonary nodule in the RUL, stable since 1/17/2018, new 6 mm nodules in the medial RLL and RUL,  LLL nodule unchanged since 7/17/2018.  Healing left ninth rib fracture.  · CT chest 9/28/2020 with stable lung nodules, pathologic fracture of the right 8th rib, abnormal appearance of T8, narrowing of the thecal sack at this level, additional lucencies at several vertebral bodies such as T7 and T11, subacute healing fractures in the anterior right 6th rib and lateral left 9th rib.  · CT head 9/28/2020 with calvarial metastatic disease involving the frontal and occipital bones.  · Bone scan 9/28/2020 with multifocal uptake consistent with metastatic disease. Anterior frontal bone, upper C spine at C1 or C2, T and L spine at T3, T8, T11, multifocal rib uptake, abnormal uptake in the iliac wings/bodies and left femoral heads/acetabulum, distal left clavicle and both humeral heads.   · PET scan from 10/8/2020.  Multifocal bone metastases throughout the skeleton.  Pathologic rib and vertebral body fracture.  Moderate FDG uptake in the right femoral neck with some sclerosis, new since 7/6/2020.  Mottled appearance of L3 and T8 vertebral bodies with pathologic compression fractures.  L3 fracture is a burst  fracture with 20 to 25% loss of height and 4 to 5 mm of retropulsion in the central spinal canal.  Pathologic compression fracture at T8 with 10% loss of height.  New findings since 7/6/2020.  FDG avid soft tissue nodule extending into the central canal along the posterior aspect of the thecal sac at T8.  FDG uptake in the left clavicle.  Compression fracture of the right posterior eighth rib.  · Votrient initiated 10/16/2020  · Palliative radiation for pain complete on 11/10/2020  · He did subsequently presented with an elevated PSA >100.  · Bone biopsy performed on 1/20/2021 confirmed metastatic prostate cancer.  · Votrient discontinued  · 2/5/2021 patient given Firmagon and Xgeva.  With plans to start Eligard 4 weeks later, and continue monthly Xgeva.    · 3/5/21.First dose of Eligard and monthly Xgeva.  Eligard will be given every 3 months.  · 12/17/2021: Proceed with Xgeva.  Plegridy in 2 months.  PSA remains very low at 0.015.    *Chronic kidney disease:   · Status post left nephrectomy.    · Creatinine stable at 1.15 today     *Migratory skeletal pain:   · Metastatic disease apparent now on CT imaging and bone scan and now PET scan.  · Completed radiation   · Left leg pain has improved  · Back pain improved  · 4/2/2021 patient continues on Norco 5/325 2 to 3 tablets/day with good pain control.  This remains stable as of 4/30/2021.  · 5/28/21.  The patient continues on [place] 5/325 with 2 to 3 tablets/day.  Pain is stable.   · 7/2/2021: He ran out of the hydrocodone/acetaminophen and has been using more ibuprofen which has controlled his pain but I encouraged him not to use as much ibuprofen to protect his kidney.  · Pain is stable as of 7/30/2021.  · 10/22/2021: Pain is stable.  Pain medication refilled.  He was started on meloxicam by primary care which she will start in the near future.  · 12/17/2021: His low back is starting to hurt worse.  Increase hydrocodone 7.5 mg.    *Mild hypomagnesemia: monitor. If  muscle cramps advised to call us.  · 4/2/2021 normal magnesium level at 1.9.  He continues on magnesium oxide 400 mg daily.  · 5/28/21.  Magnesium normal at 1.9 today    *Elevated PSA, subsequent biopsy confirmed metastatic prostate cancer:   · He has a history of very mild elevation of his PSA in the past with a PSA of 5.92 on 10/17/2018.    · His PSA on 1/4/2021 was greater than 100  · PSA repeated today is also greater than 100  · He is completely asymptomatic without clinical evidence for prostatitis.    · Dr. Cat did communicate with Dr. Zapata with radiology and all findings on imaging would support more metastatic renal cell carcinoma rather than metastatic prostate cancer since the bony lesions are lytic.  There is nothing obvious in his prostate on current imaging.  · Dr. Cat communicated with Dr. Cabrera with urology.  He is in agreement with a bone biopsy and he will see him in the office for evaluation.  · One of the bony lesions is amenable to CT-guided biopsy per Dr. Zapata.  · CT-guided needle biopsy of the bone performed on 1/20/2021 confirms metastatic prostate cancer.  · 3/5/21.  Please see above.  The patient received 1 dose of Firmagon and is on Eligard every 3 months.  · PSA further decreased to 0.076 and then 0.032  · Last PSA 0.015 on 11/19/21  · 12/17/2021: PSA stable.    *New 40% T2 compression fracture.  Asymptomatic.  He is not interested in further radiation at this time.  Might consider referral to neurosurgery should he become more symptomatic or if this worsens.    *Vasomotor symptoms related to ADT  · These have worsened significantly over the past few weeks  · Plan venlafaxine    *He did have an MRI of the pituitary gland on 8/16/2021 as ordered by his endocrinologist.  Pituitary appears normal.  There is abnormal enhancement of the clivus suspected to be related to his metastatic prostate cancer.  There is an 11 mm lesion overlying the left frontal convexity suspected to be a  meningioma.  The patient states that he was referred to neurosurgery but is not necessarily interested in pursuing this.  We can repeat an MRI in the future if desired.    PLAN:   1. Proceed with monthly Xgeva today.  2. Due for his next Eligard in February  3. Continue hydrocodone/acetaminophen but increase the dose to 7.5 mg today  4. Hold further meloxicam at this time due to adverse effects.  5. Continue venlafaxine XR 37.5 mg once daily at this point.  He did not complain of hot flashes today.  6. Bone scan in 3 weeks and see me in 4 weeks    The patient is on drug therapy requiring extensive monitoring

## 2021-12-20 RX ORDER — FLUDROCORTISONE ACETATE 0.1 MG/1
0.1 TABLET ORAL DAILY
Qty: 90 TABLET | Refills: 3 | Status: SHIPPED | OUTPATIENT
Start: 2021-12-20

## 2021-12-20 NOTE — TELEPHONE ENCOUNTER
Rx Refill Note  Requested Prescriptions     Pending Prescriptions Disp Refills   • fludrocortisone 0.1 MG tablet 90 tablet 3     Sig: Take 1 tablet by mouth Daily.      Last office visit with prescribing clinician: 10/6/2021      Next office visit with prescribing clinician: 1/12/2022            Hero Amezcua MA  12/20/21, 13:08 EST

## 2021-12-27 ENCOUNTER — TELEPHONE (OUTPATIENT)
Dept: FAMILY MEDICINE CLINIC | Facility: CLINIC | Age: 76
End: 2021-12-27

## 2021-12-27 ENCOUNTER — TELEPHONE (OUTPATIENT)
Dept: ONCOLOGY | Facility: CLINIC | Age: 76
End: 2021-12-27

## 2021-12-27 NOTE — TELEPHONE ENCOUNTER
Discussed with Dr. Dueñas.  3 teeth are affected.  He will be referred to an endodontist for a procedure which will sacrifice the teeth but leave the roots and adjacent bone intact to minimize the risk of ONJ.

## 2021-12-27 NOTE — TELEPHONE ENCOUNTER
----- Message from Lena Leger RN sent at 12/27/2021  4:28 PM EST -----  Regarding: Active infection in tooth.  Pt received Xgevea on 12/17. He has an active infection. His dentist is starting him on an ABX. Dr. Dueñas would like to speak to you regarding this pt. Dr. Dueñass [facility] [phone]

## 2021-12-27 NOTE — TELEPHONE ENCOUNTER
2  months ago pts tooth broke off. He woke up with a swollen jaw and pain around the tooth. Pt advised to see his dentist as soon as possible. He or his dentist will need to call our office before anything invasive is done. Phone number provided to pt. Pt V/U.

## 2021-12-27 NOTE — TELEPHONE ENCOUNTER
[facility] ORDER PHONE NUMBER IS [phone]

## 2022-01-06 ENCOUNTER — APPOINTMENT (OUTPATIENT)
Dept: NUCLEAR MEDICINE | Facility: HOSPITAL | Age: 77
End: 2022-01-06

## 2022-01-10 ENCOUNTER — TELEPHONE (OUTPATIENT)
Dept: ONCOLOGY | Facility: CLINIC | Age: 77
End: 2022-01-10

## 2022-01-10 NOTE — TELEPHONE ENCOUNTER
Caller: Micah Krishna    Relationship to patient: Self    Best call back number: [phone]    Chief complaint: PATIENT TO RESCHED 1/6/22 BONE SCAN AND 1/14 APPT LAB, FU, AND INJECTION    Type of visit: BONE SCAN, LAB, FU, INJECTION    Requested date: NEXT AVAILABLE AS EARLY AS POSSIBLE

## 2022-01-12 ENCOUNTER — OFFICE VISIT (OUTPATIENT)
Dept: FAMILY MEDICINE CLINIC | Facility: CLINIC | Age: 77
End: 2022-01-12

## 2022-01-12 VITALS
WEIGHT: 227.2 LBS | DIASTOLIC BLOOD PRESSURE: 76 MMHG | HEIGHT: 72 IN | SYSTOLIC BLOOD PRESSURE: 125 MMHG | RESPIRATION RATE: 14 BRPM | OXYGEN SATURATION: 100 % | BODY MASS INDEX: 30.77 KG/M2 | TEMPERATURE: 97.1 F | HEART RATE: 66 BPM

## 2022-01-12 DIAGNOSIS — C79.51 PROSTATE CANCER METASTATIC TO BONE: ICD-10-CM

## 2022-01-12 DIAGNOSIS — J12.82 PNEUMONIA DUE TO COVID-19 VIRUS: Primary | ICD-10-CM

## 2022-01-12 DIAGNOSIS — C61 PROSTATE CANCER METASTATIC TO BONE: ICD-10-CM

## 2022-01-12 DIAGNOSIS — U07.1 PNEUMONIA DUE TO COVID-19 VIRUS: Primary | ICD-10-CM

## 2022-01-12 PROCEDURE — 99213 OFFICE O/P EST LOW 20 MIN: CPT | Performed by: NURSE PRACTITIONER

## 2022-01-12 NOTE — PROGRESS NOTES
"Chief Complaint  Follow-up    Subjective          Micah Krishna presents to [facility]  Very pleasant patient here today follow-up recent COVID-pneumonia respiratory failure   Patient states several days in the hospital he is doing much better still has quite a bit of fatigue  Presently no increasing chest pain shortness of breath or fever chills,  He is fully vaccinated with the booster and thankfully and he is gradually getting his strength back.          Objective   Vital Signs:   /76   Pulse 66   Temp 97.1 °F (36.2 °C) (Infrared)   Resp 14   Ht 182.9 cm (72.01\")   Wt 103 kg (227 lb 3.2 oz)   SpO2 100%   BMI 30.81 kg/m²     Physical Exam  Vitals reviewed.   Constitutional:       Appearance: Normal appearance. He is well-developed.      Comments: Pleasant no distress energetic   HENT:      Head: Normocephalic.      Nose: Nose normal.   Eyes:      General: No scleral icterus.     Conjunctiva/sclera: Conjunctivae normal.      Pupils: Pupils are equal, round, and reactive to light.   Neck:      Thyroid: No thyromegaly.      Vascular: No JVD.   Cardiovascular:      Rate and Rhythm: Normal rate and regular rhythm.      Heart sounds: Normal heart sounds. No murmur heard.  No friction rub. No gallop.    Pulmonary:      Effort: Pulmonary effort is normal. No respiratory distress.      Breath sounds: Normal breath sounds. No stridor. No wheezing or rales.      Comments: Chest clear unlabored respirations less than 20 patient able to complete full sentences without dyspnea.  Abdominal:      General: Bowel sounds are normal. There is no distension.      Palpations: Abdomen is soft.      Tenderness: There is no abdominal tenderness.      Comments: No hepatosplenomegaly, no ascites,   Musculoskeletal:         General: No tenderness.      Cervical back: Neck supple.   Lymphadenopathy:      Cervical: No cervical adenopathy.   Skin:     General: Skin is warm and dry.      Findings: No " erythema or rash.   Neurological:      Mental Status: He is alert and oriented to person, place, and time.      Deep Tendon Reflexes: Reflexes are normal and symmetric.   Psychiatric:         Behavior: Behavior normal.         Thought Content: Thought content normal.         Judgment: Judgment normal.        Result Review :                 Assessment and Plan    There are no diagnoses linked to this encounter.  I spent 30  minutes caring for Micah on this date of service. This time includes time spent by me in the following activities:preparing for the visit, reviewing tests, obtaining and/or reviewing a separately obtained history, performing a medically appropriate examination and/or evaluation , counseling and educating the patient/family/caregiver, documenting information in the medical record and care coordination  Follow Up   No follow-ups on file.  Patient was given instructions and counseling regarding his condition or for health maintenance advice. Please see specific information pulled into the AVS if appropriate.     Patient is improved nicely  Will continue present care push fluids follow-up with oncology he has metastatic disease to his spine clavicle he does have mention of compression fracture T2 and L3 but no mention of the age acute versus chronic he has some pain at the spine but no increased severe pain  He has a good relationship with his oncologist already has a bone scan ordered which she will get very soon  I did discuss with him the metastatic disease to the spine although there is no detailed description of his T2 or L3 chronic compression fracture  With no mention of any severe metastatic disease specifically here discussed this with patient he will need further evaluation with oncology and will start with a bone scan.  Should he have increasing back pain as well follow-up with oncology.    See patient instructions good communication he will contact me in a few days as well.      Continue  present plan plenty of fluids  Plenty of activities avoid strenuous activity now  But take plenty of rest breaks  Aspirin 81 mg enteric-coated daily with food    Follow-up with  Oncology, as long as you are doing well recheck in 3 months  Send me a message in a week and let me know you doing okay.

## 2022-01-12 NOTE — PATIENT INSTRUCTIONS
Continue present plan plenty of fluids  Plenty of activities avoid strenuous activity now  But take plenty of rest breaks  Aspirin 81 mg enteric-coated daily with food    Follow-up with  Oncology, as long as you are doing well recheck in 3 months  Send me a message in a week and let me know you doing okay.

## 2022-01-14 ENCOUNTER — APPOINTMENT (OUTPATIENT)
Dept: OTHER | Facility: HOSPITAL | Age: 77
End: 2022-01-14

## 2022-01-14 ENCOUNTER — APPOINTMENT (OUTPATIENT)
Dept: ONCOLOGY | Facility: HOSPITAL | Age: 77
End: 2022-01-14

## 2022-01-20 NOTE — PROGRESS NOTES
"[facility] GROUP OUTPATIENT FOLLOW UP CLINIC VISIT    REASON FOR FOLLOW-UP:    1.  History of metastatic clear cell renal cell carcinoma.  All known disease had previously been resected..  2.  Pulmonary metastases discovered July 2020.  Bone metastases discovered September 2020.  3.  Votrient initiated 10/16/2020  4. Palliative radiation for pain complete on 11/10/2020  5.  1/4/2021 significantly elevated PSA > 100.  6.  1/20/2021 bone biopsy confirms metastatic prostate cancer    HISTORY OF PRESENT ILLNESS:  Micah Krishna is a 76 y.o. male with the above-mentioned history who returns today for follow-up    He had a dental abscess that was addressed with antibiotics.  He needs more extensive dental work on 3 teeth in the upper left maxilla.  This has not yet been performed.    Earlier this month he developed fever and altered mental status.  He was admitted at [place] for several days.  He was encephalopathic.  He had a positive COVID test and was treated accordingly.  CT imaging indicated the possibility of aspiration pneumonia.  Encephalopathy resolved after couple of days and other than fatigue he is now feeling well.    REVIEW OF SYSTEMS:  As per HPI    PE:  Vitals:    01/21/22 0818   BP: 129/74   Pulse: 70   Resp: 20   Temp: 97.3 °F (36.3 °C)   TempSrc: Temporal   SpO2: 98%   Weight: 103 kg (227 lb 12.8 oz)   Height: 182.9 cm (72.01\")   PainSc: 0-No pain     General:  No acute distress, awake, alert and oriented  Skin:  Warm and dry, no visible rash  HEENT:  Normocephalic/atraumatic.  Wearing a facemask.  Chest:  Normal respiratory effort  Extremities:  No visible clubbing, cyanosis, or edema  Neuro/psych:  Grossly non-focal.  Normal mood and affect.      DIAGNOSTIC DATA:  CBC and Differential (01/21/2022 08:18)      IMAGING:    None reviewed      ASSESSMENT:  This is a 76 y.o. male with:    *Mild normocytic anemia:   · Hemoglobin stable at 11.2 today    *Adrenal insufficiency following bilateral " adrenalectomy   · On replacement hormone therapy.    · He follows with endocrinology.    *History of metastatic renal cell carcinoma.    · He had a left nephrectomy and adrenalectomy in 2000 and then a right adrenalectomy for metastatic disease in 2012.    · Currently no evidence of disease.  See below.    *New metastatic bone disease diagnosed as he presented with worsening pain, ultimately found to be metastatic prostate cancer  · Presumed to be metastatic renal cell carcinoma but ultimately diagnosed with metastatic prostate cancer.  · Imaging discussed with radiology.  Imaging consistent with metastatic renal cell carcinoma  · However, his PSA was noted to be greater than 100  · As discussed below, bone biopsy subsequently confirmed metastatic prostate cancer  · CT C/A/P 7/6/2020 with a RUL sub 6 mm pulmonary nodule in the RUL, stable since 1/17/2018, new 6 mm nodules in the medial RLL and RUL,  LLL nodule unchanged since 7/17/2018.  Healing left ninth rib fracture.  · CT chest 9/28/2020 with stable lung nodules, pathologic fracture of the right 8th rib, abnormal appearance of T8, narrowing of the thecal sack at this level, additional lucencies at several vertebral bodies such as T7 and T11, subacute healing fractures in the anterior right 6th rib and lateral left 9th rib.  · CT head 9/28/2020 with calvarial metastatic disease involving the frontal and occipital bones.  · Bone scan 9/28/2020 with multifocal uptake consistent with metastatic disease. Anterior frontal bone, upper C spine at C1 or C2, T and L spine at T3, T8, T11, multifocal rib uptake, abnormal uptake in the iliac wings/bodies and left femoral heads/acetabulum, distal left clavicle and both humeral heads.   · PET scan from 10/8/2020.  Multifocal bone metastases throughout the skeleton.  Pathologic rib and vertebral body fracture.  Moderate FDG uptake in the right femoral neck with some sclerosis, new since 7/6/2020.  Mottled appearance of L3 and  T8 vertebral bodies with pathologic compression fractures.  L3 fracture is a burst fracture with 20 to 25% loss of height and 4 to 5 mm of retropulsion in the central spinal canal.  Pathologic compression fracture at T8 with 10% loss of height.  New findings since 7/6/2020.  FDG avid soft tissue nodule extending into the central canal along the posterior aspect of the thecal sac at T8.  FDG uptake in the left clavicle.  Compression fracture of the right posterior eighth rib.  · Votrient initiated 10/16/2020  · Palliative radiation for pain complete on 11/10/2020  · He did subsequently presented with an elevated PSA >100.  · Bone biopsy performed on 1/20/2021 confirmed metastatic prostate cancer.  · Votrient discontinued  · 2/5/2021 patient given Firmagon and Xgeva.  With plans to start Eligard 4 weeks later, and continue monthly Xgeva.    · 3/5/21.First dose of Eligard and monthly Xgeva.  Eligard will be given every 3 months.  · 12/17/2021: Proceeded with Xgeva.  PSA remains very low at 0.015.  · 1/21/2022: Hold Xgeva at this time due to dental issues.    *Chronic kidney disease:   · Status post left nephrectomy.    · Creatinine stable at 0.96 today     *Migratory skeletal pain:   · Metastatic disease apparent now on CT imaging and bone scan and now PET scan.  · Completed radiation   · Left leg pain has improved  · Back pain improved  · 4/2/2021 patient continues on Norco 5/325 2 to 3 tablets/day with good pain control.  This remains stable as of 4/30/2021.  · 5/28/21.  The patient continues on [place] 5/325 with 2 to 3 tablets/day.  Pain is stable.   · 7/2/2021: He ran out of the hydrocodone/acetaminophen and has been using more ibuprofen which has controlled his pain but I encouraged him not to use as much ibuprofen to protect his kidney.  · Pain is stable as of 7/30/2021.  · 10/22/2021: Pain is stable.  Pain medication refilled.  He was started on meloxicam by primary care which she will start in the near  future.  · 12/17/2021: His low back is starting to worsen.  Increased hydrocodone 7.5 mg.    *Mild hypomagnesemia: monitor. If muscle cramps advised to call us.  · Magnesium remains normal    *Elevated PSA, subsequent biopsy confirmed metastatic prostate cancer:   · He has a history of very mild elevation of his PSA in the past with a PSA of 5.92 on 10/17/2018.    · His PSA on 1/4/2021 was greater than 100  · PSA repeated today is also greater than 100  · He is completely asymptomatic without clinical evidence for prostatitis.    · Dr. Cat did communicate with Dr. Zapata with radiology and all findings on imaging would support more metastatic renal cell carcinoma rather than metastatic prostate cancer since the bony lesions are lytic.  There is nothing obvious in his prostate on current imaging.  · Dr. Cat communicated with Dr. Cabrera with urology.  He is in agreement with a bone biopsy and he will see him in the office for evaluation.  · One of the bony lesions is amenable to CT-guided biopsy per Dr. Zapata.  · CT-guided needle biopsy of the bone performed on 1/20/2021 confirms metastatic prostate cancer.  · 3/5/21.  Please see above.  The patient received 1 dose of Firmagon and is on Eligard every 3 months.  · 1/21/2022: PSA stable and quite low at 0.019    *New 40% T2 compression fracture.  Asymptomatic.  He is not interested in further radiation at this time.  Might consider referral to neurosurgery should he become more symptomatic or if this worsens.    *Vasomotor symptoms related to ADT  · On venlafaxine.  He did not complain of this today.    *He did have an MRI of the pituitary gland on 8/16/2021 as ordered by his endocrinologist.  Pituitary appears normal.  There is abnormal enhancement of the clivus suspected to be related to his metastatic prostate cancer.  There is an 11 mm lesion overlying the left frontal convexity suspected to be a meningioma.  The patient states that he was referred to  neurosurgery but is not necessarily interested in pursuing this.  We can repeat an MRI in the future if desired.    *Dental issues: He requires some dental work.  They will not plan for root canals considering he has been on Xgeva and requires further Xgeva.  Therefore, he has been referred to an oral surgeon that will hopefully not have to sacrifice the roots when sacrificing teeth.  He plans to contact the oral surgeon soon regarding this as this work has not yet been done.    PLAN:   1. Hold Xgeva at this time due to pending dental work  2. He is due for Eligard in 4 weeks.  I will see him back after a bone scan at that time.  Depending on the status of his dental work, consider Xgeva but I suspect we will hold at that time as well.  3. He had CT imaging done at [place] during his hospitalization and therefore this is not necessary at this time.  4. He will continue hydrocodone/acetaminophen at 7.5 mg.  His pain is adequately controlled.  5. Continue venlafaxine XR 37.5 mg once daily at this point.  He did not complain of hot flashes today.  6. Bone scan in 3 weeks and see me in 4 weeks.  Delayed due to illness.    The patient is on drug therapy requiring extensive monitoring    I spent 40 minutes today reviewing his record including [place] records, communicating with him, placing orders, documenting the encounter.

## 2022-01-21 ENCOUNTER — LAB (OUTPATIENT)
Dept: OTHER | Facility: HOSPITAL | Age: 77
End: 2022-01-21

## 2022-01-21 ENCOUNTER — INFUSION (OUTPATIENT)
Dept: ONCOLOGY | Facility: HOSPITAL | Age: 77
End: 2022-01-21

## 2022-01-21 ENCOUNTER — OFFICE VISIT (OUTPATIENT)
Dept: ONCOLOGY | Facility: CLINIC | Age: 77
End: 2022-01-21

## 2022-01-21 VITALS
TEMPERATURE: 97.3 F | OXYGEN SATURATION: 98 % | SYSTOLIC BLOOD PRESSURE: 129 MMHG | BODY MASS INDEX: 30.85 KG/M2 | DIASTOLIC BLOOD PRESSURE: 74 MMHG | HEIGHT: 72 IN | HEART RATE: 70 BPM | WEIGHT: 227.8 LBS | RESPIRATION RATE: 20 BRPM

## 2022-01-21 DIAGNOSIS — C79.51 PROSTATE CANCER METASTATIC TO BONE: Primary | ICD-10-CM

## 2022-01-21 DIAGNOSIS — C79.51 PROSTATE CANCER METASTATIC TO BONE: ICD-10-CM

## 2022-01-21 DIAGNOSIS — C61 PROSTATE CANCER METASTATIC TO BONE: ICD-10-CM

## 2022-01-21 DIAGNOSIS — C61 PROSTATE CANCER METASTATIC TO BONE: Primary | ICD-10-CM

## 2022-01-21 LAB
ALBUMIN SERPL-MCNC: 4.2 G/DL (ref 3.5–5.2)
ALBUMIN/GLOB SERPL: 1.5 G/DL
ALP SERPL-CCNC: 42 U/L (ref 39–117)
ALT SERPL W P-5'-P-CCNC: 10 U/L (ref 1–41)
ANION GAP SERPL CALCULATED.3IONS-SCNC: 9.4 MMOL/L (ref 5–15)
AST SERPL-CCNC: 16 U/L (ref 1–40)
BASOPHILS # BLD AUTO: 0.04 10*3/MM3 (ref 0–0.2)
BASOPHILS NFR BLD AUTO: 0.8 % (ref 0–1.5)
BILIRUB SERPL-MCNC: 0.4 MG/DL (ref 0–1.2)
BUN SERPL-MCNC: 13 MG/DL (ref 8–23)
BUN/CREAT SERPL: 13.5 (ref 7–25)
CALCIUM SPEC-SCNC: 9.3 MG/DL (ref 8.6–10.5)
CHLORIDE SERPL-SCNC: 108 MMOL/L (ref 98–107)
CO2 SERPL-SCNC: 25.6 MMOL/L (ref 22–29)
CREAT SERPL-MCNC: 0.96 MG/DL (ref 0.76–1.27)
DEPRECATED RDW RBC AUTO: 43.5 FL (ref 37–54)
EOSINOPHIL # BLD AUTO: 0.16 10*3/MM3 (ref 0–0.4)
EOSINOPHIL NFR BLD AUTO: 3.1 % (ref 0.3–6.2)
ERYTHROCYTE [DISTWIDTH] IN BLOOD BY AUTOMATED COUNT: 14 % (ref 12.3–15.4)
GFR SERPL CREATININE-BSD FRML MDRD: 76 ML/MIN/1.73
GLOBULIN UR ELPH-MCNC: 2.8 GM/DL
GLUCOSE SERPL-MCNC: 132 MG/DL (ref 65–99)
HCT VFR BLD AUTO: 34.1 % (ref 37.5–51)
HGB BLD-MCNC: 11.2 G/DL (ref 13–17.7)
IMM GRANULOCYTES # BLD AUTO: 0.04 10*3/MM3 (ref 0–0.05)
IMM GRANULOCYTES NFR BLD AUTO: 0.8 % (ref 0–0.5)
LYMPHOCYTES # BLD AUTO: 0.99 10*3/MM3 (ref 0.7–3.1)
LYMPHOCYTES NFR BLD AUTO: 19 % (ref 19.6–45.3)
MAGNESIUM SERPL-MCNC: 1.9 MG/DL (ref 1.6–2.4)
MCH RBC QN AUTO: 28.2 PG (ref 26.6–33)
MCHC RBC AUTO-ENTMCNC: 32.8 G/DL (ref 31.5–35.7)
MCV RBC AUTO: 85.9 FL (ref 79–97)
MONOCYTES # BLD AUTO: 0.45 10*3/MM3 (ref 0.1–0.9)
MONOCYTES NFR BLD AUTO: 8.6 % (ref 5–12)
NEUTROPHILS NFR BLD AUTO: 3.53 10*3/MM3 (ref 1.7–7)
NEUTROPHILS NFR BLD AUTO: 67.7 % (ref 42.7–76)
NRBC BLD AUTO-RTO: 0 /100 WBC (ref 0–0.2)
PHOSPHATE SERPL-MCNC: 3.9 MG/DL (ref 2.5–4.5)
PLATELET # BLD AUTO: 142 10*3/MM3 (ref 140–450)
PMV BLD AUTO: 9.2 FL (ref 6–12)
POTASSIUM SERPL-SCNC: 4 MMOL/L (ref 3.5–5.2)
PROT SERPL-MCNC: 7 G/DL (ref 6–8.5)
PSA SERPL-MCNC: 0.02 NG/ML (ref 0–4)
RBC # BLD AUTO: 3.97 10*6/MM3 (ref 4.14–5.8)
SODIUM SERPL-SCNC: 143 MMOL/L (ref 136–145)
WBC NRBC COR # BLD: 5.21 10*3/MM3 (ref 3.4–10.8)

## 2022-01-21 PROCEDURE — 84153 ASSAY OF PSA TOTAL: CPT | Performed by: INTERNAL MEDICINE

## 2022-01-21 PROCEDURE — 80053 COMPREHEN METABOLIC PANEL: CPT | Performed by: INTERNAL MEDICINE

## 2022-01-21 PROCEDURE — 99215 OFFICE O/P EST HI 40 MIN: CPT | Performed by: INTERNAL MEDICINE

## 2022-01-21 PROCEDURE — 84100 ASSAY OF PHOSPHORUS: CPT | Performed by: INTERNAL MEDICINE

## 2022-01-21 PROCEDURE — 85025 COMPLETE CBC W/AUTO DIFF WBC: CPT | Performed by: INTERNAL MEDICINE

## 2022-01-21 PROCEDURE — 83735 ASSAY OF MAGNESIUM: CPT | Performed by: INTERNAL MEDICINE

## 2022-01-21 PROCEDURE — 36415 COLL VENOUS BLD VENIPUNCTURE: CPT

## 2022-01-25 RX ORDER — FINASTERIDE 5 MG/1
5 TABLET, FILM COATED ORAL DAILY
Qty: 90 TABLET | Refills: 1 | Status: SHIPPED | OUTPATIENT
Start: 2022-01-25 | End: 2022-07-21

## 2022-01-25 RX ORDER — CLONIDINE HYDROCHLORIDE 0.2 MG/1
0.2 TABLET ORAL DAILY
Qty: 90 TABLET | Refills: 1 | Status: SHIPPED | OUTPATIENT
Start: 2022-01-25 | End: 2022-10-03

## 2022-01-25 NOTE — TELEPHONE ENCOUNTER
Rx Refill Note  Requested Prescriptions     Pending Prescriptions Disp Refills   • cloNIDine (CATAPRES) 0.2 MG tablet [Pharmacy Med Name: CLONIDINE  0.2MG  TAB] 90 tablet 1     Sig: TAKE 1 TABLET BY MOUTH  DAILY   • finasteride (PROSCAR) 5 MG tablet [Pharmacy Med Name: Finasteride 5 MG Oral Tablet] 90 tablet 1     Sig: TAKE 1 TABLET BY MOUTH  DAILY      Last office visit with prescribing clinician: 1/12/2022      Next office visit with prescribing clinician: Visit date not found            Fuad Bautista Rep  01/25/22, 08:47 EST

## 2022-01-26 ENCOUNTER — TELEPHONE (OUTPATIENT)
Dept: ONCOLOGY | Facility: CLINIC | Age: 77
End: 2022-01-26

## 2022-01-26 NOTE — TELEPHONE ENCOUNTER
Pt needs to have root canals done. His oral surgeon Dr. Watson Farmer wants to get information about the Xgeva he is on.

## 2022-01-26 NOTE — TELEPHONE ENCOUNTER
I spoke to Honey at Dr. Cooks office regarding pts Xgeva and upcoming root canal. Pt receives 120mg of Xgeva Q28 days. He has received 12 doses since starting 2/5/2021. Honey V/MADHURI. Note added to appt line to talk to Dr. Cat or nurse prior to next Xgeva injection.

## 2022-02-07 ENCOUNTER — HOSPITAL ENCOUNTER (OUTPATIENT)
Dept: NUCLEAR MEDICINE | Facility: HOSPITAL | Age: 77
Discharge: HOME | End: 2022-02-07

## 2022-02-07 DIAGNOSIS — C79.51 PROSTATE CANCER METASTATIC TO BONE: ICD-10-CM

## 2022-02-07 DIAGNOSIS — C61 PROSTATE CANCER METASTATIC TO BONE: ICD-10-CM

## 2022-02-07 PROCEDURE — A9503 TC99M MEDRONATE: HCPCS | Performed by: INTERNAL MEDICINE

## 2022-02-07 PROCEDURE — 0 TECHNETIUM MEDRONATE KIT: Performed by: INTERNAL MEDICINE

## 2022-02-07 PROCEDURE — 78306 BONE IMAGING WHOLE BODY: CPT

## 2022-02-07 RX ORDER — TC 99M MEDRONATE 20 MG/10ML
22.6 INJECTION, POWDER, LYOPHILIZED, FOR SOLUTION INTRAVENOUS
Status: COMPLETED | OUTPATIENT
Start: 2022-02-07 | End: 2022-02-07

## 2022-02-07 RX ADMIN — Medication 22.6 MILLICURIE: at 09:57

## 2022-02-17 NOTE — PROGRESS NOTES
"[facility] GROUP OUTPATIENT FOLLOW UP CLINIC VISIT    REASON FOR FOLLOW-UP:    1.  History of metastatic clear cell renal cell carcinoma.  All known disease had previously been resected..  2.  Pulmonary metastases discovered July 2020.  Bone metastases discovered September 2020.  3.  Votrient initiated 10/16/2020  4. Palliative radiation for pain complete on 11/10/2020  5.  1/4/2021 significantly elevated PSA > 100.  6.  1/20/2021 bone biopsy confirms metastatic prostate cancer    HISTORY OF PRESENT ILLNESS:  Micah Krishna is a 76 y.o. male with the above-mentioned history who returns today for follow-up    He is doing okay at this point.  His pain overall is well controlled.  He takes 2 hydrocodone tablets per day.  He does require a lot of dental work to be done early next week.  For that reason we are going to hold Xgeva for now.      REVIEW OF SYSTEMS:  As per HPI    PE:  Vitals:    02/18/22 0925   BP: 122/68   Pulse: 73   Resp: 16   Temp: 96.8 °F (36 °C)   TempSrc: Temporal   SpO2: 96%   Weight: 105 kg (230 lb 8 oz)   Height: 182.9 cm (72.01\")   PainSc: 0-No pain  Comment: bone cancer     General:  No acute distress, awake, alert and oriented  Skin:  Warm and dry, no visible rash  HEENT:  Normocephalic/atraumatic.  Wearing a facemask.  Multiple teeth missing when he did pull his facemask down.  Chest:  Normal respiratory effort.  Lungs clear to auscultation bilaterally.  Heart: Regular rate and rhythm  Extremities:  No visible clubbing, cyanosis, or edema  Neuro/psych:  Grossly non-focal.  Normal mood and affect.      DIAGNOSTIC DATA:  Phosphorus (02/18/2022 09:35)  Magnesium (02/18/2022 09:35)  Comprehensive metabolic panel (02/18/2022 09:35)  CBC and Differential (02/18/2022 09:35)      IMAGING:    NM Bone Scan Whole Body (02/07/2022 13:25)    Bone scan images from 2/7/2022 images personally reviewed.  Overall improvement.    ASSESSMENT:  This is a 76 y.o. male with:    *Mild normocytic anemia: "   · Hemoglobin 11.4 today    *Adrenal insufficiency following bilateral adrenalectomy   · On replacement hormone therapy.    · He follows with endocrinology.    *History of metastatic renal cell carcinoma.    · He had a left nephrectomy and adrenalectomy in 2000 and then a right adrenalectomy for metastatic disease in 2012.    · Currently no evidence of disease.  See below.    *New metastatic bone disease diagnosed as he presented with worsening pain, ultimately found to be metastatic prostate cancer  · Presumed to be metastatic renal cell carcinoma but ultimately diagnosed with metastatic prostate cancer.  · Imaging discussed with radiology.  Imaging consistent with metastatic renal cell carcinoma  · However, his PSA was noted to be greater than 100  · As discussed below, bone biopsy subsequently confirmed metastatic prostate cancer  · CT C/A/P 7/6/2020 with a RUL sub 6 mm pulmonary nodule in the RUL, stable since 1/17/2018, new 6 mm nodules in the medial RLL and RUL,  LLL nodule unchanged since 7/17/2018.  Healing left ninth rib fracture.  · CT chest 9/28/2020 with stable lung nodules, pathologic fracture of the right 8th rib, abnormal appearance of T8, narrowing of the thecal sack at this level, additional lucencies at several vertebral bodies such as T7 and T11, subacute healing fractures in the anterior right 6th rib and lateral left 9th rib.  · CT head 9/28/2020 with calvarial metastatic disease involving the frontal and occipital bones.  · Bone scan 9/28/2020 with multifocal uptake consistent with metastatic disease. Anterior frontal bone, upper C spine at C1 or C2, T and L spine at T3, T8, T11, multifocal rib uptake, abnormal uptake in the iliac wings/bodies and left femoral heads/acetabulum, distal left clavicle and both humeral heads.   · PET scan from 10/8/2020.  Multifocal bone metastases throughout the skeleton.  Pathologic rib and vertebral body fracture.  Moderate FDG uptake in the right femoral neck  with some sclerosis, new since 7/6/2020.  Mottled appearance of L3 and T8 vertebral bodies with pathologic compression fractures.  L3 fracture is a burst fracture with 20 to 25% loss of height and 4 to 5 mm of retropulsion in the central spinal canal.  Pathologic compression fracture at T8 with 10% loss of height.  New findings since 7/6/2020.  FDG avid soft tissue nodule extending into the central canal along the posterior aspect of the thecal sac at T8.  FDG uptake in the left clavicle.  Compression fracture of the right posterior eighth rib.  · Votrient initiated 10/16/2020  · Palliative radiation for pain complete on 11/10/2020  · He did subsequently presented with an elevated PSA >100.  · Bone biopsy performed on 1/20/2021 confirmed metastatic prostate cancer.  · Votrient discontinued  · 2/5/2021 patient given Firmagon and Xgeva.  With plans to start Eligard 4 weeks later, and continue monthly Xgeva.    · 3/5/21.First dose of Eligard and monthly Xgeva.  Eligard will be given every 3 months.  · 12/17/2021: Proceeded with Xgeva.  PSA remains very low at 0.015.  · 1/21/2022: Hold Xgeva due to dental issues.  · Bone scan on 2/7/2022 with improvement    *Chronic kidney disease:   · Status post left nephrectomy.    · Creatinine 1.0 today     *Migratory skeletal pain:   · Metastatic disease apparent now on CT imaging and bone scan and now PET scan.  · Completed radiation   · Left leg pain has improved  · Back pain improved  · 4/2/2021 patient continues on Norco 5/325 2 to 3 tablets/day with good pain control.  This remains stable as of 4/30/2021.  · 5/28/21.  The patient continues on [place] 5/325 with 2 to 3 tablets/day.  Pain is stable.   · 7/2/2021: He ran out of the hydrocodone/acetaminophen and has been using more ibuprofen which has controlled his pain but I encouraged him not to use as much ibuprofen to protect his kidney.  · Pain is stable as of 7/30/2021.  · 10/22/2021: Pain is stable.  Pain medication  refilled.  He was started on meloxicam by primary care which she will start in the near future.  · 12/17/2021: His low back is starting to worsen.  Increased hydrocodone to 7.5 mg with improvement.    · February 18, 2022: Refilled today.    *Mild hypomagnesemia: monitor. If muscle cramps advised to call us.  · Magnesium remains normal at 1.7    *Elevated PSA, subsequent biopsy confirmed metastatic prostate cancer:   · He has a history of very mild elevation of his PSA in the past with a PSA of 5.92 on 10/17/2018.    · His PSA on 1/4/2021 was greater than 100  · PSA repeated today is also greater than 100  · He is completely asymptomatic without clinical evidence for prostatitis.    · Dr. Cat did communicate with Dr. Zapata with radiology and all findings on imaging would support more metastatic renal cell carcinoma rather than metastatic prostate cancer since the bony lesions are lytic.  There is nothing obvious in his prostate on current imaging.  · Dr. Cat communicated with Dr. Cabrera with urology.  He is in agreement with a bone biopsy and he will see him in the office for evaluation.  · One of the bony lesions is amenable to CT-guided biopsy per Dr. Zapata.  · CT-guided needle biopsy of the bone performed on 1/20/2021 confirms metastatic prostate cancer.  · 3/5/21.  Please see above.  The patient received 1 dose of Firmagon and is on Eligard every 3 months.  · 1/21/2022: PSA stable and quite low at 0.019    *New 40% T2 compression fracture.  Asymptomatic.  He is not interested in further radiation at this time.  Might consider referral to neurosurgery should he become more symptomatic or if this worsens.    *Vasomotor symptoms related to ADT  · On venlafaxine.  He did not complain of this today.    *He did have an MRI of the pituitary gland on 8/16/2021 as ordered by his endocrinologist.  Pituitary appears normal.  There is abnormal enhancement of the clivus suspected to be related to his metastatic prostate  cancer.  There is an 11 mm lesion overlying the left frontal convexity suspected to be a meningioma.  The patient states that he was referred to neurosurgery but is not necessarily interested in pursuing this.  We can repeat an MRI in the future if desired.    *Dental issues: Requires extensive dental work to be performed early next week.  Hold Xgeva for now.    PLAN:   1. Continue to hold Xgeva at this time due to pending dental work  2. Eligard today and every 3 months  3. Hydrocodone/acetaminophen 7.5/325 mg tablets prescription refilled today  4. Follow-up with me in 3 months with labs and Eligard

## 2022-02-18 ENCOUNTER — OFFICE VISIT (OUTPATIENT)
Dept: ONCOLOGY | Facility: CLINIC | Age: 77
End: 2022-02-18

## 2022-02-18 ENCOUNTER — INFUSION (OUTPATIENT)
Dept: ONCOLOGY | Facility: HOSPITAL | Age: 77
End: 2022-02-18

## 2022-02-18 ENCOUNTER — LAB (OUTPATIENT)
Dept: OTHER | Facility: HOSPITAL | Age: 77
End: 2022-02-18

## 2022-02-18 VITALS
BODY MASS INDEX: 31.22 KG/M2 | DIASTOLIC BLOOD PRESSURE: 68 MMHG | HEIGHT: 72 IN | RESPIRATION RATE: 16 BRPM | HEART RATE: 73 BPM | OXYGEN SATURATION: 96 % | WEIGHT: 230.5 LBS | SYSTOLIC BLOOD PRESSURE: 122 MMHG | TEMPERATURE: 96.8 F

## 2022-02-18 DIAGNOSIS — C79.51 PROSTATE CANCER METASTATIC TO BONE: Primary | ICD-10-CM

## 2022-02-18 DIAGNOSIS — C61 PROSTATE CANCER METASTATIC TO BONE: ICD-10-CM

## 2022-02-18 DIAGNOSIS — C61 PROSTATE CANCER METASTATIC TO BONE: Primary | ICD-10-CM

## 2022-02-18 DIAGNOSIS — C79.51 PROSTATE CANCER METASTATIC TO BONE: ICD-10-CM

## 2022-02-18 DIAGNOSIS — G89.3 CANCER ASSOCIATED PAIN: ICD-10-CM

## 2022-02-18 LAB
ALBUMIN SERPL-MCNC: 4.3 G/DL (ref 3.5–5.2)
ALBUMIN/GLOB SERPL: 1.7 G/DL
ALP SERPL-CCNC: 38 U/L (ref 39–117)
ALT SERPL W P-5'-P-CCNC: 10 U/L (ref 1–41)
ANION GAP SERPL CALCULATED.3IONS-SCNC: 11.2 MMOL/L (ref 5–15)
AST SERPL-CCNC: 13 U/L (ref 1–40)
BASOPHILS # BLD AUTO: 0.06 10*3/MM3 (ref 0–0.2)
BASOPHILS NFR BLD AUTO: 1.1 % (ref 0–1.5)
BILIRUB SERPL-MCNC: 0.3 MG/DL (ref 0–1.2)
BUN SERPL-MCNC: 17 MG/DL (ref 8–23)
BUN/CREAT SERPL: 17 (ref 7–25)
CALCIUM SPEC-SCNC: 9.4 MG/DL (ref 8.6–10.5)
CHLORIDE SERPL-SCNC: 106 MMOL/L (ref 98–107)
CO2 SERPL-SCNC: 23.8 MMOL/L (ref 22–29)
CREAT SERPL-MCNC: 1 MG/DL (ref 0.76–1.27)
DEPRECATED RDW RBC AUTO: 43.8 FL (ref 37–54)
EOSINOPHIL # BLD AUTO: 0.18 10*3/MM3 (ref 0–0.4)
EOSINOPHIL NFR BLD AUTO: 3.3 % (ref 0.3–6.2)
ERYTHROCYTE [DISTWIDTH] IN BLOOD BY AUTOMATED COUNT: 14 % (ref 12.3–15.4)
GFR SERPL CREATININE-BSD FRML MDRD: 72 ML/MIN/1.73
GLOBULIN UR ELPH-MCNC: 2.5 GM/DL
GLUCOSE SERPL-MCNC: 164 MG/DL (ref 65–99)
HCT VFR BLD AUTO: 35 % (ref 37.5–51)
HGB BLD-MCNC: 11.4 G/DL (ref 13–17.7)
IMM GRANULOCYTES # BLD AUTO: 0.02 10*3/MM3 (ref 0–0.05)
IMM GRANULOCYTES NFR BLD AUTO: 0.4 % (ref 0–0.5)
LYMPHOCYTES # BLD AUTO: 1.01 10*3/MM3 (ref 0.7–3.1)
LYMPHOCYTES NFR BLD AUTO: 18.4 % (ref 19.6–45.3)
MAGNESIUM SERPL-MCNC: 1.7 MG/DL (ref 1.6–2.4)
MCH RBC QN AUTO: 27.8 PG (ref 26.6–33)
MCHC RBC AUTO-ENTMCNC: 32.6 G/DL (ref 31.5–35.7)
MCV RBC AUTO: 85.4 FL (ref 79–97)
MONOCYTES # BLD AUTO: 0.42 10*3/MM3 (ref 0.1–0.9)
MONOCYTES NFR BLD AUTO: 7.7 % (ref 5–12)
NEUTROPHILS NFR BLD AUTO: 3.8 10*3/MM3 (ref 1.7–7)
NEUTROPHILS NFR BLD AUTO: 69.1 % (ref 42.7–76)
NRBC BLD AUTO-RTO: 0 /100 WBC (ref 0–0.2)
PHOSPHATE SERPL-MCNC: 3.2 MG/DL (ref 2.5–4.5)
PLATELET # BLD AUTO: 140 10*3/MM3 (ref 140–450)
PMV BLD AUTO: 10 FL (ref 6–12)
POTASSIUM SERPL-SCNC: 4 MMOL/L (ref 3.5–5.2)
PROT SERPL-MCNC: 6.8 G/DL (ref 6–8.5)
RBC # BLD AUTO: 4.1 10*6/MM3 (ref 4.14–5.8)
SODIUM SERPL-SCNC: 141 MMOL/L (ref 136–145)
WBC NRBC COR # BLD: 5.49 10*3/MM3 (ref 3.4–10.8)

## 2022-02-18 PROCEDURE — 36415 COLL VENOUS BLD VENIPUNCTURE: CPT

## 2022-02-18 PROCEDURE — 80053 COMPREHEN METABOLIC PANEL: CPT | Performed by: INTERNAL MEDICINE

## 2022-02-18 PROCEDURE — 85025 COMPLETE CBC W/AUTO DIFF WBC: CPT | Performed by: INTERNAL MEDICINE

## 2022-02-18 PROCEDURE — 84100 ASSAY OF PHOSPHORUS: CPT | Performed by: INTERNAL MEDICINE

## 2022-02-18 PROCEDURE — 99214 OFFICE O/P EST MOD 30 MIN: CPT | Performed by: INTERNAL MEDICINE

## 2022-02-18 PROCEDURE — 25010000002 LEUPROLIDE ACETATE (3 MONTH) PER 7.5 MG: Performed by: INTERNAL MEDICINE

## 2022-02-18 PROCEDURE — 83735 ASSAY OF MAGNESIUM: CPT | Performed by: INTERNAL MEDICINE

## 2022-02-18 PROCEDURE — 96402 CHEMO HORMON ANTINEOPL SQ/IM: CPT

## 2022-02-18 RX ORDER — HYDROCODONE BITARTRATE AND ACETAMINOPHEN 7.5; 325 MG/1; MG/1
1 TABLET ORAL EVERY 6 HOURS PRN
Qty: 120 TABLET | Refills: 0 | Status: SHIPPED | OUTPATIENT
Start: 2022-02-18 | End: 2022-04-17 | Stop reason: SDUPTHER

## 2022-02-18 RX ADMIN — LEUPROLIDE ACETATE 22.5 MG: KIT at 10:34

## 2022-02-18 NOTE — NURSING NOTE
Lupron given in left glut without incidence.  Pt next appt reviewed and pt instructed to call sooner with concerns.

## 2022-02-22 RX ORDER — AMLODIPINE BESYLATE 10 MG/1
10 TABLET ORAL DAILY
Qty: 90 TABLET | Refills: 1 | Status: SHIPPED | OUTPATIENT
Start: 2022-02-22 | End: 2022-05-29 | Stop reason: SDUPTHER

## 2022-02-22 RX ORDER — ALLOPURINOL 300 MG/1
300 TABLET ORAL DAILY
Qty: 90 TABLET | Refills: 1 | Status: SHIPPED | OUTPATIENT
Start: 2022-02-22 | End: 2022-11-02 | Stop reason: SDUPTHER

## 2022-02-22 NOTE — TELEPHONE ENCOUNTER
Rx Refill Note  Requested Prescriptions     Pending Prescriptions Disp Refills   • allopurinol (ZYLOPRIM) 300 MG tablet [Pharmacy Med Name: Allopurinol 300 MG Oral Tablet] 90 tablet 1     Sig: TAKE 1 TABLET BY MOUTH  DAILY   • amLODIPine (NORVASC) 10 MG tablet [Pharmacy Med Name: amLODIPine Besylate 10 MG Oral Tablet] 90 tablet 1     Sig: TAKE 1 TABLET BY MOUTH  DAILY      Last office visit with prescribing clinician: 1/12/2022      Next office visit with prescribing clinician: Visit date not found            Fuad Bautista Rep  02/22/22, 09:09 EST

## 2022-03-04 RX ORDER — CLOPIDOGREL BISULFATE 75 MG/1
75 TABLET ORAL DAILY
Qty: 90 TABLET | Refills: 3 | Status: SHIPPED | OUTPATIENT
Start: 2022-03-04 | End: 2023-03-12 | Stop reason: SDUPTHER

## 2022-03-04 NOTE — TELEPHONE ENCOUNTER
Rx Refill Note  Requested Prescriptions     Pending Prescriptions Disp Refills   • clopidogrel (PLAVIX) 75 MG tablet [Pharmacy Med Name: Clopidogrel Bisulfate 75 MG Oral Tablet] 90 tablet 3     Sig: TAKE 1 TABLET BY MOUTH  DAILY      Last office visit with prescribing clinician: 1/12/2022      Next office visit with prescribing clinician: Visit date not found            Fuad Bautista Rep  03/04/22, 08:11 EST

## 2022-04-11 RX ORDER — ISOSORBIDE MONONITRATE 30 MG/1
TABLET, EXTENDED RELEASE ORAL
Qty: 180 TABLET | Refills: 1 | Status: SHIPPED | OUTPATIENT
Start: 2022-04-11 | End: 2022-10-03

## 2022-04-11 NOTE — TELEPHONE ENCOUNTER
Rx Refill Note  Requested Prescriptions     Pending Prescriptions Disp Refills   • isosorbide mononitrate (IMDUR) 30 MG 24 hr tablet [Pharmacy Med Name: Isosorbide Mononitrate ER 30 MG Oral Tablet Extended Release 24 Hour] 180 tablet      Sig: TAKE 1 TABLET BY MOUTH  TWICE DAILY      Last office visit with prescribing clinician: 1/12/2022      Next office visit with prescribing clinician: Visit date not found            Fuad Bautista Rep  04/11/22, 16:33 EDT

## 2022-04-17 DIAGNOSIS — C61 PROSTATE CANCER METASTATIC TO BONE: ICD-10-CM

## 2022-04-17 DIAGNOSIS — C79.51 PROSTATE CANCER METASTATIC TO BONE: ICD-10-CM

## 2022-04-18 RX ORDER — HYDROCODONE BITARTRATE AND ACETAMINOPHEN 7.5; 325 MG/1; MG/1
1 TABLET ORAL EVERY 6 HOURS PRN
Qty: 120 TABLET | Refills: 0 | Status: SHIPPED | OUTPATIENT
Start: 2022-04-18 | End: 2022-05-18

## 2022-05-16 RX ORDER — METOPROLOL SUCCINATE 100 MG/1
100 TABLET, EXTENDED RELEASE ORAL DAILY
Qty: 90 TABLET | Refills: 1 | Status: SHIPPED | OUTPATIENT
Start: 2022-05-16 | End: 2022-10-03

## 2022-05-16 NOTE — TELEPHONE ENCOUNTER
Rx Refill Note  Requested Prescriptions     Pending Prescriptions Disp Refills   • metoprolol succinate XL (TOPROL-XL) 100 MG 24 hr tablet [Pharmacy Med Name: Metoprolol Succinate  MG Oral Tablet Extended Release 24 Hour] 90 tablet 1     Sig: TAKE 1 TABLET BY MOUTH  DAILY      Last office visit with prescribing clinician: 1/12/2022      Next office visit with prescribing clinician: Visit date not found            Fuad Bautista Rep  05/16/22, 17:02 EDT

## 2022-05-16 NOTE — PROGRESS NOTES
[facility] GROUP OUTPATIENT FOLLOW UP CLINIC VISIT    REASON FOR FOLLOW-UP:    1.  History of metastatic clear cell renal cell carcinoma.  All known disease had previously been resected..  2.  Pulmonary metastases discovered July 2020.  Bone metastases discovered September 2020.  3.  Votrient initiated 10/16/2020  4. Palliative radiation for pain complete on 11/10/2020  5.  1/4/2021 significantly elevated PSA > 100.  6.  1/20/2021 bone biopsy confirms metastatic prostate cancer    HISTORY OF PRESENT ILLNESS:  Micah Krishna is a 77 y.o. male with the above-mentioned history who returns today for follow-up    He is doing okay at this point.  His pain overall is well controlled.  He takes 2 hydrocodone tablets per day.  He does require a lot of dental work to be done early next week.  For that reason we are going to hold Xgeva for now.  ***    REVIEW OF SYSTEMS:  As per HPI    PE:  There were no vitals filed for this visit.  General:  No acute distress, awake, alert and oriented  Skin:  Warm and dry, no visible rash  HEENT:  Normocephalic/atraumatic.  Wearing a facemask.  Multiple teeth missing when he did pull his facemask down.  Chest:  Normal respiratory effort.  Lungs clear to auscultation bilaterally.  Heart: Regular rate and rhythm  Extremities:  No visible clubbing, cyanosis, or edema  Neuro/psych:  Grossly non-focal.  Normal mood and affect.  ***    DIAGNOSTIC DATA:  ***    IMAGING:    NM Bone Scan Whole Body (02/07/2022 13:25)    Bone scan images from 2/7/2022 images personally reviewed.  Overall improvement.    ASSESSMENT:  This is a 77 y.o. male with:    *Mild normocytic anemia:   · Hemoglobin 11.4 today    *Adrenal insufficiency following bilateral adrenalectomy   · On replacement hormone therapy.    · He follows with endocrinology.    *History of metastatic renal cell carcinoma.    · He had a left nephrectomy and adrenalectomy in 2000 and then a right adrenalectomy for metastatic disease in 2012.     · Currently no evidence of disease.  See below.    *New metastatic bone disease diagnosed as he presented with worsening pain, ultimately found to be metastatic prostate cancer  · Presumed to be metastatic renal cell carcinoma but ultimately diagnosed with metastatic prostate cancer.  · Imaging discussed with radiology.  Imaging consistent with metastatic renal cell carcinoma  · However, his PSA was noted to be greater than 100  · As discussed below, bone biopsy subsequently confirmed metastatic prostate cancer  · CT C/A/P 7/6/2020 with a RUL sub 6 mm pulmonary nodule in the RUL, stable since 1/17/2018, new 6 mm nodules in the medial RLL and RUL,  LLL nodule unchanged since 7/17/2018.  Healing left ninth rib fracture.  · CT chest 9/28/2020 with stable lung nodules, pathologic fracture of the right 8th rib, abnormal appearance of T8, narrowing of the thecal sack at this level, additional lucencies at several vertebral bodies such as T7 and T11, subacute healing fractures in the anterior right 6th rib and lateral left 9th rib.  · CT head 9/28/2020 with calvarial metastatic disease involving the frontal and occipital bones.  · Bone scan 9/28/2020 with multifocal uptake consistent with metastatic disease. Anterior frontal bone, upper C spine at C1 or C2, T and L spine at T3, T8, T11, multifocal rib uptake, abnormal uptake in the iliac wings/bodies and left femoral heads/acetabulum, distal left clavicle and both humeral heads.   · PET scan from 10/8/2020.  Multifocal bone metastases throughout the skeleton.  Pathologic rib and vertebral body fracture.  Moderate FDG uptake in the right femoral neck with some sclerosis, new since 7/6/2020.  Mottled appearance of L3 and T8 vertebral bodies with pathologic compression fractures.  L3 fracture is a burst fracture with 20 to 25% loss of height and 4 to 5 mm of retropulsion in the central spinal canal.  Pathologic compression fracture at T8 with 10% loss of height.  New  findings since 7/6/2020.  FDG avid soft tissue nodule extending into the central canal along the posterior aspect of the thecal sac at T8.  FDG uptake in the left clavicle.  Compression fracture of the right posterior eighth rib.  · Votrient initiated 10/16/2020  · Palliative radiation for pain complete on 11/10/2020  · He did subsequently presented with an elevated PSA >100.  · Bone biopsy performed on 1/20/2021 confirmed metastatic prostate cancer.  · Votrient discontinued  · 2/5/2021 patient given Firmagon and Xgeva.  With plans to start Eligard 4 weeks later, and continue monthly Xgeva.    · 3/5/21.First dose of Eligard and monthly Xgeva.  Eligard will be given every 3 months.  · 12/17/2021: Proceeded with Xgeva.  PSA remains very low at 0.015.  · 1/21/2022: Hold Xgeva due to dental issues.  · Bone scan on 2/7/2022 with improvement    *Chronic kidney disease:   · Status post left nephrectomy.    · Creatinine 1.0 today     *Migratory skeletal pain:   · Metastatic disease apparent now on CT imaging and bone scan and now PET scan.  · Completed radiation   · Left leg pain has improved  · Back pain improved  · 4/2/2021 patient continues on Norco 5/325 2 to 3 tablets/day with good pain control.  This remains stable as of 4/30/2021.  · 5/28/21.  The patient continues on [place] 5/325 with 2 to 3 tablets/day.  Pain is stable.   · 7/2/2021: He ran out of the hydrocodone/acetaminophen and has been using more ibuprofen which has controlled his pain but I encouraged him not to use as much ibuprofen to protect his kidney.  · Pain is stable as of 7/30/2021.  · 10/22/2021: Pain is stable.  Pain medication refilled.  He was started on meloxicam by primary care which she will start in the near future.  · 12/17/2021: His low back is starting to worsen.  Increased hydrocodone to 7.5 mg with improvement.    · February 18, 2022: Refilled today.    *Mild hypomagnesemia: monitor. If muscle cramps advised to call us.  · Magnesium remains  normal at 1.7    *Elevated PSA, subsequent biopsy confirmed metastatic prostate cancer:   · He has a history of very mild elevation of his PSA in the past with a PSA of 5.92 on 10/17/2018.    · His PSA on 1/4/2021 was greater than 100  · PSA repeated today is also greater than 100  · He is completely asymptomatic without clinical evidence for prostatitis.    · Dr. Cat did communicate with Dr. Zapata with radiology and all findings on imaging would support more metastatic renal cell carcinoma rather than metastatic prostate cancer since the bony lesions are lytic.  There is nothing obvious in his prostate on current imaging.  · Dr. Cat communicated with Dr. Cabrera with urology.  He is in agreement with a bone biopsy and he will see him in the office for evaluation.  · One of the bony lesions is amenable to CT-guided biopsy per Dr. Zapata.  · CT-guided needle biopsy of the bone performed on 1/20/2021 confirms metastatic prostate cancer.  · 3/5/21.  Please see above.  The patient received 1 dose of Firmagon and is on Eligard every 3 months.  · 1/21/2022: PSA stable and quite low at 0.019    *New 40% T2 compression fracture.  Asymptomatic.  He is not interested in further radiation at this time.  Might consider referral to neurosurgery should he become more symptomatic or if this worsens.    *Vasomotor symptoms related to ADT  · On venlafaxine.  He did not complain of this today.    *He did have an MRI of the pituitary gland on 8/16/2021 as ordered by his endocrinologist.  Pituitary appears normal.  There is abnormal enhancement of the clivus suspected to be related to his metastatic prostate cancer.  There is an 11 mm lesion overlying the left frontal convexity suspected to be a meningioma.  The patient states that he was referred to neurosurgery but is not necessarily interested in pursuing this.  We can repeat an MRI in the future if desired.    *Dental issues: Requires extensive dental work to be performed early  next week.  Hold Xgeva for now.    PLAN:   1. Continue to hold Xgeva at this time due to pending dental work  2. Eligard today and every 3 months  3. Hydrocodone/acetaminophen 7.5/325 mg tablets prescription refilled today  4. Follow-up with me in 3 months with labs and Eligard  ***

## 2022-05-19 NOTE — PROGRESS NOTES
"[facility] GROUP OUTPATIENT FOLLOW UP CLINIC VISIT    REASON FOR FOLLOW-UP:    1.  History of metastatic clear cell renal cell carcinoma.  All known disease had previously been resected..  2.  Pulmonary metastases discovered July 2020.  Bone metastases discovered September 2020.  3.  Votrient initiated 10/16/2020  4. Palliative radiation for pain complete on 11/10/2020  5.  1/4/2021 significantly elevated PSA > 100.  6.  1/20/2021 bone biopsy confirms metastatic prostate cancer    HISTORY OF PRESENT ILLNESS:  Micah Krishna is a 77 y.o. male with the above-mentioned history who returns today for follow-up    He completed all of his dental work at least 6 weeks ago.  He complains of some pain related to his teeth.  He also complains of some headaches in the frontal area as well as in the occipital area.  He denies any other pain.  He denies any bleeding.  Energy level is reasonable.      REVIEW OF SYSTEMS:  As per HPI    PE:  Vitals:    05/20/22 0805   BP: 166/88   Pulse: 67   Resp: 16   Temp: 97.7 °F (36.5 °C)   TempSrc: Temporal   SpO2: 97%   Weight: 103 kg (227 lb 6.4 oz)   Height: 182.9 cm (72.01\")   PainSc: 0-No pain  Comment: bone cancer     General:  No acute distress, awake, alert and oriented  Skin:  Warm and dry, no visible rash  HEENT:  Normocephalic/atraumatic.  Wearing a facemask.  Upper dentures present.  Chest:  Normal respiratory effort.  Lungs clear to auscultation bilaterally.  Heart: Regular rate and rhythm  Extremities:  No visible clubbing, cyanosis, or edema  Neuro/psych:  Grossly non-focal.  Normal mood and affect.      DIAGNOSTIC DATA:  CBC & Differential (05/20/2022 08:07)      IMAGING:    NM Bone Scan Whole Body (02/07/2022 13:25)    ASSESSMENT:  This is a 77 y.o. male with:    *Mild normocytic anemia:   · Hemoglobin a little bit lower at 10.8 today    *Adrenal insufficiency following bilateral adrenalectomy   · On replacement hormone therapy.    · He follows with " endocrinology.    *History of metastatic renal cell carcinoma.    · He had a left nephrectomy and adrenalectomy in 2000 and then a right adrenalectomy for metastatic disease in 2012.    · Currently no evidence of disease.  See below.    *New metastatic bone disease diagnosed as he presented with worsening pain, ultimately found to be metastatic prostate cancer  · Presumed to be metastatic renal cell carcinoma but ultimately diagnosed with metastatic prostate cancer.  · Imaging discussed with radiology.  Imaging consistent with metastatic renal cell carcinoma  · However, his PSA was noted to be greater than 100  · As discussed below, bone biopsy subsequently confirmed metastatic prostate cancer  · CT C/A/P 7/6/2020 with a RUL sub 6 mm pulmonary nodule in the RUL, stable since 1/17/2018, new 6 mm nodules in the medial RLL and RUL,  LLL nodule unchanged since 7/17/2018.  Healing left ninth rib fracture.  · CT chest 9/28/2020 with stable lung nodules, pathologic fracture of the right 8th rib, abnormal appearance of T8, narrowing of the thecal sack at this level, additional lucencies at several vertebral bodies such as T7 and T11, subacute healing fractures in the anterior right 6th rib and lateral left 9th rib.  · CT head 9/28/2020 with calvarial metastatic disease involving the frontal and occipital bones.  · Bone scan 9/28/2020 with multifocal uptake consistent with metastatic disease. Anterior frontal bone, upper C spine at C1 or C2, T and L spine at T3, T8, T11, multifocal rib uptake, abnormal uptake in the iliac wings/bodies and left femoral heads/acetabulum, distal left clavicle and both humeral heads.   · PET scan from 10/8/2020.  Multifocal bone metastases throughout the skeleton.  Pathologic rib and vertebral body fracture.  Moderate FDG uptake in the right femoral neck with some sclerosis, new since 7/6/2020.  Mottled appearance of L3 and T8 vertebral bodies with pathologic compression fractures.  L3 fracture  is a burst fracture with 20 to 25% loss of height and 4 to 5 mm of retropulsion in the central spinal canal.  Pathologic compression fracture at T8 with 10% loss of height.  New findings since 7/6/2020.  FDG avid soft tissue nodule extending into the central canal along the posterior aspect of the thecal sac at T8.  FDG uptake in the left clavicle.  Compression fracture of the right posterior eighth rib.  · Votrient initiated 10/16/2020  · Palliative radiation for pain complete on 11/10/2020  · He did subsequently presented with an elevated PSA >100.  · Bone biopsy performed on 1/20/2021 confirmed metastatic prostate cancer.  · Votrient discontinued  · 2/5/2021 patient given Firmagon and Xgeva.  With plans to start Eligard 4 weeks later, and continue monthly Xgeva.    · 3/5/21.First dose of Eligard and monthly Xgeva.  Eligard will be given every 3 months.  · 12/17/2021: Proceeded with Xgeva.  PSA remains very low at 0.015.  · 1/21/2022: Hold Xgeva due to dental issues.  · Bone scan on 2/7/2022 with improvement  · 5/20/2022: He is done with all of his dental work.  Proceed with Xgeva.  Proceed with Eligard today and every 3 months    *Chronic kidney disease:   · Status post left nephrectomy.    · Creatinine pending today     *Migratory skeletal pain:   · Metastatic disease apparent now on CT imaging and bone scan and now PET scan.  · Completed radiation   · Left leg pain has improved  · Back pain improved  · 4/2/2021 patient continues on Norco 5/325 2 to 3 tablets/day with good pain control.  This remains stable as of 4/30/2021.  · 5/28/21.  The patient continues on Adams 5/325 with 2 to 3 tablets/day.  Pain is stable.   · 7/2/2021: He ran out of the hydrocodone/acetaminophen and has been using more ibuprofen which has controlled his pain but I encouraged him not to use as much ibuprofen to protect his kidney.  · Pain is stable as of 7/30/2021.  · 10/22/2021: Pain is stable.  Pain medication refilled.  He was started  on meloxicam by primary care which she will start in the near future.  · 12/17/2021: His low back is starting to worsen.  Increased hydrocodone to 7.5 mg with improvement.    · Pain improved  · 5/20/2022: Only headaches which may be secondary to his dental issues    *Mild hypomagnesemia: monitor. If muscle cramps advised to call us.  · Magnesium pending today    *Elevated PSA, subsequent biopsy confirmed metastatic prostate cancer:   · He has a history of very mild elevation of his PSA in the past with a PSA of 5.92 on 10/17/2018.    · His PSA on 1/4/2021 was greater than 100  · PSA repeated today is also greater than 100  · He is completely asymptomatic without clinical evidence for prostatitis.    · Dr. Cat did communicate with Dr. Zapata with radiology and all findings on imaging would support more metastatic renal cell carcinoma rather than metastatic prostate cancer since the bony lesions are lytic.  There is nothing obvious in his prostate on current imaging.  · Dr. Cat communicated with Dr. Cabrera with urology.  He is in agreement with a bone biopsy and he will see him in the office for evaluation.  · One of the bony lesions is amenable to CT-guided biopsy per Dr. Zapata.  · CT-guided needle biopsy of the bone performed on 1/20/2021 confirms metastatic prostate cancer.  · 3/5/21.  Please see above.  The patient received 1 dose of Firmagon and is on Eligard every 3 months.  · 1/21/2022: PSA stable and quite low at 0.019  · PSA pending today    *New 40% T2 compression fracture.  Asymptomatic.  He is not interested in further radiation at this time.  Might consider referral to neurosurgery should he become more symptomatic or if this worsens.    *Vasomotor symptoms related to ADT  · On venlafaxine.  He again did not complain of this today.    *He did have an MRI of the pituitary gland on 8/16/2021 as ordered by his endocrinologist.  Pituitary appears normal.  There is abnormal enhancement of the clivus  suspected to be related to his metastatic prostate cancer.  There is an 11 mm lesion overlying the left frontal convexity suspected to be a meningioma.  The patient states that he was referred to neurosurgery but is not necessarily interested in pursuing this.  We can repeat an MRI in the future if desired.    *Dental issues: 5/20/2022: Completed all of his dental work at least 6 weeks ago.  Proceed with Xgeva today and every month for now.    PLAN:   1. Assuming labs look okay proceed with Xgeva today and every 4 weeks for now  2. Eligard today and every 3 months  3. Hydrocodone/acetaminophen 7.5/325 mg as needed.  Not refilled today.  4. Monthly Xgeva  5. Follow-up with me in 3 months with labs, Xgeva, and Eligard.     High risk medication requiring intensive monitoring

## 2022-05-20 ENCOUNTER — LAB (OUTPATIENT)
Dept: OTHER | Facility: HOSPITAL | Age: 77
End: 2022-05-20

## 2022-05-20 ENCOUNTER — OFFICE VISIT (OUTPATIENT)
Dept: ONCOLOGY | Facility: CLINIC | Age: 77
End: 2022-05-20

## 2022-05-20 ENCOUNTER — INFUSION (OUTPATIENT)
Dept: ONCOLOGY | Facility: HOSPITAL | Age: 77
End: 2022-05-20

## 2022-05-20 VITALS
DIASTOLIC BLOOD PRESSURE: 88 MMHG | RESPIRATION RATE: 16 BRPM | SYSTOLIC BLOOD PRESSURE: 166 MMHG | BODY MASS INDEX: 30.8 KG/M2 | OXYGEN SATURATION: 97 % | HEART RATE: 67 BPM | WEIGHT: 227.4 LBS | HEIGHT: 72 IN | TEMPERATURE: 97.7 F

## 2022-05-20 DIAGNOSIS — C61 PROSTATE CANCER METASTATIC TO BONE: ICD-10-CM

## 2022-05-20 DIAGNOSIS — C61 PROSTATE CANCER METASTATIC TO BONE: Primary | ICD-10-CM

## 2022-05-20 DIAGNOSIS — C79.51 PROSTATE CANCER METASTATIC TO BONE: ICD-10-CM

## 2022-05-20 DIAGNOSIS — C79.51 PROSTATE CANCER METASTATIC TO BONE: Primary | ICD-10-CM

## 2022-05-20 LAB
ALBUMIN SERPL-MCNC: 4.2 G/DL (ref 3.5–5.2)
ALBUMIN/GLOB SERPL: 1.9 G/DL
ALP SERPL-CCNC: 43 U/L (ref 39–117)
ALT SERPL W P-5'-P-CCNC: 10 U/L (ref 1–41)
ANION GAP SERPL CALCULATED.3IONS-SCNC: 10.8 MMOL/L (ref 5–15)
AST SERPL-CCNC: 13 U/L (ref 1–40)
BASOPHILS # BLD AUTO: 0.04 10*3/MM3 (ref 0–0.2)
BASOPHILS NFR BLD AUTO: 0.7 % (ref 0–1.5)
BILIRUB SERPL-MCNC: 0.4 MG/DL (ref 0–1.2)
BUN SERPL-MCNC: 11 MG/DL (ref 8–23)
BUN/CREAT SERPL: 11.8 (ref 7–25)
CALCIUM SPEC-SCNC: 8.9 MG/DL (ref 8.6–10.5)
CHLORIDE SERPL-SCNC: 108 MMOL/L (ref 98–107)
CO2 SERPL-SCNC: 24.2 MMOL/L (ref 22–29)
CREAT SERPL-MCNC: 0.93 MG/DL (ref 0.76–1.27)
DEPRECATED RDW RBC AUTO: 44 FL (ref 37–54)
EGFRCR SERPLBLD CKD-EPI 2021: 83.5 ML/MIN/1.73
EOSINOPHIL # BLD AUTO: 0.39 10*3/MM3 (ref 0–0.4)
EOSINOPHIL NFR BLD AUTO: 6.4 % (ref 0.3–6.2)
ERYTHROCYTE [DISTWIDTH] IN BLOOD BY AUTOMATED COUNT: 14.4 % (ref 12.3–15.4)
GLOBULIN UR ELPH-MCNC: 2.2 GM/DL
GLUCOSE SERPL-MCNC: 138 MG/DL (ref 65–99)
HCT VFR BLD AUTO: 32.3 % (ref 37.5–51)
HGB BLD-MCNC: 10.8 G/DL (ref 13–17.7)
IMM GRANULOCYTES # BLD AUTO: 0.03 10*3/MM3 (ref 0–0.05)
IMM GRANULOCYTES NFR BLD AUTO: 0.5 % (ref 0–0.5)
LYMPHOCYTES # BLD AUTO: 1.02 10*3/MM3 (ref 0.7–3.1)
LYMPHOCYTES NFR BLD AUTO: 16.8 % (ref 19.6–45.3)
MAGNESIUM SERPL-MCNC: 1.5 MG/DL (ref 1.6–2.4)
MCH RBC QN AUTO: 28.2 PG (ref 26.6–33)
MCHC RBC AUTO-ENTMCNC: 33.4 G/DL (ref 31.5–35.7)
MCV RBC AUTO: 84.3 FL (ref 79–97)
MONOCYTES # BLD AUTO: 0.6 10*3/MM3 (ref 0.1–0.9)
MONOCYTES NFR BLD AUTO: 9.9 % (ref 5–12)
NEUTROPHILS NFR BLD AUTO: 4 10*3/MM3 (ref 1.7–7)
NEUTROPHILS NFR BLD AUTO: 65.7 % (ref 42.7–76)
NRBC BLD AUTO-RTO: 0 /100 WBC (ref 0–0.2)
PHOSPHATE SERPL-MCNC: 3.1 MG/DL (ref 2.5–4.5)
PLATELET # BLD AUTO: 141 10*3/MM3 (ref 140–450)
PMV BLD AUTO: 9.3 FL (ref 6–12)
POTASSIUM SERPL-SCNC: 3.6 MMOL/L (ref 3.5–5.2)
PROT SERPL-MCNC: 6.4 G/DL (ref 6–8.5)
PSA SERPL-MCNC: <0.014 NG/ML (ref 0–4)
RBC # BLD AUTO: 3.83 10*6/MM3 (ref 4.14–5.8)
SODIUM SERPL-SCNC: 143 MMOL/L (ref 136–145)
WBC NRBC COR # BLD: 6.08 10*3/MM3 (ref 3.4–10.8)

## 2022-05-20 PROCEDURE — 80053 COMPREHEN METABOLIC PANEL: CPT | Performed by: INTERNAL MEDICINE

## 2022-05-20 PROCEDURE — 84153 ASSAY OF PSA TOTAL: CPT | Performed by: INTERNAL MEDICINE

## 2022-05-20 PROCEDURE — 99214 OFFICE O/P EST MOD 30 MIN: CPT | Performed by: INTERNAL MEDICINE

## 2022-05-20 PROCEDURE — 96372 THER/PROPH/DIAG INJ SC/IM: CPT

## 2022-05-20 PROCEDURE — 83735 ASSAY OF MAGNESIUM: CPT | Performed by: INTERNAL MEDICINE

## 2022-05-20 PROCEDURE — 84100 ASSAY OF PHOSPHORUS: CPT | Performed by: INTERNAL MEDICINE

## 2022-05-20 PROCEDURE — 25010000002 DENOSUMAB 120 MG/1.7ML SOLUTION: Performed by: INTERNAL MEDICINE

## 2022-05-20 PROCEDURE — 36415 COLL VENOUS BLD VENIPUNCTURE: CPT

## 2022-05-20 PROCEDURE — 25010000002 LEUPROLIDE ACETATE (3 MONTH) PER 7.5 MG: Performed by: INTERNAL MEDICINE

## 2022-05-20 PROCEDURE — 96402 CHEMO HORMON ANTINEOPL SQ/IM: CPT

## 2022-05-20 PROCEDURE — 85025 COMPLETE CBC W/AUTO DIFF WBC: CPT | Performed by: INTERNAL MEDICINE

## 2022-05-20 RX ORDER — HYDROCORTISONE 5 MG/1
TABLET ORAL SEE ADMIN INSTRUCTIONS
COMMUNITY
Start: 2022-03-11 | End: 2022-09-01 | Stop reason: SDUPTHER

## 2022-05-20 RX ADMIN — LEUPROLIDE ACETATE 22.5 MG: KIT at 09:41

## 2022-05-20 RX ADMIN — DENOSUMAB 120 MG: 120 INJECTION SUBCUTANEOUS at 10:08

## 2022-05-20 NOTE — NURSING NOTE
Pt here for Xgeva and Lupron. Mag 1.5 reviewed with Dr Emory plata to proceed with Xgeva.   Pt states he will continue to take his oral mag he did not take it the last few days.     Lab Results   Component Value Date    WBC 6.08 05/20/2022    HGB 10.8 (L) 05/20/2022    HCT 32.3 (L) 05/20/2022    MCV 84.3 05/20/2022     05/20/2022     Lab Results   Component Value Date    GLUCOSE 138 (H) 05/20/2022    BUN 11 05/20/2022    CREATININE 0.93 05/20/2022    EGFRIFNONA 72 02/18/2022    EGFRIFAFRI 61 05/09/2019    BCR 11.8 05/20/2022    K 3.6 05/20/2022    CO2 24.2 05/20/2022    CALCIUM 8.9 05/20/2022    PROTENTOTREF 7.1 01/18/2019    ALBUMIN 4.20 05/20/2022    LABIL2 1.6 03/01/2022    AST 13 05/20/2022    ALT 10 05/20/2022

## 2022-05-31 RX ORDER — HYDROCORTISONE 10 MG/1
TABLET ORAL
Qty: 135 TABLET | Refills: 3 | Status: SHIPPED | OUTPATIENT
Start: 2022-05-31 | End: 2022-11-02 | Stop reason: SDUPTHER

## 2022-05-31 RX ORDER — ERGOCALCIFEROL 1.25 MG/1
CAPSULE ORAL
Qty: 14 CAPSULE | Refills: 0 | Status: SHIPPED | OUTPATIENT
Start: 2022-05-31 | End: 2022-07-06

## 2022-05-31 RX ORDER — VALSARTAN 160 MG/1
TABLET ORAL
Qty: 180 TABLET | Refills: 1 | Status: SHIPPED | OUTPATIENT
Start: 2022-05-31 | End: 2022-09-13 | Stop reason: SDUPTHER

## 2022-05-31 RX ORDER — AMLODIPINE BESYLATE 10 MG/1
10 TABLET ORAL DAILY
Qty: 90 TABLET | Refills: 1 | Status: SHIPPED | OUTPATIENT
Start: 2022-05-31 | End: 2023-02-02 | Stop reason: SDUPTHER

## 2022-06-17 ENCOUNTER — LAB (OUTPATIENT)
Dept: OTHER | Facility: HOSPITAL | Age: 77
End: 2022-06-17

## 2022-06-17 ENCOUNTER — INFUSION (OUTPATIENT)
Dept: ONCOLOGY | Facility: HOSPITAL | Age: 77
End: 2022-06-17

## 2022-06-17 DIAGNOSIS — C79.51 PROSTATE CANCER METASTATIC TO BONE: ICD-10-CM

## 2022-06-17 DIAGNOSIS — C61 PROSTATE CANCER METASTATIC TO BONE: ICD-10-CM

## 2022-06-17 DIAGNOSIS — C79.51 PROSTATE CANCER METASTATIC TO BONE: Primary | ICD-10-CM

## 2022-06-17 DIAGNOSIS — C61 PROSTATE CANCER METASTATIC TO BONE: Primary | ICD-10-CM

## 2022-06-17 LAB
ALBUMIN SERPL-MCNC: 4.1 G/DL (ref 3.5–5.2)
ALBUMIN/GLOB SERPL: 1.6 G/DL
ALP SERPL-CCNC: 43 U/L (ref 39–117)
ALT SERPL W P-5'-P-CCNC: 10 U/L (ref 1–41)
ANION GAP SERPL CALCULATED.3IONS-SCNC: 9.5 MMOL/L (ref 5–15)
AST SERPL-CCNC: 13 U/L (ref 1–40)
BILIRUB SERPL-MCNC: 0.3 MG/DL (ref 0–1.2)
BUN SERPL-MCNC: 13 MG/DL (ref 8–23)
BUN/CREAT SERPL: 14 (ref 7–25)
CALCIUM SPEC-SCNC: 9 MG/DL (ref 8.6–10.5)
CHLORIDE SERPL-SCNC: 105 MMOL/L (ref 98–107)
CO2 SERPL-SCNC: 26.5 MMOL/L (ref 22–29)
CREAT SERPL-MCNC: 0.93 MG/DL (ref 0.76–1.27)
EGFRCR SERPLBLD CKD-EPI 2021: 83.5 ML/MIN/1.73
GLOBULIN UR ELPH-MCNC: 2.6 GM/DL
GLUCOSE SERPL-MCNC: 154 MG/DL (ref 65–99)
MAGNESIUM SERPL-MCNC: 1.8 MG/DL (ref 1.6–2.4)
PHOSPHATE SERPL-MCNC: 2.8 MG/DL (ref 2.5–4.5)
POTASSIUM SERPL-SCNC: 4.3 MMOL/L (ref 3.5–5.2)
PROT SERPL-MCNC: 6.7 G/DL (ref 6–8.5)
SODIUM SERPL-SCNC: 141 MMOL/L (ref 136–145)

## 2022-06-17 PROCEDURE — 80053 COMPREHEN METABOLIC PANEL: CPT | Performed by: INTERNAL MEDICINE

## 2022-06-17 PROCEDURE — 36415 COLL VENOUS BLD VENIPUNCTURE: CPT

## 2022-06-17 PROCEDURE — 84100 ASSAY OF PHOSPHORUS: CPT | Performed by: INTERNAL MEDICINE

## 2022-06-17 PROCEDURE — 83735 ASSAY OF MAGNESIUM: CPT | Performed by: INTERNAL MEDICINE

## 2022-06-17 PROCEDURE — 96372 THER/PROPH/DIAG INJ SC/IM: CPT

## 2022-06-17 PROCEDURE — 25010000002 DENOSUMAB 120 MG/1.7ML SOLUTION: Performed by: INTERNAL MEDICINE

## 2022-06-17 RX ADMIN — DENOSUMAB 120 MG: 120 INJECTION SUBCUTANEOUS at 09:09

## 2022-07-06 RX ORDER — ERGOCALCIFEROL 1.25 MG/1
CAPSULE ORAL
Qty: 14 CAPSULE | Refills: 0 | Status: SHIPPED | OUTPATIENT
Start: 2022-07-06

## 2022-07-06 NOTE — TELEPHONE ENCOUNTER
We need a recent vitamin D level    Outpatient lab  CMP CBC with differential  TSH  Fasting lipid profile  Vitamin D level      Add vitamin D deficiency  And see problem list for remaining  If he can get lab sometime in the next couple weeks to see where his level is thank you

## 2022-07-06 NOTE — TELEPHONE ENCOUNTER
Rx Refill Note  Requested Prescriptions     Pending Prescriptions Disp Refills   • vitamin D (ERGOCALCIFEROL) 1.25 MG (34488 UT) capsule capsule [Pharmacy Med Name: Vitamin D (Ergocalciferol) 1.25 MG (44147 UT) Oral Capsule] 14 capsule 0     Sig: TAKE 1 CAPSULE BY MOUTH  TWICE WEEKLY      Last office visit with prescribing clinician: 1/12/2022      Next office visit with prescribing clinician: Visit date not found            Geovanna Arreguin MA  07/06/22, 09:01 EDT

## 2022-07-15 ENCOUNTER — INFUSION (OUTPATIENT)
Dept: ONCOLOGY | Facility: HOSPITAL | Age: 77
End: 2022-07-15

## 2022-07-15 ENCOUNTER — LAB (OUTPATIENT)
Dept: OTHER | Facility: HOSPITAL | Age: 77
End: 2022-07-15

## 2022-07-15 VITALS — RESPIRATION RATE: 18 BRPM | TEMPERATURE: 97.1 F

## 2022-07-15 DIAGNOSIS — C79.51 PROSTATE CANCER METASTATIC TO BONE: Primary | ICD-10-CM

## 2022-07-15 DIAGNOSIS — C79.51 PROSTATE CANCER METASTATIC TO BONE: ICD-10-CM

## 2022-07-15 DIAGNOSIS — C61 PROSTATE CANCER METASTATIC TO BONE: Primary | ICD-10-CM

## 2022-07-15 DIAGNOSIS — C61 PROSTATE CANCER METASTATIC TO BONE: ICD-10-CM

## 2022-07-15 LAB
ALBUMIN SERPL-MCNC: 4.3 G/DL (ref 3.5–5.2)
ALBUMIN/GLOB SERPL: 1.5 G/DL
ALP SERPL-CCNC: 41 U/L (ref 39–117)
ALT SERPL W P-5'-P-CCNC: 11 U/L (ref 1–41)
ANION GAP SERPL CALCULATED.3IONS-SCNC: 10.5 MMOL/L (ref 5–15)
AST SERPL-CCNC: 14 U/L (ref 1–40)
BASOPHILS # BLD AUTO: 0.06 10*3/MM3 (ref 0–0.2)
BASOPHILS NFR BLD AUTO: 0.8 % (ref 0–1.5)
BILIRUB SERPL-MCNC: 0.3 MG/DL (ref 0–1.2)
BUN SERPL-MCNC: 17 MG/DL (ref 8–23)
BUN/CREAT SERPL: 17 (ref 7–25)
CALCIUM SPEC-SCNC: 9.5 MG/DL (ref 8.6–10.5)
CHLORIDE SERPL-SCNC: 109 MMOL/L (ref 98–107)
CO2 SERPL-SCNC: 23.5 MMOL/L (ref 22–29)
CREAT SERPL-MCNC: 1 MG/DL (ref 0.76–1.27)
DEPRECATED RDW RBC AUTO: 42.7 FL (ref 37–54)
EGFRCR SERPLBLD CKD-EPI 2021: 76.6 ML/MIN/1.73
EOSINOPHIL # BLD AUTO: 0.32 10*3/MM3 (ref 0–0.4)
EOSINOPHIL NFR BLD AUTO: 4.4 % (ref 0.3–6.2)
ERYTHROCYTE [DISTWIDTH] IN BLOOD BY AUTOMATED COUNT: 13.9 % (ref 12.3–15.4)
GLOBULIN UR ELPH-MCNC: 2.9 GM/DL
GLUCOSE SERPL-MCNC: 172 MG/DL (ref 65–99)
HCT VFR BLD AUTO: 35.4 % (ref 37.5–51)
HGB BLD-MCNC: 11.9 G/DL (ref 13–17.7)
IMM GRANULOCYTES # BLD AUTO: 0.03 10*3/MM3 (ref 0–0.05)
IMM GRANULOCYTES NFR BLD AUTO: 0.4 % (ref 0–0.5)
LYMPHOCYTES # BLD AUTO: 1.34 10*3/MM3 (ref 0.7–3.1)
LYMPHOCYTES NFR BLD AUTO: 18.5 % (ref 19.6–45.3)
MAGNESIUM SERPL-MCNC: 1.7 MG/DL (ref 1.6–2.4)
MCH RBC QN AUTO: 28.4 PG (ref 26.6–33)
MCHC RBC AUTO-ENTMCNC: 33.6 G/DL (ref 31.5–35.7)
MCV RBC AUTO: 84.5 FL (ref 79–97)
MONOCYTES # BLD AUTO: 0.58 10*3/MM3 (ref 0.1–0.9)
MONOCYTES NFR BLD AUTO: 8 % (ref 5–12)
NEUTROPHILS NFR BLD AUTO: 4.9 10*3/MM3 (ref 1.7–7)
NEUTROPHILS NFR BLD AUTO: 67.9 % (ref 42.7–76)
NRBC BLD AUTO-RTO: 0 /100 WBC (ref 0–0.2)
PHOSPHATE SERPL-MCNC: 4.1 MG/DL (ref 2.5–4.5)
PLATELET # BLD AUTO: 147 10*3/MM3 (ref 140–450)
PMV BLD AUTO: 9.6 FL (ref 6–12)
POTASSIUM SERPL-SCNC: 3.7 MMOL/L (ref 3.5–5.2)
PROT SERPL-MCNC: 7.2 G/DL (ref 6–8.5)
RBC # BLD AUTO: 4.19 10*6/MM3 (ref 4.14–5.8)
SODIUM SERPL-SCNC: 143 MMOL/L (ref 136–145)
WBC NRBC COR # BLD: 7.23 10*3/MM3 (ref 3.4–10.8)

## 2022-07-15 PROCEDURE — 85025 COMPLETE CBC W/AUTO DIFF WBC: CPT | Performed by: INTERNAL MEDICINE

## 2022-07-15 PROCEDURE — 96372 THER/PROPH/DIAG INJ SC/IM: CPT

## 2022-07-15 PROCEDURE — 84100 ASSAY OF PHOSPHORUS: CPT | Performed by: INTERNAL MEDICINE

## 2022-07-15 PROCEDURE — 36415 COLL VENOUS BLD VENIPUNCTURE: CPT

## 2022-07-15 PROCEDURE — 25010000002 DENOSUMAB 120 MG/1.7ML SOLUTION: Performed by: INTERNAL MEDICINE

## 2022-07-15 PROCEDURE — 83735 ASSAY OF MAGNESIUM: CPT | Performed by: INTERNAL MEDICINE

## 2022-07-15 PROCEDURE — 80053 COMPREHEN METABOLIC PANEL: CPT | Performed by: INTERNAL MEDICINE

## 2022-07-15 RX ADMIN — DENOSUMAB 120 MG: 120 INJECTION SUBCUTANEOUS at 08:30

## 2022-07-15 NOTE — NURSING NOTE
Pt arrived ambulatory of Xgeva injection. No concerns or complaints at this time. Xgeva given without incidence and copy of labs given to pt and reviewed. Pt knows to call the office should he have any questions or concerns prior to his next appointment. Discharged in stable condition.

## 2022-07-18 DIAGNOSIS — G89.3 CANCER ASSOCIATED PAIN: ICD-10-CM

## 2022-07-19 RX ORDER — HYDROCODONE BITARTRATE AND ACETAMINOPHEN 7.5; 325 MG/1; MG/1
1 TABLET ORAL EVERY 6 HOURS PRN
Qty: 60 TABLET | Refills: 0 | Status: SHIPPED | OUTPATIENT
Start: 2022-07-19 | End: 2022-08-19 | Stop reason: SDUPTHER

## 2022-07-20 ENCOUNTER — TELEPHONE (OUTPATIENT)
Dept: ONCOLOGY | Facility: CLINIC | Age: 77
End: 2022-07-20

## 2022-07-20 NOTE — TELEPHONE ENCOUNTER
----- Message from Lena Leger RN sent at 7/20/2022  7:59 AM EDT -----  Regarding: FW: August 12 appointment   Please reschedule for the week after current appt  ----- Message -----  From: Vanessa Lyons RN  Sent: 7/20/2022   7:53 AM EDT  To: Lena Leger RN  Subject: FW: August 12 appointment                          ----- Message -----  From: Micah Krishna  Sent: 7/19/2022   4:54 PM EDT  [facility]  Subject: August 12 appointment                            Need to be out of town that weekend, can I have my appointment changed to either the week before or the week after   Thank You.

## 2022-07-20 NOTE — TELEPHONE ENCOUNTER
CALLED PATIENT WITH HIS UPDATED APPT , ADVISED THAT WE NEEDED TO SCHEDULE OUT ONE WK - PATIENT HAS CONFIRMED APPT

## 2022-07-21 RX ORDER — FINASTERIDE 5 MG/1
5 TABLET, FILM COATED ORAL DAILY
Qty: 90 TABLET | Refills: 1 | Status: SHIPPED | OUTPATIENT
Start: 2022-07-21 | End: 2023-03-28 | Stop reason: SDUPTHER

## 2022-07-21 NOTE — TELEPHONE ENCOUNTER
Rx Refill Note  Requested Prescriptions     Pending Prescriptions Disp Refills   • finasteride (PROSCAR) 5 MG tablet [Pharmacy Med Name: Finasteride 5 MG Oral Tablet] 90 tablet 1     Sig: TAKE 1 TABLET BY MOUTH  DAILY      Last office visit with prescribing clinician: 1/12/2022      Next office visit with prescribing clinician: Visit date not found            Fuad Bautista Rep  07/21/22, 08:58 EDT

## 2022-07-22 DIAGNOSIS — E78.2 MIXED HYPERLIPIDEMIA: Primary | ICD-10-CM

## 2022-07-22 DIAGNOSIS — Z00.00 HEALTHCARE MAINTENANCE: ICD-10-CM

## 2022-07-22 DIAGNOSIS — E55.9 VITAMIN D DEFICIENCY: ICD-10-CM

## 2022-07-22 RX ORDER — POTASSIUM CHLORIDE 20 MEQ/1
20 TABLET, EXTENDED RELEASE ORAL DAILY
Qty: 90 TABLET | Refills: 1 | Status: SHIPPED | OUTPATIENT
Start: 2022-07-22 | End: 2022-07-27 | Stop reason: SDUPTHER

## 2022-07-22 NOTE — TELEPHONE ENCOUNTER
Called spoke to pt in detail. Pt already had cbc and cmp lab done pt coming in on Monday to have vit d, tsh, and lipid labs done

## 2022-07-27 ENCOUNTER — TELEPHONE (OUTPATIENT)
Dept: FAMILY MEDICINE CLINIC | Facility: CLINIC | Age: 77
End: 2022-07-27

## 2022-07-27 RX ORDER — POTASSIUM CHLORIDE 20 MEQ/1
20 TABLET, EXTENDED RELEASE ORAL DAILY
Qty: 90 TABLET | Refills: 1 | Status: SHIPPED | OUTPATIENT
Start: 2022-07-27 | End: 2023-02-02 | Stop reason: SDUPTHER

## 2022-07-27 NOTE — TELEPHONE ENCOUNTER
A user error has taken place: encounter opened in error, closed for administrative reasons.

## 2022-08-10 NOTE — PROGRESS NOTES
"[facility] GROUP OUTPATIENT FOLLOW UP CLINIC VISIT    REASON FOR FOLLOW-UP:    1.  History of metastatic clear cell renal cell carcinoma.  All known disease had previously been resected..  2.  Pulmonary metastases discovered July 2020.  Bone metastases discovered September 2020.  3.  Votrient initiated 10/16/2020  4. Palliative radiation for pain complete on 11/10/2020  5.  1/4/2021 significantly elevated PSA > 100.  6.  1/20/2021 bone biopsy confirms metastatic prostate cancer    HISTORY OF PRESENT ILLNESS:  Micah Krishna is a 77 y.o. male with the above-mentioned history who returns today for follow-up    He overall feels well.  Back pain is well managed.  He notes a skin lesion on the left cheek that he states was drained at 1 point in the past but now has returned.  He requests referral to dermatology.  Dental work is all done and everything is stable.      REVIEW OF SYSTEMS:  As per HPI    PE:  Vitals:    08/19/22 0844   BP: 150/87   Pulse: 71   Resp: 18   Temp: 97.3 °F (36.3 °C)   TempSrc: Temporal   SpO2: 97%   Weight: 102 kg (224 lb 8 oz)   Height: 182.9 cm (72.01\")   PainSc:   2   PainLoc: Back      General:  No acute distress, awake, alert and oriented  Skin:  Warm and dry, no visible rash  HEENT:  Normocephalic/atraumatic.  Wearing a facemask.  Upper dentures present.  There is a 1 cm raised flesh-colored nodule present on the left cheek  Chest:  Normal respiratory effort.  Lungs clear to auscultation bilaterally.  Heart: Regular rate and rhythm  Extremities:  No visible clubbing, cyanosis, or edema  Neuro/psych:  Grossly non-focal.  Normal mood and affect.      DIAGNOSTIC DATA:  Phosphorus (08/19/2022 08:20)  Magnesium (08/19/2022 08:20)  Comprehensive metabolic panel (08/19/2022 08:20)  CBC and Differential (08/19/2022 08:20)  PSA (08/19/2022 08:20)      IMAGING:    None reviewed    ASSESSMENT:  This is a 77 y.o. male with:    *Mild normocytic anemia:   · Hemoglobin just mildly low at " 11.9    *Adrenal insufficiency following bilateral adrenalectomy   · On replacement hormone therapy.    · He follows with endocrinology.    *History of metastatic renal cell carcinoma.    · He had a left nephrectomy and adrenalectomy in 2000 and then a right adrenalectomy for metastatic disease in 2012.    · Currently no evidence of disease.  See below.    *New metastatic bone disease diagnosed as he presented with worsening pain, ultimately found to be metastatic prostate cancer  · Presumed to be metastatic renal cell carcinoma but ultimately diagnosed with metastatic prostate cancer.  · Imaging discussed with radiology.  Imaging consistent with metastatic renal cell carcinoma  · However, his PSA was noted to be greater than 100  · As discussed below, bone biopsy subsequently confirmed metastatic prostate cancer  · CT C/A/P 7/6/2020 with a RUL sub 6 mm pulmonary nodule in the RUL, stable since 1/17/2018, new 6 mm nodules in the medial RLL and RUL,  LLL nodule unchanged since 7/17/2018.  Healing left ninth rib fracture.  · CT chest 9/28/2020 with stable lung nodules, pathologic fracture of the right 8th rib, abnormal appearance of T8, narrowing of the thecal sack at this level, additional lucencies at several vertebral bodies such as T7 and T11, subacute healing fractures in the anterior right 6th rib and lateral left 9th rib.  · CT head 9/28/2020 with calvarial metastatic disease involving the frontal and occipital bones.  · Bone scan 9/28/2020 with multifocal uptake consistent with metastatic disease. Anterior frontal bone, upper C spine at C1 or C2, T and L spine at T3, T8, T11, multifocal rib uptake, abnormal uptake in the iliac wings/bodies and left femoral heads/acetabulum, distal left clavicle and both humeral heads.   · PET scan from 10/8/2020.  Multifocal bone metastases throughout the skeleton.  Pathologic rib and vertebral body fracture.  Moderate FDG uptake in the right femoral neck with some sclerosis,  new since 7/6/2020.  Mottled appearance of L3 and T8 vertebral bodies with pathologic compression fractures.  L3 fracture is a burst fracture with 20 to 25% loss of height and 4 to 5 mm of retropulsion in the central spinal canal.  Pathologic compression fracture at T8 with 10% loss of height.  New findings since 7/6/2020.  FDG avid soft tissue nodule extending into the central canal along the posterior aspect of the thecal sac at T8.  FDG uptake in the left clavicle.  Compression fracture of the right posterior eighth rib.  · Votrient initiated 10/16/2020  · Palliative radiation for pain complete on 11/10/2020  · He did subsequently presented with an elevated PSA >100.  · Bone biopsy performed on 1/20/2021 confirmed metastatic prostate cancer.  · Votrient discontinued  · 2/5/2021 patient given Firmagon and Xgeva.  With plans to start Eligard 4 weeks later, and continue monthly Xgeva.    · 3/5/21.First dose of Eligard and monthly Xgeva.  Eligard will be given every 3 months.  · 12/17/2021: Proceeded with Xgeva.  PSA remains very low at 0.015.  · 1/21/2022: Hold Xgeva due to dental issues.  · Bone scan on 2/7/2022 with improvement  · 5/20/2022: He is done with all of his dental work.  Proceed with Xgeva.  Proceed with lupron and every 3 months  · 8/19/2022: Proceed with Xgeva and Lupron.  Move Xgeva to every 3 months.    *Chronic kidney disease:   · Status post left nephrectomy.    · Creatinine looks good at 1.05 today     *Migratory skeletal pain:   · Metastatic disease apparent now on CT imaging and bone scan and now PET scan.  · Completed radiation   · Left leg pain has improved  · Back pain improved  · 4/2/2021 patient continues on Norco 5/325 2 to 3 tablets/day with good pain control.  This remains stable as of 4/30/2021.  · 5/28/21.  The patient continues on Silva 5/325 with 2 to 3 tablets/day.  Pain is stable.   · 7/2/2021: He ran out of the hydrocodone/acetaminophen and has been using more ibuprofen which has  controlled his pain but I encouraged him not to use as much ibuprofen to protect his kidney.  · Pain is stable as of 7/30/2021.  · 10/22/2021: Pain is stable.  Pain medication refilled.  He was started on meloxicam by primary care which she will start in the near future.  · 12/17/2021: His low back is starting to worsen.  Increased hydrocodone to 7.5 mg with improvement.    · Pain improved  · 5/20/2022: Only headaches which may be secondary to his dental issues    *Mild hypomagnesemia: monitor. If muscle cramps advised to call us.  · Magnesium normal today    *Elevated PSA, subsequent biopsy confirmed metastatic prostate cancer:   · He has a history of very mild elevation of his PSA in the past with a PSA of 5.92 on 10/17/2018.    · His PSA on 1/4/2021 was greater than 100  · PSA repeated today is also greater than 100  · He is completely asymptomatic without clinical evidence for prostatitis.    · Dr. Cat did communicate with Dr. Zapata with radiology and all findings on imaging would support more metastatic renal cell carcinoma rather than metastatic prostate cancer since the bony lesions are lytic.  There is nothing obvious in his prostate on current imaging.  · Dr. Cat communicated with Dr. Cabrera with urology.  He is in agreement with a bone biopsy and he will see him in the office for evaluation.  · One of the bony lesions is amenable to CT-guided biopsy per Dr. Zapata.  · CT-guided needle biopsy of the bone performed on 1/20/2021 confirms metastatic prostate cancer.  · 3/5/21.  Please see above.  The patient received 1 dose of Firmagon and is on Eligard every 3 months.  · 1/21/2022: PSA stable and quite low at 0.019  · PSA undetectable on 5/20/2022  · 8/19/2022: PSA remains undetectable.    *New 40% T2 compression fracture.  Asymptomatic.  He is not interested in further radiation at this time.  Might consider referral to neurosurgery should he become more symptomatic or if this worsens.    *Vasomotor  symptoms related to ADT  · On venlafaxine.  He again did not complain of this today.    *He did have an MRI of the pituitary gland on 8/16/2021 as ordered by his endocrinologist.  Pituitary appears normal.  There is abnormal enhancement of the clivus suspected to be related to his metastatic prostate cancer.  There is an 11 mm lesion overlying the left frontal convexity suspected to be a meningioma.  The patient states that he was referred to neurosurgery but is not necessarily interested in pursuing this.  We can repeat an MRI in the future if desired.    *Dental issues: 5/20/2022: Completed all of his dental work at least 6 weeks ago.  Proceed with Xgeva today and every month for now.    *Skin lesion left cheek: Question sebaceous cyst.  Refer to dermatology.    PLAN:   1. Proceed with Xgeva.  Mood every 3 months.  2. Lupron today and every 3 months  3. Hydrocodone/acetaminophen 7.5/325 mg as needed.  This was refilled today.  4. Follow-up with me in 3 months with labs, Xgeva, and Eligard.  CT imaging done prior to that.  5. Refer to Dr. Saab with dermatology.    High risk medication requiring intensive monitoring    I spent 50 minutes in this visit today reviewing his record communicating with him examining him placing orders communicating with staff and documenting the encounter.

## 2022-08-19 ENCOUNTER — LAB (OUTPATIENT)
Dept: OTHER | Facility: HOSPITAL | Age: 77
End: 2022-08-19

## 2022-08-19 ENCOUNTER — OFFICE VISIT (OUTPATIENT)
Dept: ONCOLOGY | Facility: CLINIC | Age: 77
End: 2022-08-19

## 2022-08-19 ENCOUNTER — INFUSION (OUTPATIENT)
Dept: ONCOLOGY | Facility: HOSPITAL | Age: 77
End: 2022-08-19

## 2022-08-19 VITALS
DIASTOLIC BLOOD PRESSURE: 87 MMHG | RESPIRATION RATE: 18 BRPM | WEIGHT: 224.5 LBS | TEMPERATURE: 97.3 F | HEIGHT: 72 IN | OXYGEN SATURATION: 97 % | HEART RATE: 71 BPM | SYSTOLIC BLOOD PRESSURE: 150 MMHG | BODY MASS INDEX: 30.41 KG/M2

## 2022-08-19 DIAGNOSIS — C61 PROSTATE CANCER METASTATIC TO BONE: Primary | ICD-10-CM

## 2022-08-19 DIAGNOSIS — L02.01 ABSCESS OF LEFT EXTERNAL CHEEK: ICD-10-CM

## 2022-08-19 DIAGNOSIS — C79.51 PROSTATE CANCER METASTATIC TO BONE: Primary | ICD-10-CM

## 2022-08-19 DIAGNOSIS — C79.51 PROSTATE CANCER METASTATIC TO BONE: ICD-10-CM

## 2022-08-19 DIAGNOSIS — C61 PROSTATE CANCER METASTATIC TO BONE: ICD-10-CM

## 2022-08-19 DIAGNOSIS — G89.3 CANCER ASSOCIATED PAIN: ICD-10-CM

## 2022-08-19 LAB
ALBUMIN SERPL-MCNC: 4.3 G/DL (ref 3.5–5.2)
ALBUMIN/GLOB SERPL: 1.7 G/DL
ALP SERPL-CCNC: 41 U/L (ref 39–117)
ALT SERPL W P-5'-P-CCNC: 12 U/L (ref 1–41)
ANION GAP SERPL CALCULATED.3IONS-SCNC: 10.1 MMOL/L (ref 5–15)
AST SERPL-CCNC: 14 U/L (ref 1–40)
BASOPHILS # BLD AUTO: 0.05 10*3/MM3 (ref 0–0.2)
BASOPHILS NFR BLD AUTO: 1 % (ref 0–1.5)
BILIRUB SERPL-MCNC: 0.3 MG/DL (ref 0–1.2)
BUN SERPL-MCNC: 15 MG/DL (ref 8–23)
BUN/CREAT SERPL: 14.3 (ref 7–25)
CALCIUM SPEC-SCNC: 9.3 MG/DL (ref 8.6–10.5)
CHLORIDE SERPL-SCNC: 107 MMOL/L (ref 98–107)
CO2 SERPL-SCNC: 24.9 MMOL/L (ref 22–29)
CREAT SERPL-MCNC: 1.05 MG/DL (ref 0.76–1.27)
DEPRECATED RDW RBC AUTO: 43.1 FL (ref 37–54)
EGFRCR SERPLBLD CKD-EPI 2021: 72.2 ML/MIN/1.73
EOSINOPHIL # BLD AUTO: 0.27 10*3/MM3 (ref 0–0.4)
EOSINOPHIL NFR BLD AUTO: 5.2 % (ref 0.3–6.2)
ERYTHROCYTE [DISTWIDTH] IN BLOOD BY AUTOMATED COUNT: 14.1 % (ref 12.3–15.4)
GLOBULIN UR ELPH-MCNC: 2.5 GM/DL
GLUCOSE SERPL-MCNC: 147 MG/DL (ref 65–99)
HCT VFR BLD AUTO: 34.8 % (ref 37.5–51)
HGB BLD-MCNC: 11.9 G/DL (ref 13–17.7)
IMM GRANULOCYTES # BLD AUTO: 0.02 10*3/MM3 (ref 0–0.05)
IMM GRANULOCYTES NFR BLD AUTO: 0.4 % (ref 0–0.5)
LYMPHOCYTES # BLD AUTO: 1.42 10*3/MM3 (ref 0.7–3.1)
LYMPHOCYTES NFR BLD AUTO: 27.1 % (ref 19.6–45.3)
MAGNESIUM SERPL-MCNC: 1.7 MG/DL (ref 1.6–2.4)
MCH RBC QN AUTO: 29 PG (ref 26.6–33)
MCHC RBC AUTO-ENTMCNC: 34.2 G/DL (ref 31.5–35.7)
MCV RBC AUTO: 84.7 FL (ref 79–97)
MONOCYTES # BLD AUTO: 0.51 10*3/MM3 (ref 0.1–0.9)
MONOCYTES NFR BLD AUTO: 9.7 % (ref 5–12)
NEUTROPHILS NFR BLD AUTO: 2.97 10*3/MM3 (ref 1.7–7)
NEUTROPHILS NFR BLD AUTO: 56.6 % (ref 42.7–76)
NRBC BLD AUTO-RTO: 0 /100 WBC (ref 0–0.2)
PHOSPHATE SERPL-MCNC: 3.1 MG/DL (ref 2.5–4.5)
PLATELET # BLD AUTO: 128 10*3/MM3 (ref 140–450)
PMV BLD AUTO: 9.7 FL (ref 6–12)
POTASSIUM SERPL-SCNC: 4.3 MMOL/L (ref 3.5–5.2)
PROT SERPL-MCNC: 6.8 G/DL (ref 6–8.5)
PSA SERPL-MCNC: <0.014 NG/ML (ref 0–4)
RBC # BLD AUTO: 4.11 10*6/MM3 (ref 4.14–5.8)
SODIUM SERPL-SCNC: 142 MMOL/L (ref 136–145)
WBC NRBC COR # BLD: 5.24 10*3/MM3 (ref 3.4–10.8)

## 2022-08-19 PROCEDURE — 84100 ASSAY OF PHOSPHORUS: CPT | Performed by: INTERNAL MEDICINE

## 2022-08-19 PROCEDURE — 80053 COMPREHEN METABOLIC PANEL: CPT | Performed by: INTERNAL MEDICINE

## 2022-08-19 PROCEDURE — 96402 CHEMO HORMON ANTINEOPL SQ/IM: CPT

## 2022-08-19 PROCEDURE — 25010000002 DENOSUMAB 120 MG/1.7ML SOLUTION: Performed by: INTERNAL MEDICINE

## 2022-08-19 PROCEDURE — 36415 COLL VENOUS BLD VENIPUNCTURE: CPT

## 2022-08-19 PROCEDURE — 84153 ASSAY OF PSA TOTAL: CPT | Performed by: INTERNAL MEDICINE

## 2022-08-19 PROCEDURE — 85025 COMPLETE CBC W/AUTO DIFF WBC: CPT | Performed by: INTERNAL MEDICINE

## 2022-08-19 PROCEDURE — 99215 OFFICE O/P EST HI 40 MIN: CPT | Performed by: INTERNAL MEDICINE

## 2022-08-19 PROCEDURE — 83735 ASSAY OF MAGNESIUM: CPT | Performed by: INTERNAL MEDICINE

## 2022-08-19 PROCEDURE — 96372 THER/PROPH/DIAG INJ SC/IM: CPT

## 2022-08-19 PROCEDURE — 25010000002 LEUPROLIDE ACETATE (3 MONTH) PER 7.5 MG: Performed by: INTERNAL MEDICINE

## 2022-08-19 RX ORDER — HYDROCODONE BITARTRATE AND ACETAMINOPHEN 7.5; 325 MG/1; MG/1
1 TABLET ORAL EVERY 6 HOURS PRN
Qty: 120 TABLET | Refills: 0 | Status: SHIPPED | OUTPATIENT
Start: 2022-08-19 | End: 2022-10-28 | Stop reason: SDUPTHER

## 2022-08-19 RX ADMIN — LEUPROLIDE ACETATE 22.5 MG: KIT at 10:23

## 2022-08-19 RX ADMIN — DENOSUMAB 120 MG: 120 INJECTION SUBCUTANEOUS at 10:21

## 2022-09-01 ENCOUNTER — OFFICE VISIT (OUTPATIENT)
Dept: FAMILY MEDICINE CLINIC | Facility: CLINIC | Age: 77
End: 2022-09-01

## 2022-09-01 VITALS
WEIGHT: 226.5 LBS | BODY MASS INDEX: 30.68 KG/M2 | HEIGHT: 72 IN | OXYGEN SATURATION: 97 % | HEART RATE: 70 BPM | SYSTOLIC BLOOD PRESSURE: 120 MMHG | DIASTOLIC BLOOD PRESSURE: 82 MMHG

## 2022-09-01 DIAGNOSIS — E78.2 MIXED HYPERLIPIDEMIA: ICD-10-CM

## 2022-09-01 DIAGNOSIS — L72.3 INFLAMED SEBACEOUS CYST: Primary | ICD-10-CM

## 2022-09-01 DIAGNOSIS — I10 ESSENTIAL HYPERTENSION: ICD-10-CM

## 2022-09-01 PROCEDURE — 99213 OFFICE O/P EST LOW 20 MIN: CPT | Performed by: NURSE PRACTITIONER

## 2022-09-01 RX ORDER — AMOXICILLIN AND CLAVULANATE POTASSIUM 875; 125 MG/1; MG/1
1 TABLET, FILM COATED ORAL 2 TIMES DAILY
Qty: 14 TABLET | Refills: 0 | Status: SHIPPED | OUTPATIENT
Start: 2022-09-01 | End: 2023-02-15

## 2022-09-01 RX ORDER — MAGNESIUM OXIDE 400 MG/1
400 TABLET ORAL DAILY
Qty: 90 TABLET | Refills: 3 | Status: SHIPPED | OUTPATIENT
Start: 2022-09-01

## 2022-09-01 RX ORDER — MAGNESIUM OXIDE 400 MG/1
400 TABLET ORAL 2 TIMES DAILY
Qty: 60 TABLET | Refills: 1 | Status: SHIPPED | OUTPATIENT
Start: 2022-09-01 | End: 2022-11-18

## 2022-09-01 NOTE — PROGRESS NOTES
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     The patient complains of some back pain today. He also reports having skipped beats, which has been especially noticeable in the morning. He denies any shortness of breath, dizziness, weakness, or fatigue.     The patient has an appointment with dermatology in early 11/2022 for evaluation of a left cheek sebaceous cyst. He explains that this was first noticed 4 to 5 years ago, and more recently has grown in size to about the size of a quarter. He has had new tenderness for about 1 month. This has been especially bothersome while shaving.     Of note, the patient recently presented to urgent care for an abscess on his arm which was reportedly determined to be a staph infection.    He is up to date with 4 doses of the COVID-19 vaccine.       Objective   Vital Signs:  /82   Pulse 70   Ht 182.9 cm (72\")   Wt 103 kg (226 lb 8 oz)   SpO2 97%   BMI 30.72 kg/m²   Estimated body mass index is 30.72 kg/m² as calculated from the following:    Height as of this encounter: 182.9 cm (72\").    Weight as of this encounter: 103 kg (226 lb 8 oz).          Physical Exam  Vitals reviewed.   Constitutional:       Appearance: He is well-developed. He is not ill-appearing.      Comments: He is extremely pleasant. He appears well and pretty healthy.   HENT:      Head: Normocephalic and atraumatic.   Eyes:      General: No scleral icterus.     Conjunctiva/sclera: Conjunctivae normal.      " Pupils: Pupils are equal, round, and reactive to light.   Cardiovascular:      Rate and Rhythm: Normal rate and regular rhythm.      Heart sounds: No murmur heard.     Comments: Listen to a full 30 seconds.  Pulmonary:      Effort: Pulmonary effort is normal. No respiratory distress.      Breath sounds: No wheezing.   Abdominal:      Palpations: There is no mass.      Tenderness: There is no abdominal tenderness.      Hernia: No hernia is present.      Comments: No hepatosplenomegaly.    Musculoskeletal:      Cervical back: Neck supple.      Right lower leg: Edema (trace) present.      Left lower leg: Edema (trace) present.   Skin:     General: Skin is warm and dry.      Comments: Left cheek sebaceous cyst, approximately 2 cm.    Neurological:      Mental Status: He is alert and oriented to person, place, and time. Mental status is at baseline.   Psychiatric:         Mood and Affect: Affect normal.         Behavior: Behavior normal.         Thought Content: Thought content normal.         Judgment: Judgment normal.      Comments: He is in good spirits.     Left cheek mild tenderness but no fluctuance no increased redness or sign of abscess  Result Review :                Assessment and Plan   Diagnoses and all orders for this visit:    1. Inflamed sebaceous cyst (Primary)  Comments:  apx 2cm left cheek without abcess  Orders:  -     Ambulatory Referral to Dermatology    2. Essential hypertension    3. Mixed hyperlipidemia    Other orders  -     amoxicillin-clavulanate (Augmentin) 875-125 MG per tablet; Take 1 tablet by mouth 2 (Two) Times a Day.  Dispense: 14 tablet; Refill: 0  -     magnesium oxide (MAG-OX) 400 MG tablet; Take 1 tablet by mouth Daily.  Dispense: 90 tablet; Refill: 3  -     magnesium oxide (MAG-OX) 400 MG tablet; Take 1 tablet by mouth 2 (Two) Times a Day.  Dispense: 60 tablet; Refill: 1    1. Essential hypertension, controlled.   2. Hyperlipidemia, mixed stable.   3. History of renal cancer.   4.  Prostate cancer, metastasis to the bone.   5. Inflamed facial sebaceous cyst, left cheek.     He will continue to follow up with his providers and continue present care. We discussed falls precautions. He will continue with his upcoming vaccine series and stay up to date with COVID-19 vaccines. Should he contract COVID-19, he will reach out immediately for the antiviral treatment. If he experiences chest pain or shortness of breath, he will go to the emergency room. We will place an urgent referral to dermatology to have his sebaceous cyst evaluated. He was provided with Augmentin, which he can take twice daily if he notices increased redness or tenderness in the interim.     As long as he is doing well, he will return to clinic in 4 months.          Follow Up   No follow-ups on file.  Patient was given instructions and counseling regarding his condition or for health maintenance advice. Please see specific information pulled into the AVS if appropriate.         Transcribed from ambient dictation for James Epley, APRN by TORIE MOJICA.  09/01/22   16:24 EDT    Patient verbalized consent to the visit recording.

## 2022-09-13 NOTE — TELEPHONE ENCOUNTER
Rx Refill Note  Requested Prescriptions     Pending Prescriptions Disp Refills   • valsartan (DIOVAN) 160 MG tablet 180 tablet 1     Si tablet by mouth once or twice a day depending on your blood pressure      Last office visit with prescribing clinician: 2022      Next office visit with prescribing clinician: 2023            Fuad Bautista Rep  22, 15:09 EDT

## 2022-09-14 RX ORDER — VALSARTAN 160 MG/1
TABLET ORAL
Qty: 180 TABLET | Refills: 2 | Status: SHIPPED | OUTPATIENT
Start: 2022-09-14

## 2022-10-03 RX ORDER — CLONIDINE HYDROCHLORIDE 0.2 MG/1
0.2 TABLET ORAL DAILY
Qty: 90 TABLET | Refills: 1 | Status: SHIPPED | OUTPATIENT
Start: 2022-10-03

## 2022-10-03 RX ORDER — PANTOPRAZOLE SODIUM 20 MG/1
TABLET, DELAYED RELEASE ORAL
Qty: 90 TABLET | Refills: 1 | Status: SHIPPED | OUTPATIENT
Start: 2022-10-03

## 2022-10-03 RX ORDER — METOPROLOL SUCCINATE 100 MG/1
100 TABLET, EXTENDED RELEASE ORAL DAILY
Qty: 90 TABLET | Refills: 1 | Status: SHIPPED | OUTPATIENT
Start: 2022-10-03 | End: 2023-02-10

## 2022-10-03 RX ORDER — ISOSORBIDE MONONITRATE 30 MG/1
TABLET, EXTENDED RELEASE ORAL
Qty: 180 TABLET | Refills: 1 | Status: SHIPPED | OUTPATIENT
Start: 2022-10-03 | End: 2023-03-06 | Stop reason: SDUPTHER

## 2022-10-03 RX ORDER — VALSARTAN 80 MG/1
TABLET ORAL
Qty: 180 TABLET | Refills: 1 | Status: SHIPPED | OUTPATIENT
Start: 2022-10-03 | End: 2023-02-15 | Stop reason: DRUGHIGH

## 2022-10-03 RX ORDER — ALBUTEROL SULFATE 90 UG/1
AEROSOL, METERED RESPIRATORY (INHALATION)
Qty: 51 G | Refills: 1 | Status: SHIPPED | OUTPATIENT
Start: 2022-10-03

## 2022-10-13 ENCOUNTER — CLINICAL SUPPORT (OUTPATIENT)
Dept: FAMILY MEDICINE CLINIC | Facility: CLINIC | Age: 77
End: 2022-10-13

## 2022-10-13 DIAGNOSIS — Z23 NEED FOR VACCINATION: Primary | ICD-10-CM

## 2022-10-13 PROCEDURE — G0008 ADMIN INFLUENZA VIRUS VAC: HCPCS | Performed by: NURSE PRACTITIONER

## 2022-10-13 PROCEDURE — 90662 IIV NO PRSV INCREASED AG IM: CPT | Performed by: NURSE PRACTITIONER

## 2022-10-19 DIAGNOSIS — G89.3 CANCER ASSOCIATED PAIN: ICD-10-CM

## 2022-10-20 RX ORDER — HYDROCODONE BITARTRATE AND ACETAMINOPHEN 7.5; 325 MG/1; MG/1
1 TABLET ORAL EVERY 6 HOURS PRN
Qty: 120 TABLET | Refills: 0 | OUTPATIENT
Start: 2022-10-20

## 2022-10-27 DIAGNOSIS — G89.3 CANCER ASSOCIATED PAIN: ICD-10-CM

## 2022-10-27 RX ORDER — HYDROCODONE BITARTRATE AND ACETAMINOPHEN 7.5; 325 MG/1; MG/1
1 TABLET ORAL EVERY 6 HOURS PRN
Qty: 120 TABLET | Refills: 0 | Status: CANCELLED | OUTPATIENT
Start: 2022-10-27

## 2022-10-28 DIAGNOSIS — G89.3 CANCER ASSOCIATED PAIN: ICD-10-CM

## 2022-10-28 RX ORDER — HYDROCODONE BITARTRATE AND ACETAMINOPHEN 7.5; 325 MG/1; MG/1
1 TABLET ORAL EVERY 6 HOURS PRN
Qty: 120 TABLET | Refills: 0 | Status: SHIPPED | OUTPATIENT
Start: 2022-10-28 | End: 2022-12-18 | Stop reason: SDUPTHER

## 2022-11-03 RX ORDER — HYDROCORTISONE 10 MG/1
TABLET ORAL
Qty: 135 TABLET | Refills: 3 | Status: SHIPPED | OUTPATIENT
Start: 2022-11-03 | End: 2023-02-10

## 2022-11-03 RX ORDER — ALLOPURINOL 300 MG/1
300 TABLET ORAL DAILY
Qty: 90 TABLET | Refills: 3 | Status: SHIPPED | OUTPATIENT
Start: 2022-11-03

## 2022-11-03 NOTE — TELEPHONE ENCOUNTER
Rx Refill Note  Requested Prescriptions     Pending Prescriptions Disp Refills   • allopurinol (ZYLOPRIM) 300 MG tablet 90 tablet 1     Sig: Take 1 tablet by mouth Daily.   • hydrocortisone (CORTEF) 10 MG tablet 135 tablet 3     Si tablet by mouth every morning and 1/2 tablet every evening      Last office visit with prescribing clinician: 2022      Next office visit with prescribing clinician: 2023       {TIP  Please add Last Relevant Lab Date if appropriate: 22    Lea Estrada MA  22, 08:40 EDT

## 2022-11-09 ENCOUNTER — HOSPITAL ENCOUNTER (OUTPATIENT)
Dept: NUCLEAR MEDICINE | Facility: HOSPITAL | Age: 77
Discharge: HOME | End: 2022-11-09

## 2022-11-09 DIAGNOSIS — C79.51 PROSTATE CANCER METASTATIC TO BONE: ICD-10-CM

## 2022-11-09 DIAGNOSIS — G89.3 CANCER ASSOCIATED PAIN: ICD-10-CM

## 2022-11-09 DIAGNOSIS — C61 PROSTATE CANCER METASTATIC TO BONE: ICD-10-CM

## 2022-11-09 PROCEDURE — 78306 BONE IMAGING WHOLE BODY: CPT

## 2022-11-09 PROCEDURE — A9503 TC99M MEDRONATE: HCPCS | Performed by: INTERNAL MEDICINE

## 2022-11-09 PROCEDURE — 0 TECHNETIUM MEDRONATE KIT: Performed by: INTERNAL MEDICINE

## 2022-11-09 RX ORDER — TC 99M MEDRONATE 20 MG/10ML
21.3 INJECTION, POWDER, LYOPHILIZED, FOR SOLUTION INTRAVENOUS
Status: COMPLETED | OUTPATIENT
Start: 2022-11-09 | End: 2022-11-09

## 2022-11-09 RX ADMIN — Medication 21.3 MILLICURIE: at 09:55

## 2022-11-11 ENCOUNTER — APPOINTMENT (OUTPATIENT)
Dept: NUCLEAR MEDICINE | Facility: HOSPITAL | Age: 77
End: 2022-11-11

## 2022-11-11 ENCOUNTER — HOSPITAL ENCOUNTER (OUTPATIENT)
Dept: CT IMAGING | Facility: HOSPITAL | Age: 77
Discharge: HOME | End: 2022-11-11
Admitting: INTERNAL MEDICINE

## 2022-11-11 DIAGNOSIS — C61 PROSTATE CANCER METASTATIC TO BONE: ICD-10-CM

## 2022-11-11 DIAGNOSIS — G89.3 CANCER ASSOCIATED PAIN: ICD-10-CM

## 2022-11-11 DIAGNOSIS — C79.51 PROSTATE CANCER METASTATIC TO BONE: ICD-10-CM

## 2022-11-11 PROCEDURE — 71250 CT THORAX DX C-: CPT

## 2022-11-11 PROCEDURE — 74176 CT ABD & PELVIS W/O CONTRAST: CPT

## 2022-11-17 NOTE — PROGRESS NOTES
"[facility] OUTPATIENT FOLLOW UP CLINIC VISIT    REASON FOR FOLLOW-UP:    1.  History of metastatic clear cell renal cell carcinoma.  All known disease had previously been resected..  2.  Pulmonary metastases discovered July 2020.  Bone metastases discovered September 2020.  3.  Votrient initiated 10/16/2020  4. Palliative radiation for pain complete on 11/10/2020  5.  1/4/2021 significantly elevated PSA > 100.  6.  1/20/2021 bone biopsy confirms metastatic prostate cancer    HISTORY OF PRESENT ILLNESS:  Micah Krishna is a 77 y.o. male with the above-mentioned history who returns today for follow-up    He overall has been doing well.  He unfortunately ran out of his pain medication due to some confusion about the refill.  He was having more pain during that time.  He was taking some ibuprofen.  He now has his medication and is doing well.  He has been having some hot flashes and night sweats which have improved at this point.  He still is not interested in any medication to try to help with this.      REVIEW OF SYSTEMS:  As per HPI    PE:  Vitals:    11/18/22 0832   BP: 135/83   Pulse: 75   Resp: 18   Temp: 97.7 °F (36.5 °C)   TempSrc: Temporal   SpO2: 97%   Weight: 101 kg (222 lb 6.6 oz)   Height: 182.9 cm (72.01\")   PainSc:   4   PainLoc: Back      General:  No acute distress, awake, alert and oriented  Skin:  Warm and dry, no visible rash  HEENT:  Normocephalic/atraumatic.  Wearing a facemask.    Chest:  Normal respiratory effort.  Lungs clear to auscultation bilaterally.  Heart: Regular rate and rhythm  Extremities:  No visible clubbing, cyanosis, or edema  Neuro/psych:  Grossly non-focal.  Normal mood and affect.      DIAGNOSTIC DATA:  CBC and Differential (11/18/2022 08:28)      IMAGING:  NM Bone Scan Whole Body (11/09/2022 13:43)  CT Chest Without Contrast Diagnostic (11/11/2022 12:34)  CT Abdomen Pelvis Without Contrast (11/11/2022 12:34)    Images personally reviewed. Bone scan stable. CT " with a decrease in the RLL nodule. Skin thickening anterior pelvic wall.  The patient states he had a rash but this resolved.    ASSESSMENT:  This is a 77 y.o. male with:    *Mild normocytic anemia:   · Hemoglobin a little bit lower at 11.1 today, overall stable    *Adrenal insufficiency following bilateral adrenalectomy   · On replacement hormone therapy.    · He follows with endocrinology.    *History of metastatic renal cell carcinoma.    · He had a left nephrectomy and adrenalectomy in 2000 and then a right adrenalectomy for metastatic disease in 2012.    · Currently no evidence of disease.  See below.    *New metastatic bone disease diagnosed as he presented with worsening pain, ultimately found to be metastatic prostate cancer  · Presumed to be metastatic renal cell carcinoma but ultimately diagnosed with metastatic prostate cancer.  · Imaging discussed with radiology.  Imaging consistent with metastatic renal cell carcinoma  · However, his PSA was noted to be greater than 100  · As discussed below, bone biopsy subsequently confirmed metastatic prostate cancer  · CT C/A/P 7/6/2020 with a RUL sub 6 mm pulmonary nodule in the RUL, stable since 1/17/2018, new 6 mm nodules in the medial RLL and RUL,  LLL nodule unchanged since 7/17/2018.  Healing left ninth rib fracture.  · CT chest 9/28/2020 with stable lung nodules, pathologic fracture of the right 8th rib, abnormal appearance of T8, narrowing of the thecal sack at this level, additional lucencies at several vertebral bodies such as T7 and T11, subacute healing fractures in the anterior right 6th rib and lateral left 9th rib.  · CT head 9/28/2020 with calvarial metastatic disease involving the frontal and occipital bones.  · Bone scan 9/28/2020 with multifocal uptake consistent with metastatic disease. Anterior frontal bone, upper C spine at C1 or C2, T and L spine at T3, T8, T11, multifocal rib uptake, abnormal uptake in the iliac wings/bodies and left femoral  heads/acetabulum, distal left clavicle and both humeral heads.   · PET scan from 10/8/2020.  Multifocal bone metastases throughout the skeleton.  Pathologic rib and vertebral body fracture.  Moderate FDG uptake in the right femoral neck with some sclerosis, new since 7/6/2020.  Mottled appearance of L3 and T8 vertebral bodies with pathologic compression fractures.  L3 fracture is a burst fracture with 20 to 25% loss of height and 4 to 5 mm of retropulsion in the central spinal canal.  Pathologic compression fracture at T8 with 10% loss of height.  New findings since 7/6/2020.  FDG avid soft tissue nodule extending into the central canal along the posterior aspect of the thecal sac at T8.  FDG uptake in the left clavicle.  Compression fracture of the right posterior eighth rib.  · Votrient initiated 10/16/2020  · Palliative radiation for pain complete on 11/10/2020  · He did subsequently presented with an elevated PSA >100.  · Bone biopsy performed on 1/20/2021 confirmed metastatic prostate cancer.  · Votrient discontinued  · 2/5/2021 patient given Firmagon and Xgeva.  With plans to start Eligard 4 weeks later, and continue monthly Xgeva.    · 3/5/21.First dose of Eligard and monthly Xgeva.  Eligard will be given every 3 months.  · 12/17/2021: Proceeded with Xgeva.  PSA remains very low at 0.015.  · 1/21/2022: Hold Xgeva due to dental issues.  · Bone scan on 2/7/2022 with improvement  · 5/20/2022: He is done with all of his dental work.  Proceed with Xgeva.  Proceed with lupron and every 3 months  · 8/19/2022: Proceed with Xgeva and Lupron.  Move Xgeva to every 3 months.  · 11/18/2022: Proceed with Xgeva and Lupron both every 3 months at this point    *Chronic kidney disease:   · Status post left nephrectomy.    · Creatinine pending today    *Migratory skeletal pain:   · Metastatic disease apparent now on CT imaging and bone scan and now PET scan.  · Completed radiation   · Left leg pain has improved  · Back pain  improved  · 4/2/2021 patient continues on Norco 5/325 2 to 3 tablets/day with good pain control.  This remains stable as of 4/30/2021.  · 5/28/21.  The patient continues on [place] 5/325 with 2 to 3 tablets/day.  Pain is stable.   · 7/2/2021: He ran out of the hydrocodone/acetaminophen and has been using more ibuprofen which has controlled his pain but I encouraged him not to use as much ibuprofen to protect his kidney.  · Pain is stable as of 7/30/2021.  · 10/22/2021: Pain is stable.  Pain medication refilled.  He was started on meloxicam by primary care which she will start in the near future.  · 12/17/2021: His low back is starting to worsen.  Increased hydrocodone to 7.5 mg with improvement.    · Pain improved  · 5/20/2022: Only headaches which may be secondary to his dental issues    *Mild hypomagnesemia: monitor. If muscle cramps advised to call us.  · Magnesium normal today    *Elevated PSA, subsequent biopsy confirmed metastatic prostate cancer:   · He has a history of very mild elevation of his PSA in the past with a PSA of 5.92 on 10/17/2018.    · His PSA on 1/4/2021 was greater than 100  · PSA repeated today is also greater than 100  · He is completely asymptomatic without clinical evidence for prostatitis.    · Dr. Cat did communicate with Dr. Zapata with radiology and all findings on imaging would support more metastatic renal cell carcinoma rather than metastatic prostate cancer since the bony lesions are lytic.  There is nothing obvious in his prostate on current imaging.  · Dr. Cat communicated with Dr. Cabrera with urology.  He is in agreement with a bone biopsy and he will see him in the office for evaluation.  · One of the bony lesions is amenable to CT-guided biopsy per Dr. Zapata.  · CT-guided needle biopsy of the bone performed on 1/20/2021 confirms metastatic prostate cancer.  · 3/5/21.  Please see above.  The patient received 1 dose of Firmagon and is on Eligard every 3 months.  · 1/21/2022:  PSA stable and quite low at 0.019  · PSA undetectable on 5/20/2022  · 8/19/2022: PSA remains undetectable.  · PSA pending today    *New 40% T2 compression fracture.  Asymptomatic.  He is not interested in further radiation at this time.  Might consider referral to neurosurgery should he become more symptomatic or if this worsens.    *Vasomotor symptoms related to ADT  · On venlafaxine.  He again did not complain of this today.    *He did have an MRI of the pituitary gland on 8/16/2021 as ordered by his endocrinologist.  Pituitary appears normal.  There is abnormal enhancement of the clivus suspected to be related to his metastatic prostate cancer.  There is an 11 mm lesion overlying the left frontal convexity suspected to be a meningioma.  The patient states that he was referred to neurosurgery but is not necessarily interested in pursuing this.  We can repeat an MRI in the future if desired.    *Dental issues: 5/20/2022: Completed all of his dental work at least 6 weeks ago.  Proceed with Xgeva today and every month for now.    *Skin lesion left cheek: Question sebaceous cyst.  Previously referred to dermatology.    PLAN:   1. Proceed with Xgeva today and now every 3 months.  2. Lupron today and every 3 months  3. Hydrocodone/acetaminophen 7.5/325 mg as needed.  Refill not necessary at this point.  I advised him to contact his pharmacy for refills.  He can also contact the office directly.  The way he requested a refill last time was not effective.  4. Follow-up with me in 3 months with labs, Xgeva, and Lupron.      High risk medication requiring intensive monitoring

## 2022-11-18 ENCOUNTER — LAB (OUTPATIENT)
Dept: OTHER | Facility: HOSPITAL | Age: 77
End: 2022-11-18

## 2022-11-18 ENCOUNTER — OFFICE VISIT (OUTPATIENT)
Dept: ONCOLOGY | Facility: CLINIC | Age: 77
End: 2022-11-18

## 2022-11-18 ENCOUNTER — INFUSION (OUTPATIENT)
Dept: ONCOLOGY | Facility: HOSPITAL | Age: 77
End: 2022-11-18

## 2022-11-18 VITALS
SYSTOLIC BLOOD PRESSURE: 135 MMHG | BODY MASS INDEX: 30.12 KG/M2 | TEMPERATURE: 97.7 F | HEIGHT: 72 IN | OXYGEN SATURATION: 97 % | RESPIRATION RATE: 18 BRPM | HEART RATE: 75 BPM | WEIGHT: 222.41 LBS | DIASTOLIC BLOOD PRESSURE: 83 MMHG

## 2022-11-18 DIAGNOSIS — C79.51 PROSTATE CANCER METASTATIC TO BONE: Primary | ICD-10-CM

## 2022-11-18 DIAGNOSIS — C61 PROSTATE CANCER METASTATIC TO BONE: ICD-10-CM

## 2022-11-18 DIAGNOSIS — C79.51 PROSTATE CANCER METASTATIC TO BONE: ICD-10-CM

## 2022-11-18 DIAGNOSIS — C61 PROSTATE CANCER METASTATIC TO BONE: Primary | ICD-10-CM

## 2022-11-18 LAB
ALBUMIN SERPL-MCNC: 3.9 G/DL (ref 3.5–5.2)
ALBUMIN/GLOB SERPL: 1.4 G/DL
ALP SERPL-CCNC: 36 U/L (ref 39–117)
ALT SERPL W P-5'-P-CCNC: 10 U/L (ref 1–41)
ANION GAP SERPL CALCULATED.3IONS-SCNC: 9.2 MMOL/L (ref 5–15)
AST SERPL-CCNC: 15 U/L (ref 1–40)
BASOPHILS # BLD AUTO: 0.04 10*3/MM3 (ref 0–0.2)
BASOPHILS NFR BLD AUTO: 0.9 % (ref 0–1.5)
BILIRUB SERPL-MCNC: 0.4 MG/DL (ref 0–1.2)
BUN SERPL-MCNC: 11 MG/DL (ref 8–23)
BUN/CREAT SERPL: 10.6 (ref 7–25)
CALCIUM SPEC-SCNC: 9.4 MG/DL (ref 8.6–10.5)
CHLORIDE SERPL-SCNC: 108 MMOL/L (ref 98–107)
CO2 SERPL-SCNC: 23.8 MMOL/L (ref 22–29)
CREAT SERPL-MCNC: 1.04 MG/DL (ref 0.76–1.27)
DEPRECATED RDW RBC AUTO: 41 FL (ref 37–54)
EGFRCR SERPLBLD CKD-EPI 2021: 73 ML/MIN/1.73
EOSINOPHIL # BLD AUTO: 0.29 10*3/MM3 (ref 0–0.4)
EOSINOPHIL NFR BLD AUTO: 6.4 % (ref 0.3–6.2)
ERYTHROCYTE [DISTWIDTH] IN BLOOD BY AUTOMATED COUNT: 13.2 % (ref 12.3–15.4)
GLOBULIN UR ELPH-MCNC: 2.7 GM/DL
GLUCOSE SERPL-MCNC: 194 MG/DL (ref 65–99)
HCT VFR BLD AUTO: 33.2 % (ref 37.5–51)
HGB BLD-MCNC: 11.1 G/DL (ref 13–17.7)
IMM GRANULOCYTES # BLD AUTO: 0.01 10*3/MM3 (ref 0–0.05)
IMM GRANULOCYTES NFR BLD AUTO: 0.2 % (ref 0–0.5)
LYMPHOCYTES # BLD AUTO: 1.37 10*3/MM3 (ref 0.7–3.1)
LYMPHOCYTES NFR BLD AUTO: 30.4 % (ref 19.6–45.3)
MAGNESIUM SERPL-MCNC: 1.6 MG/DL (ref 1.6–2.4)
MCH RBC QN AUTO: 28.1 PG (ref 26.6–33)
MCHC RBC AUTO-ENTMCNC: 33.4 G/DL (ref 31.5–35.7)
MCV RBC AUTO: 84.1 FL (ref 79–97)
MONOCYTES # BLD AUTO: 0.41 10*3/MM3 (ref 0.1–0.9)
MONOCYTES NFR BLD AUTO: 9.1 % (ref 5–12)
NEUTROPHILS NFR BLD AUTO: 2.39 10*3/MM3 (ref 1.7–7)
NEUTROPHILS NFR BLD AUTO: 53 % (ref 42.7–76)
NRBC BLD AUTO-RTO: 0 /100 WBC (ref 0–0.2)
PHOSPHATE SERPL-MCNC: 2.9 MG/DL (ref 2.5–4.5)
PLATELET # BLD AUTO: 124 10*3/MM3 (ref 140–450)
PMV BLD AUTO: 9.3 FL (ref 6–12)
POTASSIUM SERPL-SCNC: 4.4 MMOL/L (ref 3.5–5.2)
PROT SERPL-MCNC: 6.6 G/DL (ref 6–8.5)
PSA SERPL-MCNC: <0.014 NG/ML (ref 0–4)
RBC # BLD AUTO: 3.95 10*6/MM3 (ref 4.14–5.8)
SODIUM SERPL-SCNC: 141 MMOL/L (ref 136–145)
WBC NRBC COR # BLD: 4.51 10*3/MM3 (ref 3.4–10.8)

## 2022-11-18 PROCEDURE — 85025 COMPLETE CBC W/AUTO DIFF WBC: CPT | Performed by: INTERNAL MEDICINE

## 2022-11-18 PROCEDURE — 96402 CHEMO HORMON ANTINEOPL SQ/IM: CPT

## 2022-11-18 PROCEDURE — 80053 COMPREHEN METABOLIC PANEL: CPT | Performed by: INTERNAL MEDICINE

## 2022-11-18 PROCEDURE — 84153 ASSAY OF PSA TOTAL: CPT | Performed by: INTERNAL MEDICINE

## 2022-11-18 PROCEDURE — 84100 ASSAY OF PHOSPHORUS: CPT | Performed by: INTERNAL MEDICINE

## 2022-11-18 PROCEDURE — 83735 ASSAY OF MAGNESIUM: CPT | Performed by: INTERNAL MEDICINE

## 2022-11-18 PROCEDURE — 25010000002 DENOSUMAB 120 MG/1.7ML SOLUTION: Performed by: INTERNAL MEDICINE

## 2022-11-18 PROCEDURE — 25010000002 LEUPROLIDE ACETATE (3 MONTH) PER 7.5 MG: Performed by: INTERNAL MEDICINE

## 2022-11-18 PROCEDURE — 96372 THER/PROPH/DIAG INJ SC/IM: CPT

## 2022-11-18 PROCEDURE — 99214 OFFICE O/P EST MOD 30 MIN: CPT | Performed by: INTERNAL MEDICINE

## 2022-11-18 PROCEDURE — 36415 COLL VENOUS BLD VENIPUNCTURE: CPT

## 2022-11-18 RX ORDER — HYDROCORTISONE 5 MG/1
TABLET ORAL SEE ADMIN INSTRUCTIONS
COMMUNITY
Start: 2022-09-23

## 2022-11-18 RX ORDER — NEOMYCIN SULFATE, POLYMYXIN B SULFATE AND DEXAMETHASONE 3.5; 10000; 1 MG/ML; [USP'U]/ML; MG/ML
SUSPENSION/ DROPS OPHTHALMIC
COMMUNITY
Start: 2022-10-20

## 2022-11-18 RX ORDER — MAGNESIUM OXIDE TAB 400 MG (241.3 MG ELEMENTAL MG) 400 (241.3 MG) MG
1 TAB ORAL 2 TIMES DAILY
COMMUNITY
Start: 2022-11-03 | End: 2023-01-03

## 2022-11-18 RX ORDER — PREDNISOLONE ACETATE 10 MG/ML
SUSPENSION/ DROPS OPHTHALMIC
COMMUNITY
Start: 2022-10-20

## 2022-11-18 RX ADMIN — DENOSUMAB 120 MG: 120 INJECTION SUBCUTANEOUS at 09:15

## 2022-11-18 RX ADMIN — LEUPROLIDE ACETATE 22.5 MG: KIT at 09:18

## 2022-11-18 NOTE — NURSING NOTE
Labs satisfactory for Xgeva and given without incidence.  Lupron given and band aid applied.  Pt next appt reviewed and instructed to call sooner with concerns.

## 2022-12-18 DIAGNOSIS — G89.3 CANCER ASSOCIATED PAIN: ICD-10-CM

## 2022-12-19 RX ORDER — HYDROCODONE BITARTRATE AND ACETAMINOPHEN 7.5; 325 MG/1; MG/1
1 TABLET ORAL EVERY 6 HOURS PRN
Qty: 120 TABLET | Refills: 0 | Status: SHIPPED | OUTPATIENT
Start: 2022-12-19 | End: 2023-02-10 | Stop reason: SDUPTHER

## 2023-01-03 RX ORDER — MAGNESIUM OXIDE TAB 400 MG (241.3 MG ELEMENTAL MG) 400 (241.3 MG) MG
TAB ORAL
Qty: 180 TABLET | Refills: 1 | Status: SHIPPED | OUTPATIENT
Start: 2023-01-03 | End: 2023-02-10

## 2023-01-03 NOTE — TELEPHONE ENCOUNTER
Rx Refill Note  Requested Prescriptions     Pending Prescriptions Disp Refills   • MAGnesium-Oxide 400 (240 Mg) MG tablet [Pharmacy Med Name: MAG-OXIDE 400MG TABLETS] 60 tablet      Sig: TAKE 1 TABLET BY MOUTH TWICE DAILY      Last office visit with prescribing clinician: 9/1/2022   Last telemedicine visit with prescribing clinician: Visit date not found   Next office visit with prescribing clinician: Visit date not found                         Would you like a call back once the refill request has been completed: [] Yes [] No    If the office needs to give you a call back, can they leave a voicemail: [] Yes [] No    Fuad Bautista Rep  01/03/23, 10:16 EST

## 2023-02-03 RX ORDER — AMLODIPINE BESYLATE 10 MG/1
10 TABLET ORAL DAILY
Qty: 90 TABLET | Refills: 1 | Status: SHIPPED | OUTPATIENT
Start: 2023-02-03

## 2023-02-03 RX ORDER — POTASSIUM CHLORIDE 20 MEQ/1
20 TABLET, EXTENDED RELEASE ORAL DAILY
Qty: 90 TABLET | Refills: 1 | Status: SHIPPED | OUTPATIENT
Start: 2023-02-03

## 2023-02-03 NOTE — TELEPHONE ENCOUNTER
Rx Refill Note  Requested Prescriptions     Pending Prescriptions Disp Refills   • amLODIPine (NORVASC) 10 MG tablet 90 tablet 1     Sig: Take 1 tablet by mouth Daily.   • potassium chloride (K-DUR,KLOR-CON) 20 MEQ CR tablet 90 tablet 1     Sig: Take 1 tablet by mouth Daily.      Last office visit with prescribing clinician: 9/1/2022   Last telemedicine visit with prescribing clinician: Visit date not found   Next office visit with prescribing clinician: Visit date not found                         Would you like a call back once the refill request has been completed: [] Yes [] No    If the office needs to give you a call back, can they leave a voicemail: [] Yes [] No    Fuad Bautista Rep  02/03/23, 08:19 EST

## 2023-02-09 NOTE — PROGRESS NOTES
"[facility] OUTPATIENT FOLLOW UP CLINIC VISIT    REASON FOR FOLLOW-UP:    1.  History of metastatic clear cell renal cell carcinoma.  All known disease had previously been resected..  2.  Pulmonary metastases discovered July 2020.  Bone metastases discovered September 2020.  3.  Votrient initiated 10/16/2020  4. Palliative radiation for pain complete on 11/10/2020  5.  1/4/2021 significantly elevated PSA > 100.  6.  1/20/2021 bone biopsy confirms metastatic prostate cancer    HISTORY OF PRESENT ILLNESS:  Micah Krishna is a 77 y.o. male with the above-mentioned history who returns today for follow-up    For several weeks now he notes some pain along the medial aspect of the left leg both above the knee and below the knee.  He has noticed some mild left lower extremity edema but nothing significant.  No erythema.  No specific knee pain although he does note some crepitance.  He otherwise has been feeling about the same.      REVIEW OF SYSTEMS:  As per HPI    PE:  Vitals:    02/10/23 0904   BP: 160/89   Pulse: 53   Resp: 16   Temp: 98.4 °F (36.9 °C)   TempSrc: Temporal   SpO2: 99%   Weight: 97.1 kg (214 lb 1.6 oz)   Height: 182 cm (71.65\")   PainSc: 0-No pain  Comment: CAN GO TO 7 OR 8, NO PAIN RIGHT NOW   PainLoc: Leg  Comment: LEFT INNER LEG, DOWN TO ANKLE      General:  No acute distress, awake, alert and oriented  Skin:  Warm and dry, no visible rash  HEENT:  Normocephalic/atraumatic.  Wearing a facemask.    Chest:  Normal respiratory effort.  Lungs clear to auscultation bilaterally.  Heart: Regular rate and rhythm  Extremities: Trace left lower extremity edema.  No palpable cords.  Neuro/psych:  Grossly non-focal.  Normal mood and affect.      DIAGNOSTIC DATA:  Phosphorus (02/10/2023 08:35)  Magnesium (02/10/2023 08:35)  Comprehensive metabolic panel (02/10/2023 08:35)  CBC and Differential (02/10/2023 08:35)  PSA (02/10/2023 08:35)      IMAGING:  None reviewed    ASSESSMENT:  This is a 77 y.o. male " with:    *Mild normocytic anemia:   · Hemoglobin stable at 11.0    *Adrenal insufficiency following bilateral adrenalectomy   · On replacement hormone therapy.    · He follows with endocrinology.    *History of metastatic renal cell carcinoma.    · He had a left nephrectomy and adrenalectomy in 2000 and then a right adrenalectomy for metastatic disease in 2012.    · Currently no evidence of disease.  See below.    *New metastatic bone disease diagnosed as he presented with worsening pain, ultimately found to be metastatic prostate cancer  · Presumed to be metastatic renal cell carcinoma but ultimately diagnosed with metastatic prostate cancer.  · Imaging discussed with radiology.  Imaging consistent with metastatic renal cell carcinoma  · However, his PSA was noted to be greater than 100  · As discussed below, bone biopsy subsequently confirmed metastatic prostate cancer  · CT C/A/P 7/6/2020 with a RUL sub 6 mm pulmonary nodule in the RUL, stable since 1/17/2018, new 6 mm nodules in the medial RLL and RUL,  LLL nodule unchanged since 7/17/2018.  Healing left ninth rib fracture.  · CT chest 9/28/2020 with stable lung nodules, pathologic fracture of the right 8th rib, abnormal appearance of T8, narrowing of the thecal sack at this level, additional lucencies at several vertebral bodies such as T7 and T11, subacute healing fractures in the anterior right 6th rib and lateral left 9th rib.  · CT head 9/28/2020 with calvarial metastatic disease involving the frontal and occipital bones.  · Bone scan 9/28/2020 with multifocal uptake consistent with metastatic disease. Anterior frontal bone, upper C spine at C1 or C2, T and L spine at T3, T8, T11, multifocal rib uptake, abnormal uptake in the iliac wings/bodies and left femoral heads/acetabulum, distal left clavicle and both humeral heads.   · PET scan from 10/8/2020.  Multifocal bone metastases throughout the skeleton.  Pathologic rib and vertebral body fracture.  Moderate  FDG uptake in the right femoral neck with some sclerosis, new since 7/6/2020.  Mottled appearance of L3 and T8 vertebral bodies with pathologic compression fractures.  L3 fracture is a burst fracture with 20 to 25% loss of height and 4 to 5 mm of retropulsion in the central spinal canal.  Pathologic compression fracture at T8 with 10% loss of height.  New findings since 7/6/2020.  FDG avid soft tissue nodule extending into the central canal along the posterior aspect of the thecal sac at T8.  FDG uptake in the left clavicle.  Compression fracture of the right posterior eighth rib.  · Votrient initiated 10/16/2020  · Palliative radiation for pain complete on 11/10/2020  · He did subsequently presented with an elevated PSA >100.  · Bone biopsy performed on 1/20/2021 confirmed metastatic prostate cancer.  · Votrient discontinued  · 2/5/2021 patient given Firmagon and Xgeva.  With plans to start Eligard 4 weeks later, and continue monthly Xgeva.    · 3/5/21.First dose of Eligard and monthly Xgeva.  Eligard will be given every 3 months.  · 12/17/2021: Proceeded with Xgeva.  PSA remains very low at 0.015.  · 1/21/2022: Hold Xgeva due to dental issues.  · Bone scan on 2/7/2022 with improvement  · 5/20/2022: He is done with all of his dental work.  Proceed with Xgeva.  Proceed with lupron and every 3 months  · 8/19/2022: Proceed with Xgeva and Lupron.  Move Xgeva to every 3 months.  · 11/18/2022: Proceed with Xgeva and Lupron both every 3 months at this point  · 2/10/2023: Xgeva and Lupron.  See below.    *Chronic kidney disease:   · Status post left nephrectomy.    · Creatinine 1.13    *Migratory skeletal pain:   · Metastatic disease apparent now on CT imaging and bone scan and now PET scan.  · Completed radiation   · Left leg pain has improved  · Back pain improved  · 4/2/2021 patient continues on Norco 5/325 2 to 3 tablets/day with good pain control.  This remains stable as of 4/30/2021.  · 5/28/21.  The patient  continues on Norco 5/325 with 2 to 3 tablets/day.  Pain is stable.   · 7/2/2021: He ran out of the hydrocodone/acetaminophen and has been using more ibuprofen which has controlled his pain but I encouraged him not to use as much ibuprofen to protect his kidney.  · Pain is stable as of 7/30/2021.  · 10/22/2021: Pain is stable.  Pain medication refilled.  He was started on meloxicam by primary care which she will start in the near future.  · 12/17/2021: His low back is starting to worsen.  Increased hydrocodone to 7.5 mg with improvement.    · Pain improved  · 5/20/2022: Only headaches which may be secondary to his dental issues    *Mild hypomagnesemia: monitor. If muscle cramps advised to call us.  · 2/10/2023: Magnesium remains normal at 1.7    *Elevated PSA, subsequent biopsy confirmed metastatic prostate cancer:   · He has a history of very mild elevation of his PSA in the past with a PSA of 5.92 on 10/17/2018.    · His PSA on 1/4/2021 was greater than 100  · PSA repeated today is also greater than 100  · He is completely asymptomatic without clinical evidence for prostatitis.    · Dr. Cat did communicate with Dr. Zapata with radiology and all findings on imaging would support more metastatic renal cell carcinoma rather than metastatic prostate cancer since the bony lesions are lytic.  There is nothing obvious in his prostate on current imaging.  · Dr. Cat communicated with Dr. Cabrera with urology.  He is in agreement with a bone biopsy and he will see him in the office for evaluation.  · One of the bony lesions is amenable to CT-guided biopsy per Dr. Zapata.  · CT-guided needle biopsy of the bone performed on 1/20/2021 confirms metastatic prostate cancer.  · 3/5/21.  Please see above.  The patient received 1 dose of Firmagon and is on Eligard every 3 months.  · 1/21/2022: PSA stable and quite low at 0.019  · PSA undetectable on 5/20/2022  · 11/18/2022: PSA remains undetectable.  · PSA undetectable  today    *New 40% T2 compression fracture.  Asymptomatic.  He is not interested in further radiation at this time.  Might consider referral to neurosurgery should he become more symptomatic or if this worsens.    *Vasomotor symptoms related to ADT  · On venlafaxine.  He again did not complain of this today.    *He did have an MRI of the pituitary gland on 8/16/2021 as ordered by his endocrinologist.  Pituitary appears normal.  There is abnormal enhancement of the clivus suspected to be related to his metastatic prostate cancer.  There is an 11 mm lesion overlying the left frontal convexity suspected to be a meningioma.  The patient states that he was referred to neurosurgery but is not necessarily interested in pursuing this.  We can repeat an MRI in the future if desired.    *Dental issues: 5/20/2022: Completed all of his dental work at least 6 weeks ago.  Proceed with Xgeva today and every month for now.    *Skin lesion left cheek: Question sebaceous cyst.  Previously referred to dermatology.  Not discussed today.    *New pain in the left lower extremity both above and below the knee.  There is a little bit of edema there.  Rule out DVT.    PLAN:   1. Proceed with Xgeva today and now every 3 months.  2. Lupron today and every 3 months  3. Hydrocodone/acetaminophen 7.5/325 mg as needed.  Refilled today.  4. Stat left lower extremity venous duplex today.  If DVT present, he will require anticoagulation.  If no DVT, plan a bone scan in the near future.  5. Immediate follow-up at this point to be determined but typically I would have him follow-up with me in 3 months with labs, Xgeva, and Lupron.      High risk medication requiring intensive monitoring.  I spent 45 minutes in this visit today reviewing his record communicating with him examining him making with staff placing orders and documenting the encounter.    Addendum: Left lower extremity venous duplex negative for DVT.  I will request a bone scan and follow-up  with the result when it returns.  Otherwise, follow-up in 3 months with labs, Xgeva, Lupron.

## 2023-02-10 ENCOUNTER — HOSPITAL ENCOUNTER (OUTPATIENT)
Dept: CARDIOLOGY | Facility: HOSPITAL | Age: 78
Discharge: HOME | End: 2023-02-10
Admitting: INTERNAL MEDICINE
Payer: MEDICARE

## 2023-02-10 ENCOUNTER — LAB (OUTPATIENT)
Dept: OTHER | Facility: HOSPITAL | Age: 78
End: 2023-02-10
Payer: MEDICARE

## 2023-02-10 ENCOUNTER — OFFICE VISIT (OUTPATIENT)
Dept: ONCOLOGY | Facility: CLINIC | Age: 78
End: 2023-02-10
Payer: MEDICARE

## 2023-02-10 ENCOUNTER — INFUSION (OUTPATIENT)
Dept: ONCOLOGY | Facility: HOSPITAL | Age: 78
End: 2023-02-10
Payer: MEDICARE

## 2023-02-10 VITALS
RESPIRATION RATE: 16 BRPM | OXYGEN SATURATION: 99 % | SYSTOLIC BLOOD PRESSURE: 160 MMHG | DIASTOLIC BLOOD PRESSURE: 89 MMHG | TEMPERATURE: 98.4 F | HEART RATE: 53 BPM | HEIGHT: 72 IN | BODY MASS INDEX: 29 KG/M2 | WEIGHT: 214.1 LBS

## 2023-02-10 DIAGNOSIS — C79.51 PROSTATE CANCER METASTATIC TO BONE: Primary | ICD-10-CM

## 2023-02-10 DIAGNOSIS — C64.9 METASTATIC RENAL CELL CARCINOMA, UNSPECIFIED LATERALITY: ICD-10-CM

## 2023-02-10 DIAGNOSIS — C61 PROSTATE CANCER METASTATIC TO BONE: ICD-10-CM

## 2023-02-10 DIAGNOSIS — C79.51 PROSTATE CANCER METASTATIC TO BONE: ICD-10-CM

## 2023-02-10 DIAGNOSIS — M79.605 ACUTE LEG PAIN, LEFT: Primary | ICD-10-CM

## 2023-02-10 DIAGNOSIS — G89.3 CANCER ASSOCIATED PAIN: ICD-10-CM

## 2023-02-10 DIAGNOSIS — C61 PROSTATE CANCER METASTATIC TO BONE: Primary | ICD-10-CM

## 2023-02-10 LAB
ALBUMIN SERPL-MCNC: 4.3 G/DL (ref 3.5–5.2)
ALBUMIN/GLOB SERPL: 1.7 G/DL
ALP SERPL-CCNC: 38 U/L (ref 39–117)
ALT SERPL W P-5'-P-CCNC: 13 U/L (ref 1–41)
ANION GAP SERPL CALCULATED.3IONS-SCNC: 8.2 MMOL/L (ref 5–15)
AST SERPL-CCNC: 14 U/L (ref 1–40)
BASOPHILS # BLD AUTO: 0.06 10*3/MM3 (ref 0–0.2)
BASOPHILS NFR BLD AUTO: 1 % (ref 0–1.5)
BH CV LOWER VASCULAR LEFT COMMON FEMORAL AUGMENT: NORMAL
BH CV LOWER VASCULAR LEFT COMMON FEMORAL COMPETENT: NORMAL
BH CV LOWER VASCULAR LEFT COMMON FEMORAL COMPRESS: NORMAL
BH CV LOWER VASCULAR LEFT COMMON FEMORAL PHASIC: NORMAL
BH CV LOWER VASCULAR LEFT COMMON FEMORAL SPONT: NORMAL
BH CV LOWER VASCULAR LEFT DISTAL FEMORAL COMPRESS: NORMAL
BH CV LOWER VASCULAR LEFT GASTRONEMIUS COMPRESS: NORMAL
BH CV LOWER VASCULAR LEFT GREATER SAPH AK COMPRESS: NORMAL
BH CV LOWER VASCULAR LEFT GREATER SAPH BK COMPRESS: NORMAL
BH CV LOWER VASCULAR LEFT LESSER SAPH COMPRESS: NORMAL
BH CV LOWER VASCULAR LEFT MID FEMORAL AUGMENT: NORMAL
BH CV LOWER VASCULAR LEFT MID FEMORAL COMPETENT: NORMAL
BH CV LOWER VASCULAR LEFT MID FEMORAL COMPRESS: NORMAL
BH CV LOWER VASCULAR LEFT MID FEMORAL PHASIC: NORMAL
BH CV LOWER VASCULAR LEFT MID FEMORAL SPONT: NORMAL
BH CV LOWER VASCULAR LEFT PERONEAL COMPRESS: NORMAL
BH CV LOWER VASCULAR LEFT POPLITEAL AUGMENT: NORMAL
BH CV LOWER VASCULAR LEFT POPLITEAL COMPETENT: NORMAL
BH CV LOWER VASCULAR LEFT POPLITEAL COMPRESS: NORMAL
BH CV LOWER VASCULAR LEFT POPLITEAL PHASIC: NORMAL
BH CV LOWER VASCULAR LEFT POPLITEAL SPONT: NORMAL
BH CV LOWER VASCULAR LEFT POSTERIOR TIBIAL COMPRESS: NORMAL
BH CV LOWER VASCULAR LEFT PROFUNDA FEMORAL COMPRESS: NORMAL
BH CV LOWER VASCULAR LEFT PROXIMAL FEMORAL COMPRESS: NORMAL
BH CV LOWER VASCULAR LEFT SAPHENOFEMORAL JUNCTION COMPRESS: NORMAL
BH CV LOWER VASCULAR RIGHT COMMON FEMORAL AUGMENT: NORMAL
BH CV LOWER VASCULAR RIGHT COMMON FEMORAL COMPETENT: NORMAL
BH CV LOWER VASCULAR RIGHT COMMON FEMORAL COMPRESS: NORMAL
BH CV LOWER VASCULAR RIGHT COMMON FEMORAL PHASIC: NORMAL
BH CV LOWER VASCULAR RIGHT COMMON FEMORAL SPONT: NORMAL
BILIRUB SERPL-MCNC: 0.3 MG/DL (ref 0–1.2)
BUN SERPL-MCNC: 21 MG/DL (ref 8–23)
BUN/CREAT SERPL: 18.6 (ref 7–25)
CALCIUM SPEC-SCNC: 8.9 MG/DL (ref 8.6–10.5)
CHLORIDE SERPL-SCNC: 110 MMOL/L (ref 98–107)
CO2 SERPL-SCNC: 25.8 MMOL/L (ref 22–29)
CREAT SERPL-MCNC: 1.13 MG/DL (ref 0.76–1.27)
DEPRECATED RDW RBC AUTO: 43.5 FL (ref 37–54)
EGFRCR SERPLBLD CKD-EPI 2021: 66.1 ML/MIN/1.73
EOSINOPHIL # BLD AUTO: 0.3 10*3/MM3 (ref 0–0.4)
EOSINOPHIL NFR BLD AUTO: 5.1 % (ref 0.3–6.2)
ERYTHROCYTE [DISTWIDTH] IN BLOOD BY AUTOMATED COUNT: 13.9 % (ref 12.3–15.4)
GLOBULIN UR ELPH-MCNC: 2.6 GM/DL
GLUCOSE SERPL-MCNC: 150 MG/DL (ref 65–99)
HCT VFR BLD AUTO: 33.4 % (ref 37.5–51)
HGB BLD-MCNC: 11 G/DL (ref 13–17.7)
IMM GRANULOCYTES # BLD AUTO: 0.02 10*3/MM3 (ref 0–0.05)
IMM GRANULOCYTES NFR BLD AUTO: 0.3 % (ref 0–0.5)
LYMPHOCYTES # BLD AUTO: 1.03 10*3/MM3 (ref 0.7–3.1)
LYMPHOCYTES NFR BLD AUTO: 17.5 % (ref 19.6–45.3)
MAGNESIUM SERPL-MCNC: 1.7 MG/DL (ref 1.6–2.4)
MAXIMAL PREDICTED HEART RATE: 141 BPM
MCH RBC QN AUTO: 27.9 PG (ref 26.6–33)
MCHC RBC AUTO-ENTMCNC: 32.9 G/DL (ref 31.5–35.7)
MCV RBC AUTO: 84.8 FL (ref 79–97)
MONOCYTES # BLD AUTO: 0.51 10*3/MM3 (ref 0.1–0.9)
MONOCYTES NFR BLD AUTO: 8.7 % (ref 5–12)
NEUTROPHILS NFR BLD AUTO: 3.97 10*3/MM3 (ref 1.7–7)
NEUTROPHILS NFR BLD AUTO: 67.4 % (ref 42.7–76)
NRBC BLD AUTO-RTO: 0 /100 WBC (ref 0–0.2)
PHOSPHATE SERPL-MCNC: 3.5 MG/DL (ref 2.5–4.5)
PLATELET # BLD AUTO: 118 10*3/MM3 (ref 140–450)
PMV BLD AUTO: 9.8 FL (ref 6–12)
POTASSIUM SERPL-SCNC: 3.9 MMOL/L (ref 3.5–5.2)
PROT SERPL-MCNC: 6.9 G/DL (ref 6–8.5)
PSA SERPL-MCNC: <0.014 NG/ML (ref 0–4)
RBC # BLD AUTO: 3.94 10*6/MM3 (ref 4.14–5.8)
SODIUM SERPL-SCNC: 144 MMOL/L (ref 136–145)
STRESS TARGET HR: 120 BPM
WBC NRBC COR # BLD: 5.89 10*3/MM3 (ref 3.4–10.8)

## 2023-02-10 PROCEDURE — 85025 COMPLETE CBC W/AUTO DIFF WBC: CPT | Performed by: INTERNAL MEDICINE

## 2023-02-10 PROCEDURE — 84153 ASSAY OF PSA TOTAL: CPT | Performed by: INTERNAL MEDICINE

## 2023-02-10 PROCEDURE — 25010000002 DENOSUMAB 120 MG/1.7ML SOLUTION: Performed by: INTERNAL MEDICINE

## 2023-02-10 PROCEDURE — 93971 EXTREMITY STUDY: CPT

## 2023-02-10 PROCEDURE — 84100 ASSAY OF PHOSPHORUS: CPT | Performed by: INTERNAL MEDICINE

## 2023-02-10 PROCEDURE — 83735 ASSAY OF MAGNESIUM: CPT | Performed by: INTERNAL MEDICINE

## 2023-02-10 PROCEDURE — 96402 CHEMO HORMON ANTINEOPL SQ/IM: CPT

## 2023-02-10 PROCEDURE — 80053 COMPREHEN METABOLIC PANEL: CPT | Performed by: INTERNAL MEDICINE

## 2023-02-10 PROCEDURE — 96372 THER/PROPH/DIAG INJ SC/IM: CPT

## 2023-02-10 PROCEDURE — 99215 OFFICE O/P EST HI 40 MIN: CPT | Performed by: INTERNAL MEDICINE

## 2023-02-10 PROCEDURE — 25010000002 LEUPROLIDE ACETATE (3 MONTH) PER 7.5 MG: Performed by: INTERNAL MEDICINE

## 2023-02-10 PROCEDURE — 36415 COLL VENOUS BLD VENIPUNCTURE: CPT

## 2023-02-10 RX ORDER — ASPIRIN 81 MG/81MG
81 CAPSULE ORAL DAILY
COMMUNITY
Start: 2022-12-24

## 2023-02-10 RX ORDER — METOPROLOL SUCCINATE 25 MG/1
1 TABLET, EXTENDED RELEASE ORAL DAILY
COMMUNITY
Start: 2023-01-29 | End: 2023-03-06 | Stop reason: SDUPTHER

## 2023-02-10 RX ORDER — INSULIN DETEMIR 100 [IU]/ML
30 INJECTION, SOLUTION SUBCUTANEOUS
COMMUNITY
Start: 2022-09-23

## 2023-02-10 RX ORDER — HYDROCODONE BITARTRATE AND ACETAMINOPHEN 7.5; 325 MG/1; MG/1
1 TABLET ORAL EVERY 6 HOURS PRN
Qty: 120 TABLET | Refills: 0 | Status: SHIPPED | OUTPATIENT
Start: 2023-02-10 | End: 2023-04-01 | Stop reason: SDUPTHER

## 2023-02-10 RX ORDER — LEVOFLOXACIN 750 MG/1
750 TABLET ORAL DAILY
COMMUNITY
Start: 2022-12-24 | End: 2023-02-15

## 2023-02-10 RX ADMIN — LEUPROLIDE ACETATE 22.5 MG: KIT at 09:42

## 2023-02-10 RX ADMIN — DENOSUMAB 120 MG: 120 INJECTION SUBCUTANEOUS at 09:39

## 2023-02-10 NOTE — NURSING NOTE
Lupron and Xgeva given without incidence.  Pt sent to scheduling for future appts and instructed to call sooner with concerns.

## 2023-02-10 NOTE — PROGRESS NOTES
The preliminary findings of today's left lower extremity venous duplex are negative for deep vein thrombosis. These preliminary findings were called to Dr. Delmar Cat, who stated he would call the patient with further directions.

## 2023-02-13 ENCOUNTER — TELEPHONE (OUTPATIENT)
Dept: ONCOLOGY | Facility: CLINIC | Age: 78
End: 2023-02-13
Payer: MEDICARE

## 2023-02-13 NOTE — TELEPHONE ENCOUNTER
----- Message from Delmar Cat MD sent at 2/10/2023  4:32 PM EST -----  Regarding: f/u orders  I was waiting for the venous duplex before putting in f/u requests, but they're in the check out box from today's visit now. Thanks! ROSANNE

## 2023-02-15 ENCOUNTER — OFFICE VISIT (OUTPATIENT)
Dept: FAMILY MEDICINE CLINIC | Facility: CLINIC | Age: 78
End: 2023-02-15
Payer: MEDICARE

## 2023-02-15 VITALS
TEMPERATURE: 97.1 F | DIASTOLIC BLOOD PRESSURE: 81 MMHG | BODY MASS INDEX: 28.92 KG/M2 | RESPIRATION RATE: 14 BRPM | HEART RATE: 69 BPM | OXYGEN SATURATION: 98 % | SYSTOLIC BLOOD PRESSURE: 129 MMHG | WEIGHT: 213.5 LBS | HEIGHT: 72 IN

## 2023-02-15 DIAGNOSIS — E78.2 MIXED HYPERLIPIDEMIA: ICD-10-CM

## 2023-02-15 DIAGNOSIS — I10 ESSENTIAL HYPERTENSION: ICD-10-CM

## 2023-02-15 DIAGNOSIS — Z00.00 MEDICARE ANNUAL WELLNESS VISIT, SUBSEQUENT: Primary | ICD-10-CM

## 2023-02-15 DIAGNOSIS — Z23 NEED FOR VIRAL IMMUNIZATION: ICD-10-CM

## 2023-02-15 PROCEDURE — 1160F RVW MEDS BY RX/DR IN RCRD: CPT | Performed by: NURSE PRACTITIONER

## 2023-02-15 PROCEDURE — 91312 COVID-19 (PFIZER) BIVALENT BOOSTER 12+YRS: CPT | Performed by: NURSE PRACTITIONER

## 2023-02-15 PROCEDURE — G0439 PPPS, SUBSEQ VISIT: HCPCS | Performed by: NURSE PRACTITIONER

## 2023-02-15 PROCEDURE — 0124A PR ADM SARSCOV2 30MCG/0.3ML BST: CPT | Performed by: NURSE PRACTITIONER

## 2023-02-15 PROCEDURE — 1170F FXNL STATUS ASSESSED: CPT | Performed by: NURSE PRACTITIONER

## 2023-02-15 RX ORDER — FLUTICASONE PROPIONATE 50 MCG
2 SPRAY, SUSPENSION (ML) NASAL DAILY
Qty: 15.8 ML | Refills: 11 | Status: SHIPPED | OUTPATIENT
Start: 2023-02-15

## 2023-02-15 RX ORDER — IPRATROPIUM BROMIDE 42 UG/1
2 SPRAY, METERED NASAL 3 TIMES DAILY
Qty: 15 ML | Refills: 3 | Status: SHIPPED | OUTPATIENT
Start: 2023-02-15

## 2023-02-15 NOTE — PROGRESS NOTES
The ABCs of the Annual Wellness Visit  Subsequent Medicare Wellness Visit    Subjective      Micah Krishna is a 77 y.o. male who presents for a Subsequent Medicare Wellness Visit.    The following portions of the patient's history were reviewed and   updated as appropriate: allergies, current medications, past family history, past medical history, past social history, past surgical history and problem list.    Compared to one year ago, the patient feels his physical   health is the same.    Compared to one year ago, the patient feels his mental   health is the same.    Recent Hospitalizations:  He was admitted within the past 365 days at [facility].       Current Medical Providers:  Patient Care Team:  Epley, James, APRN as PCP - General (Family Medicine)  Delmar Cat MD as Consulting Physician (Hematology and Oncology)  Samir Dowd MD as Consulting Physician (Internal Medicine)  Stephanie Bowie MD (Inactive) as Consulting Physician (Radiation Oncology)  Ab Carr MD as Referring Physician (Family Medicine)    Outpatient Medications Prior to Visit   Medication Sig Dispense Refill   • albuterol sulfate  (90 Base) MCG/ACT inhaler USE 2 INHALATIONS BY MOUTH  EVERY 4 HOURS AS NEEDED FOR WHEEZING AND PRIOR TO  PHYSICAL EXERTION 51 g 1   • allopurinol (ZYLOPRIM) 300 MG tablet Take 1 tablet by mouth Daily. 90 tablet 3   • amLODIPine (NORVASC) 10 MG tablet Take 1 tablet by mouth Daily. 90 tablet 1   • Aspirin ([place]) 81 MG capsule Take 81 mg by mouth Daily.     • bumetanide (BUMEX) 2 MG tablet 1/2 to 1 tab daily as needed dependent edema 90 tablet 1   • cloNIDine (CATAPRES) 0.2 MG tablet TAKE 1 TABLET BY MOUTH  DAILY 90 tablet 1   • clopidogrel (PLAVIX) 75 MG tablet TAKE 1 TABLET BY MOUTH  DAILY 90 tablet 3   • finasteride (PROSCAR) 5 MG tablet TAKE 1 TABLET BY MOUTH  DAILY 90 tablet 1   • fludrocortisone 0.1 MG tablet Take 1 tablet by mouth Daily. 90 tablet 3   • fluticasone-salmeterol  (Advair Diskus) 250-50 MCG/DOSE DISKUS Inhale 1 puff 2 (Two) Times a Day. 60 each 3   • Fluticasone-Umeclidin-Vilant (Trelegy Ellipta) 100-62.5-25 MCG/INH inhaler Inhale 1 puff Daily. 3 each 3   • glucose blood test strip TEST BLOOD SUGAR FOUR TIMES DAILY.     • glucose blood test strip 1 each by Other route 4 (Four) Times a Day.     • HUMALOG KWIKPEN 100 UNIT/ML solution pen-injector INJECT 2 UNITS FOR EACH 50 MG ABOVE 200  1   • HYDROcodone-acetaminophen (NORCO) 7.5-325 MG per tablet Take 1 tablet by mouth Every 6 (Six) Hours As Needed for Moderate Pain. 120 tablet 0   • hydrocortisone (CORTEF) 5 MG tablet TK 2 TS PO IN THE MORNING AND 1 T PO IN THE EVENING  1   • hydrocortisone (CORTEF) 5 MG tablet Take  by mouth See Admin Instructions.     • insulin detemir (Levemir FlexTouch) 100 UNIT/ML injection Inject 30 Units under the skin into the appropriate area as directed.     • insulin detemir (LEVEMIR) 100 UNIT/ML injection Inject  under the skin Daily.     • Insulin Glargine (LANTUS SOLOSTAR) 100 UNIT/ML injection pen Inject 30 Units under the skin 2 (Two) Times a Day. 45 mL 1   • isosorbide mononitrate (IMDUR) 30 MG 24 hr tablet TAKE 1 TABLET BY MOUTH  TWICE DAILY 180 tablet 1   • magnesium oxide (MAG-OX) 400 MG tablet Take 1 tablet by mouth Daily. 90 tablet 3   • metoprolol succinate XL (TOPROL-XL) 25 MG 24 hr tablet Take 1 tablet by mouth Daily.     • potassium chloride (K-DUR,KLOR-CON) 20 MEQ CR tablet Take 1 tablet by mouth Daily. 90 tablet 1   • rosuvastatin (CRESTOR) 20 MG tablet Take 1 tablet by mouth Daily. 90 tablet 1   • Syringe, Disposable, 1 ML misc 1 mL Daily. 4 each 2   • valsartan (DIOVAN) 160 MG tablet 1 tablet by mouth once or twice a day depending on your blood pressure 180 tablet 2   • vitamin D (ERGOCALCIFEROL) 1.25 MG (25464 UT) capsule capsule TAKE 1 CAPSULE BY MOUTH  TWICE WEEKLY 14 capsule 0   • levoFLOXacin (LEVAQUIN) 750 MG tablet Take 750 mg by mouth Daily.     • meloxicam (Mobic) 7.5 MG  tablet Take 1 tablet by mouth Daily. With food and water for chronic pain and inflammation 90 tablet 1   • neomycin-polymyxin-dexamethasone (MAXITROL) 3.5-19995-1.1 ophthalmic suspension SHAKE LIQUID AND INSTILL 1 DROP IN RIGHT EYE FOUR TIMES DAILY     • nitroglycerin (NITROLINGUAL) 0.4 MG/SPRAY spray Place 1 spray under the tongue Every 5 (Five) Minutes As Needed for Chest Pain. 12 g 5   • ondansetron ODT (ZOFRAN-ODT) 8 MG disintegrating tablet Place 1 tablet on the tongue Every 8 (Eight) Hours As Needed for Nausea or Vomiting. 30 tablet 2   • pantoprazole (PROTONIX) 20 MG EC tablet TAKE 1 TABLET BY MOUTH  DAILY 90 tablet 1   • prednisoLONE acetate (PRED FORTE) 1 % ophthalmic suspension SHAKE LIQUID AND INSTILL 1 DROP IN RIGHT EYE EVERY HOUR     • promethazine (PHENERGAN) 25 MG suppository Insert 1 suppository into the rectum Every 6 (Six) Hours As Needed for Nausea or Vomiting. 20 suppository 0   • venlafaxine XR (EFFEXOR-XR) 37.5 MG 24 hr capsule Take 1 capsule by mouth Daily for 180 days. 30 capsule 5   • amoxicillin-clavulanate (Augmentin) 875-125 MG per tablet Take 1 tablet by mouth 2 (Two) Times a Day. 14 tablet 0   • doxycycline (MONODOX) 100 MG capsule Take 1 capsule by mouth 2 (Two) Times a Day. 20 capsule 0   • valsartan (DIOVAN) 80 MG tablet TAKE 1 TABLET BY MOUTH  TWICE DAILY 180 tablet 1     No facility-administered medications prior to visit.       Opioid medication/s are on active medication list.  and I have evaluated his active treatment plan and pain score trends (see table).  Vitals:    02/15/23 1045   PainSc:   5   PainLoc: Leg     I have reviewed the chart for potential of high risk medication and harmful drug interactions in the elderly.            Aspirin is on active medication list. Aspirin use is not indicated based on review of current medical condition/s. Risk of harm outweighs potential benefits. Patient instructed to discontinue this medication.  .  Physical exam alert and oriented no  distress he is quite pleasant chest is clear heart regular rate without murmur neck is supple no cervical adenopathy carotids clear no thrill or bruit  Thyroid no mass, abdomen no hepatosplenomegaly masses or hernias no significant edema overall he appears well    Extremely pleasant  Psych normal and appropriate  Skin is warm and dry  Neuro intact, alert and oriented  At baseline      Patient Active Problem List   Diagnosis   • Paresthesia of both hands   • Diabetes mellitus due to underlying condition with hyperglycemia, with long-term current use of insulin (HCC)   • Adrenal insufficiency (HCC)   • Essential hypertension   • Mixed hyperlipidemia   • History of renal cell carcinoma   • Chronic pain of right knee   • Bilateral edema of lower extremity   • Muscle ache   • Trigger index finger of right hand   • Chronic right shoulder pain   • Medicare annual wellness visit, initial   • Gastroesophageal reflux disease without esophagitis   • Coronary artery disease involving native coronary artery   • Dyspnea on exertion   • Lung nodules   • Closed fracture of one rib of left side with routine healing   • Metastatic renal cell carcinoma (HCC)   • Prostate cancer metastatic to bone (HCC)   • Cancer associated pain   • ACTH elevation (HCC)   • Aortic sclerosis   • Back ache   • COPD (chronic obstructive pulmonary disease) (HCC)   • Cough   • Decreased potassium in the blood   • Luetscher's syndrome   • Diastolic dysfunction, left ventricle   • Febrile illness, acute   • Gout   • H/O total adrenalectomy (HCC)   • S/P coronary artery stent placement   • Intractable nausea and vomiting   • Obesity (BMI 30-39.9)   • Presence of stent in coronary artery   • Renal cancer (HCC)   • Statin intolerance   • TIA (transient ischemic attack)   • Tinnitus of both ears   • Type 2 diabetes mellitus with hyperglycemia (HCC)   • Vitamin D deficiency   • Elevated PSA     Advance Care Planning  Advance Directive is not on file.  ACP  "discussion was held with the patient during this visit. Patient does not have an advance directive, information provided.     Objective    Vitals:    02/15/23 1045   BP: 129/81   Pulse: 69   Resp: 14   Temp: 97.1 °F (36.2 °C)   TempSrc: Infrared   SpO2: 98%   Weight: 96.8 kg (213 lb 8 oz)   Height: 182 cm (71.65\")   PainSc:   5   PainLoc: Leg     Estimated body mass index is 29.24 kg/m² as calculated from the following:    Height as of this encounter: 182 cm (71.65\").    Weight as of this encounter: 96.8 kg (213 lb 8 oz).    BMI is >= 25 and <30. (Overweight) The following options were offered after discussion;: nutrition counseling/recommendations      Does the patient have evidence of cognitive impairment?   No    Lab Results   Component Value Date    HGBA1C 6.2 (H) 12/23/2022          HEALTH RISK ASSESSMENT    Smoking Status:  Social History     Tobacco Use   Smoking Status Former   • Packs/day: 1.50   • Years: 40.00   • Pack years: 60.00   • Types: Cigarettes   • Start date: 1/1/1965   • Quit date: 1/1/2008   • Years since quitting: 15.1   Smokeless Tobacco Never     Alcohol Consumption:  Social History     Substance and Sexual Activity   Alcohol Use No     Fall Risk Screen:    PUJA Fall Risk Assessment was completed, and patient is at MODERATE risk for falls. Assessment completed on:2/15/2023    Depression Screening:  PHQ-2/PHQ-9 Depression Screening 8/19/2022   Little Interest or Pleasure in Doing Things 0-->not at all   Feeling Down, Depressed or Hopeless 0-->not at all   PHQ-9: Brief Depression Severity Measure Score 0       Health Habits and Functional and Cognitive Screening:  Functional & Cognitive Status 2/15/2023   Do you have difficulty preparing food and eating? No   Do you have difficulty bathing yourself, getting dressed or grooming yourself? No   Do you have difficulty using the toilet? No   Do you have difficulty moving around from place to place? No   Do you have trouble with steps or getting " out of a bed or a chair? No   Current Diet Other   Dental Exam Up to date   Eye Exam Up to date   Exercise (times per week) 4 times per week   Current Exercises Include Walking   Current Exercise Activities Include -   Do you need help using the phone?  No   Are you deaf or do you have serious difficulty hearing?  No   Do you need help with transportation? No   Do you need help shopping? No   Do you need help preparing meals?  No   Do you need help with housework?  No   Do you need help with laundry? No   Do you need help taking your medications? No   Do you need help managing money? No   Do you ever drive or ride in a car without wearing a seat belt? No   Have you felt unusual stress, anger or loneliness in the last month? No   Who do you live with? Spouse   If you need help, do you have trouble finding someone available to you? No   Have you been bothered in the last four weeks by sexual problems? No   Do you have difficulty concentrating, remembering or making decisions? No       Age-appropriate Screening Schedule:  Refer to the list below for future screening recommendations based on patient's age, sex and/or medical conditions. Orders for these recommended tests are listed in the plan section. The patient has been provided with a written plan.    Health Maintenance   Topic Date Due   • ZOSTER VACCINE (1 of 2) Never done   • DIABETIC EYE EXAM  04/19/2019   • HEMOGLOBIN A1C  06/23/2023   • LIPID PANEL  09/16/2023   • URINE MICROALBUMIN  09/16/2023   • TDAP/TD VACCINES (2 - Td or Tdap) 10/22/2025   • INFLUENZA VACCINE  Completed                CMS Preventative Services Quick Reference  Risk Factors Identified During Encounter:    Fall Risk-High or Moderate: Discussed Fall Prevention in the home    The above risks/problems have been discussed with the patient.  Pertinent information has been shared with the patient in the After Visit Summary.    Diagnoses and all orders for this visit:    1. Medicare annual  wellness visit, subsequent (Primary)    2. Essential hypertension    3. Mixed hyperlipidemia    Other orders  -     fluticasone (FLONASE) 50 MCG/ACT nasal spray; 2 sprays into the nostril(s) as directed by provider Daily.  Dispense: 15.8 mL; Refill: 11  -     ipratropium (ATROVENT) 0.06 % nasal spray; 2 sprays into the nostril(s) as directed by provider 3 (Three) Times a Day. As needed PND  Dispense: 15 mL; Refill: 3        Follow Up:   Next Medicare Wellness visit to be scheduled in 1 year.      An After Visit Summary and PPPS were made available to the patient.      Colonoscopy up-to-date  Immunizations  Shingrix x2  Prevnar 20  Tdap    COVID vaccine today.  Falls precaution    Send me a question for any issue with medication  Generally avoid anti-inflammatories however should he take ibuprofen  He should take it with at least 20 mg of Pepcid for GI protection as he is presently not taking pantoprazole routinely  He has no renal insufficiency but he does take Plavix and aspirin and if he takes anti-inflammatory frequently or chronically may increase risk of bleeds which may be very serious.    Risk with occasional or limited dose  He has been advised by his oncologist to avoid this but really does not think he can get by without it so he is minimize the dose  If he keeps this under 2-3 ibuprofen 1 time a day with some GI protection he is fairly well mitigated his risk    We will bring him back in 3 months just have better follow-up he has a lot going on now.    For allergies he can try Flonase and as needed occasionally Atrovent

## 2023-02-15 NOTE — PATIENT INSTRUCTIONS
Discharge instructions    Continue present plan you doing spectacular good hydration    Continue present medications    Follow-up endocrinology  Discharge instructions    Outpatient shingles vaccine x2    Then 1 month after series completed Prevnar 20 outpatient    Tdap at your convenience at some point      Follow-up in 3 months

## 2023-02-27 ENCOUNTER — HOSPITAL ENCOUNTER (OUTPATIENT)
Dept: NUCLEAR MEDICINE | Facility: HOSPITAL | Age: 78
Discharge: HOME | End: 2023-02-27
Payer: MEDICARE

## 2023-02-27 DIAGNOSIS — G89.3 CANCER ASSOCIATED PAIN: ICD-10-CM

## 2023-02-27 DIAGNOSIS — C61 PROSTATE CANCER METASTATIC TO BONE: ICD-10-CM

## 2023-02-27 DIAGNOSIS — C79.51 PROSTATE CANCER METASTATIC TO BONE: ICD-10-CM

## 2023-02-27 DIAGNOSIS — M79.605 ACUTE LEG PAIN, LEFT: ICD-10-CM

## 2023-02-27 PROCEDURE — A9503 TC99M MEDRONATE: HCPCS | Performed by: INTERNAL MEDICINE

## 2023-02-27 PROCEDURE — 78306 BONE IMAGING WHOLE BODY: CPT

## 2023-02-27 PROCEDURE — 0 TECHNETIUM MEDRONATE KIT: Performed by: INTERNAL MEDICINE

## 2023-02-27 RX ORDER — TC 99M MEDRONATE 20 MG/10ML
21.6 INJECTION, POWDER, LYOPHILIZED, FOR SOLUTION INTRAVENOUS
Status: COMPLETED | OUTPATIENT
Start: 2023-02-27 | End: 2023-02-27

## 2023-02-27 RX ADMIN — Medication 21.6 MILLICURIE: at 08:10

## 2023-03-07 RX ORDER — METOPROLOL SUCCINATE 25 MG/1
25 TABLET, EXTENDED RELEASE ORAL DAILY
Qty: 90 TABLET | Refills: 1 | Status: SHIPPED | OUTPATIENT
Start: 2023-03-07

## 2023-03-07 RX ORDER — ISOSORBIDE MONONITRATE 30 MG/1
30 TABLET, EXTENDED RELEASE ORAL 2 TIMES DAILY
Qty: 180 TABLET | Refills: 1 | Status: SHIPPED | OUTPATIENT
Start: 2023-03-07

## 2023-03-07 NOTE — TELEPHONE ENCOUNTER
Rx Refill Note  Requested Prescriptions     Pending Prescriptions Disp Refills   • isosorbide mononitrate (IMDUR) 30 MG 24 hr tablet 180 tablet 1     Sig: Take 1 tablet by mouth 2 (Two) Times a Day.   • metoprolol succinate XL (TOPROL-XL) 25 MG 24 hr tablet       Sig: Take 1 tablet by mouth Daily.      Last office visit with prescribing clinician: 2/15/2023   Last telemedicine visit with prescribing clinician: 5/16/2023   Next office visit with prescribing clinician: 5/16/2023                         Would you like a call back once the refill request has been completed: [] Yes [] No    If the office needs to give you a call back, can they leave a voicemail: [] Yes [] No    Fuad Bautista Rep  03/07/23, 16:59 EST

## 2023-03-13 RX ORDER — CLOPIDOGREL BISULFATE 75 MG/1
75 TABLET ORAL DAILY
Qty: 90 TABLET | Refills: 3 | Status: SHIPPED | OUTPATIENT
Start: 2023-03-13

## 2023-03-13 NOTE — TELEPHONE ENCOUNTER
Rx Refill Note  Requested Prescriptions     Pending Prescriptions Disp Refills   • clopidogrel (PLAVIX) 75 MG tablet 90 tablet 3     Sig: Take 1 tablet by mouth Daily.      Last office visit with prescribing clinician: 2/15/2023   Last telemedicine visit with prescribing clinician: 5/16/2023   Next office visit with prescribing clinician: 5/16/2023                         Would you like a call back once the refill request has been completed: [] Yes [] No    If the office needs to give you a call back, can they leave a voicemail: [] Yes [] No    Fuad Bautista Rep  03/13/23, 12:13 EDT

## 2023-03-28 RX ORDER — FINASTERIDE 5 MG/1
5 TABLET, FILM COATED ORAL DAILY
Qty: 90 TABLET | Refills: 3 | Status: SHIPPED | OUTPATIENT
Start: 2023-03-28

## 2023-04-01 DIAGNOSIS — G89.3 CANCER ASSOCIATED PAIN: ICD-10-CM

## 2023-04-03 RX ORDER — HYDROCODONE BITARTRATE AND ACETAMINOPHEN 7.5; 325 MG/1; MG/1
1 TABLET ORAL EVERY 6 HOURS PRN
Qty: 120 TABLET | Refills: 0 | Status: SHIPPED | OUTPATIENT
Start: 2023-04-03

## 2023-04-10 RX ORDER — CLONIDINE HYDROCHLORIDE 0.2 MG/1
0.2 TABLET ORAL DAILY
Qty: 90 TABLET | Refills: 1 | Status: SHIPPED | OUTPATIENT
Start: 2023-04-10 | End: 2023-04-10 | Stop reason: SDUPTHER

## 2023-04-11 RX ORDER — METOPROLOL SUCCINATE 25 MG/1
25 TABLET, EXTENDED RELEASE ORAL DAILY
Qty: 90 TABLET | Refills: 1 | Status: SHIPPED | OUTPATIENT
Start: 2023-04-11

## 2023-04-11 RX ORDER — HYDROCORTISONE 5 MG/1
TABLET ORAL SEE ADMIN INSTRUCTIONS
OUTPATIENT
Start: 2023-04-11

## 2023-04-11 RX ORDER — CLONIDINE HYDROCHLORIDE 0.2 MG/1
0.2 TABLET ORAL DAILY
Qty: 90 TABLET | Refills: 1 | Status: SHIPPED | OUTPATIENT
Start: 2023-04-11

## 2023-04-11 RX ORDER — AMLODIPINE BESYLATE 10 MG/1
10 TABLET ORAL DAILY
Qty: 90 TABLET | Refills: 1 | Status: SHIPPED | OUTPATIENT
Start: 2023-04-11

## 2023-04-11 NOTE — TELEPHONE ENCOUNTER
Patient should have his endocrinologist write the hydrocortisone,  If I need to do it temporarily happy to do so just let me know  Thanks

## 2023-04-11 NOTE — TELEPHONE ENCOUNTER
Rx Refill Note  Requested Prescriptions     Pending Prescriptions Disp Refills   • hydrocortisone (CORTEF) 5 MG tablet       Sig: Take  by mouth See Admin Instructions.   • amLODIPine (NORVASC) 10 MG tablet 90 tablet 1     Sig: Take 1 tablet by mouth Daily.   • metoprolol succinate XL (TOPROL-XL) 25 MG 24 hr tablet 90 tablet 1     Sig: Take 1 tablet by mouth Daily.   • cloNIDine (CATAPRES) 0.2 MG tablet 90 tablet 1     Sig: Take 1 tablet by mouth Daily.      Last office visit with prescribing clinician: 2/15/2023   Last telemedicine visit with prescribing clinician: 5/16/2023   Next office visit with prescribing clinician: 5/16/2023                         Would you like a call back once the refill request has been completed: [] Yes [] No    If the office needs to give you a call back, can they leave a voicemail: [] Yes [] No    Fuad Bautista Rep  04/11/23, 11:08 EDT

## 2023-04-12 ENCOUNTER — TELEPHONE (OUTPATIENT)
Dept: FAMILY MEDICINE CLINIC | Facility: CLINIC | Age: 78
End: 2023-04-12

## 2023-04-12 NOTE — TELEPHONE ENCOUNTER
Please call patient and clarify this with him  I have refilled just the request  Have him check his old bottle and see if he is taking 100 mg or 25 of metoprolol so I can take care of this for him  Thank you

## 2023-04-12 NOTE — TELEPHONE ENCOUNTER
Pharmacy Name: [facility] - [place], [place] - [address] [facility] [phone] [facility] [phone]      Pharmacy representative name: LINDSAY, BUT ANYBODY CAN HELP.    Pharmacy representative phone number: [phone]    What medication are you calling in regards to: metoprolol succinate XL (TOPROL-XL) 25 MG 24 hr tablet    What question does the pharmacy have: PHARMACY IS ASKING FOR PATIENT'S DOSAGE.  25MG WAS DISCONTINUED, SO PATIENT WAS CHANGED TO 100MG, BUT THE DOSE IS STILL LISTED AS 25MG.    Who is the provider that prescribed the medication: JAMES EPLEY    Additional notes: REFERENCE NUMBER [id number]

## 2023-05-03 RX ORDER — ERGOCALCIFEROL 1.25 MG/1
CAPSULE ORAL
Qty: 14 CAPSULE | Refills: 1 | Status: SHIPPED | OUTPATIENT
Start: 2023-05-03

## 2023-05-03 RX ORDER — METOPROLOL SUCCINATE 25 MG/1
25 TABLET, EXTENDED RELEASE ORAL DAILY
Qty: 90 TABLET | Refills: 3 | Status: SHIPPED | OUTPATIENT
Start: 2023-05-03

## 2023-05-03 RX ORDER — HYDROCORTISONE 5 MG/1
TABLET ORAL SEE ADMIN INSTRUCTIONS
OUTPATIENT
Start: 2023-05-03

## 2023-05-03 NOTE — TELEPHONE ENCOUNTER
Please clarify  The name of the medication and dose  I do not think I written this 1 for him  There was another steroid I think had given him previously just clarify which medicine he needs  If I am the one right need for him I be happy to do this and make sure he gets this thanks

## 2023-05-03 NOTE — TELEPHONE ENCOUNTER
Rx Refill Note  Requested Prescriptions     Pending Prescriptions Disp Refills   • vitamin D (ERGOCALCIFEROL) 1.25 MG (83248 UT) capsule capsule 14 capsule 0     Sig: TAKE 1 CAPSULE BY MOUTH  TWICE WEEKLY   • hydrocortisone (CORTEF) 5 MG tablet       Sig: Take  by mouth See Admin Instructions.   • metoprolol succinate XL (TOPROL-XL) 25 MG 24 hr tablet 90 tablet 1     Sig: Take 1 tablet by mouth Daily.      Last office visit with prescribing clinician: 2/15/2023   Last telemedicine visit with prescribing clinician: 5/16/2023   Next office visit with prescribing clinician: 5/16/2023                         Would you like a call back once the refill request has been completed: [] Yes [] No    If the office needs to give you a call back, can they leave a voicemail: [] Yes [] No    Fuad Bautista Rep  05/03/23, 09:13 EDT

## 2023-05-04 NOTE — PROGRESS NOTES
"[facility] GROUP OUTPATIENT FOLLOW UP CLINIC VISIT    REASON FOR FOLLOW-UP:    1.  History of metastatic clear cell renal cell carcinoma.  All known disease had previously been resected..  2.  Pulmonary metastases discovered July 2020.  Bone metastases discovered September 2020.  3.  Votrient initiated 10/16/2020  4. Palliative radiation for pain complete on 11/10/2020  5.  1/4/2021 significantly elevated PSA > 100.  6.  1/20/2021 bone biopsy confirms metastatic prostate cancer    HISTORY OF PRESENT ILLNESS:  Micah Krishna is a 78 y.o. male with the above-mentioned history who returns today for follow-up    Back pain is worse but he has been more active doing yard work recently.  The hydrocodone/acetaminophen tablets do help when he takes them and he uses as many as 4 a day.  His leg pain has resolved.      REVIEW OF SYSTEMS:  As per HPI    PE:  Vitals:    05/05/23 0845   BP: 124/81   Pulse: 66   Resp: 16   Temp: 97.8 °F (36.6 °C)   TempSrc: Temporal   SpO2: 97%   Weight: 99.8 kg (220 lb 1.6 oz)   Height: 182 cm (71.65\")   PainSc:   5   PainLoc: Back      General:  No acute distress, awake, alert and oriented  Skin:  Warm and dry, no visible rash  HEENT:  Normocephalic/atraumatic.    Chest:  Normal respiratory effort.  Lungs clear to auscultation bilaterally.  Heart: Regular rate and rhythm  Extremities: No clubbing cyanosis or edema  Neuro/psych:  Grossly non-focal.  Normal mood and affect.      DIAGNOSTIC DATA:  CBC and Differential (05/05/2023 08:39)      IMAGING:  None reviewed    ASSESSMENT:  This is a 78 y.o. male with:    *Mild normocytic anemia:   · Hemoglobin 10.6, a little lower  · Likely due to ADT and metastatic prostate cancer.    *Adrenal insufficiency following bilateral adrenalectomy   · On replacement hormone therapy.    · He follows with endocrinology.    *History of metastatic renal cell carcinoma.    · He had a left nephrectomy and adrenalectomy in 2000 and then a right adrenalectomy " for metastatic disease in 2012.    · There remains no evidence of disease.  See below.    *New metastatic bone disease diagnosed as he presented with worsening pain, ultimately found to be metastatic prostate cancer  · Presumed to be metastatic renal cell carcinoma but ultimately diagnosed with metastatic prostate cancer.  · Imaging discussed with radiology.  Imaging consistent with metastatic renal cell carcinoma  · However, his PSA was noted to be greater than 100  · As discussed below, bone biopsy subsequently confirmed metastatic prostate cancer  · CT C/A/P 7/6/2020 with a RUL sub 6 mm pulmonary nodule in the RUL, stable since 1/17/2018, new 6 mm nodules in the medial RLL and RUL,  LLL nodule unchanged since 7/17/2018.  Healing left ninth rib fracture.  · CT chest 9/28/2020 with stable lung nodules, pathologic fracture of the right 8th rib, abnormal appearance of T8, narrowing of the thecal sack at this level, additional lucencies at several vertebral bodies such as T7 and T11, subacute healing fractures in the anterior right 6th rib and lateral left 9th rib.  · CT head 9/28/2020 with calvarial metastatic disease involving the frontal and occipital bones.  · Bone scan 9/28/2020 with multifocal uptake consistent with metastatic disease. Anterior frontal bone, upper C spine at C1 or C2, T and L spine at T3, T8, T11, multifocal rib uptake, abnormal uptake in the iliac wings/bodies and left femoral heads/acetabulum, distal left clavicle and both humeral heads.   · PET scan from 10/8/2020.  Multifocal bone metastases throughout the skeleton.  Pathologic rib and vertebral body fracture.  Moderate FDG uptake in the right femoral neck with some sclerosis, new since 7/6/2020.  Mottled appearance of L3 and T8 vertebral bodies with pathologic compression fractures.  L3 fracture is a burst fracture with 20 to 25% loss of height and 4 to 5 mm of retropulsion in the central spinal canal.  Pathologic compression fracture at T8  with 10% loss of height.  New findings since 7/6/2020.  FDG avid soft tissue nodule extending into the central canal along the posterior aspect of the thecal sac at T8.  FDG uptake in the left clavicle.  Compression fracture of the right posterior eighth rib.  · Votrient initiated 10/16/2020  · Palliative radiation for pain complete on 11/10/2020  · He did subsequently presented with an elevated PSA >100.  · Bone biopsy performed on 1/20/2021 confirmed metastatic prostate cancer.  · Votrient discontinued  · 2/5/2021 patient given Firmagon and Xgeva.  With plans to start Eligard 4 weeks later, and continue monthly Xgeva.    · 3/5/21.First dose of Eligard and monthly Xgeva.  Eligard will be given every 3 months.  · 12/17/2021: Proceeded with Xgeva.  PSA remains very low at 0.015.  · 1/21/2022: Hold Xgeva due to dental issues.  · Bone scan on 2/7/2022 with improvement  · 5/20/2022: He is done with all of his dental work.  Proceed with Xgeva.  Proceed with lupron and every 3 months  · 8/19/2022: Proceed with Xgeva and Lupron.  Move Xgeva to every 3 months.  · 11/18/2022: Proceed with Xgeva and Lupron both every 3 months at this point  · 2/10/2023: Xgeva and Lupron.    · 5/5/2023: Proceed with Xgeva and Lupron.    *Chronic kidney disease:   · Status post left nephrectomy.    · Creatinine remains excellent at 1.02    *Migratory skeletal pain:   · Metastatic disease apparent now on CT imaging and bone scan and now PET scan.  · Completed radiation   · Left leg pain has improved  · Back pain improved  · 4/2/2021 patient continues on Norco 5/325 2 to 3 tablets/day with good pain control.  This remains stable as of 4/30/2021.  · 5/28/21.  The patient continues on [place] 5/325 with 2 to 3 tablets/day.  Pain is stable.   · 7/2/2021: He ran out of the hydrocodone/acetaminophen and has been using more ibuprofen which has controlled his pain but I encouraged him not to use as much ibuprofen to protect his kidney.  · Pain is stable  as of 7/30/2021.  · 10/22/2021: Pain is stable.  Pain medication refilled.  He was started on meloxicam by primary care which she will start in the near future.  · 12/17/2021: His low back is starting to worsen.  Increased hydrocodone to 7.5 mg with improvement.    · 5/5/2023: Low back pain is worse likely due to doing yard work.  He continues hydrocodone/acetaminophen which does control his pain.    *Mild hypomagnesemia: monitor. If muscle cramps advised to call us.  · Magnesium normal at 1.8    *Elevated PSA, subsequent biopsy confirmed metastatic prostate cancer:   · He has a history of very mild elevation of his PSA in the past with a PSA of 5.92 on 10/17/2018.    · His PSA on 1/4/2021 was greater than 100  · PSA repeated today is also greater than 100  · He is completely asymptomatic without clinical evidence for prostatitis.    · Dr. Cat did communicate with Dr. Zapata with radiology and all findings on imaging would support more metastatic renal cell carcinoma rather than metastatic prostate cancer since the bony lesions are lytic.  There is nothing obvious in his prostate on current imaging.  · Dr. Cat communicated with Dr. Cabrera with urology.  He is in agreement with a bone biopsy and he will see him in the office for evaluation.  · One of the bony lesions is amenable to CT-guided biopsy per Dr. Zapata.  · CT-guided needle biopsy of the bone performed on 1/20/2021 confirms metastatic prostate cancer.  · 3/5/21.  Please see above.  The patient received 1 dose of Firmagon and is on Eligard every 3 months.  · 1/21/2022: PSA stable and quite low at 0.019  · PSA undetectable on 5/20/2022  · 2/10/2023:: PSA remains undetectable.  · PSA is pending today    *New 40% T2 compression fracture.  Asymptomatic.  He is not interested in further radiation at this time.  Might consider referral to neurosurgery should he become more symptomatic or if this worsens.    *Vasomotor symptoms related to ADT  · On venlafaxine.   He again did not complain of this today.    *He did have an MRI of the pituitary gland on 8/16/2021 as ordered by his endocrinologist.  Pituitary appears normal.  There is abnormal enhancement of the clivus suspected to be related to his metastatic prostate cancer.  There is an 11 mm lesion overlying the left frontal convexity suspected to be a meningioma.  The patient states that he was referred to neurosurgery but is not necessarily interested in pursuing this.  We can repeat an MRI in the future if desired.  He continues to not desire any follow-up for regarding this.  He does not desire surgery.    *Dental issues: 5/20/2022: Completed all of his dental work at least 6 weeks ago.  Proceed with Xgeva today and every month for now.    *Skin lesion left cheek: Question sebaceous cyst.  Previously referred to dermatology.  Not discussed again today.    *History of pain in the left lower extremity both above and below the knee.  There is a little bit of edema there in February 2023  · Left lower extremity venous duplex on 2/10/2023 was negative for DVT  · Bone scan was stable  · Pain resolved    PLAN:   1. Continue Xgeva today and every 3 months  2. Lupron today and every 3 months  3. Hydrocodone/acetaminophen 7.5/325 mg every 6 hours as needed.  Refilled today.  4. Notify us if his pain worsens and we can proceed with further imaging.  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  5. Follow-up in 3 months with labs, Xgeva, Lupron    He remains on high risk medication requiring intensive monitoring.

## 2023-05-05 ENCOUNTER — OFFICE VISIT (OUTPATIENT)
Dept: ONCOLOGY | Facility: CLINIC | Age: 78
End: 2023-05-05
Payer: MEDICARE

## 2023-05-05 ENCOUNTER — INFUSION (OUTPATIENT)
Dept: ONCOLOGY | Facility: HOSPITAL | Age: 78
End: 2023-05-05
Payer: MEDICARE

## 2023-05-05 ENCOUNTER — LAB (OUTPATIENT)
Dept: OTHER | Facility: HOSPITAL | Age: 78
End: 2023-05-05
Payer: MEDICARE

## 2023-05-05 VITALS
HEART RATE: 66 BPM | WEIGHT: 220.1 LBS | HEIGHT: 72 IN | DIASTOLIC BLOOD PRESSURE: 81 MMHG | OXYGEN SATURATION: 97 % | BODY MASS INDEX: 29.81 KG/M2 | TEMPERATURE: 97.8 F | SYSTOLIC BLOOD PRESSURE: 124 MMHG | RESPIRATION RATE: 16 BRPM

## 2023-05-05 DIAGNOSIS — C79.51 PROSTATE CANCER METASTATIC TO BONE: Primary | ICD-10-CM

## 2023-05-05 DIAGNOSIS — G89.3 CANCER ASSOCIATED PAIN: Primary | ICD-10-CM

## 2023-05-05 DIAGNOSIS — C79.51 PROSTATE CANCER METASTATIC TO BONE: ICD-10-CM

## 2023-05-05 DIAGNOSIS — C61 PROSTATE CANCER METASTATIC TO BONE: ICD-10-CM

## 2023-05-05 DIAGNOSIS — C61 PROSTATE CANCER METASTATIC TO BONE: Primary | ICD-10-CM

## 2023-05-05 DIAGNOSIS — D64.9 NORMOCYTIC ANEMIA: ICD-10-CM

## 2023-05-05 LAB
ALBUMIN SERPL-MCNC: 4 G/DL (ref 3.5–5.2)
ALBUMIN/GLOB SERPL: 1.5 G/DL
ALP SERPL-CCNC: 39 U/L (ref 39–117)
ALT SERPL W P-5'-P-CCNC: 9 U/L (ref 1–41)
ANION GAP SERPL CALCULATED.3IONS-SCNC: 8.6 MMOL/L (ref 5–15)
AST SERPL-CCNC: 13 U/L (ref 1–40)
BASOPHILS # BLD AUTO: 0.04 10*3/MM3 (ref 0–0.2)
BASOPHILS NFR BLD AUTO: 0.6 % (ref 0–1.5)
BILIRUB SERPL-MCNC: 0.4 MG/DL (ref 0–1.2)
BUN SERPL-MCNC: 18 MG/DL (ref 8–23)
BUN/CREAT SERPL: 17.6 (ref 7–25)
CALCIUM SPEC-SCNC: 9.1 MG/DL (ref 8.6–10.5)
CHLORIDE SERPL-SCNC: 109 MMOL/L (ref 98–107)
CO2 SERPL-SCNC: 24.4 MMOL/L (ref 22–29)
CREAT SERPL-MCNC: 1.02 MG/DL (ref 0.76–1.27)
DEPRECATED RDW RBC AUTO: 42.5 FL (ref 37–54)
EGFRCR SERPLBLD CKD-EPI 2021: 74.3 ML/MIN/1.73
EOSINOPHIL # BLD AUTO: 0.43 10*3/MM3 (ref 0–0.4)
EOSINOPHIL NFR BLD AUTO: 6.6 % (ref 0.3–6.2)
ERYTHROCYTE [DISTWIDTH] IN BLOOD BY AUTOMATED COUNT: 13.9 % (ref 12.3–15.4)
FERRITIN SERPL-MCNC: 176 NG/ML (ref 30–400)
GLOBULIN UR ELPH-MCNC: 2.7 GM/DL
GLUCOSE SERPL-MCNC: 158 MG/DL (ref 65–99)
HCT VFR BLD AUTO: 31.3 % (ref 37.5–51)
HGB BLD-MCNC: 10.6 G/DL (ref 13–17.7)
HGB RETIC QN AUTO: 32.6 PG (ref 29.8–36.1)
IMM GRANULOCYTES # BLD AUTO: 0.03 10*3/MM3 (ref 0–0.05)
IMM GRANULOCYTES NFR BLD AUTO: 0.5 % (ref 0–0.5)
IMM RETICS NFR: 14.9 % (ref 3–15.8)
IRON 24H UR-MRATE: 68 MCG/DL (ref 59–158)
IRON SATN MFR SERPL: 21 % (ref 20–50)
LYMPHOCYTES # BLD AUTO: 0.97 10*3/MM3 (ref 0.7–3.1)
LYMPHOCYTES NFR BLD AUTO: 14.9 % (ref 19.6–45.3)
MAGNESIUM SERPL-MCNC: 1.8 MG/DL (ref 1.6–2.4)
MCH RBC QN AUTO: 28.6 PG (ref 26.6–33)
MCHC RBC AUTO-ENTMCNC: 33.9 G/DL (ref 31.5–35.7)
MCV RBC AUTO: 84.4 FL (ref 79–97)
MONOCYTES # BLD AUTO: 0.52 10*3/MM3 (ref 0.1–0.9)
MONOCYTES NFR BLD AUTO: 8 % (ref 5–12)
NEUTROPHILS NFR BLD AUTO: 4.54 10*3/MM3 (ref 1.7–7)
NEUTROPHILS NFR BLD AUTO: 69.4 % (ref 42.7–76)
NRBC BLD AUTO-RTO: 0 /100 WBC (ref 0–0.2)
PHOSPHATE SERPL-MCNC: 3.6 MG/DL (ref 2.5–4.5)
PLATELET # BLD AUTO: 135 10*3/MM3 (ref 140–450)
PMV BLD AUTO: 9.3 FL (ref 6–12)
POTASSIUM SERPL-SCNC: 4 MMOL/L (ref 3.5–5.2)
PROT SERPL-MCNC: 6.7 G/DL (ref 6–8.5)
PSA SERPL-MCNC: <0.014 NG/ML (ref 0–4)
RBC # BLD AUTO: 3.71 10*6/MM3 (ref 4.14–5.8)
RETICS # AUTO: 0.05 10*6/MM3 (ref 0.02–0.13)
RETICS/RBC NFR AUTO: 1.48 % (ref 0.7–1.9)
SODIUM SERPL-SCNC: 142 MMOL/L (ref 136–145)
TIBC SERPL-MCNC: 328 MCG/DL (ref 298–536)
TRANSFERRIN SERPL-MCNC: 220 MG/DL (ref 200–360)
WBC NRBC COR # BLD: 6.53 10*3/MM3 (ref 3.4–10.8)

## 2023-05-05 PROCEDURE — 3074F SYST BP LT 130 MM HG: CPT | Performed by: INTERNAL MEDICINE

## 2023-05-05 PROCEDURE — 84466 ASSAY OF TRANSFERRIN: CPT | Performed by: INTERNAL MEDICINE

## 2023-05-05 PROCEDURE — 85025 COMPLETE CBC W/AUTO DIFF WBC: CPT | Performed by: INTERNAL MEDICINE

## 2023-05-05 PROCEDURE — 3079F DIAST BP 80-89 MM HG: CPT | Performed by: INTERNAL MEDICINE

## 2023-05-05 PROCEDURE — 25010000002 LEUPROLIDE ACETATE (3 MONTH) PER 7.5 MG: Performed by: INTERNAL MEDICINE

## 2023-05-05 PROCEDURE — 96372 THER/PROPH/DIAG INJ SC/IM: CPT

## 2023-05-05 PROCEDURE — 83735 ASSAY OF MAGNESIUM: CPT | Performed by: INTERNAL MEDICINE

## 2023-05-05 PROCEDURE — 99214 OFFICE O/P EST MOD 30 MIN: CPT | Performed by: INTERNAL MEDICINE

## 2023-05-05 PROCEDURE — 25010000002 DENOSUMAB 120 MG/1.7ML SOLUTION: Performed by: INTERNAL MEDICINE

## 2023-05-05 PROCEDURE — 1159F MED LIST DOCD IN RCRD: CPT | Performed by: INTERNAL MEDICINE

## 2023-05-05 PROCEDURE — 1125F AMNT PAIN NOTED PAIN PRSNT: CPT | Performed by: INTERNAL MEDICINE

## 2023-05-05 PROCEDURE — 84153 ASSAY OF PSA TOTAL: CPT | Performed by: INTERNAL MEDICINE

## 2023-05-05 PROCEDURE — 1160F RVW MEDS BY RX/DR IN RCRD: CPT | Performed by: INTERNAL MEDICINE

## 2023-05-05 PROCEDURE — 85046 RETICYTE/HGB CONCENTRATE: CPT | Performed by: INTERNAL MEDICINE

## 2023-05-05 PROCEDURE — 80053 COMPREHEN METABOLIC PANEL: CPT | Performed by: INTERNAL MEDICINE

## 2023-05-05 PROCEDURE — 84100 ASSAY OF PHOSPHORUS: CPT | Performed by: INTERNAL MEDICINE

## 2023-05-05 PROCEDURE — 36415 COLL VENOUS BLD VENIPUNCTURE: CPT

## 2023-05-05 PROCEDURE — 82728 ASSAY OF FERRITIN: CPT | Performed by: INTERNAL MEDICINE

## 2023-05-05 PROCEDURE — 96402 CHEMO HORMON ANTINEOPL SQ/IM: CPT

## 2023-05-05 PROCEDURE — 83540 ASSAY OF IRON: CPT | Performed by: INTERNAL MEDICINE

## 2023-05-05 RX ORDER — HYDROCODONE BITARTRATE AND ACETAMINOPHEN 7.5; 325 MG/1; MG/1
1 TABLET ORAL EVERY 6 HOURS PRN
Qty: 120 TABLET | Refills: 0 | Status: SHIPPED | OUTPATIENT
Start: 2023-05-05

## 2023-05-05 RX ADMIN — DENOSUMAB 120 MG: 120 INJECTION SUBCUTANEOUS at 09:59

## 2023-05-05 RX ADMIN — LEUPROLIDE ACETATE 22.5 MG: KIT at 10:02

## 2023-05-05 NOTE — NURSING NOTE
Lupron and xgeva administered without complication. Patient aware of next appointment and knows to call sooner for any questions or concerns.

## 2023-05-09 ENCOUNTER — TELEPHONE (OUTPATIENT)
Dept: FAMILY MEDICINE CLINIC | Facility: CLINIC | Age: 78
End: 2023-05-09

## 2023-05-09 NOTE — TELEPHONE ENCOUNTER
Pharmacy Name: [facility] ) - [place], [place] - [address] [facility] [phone] [facility] [phone]      Pharmacy representative phone number: [phone]  REF #[id number]    What medication are you calling in regards to: vitamin D (ERGOCALCIFEROL) 1.25 MG (34253 UT) capsule capsule    What question does the pharmacy have: PLEASE CALL TO VERIFY THE INCREASED DOSAGE AMOUNT TO TWO CAPSULES A WEEK.     Who is the provider that prescribed the medication: JAMES EPLEY

## 2023-05-11 NOTE — TELEPHONE ENCOUNTER
It may be that his oncologist told him to take 2 twice a week  But have never written that away because it is a megadose  But that said recent vitamin D is low normal    Just clarify this I would keep it 1 twice a week if his oncologist or someone else told him to increase it just let me know I will take care of it and we will follow-up lab    Otherwise clarify and I will extend his prescription to make it last longer for him and we should repeat the vitamin D and the next  4 to 6 months or so  Just let me know and I will enter orders    Thank you

## 2023-05-12 RX ORDER — HYDROCORTISONE 5 MG/1
TABLET ORAL
Qty: 270 TABLET | Refills: 3 | Status: SHIPPED | OUTPATIENT
Start: 2023-05-12

## 2023-05-12 NOTE — TELEPHONE ENCOUNTER
Hub staff attempted to follow warm transfer process and was unsuccessful     Caller: Micah Krishna    Relationship to patient: Self    Best call back number: [phone]    Patient is needing: PATIENT IS RETURNING JAZMINE'S CALL

## 2023-05-15 ENCOUNTER — TELEPHONE (OUTPATIENT)
Dept: FAMILY MEDICINE CLINIC | Facility: CLINIC | Age: 78
End: 2023-05-15

## 2023-05-15 NOTE — TELEPHONE ENCOUNTER
Pharmacy Name: [facility]) - [place], [place] - [address] [facility] [phone] [facility] [phone]      Pharmacy representative name: TATY    Pharmacy representative phone number: [phone] REFERENCE  # [id number]    What medication are you calling in regards to: metoprolol succinate XL (TOPROL-XL) 25 MG 24 hr tablet    What question does the pharmacy have: WANT TO KNOW IS HE STILL TAKING THE 100MG ALSO OR JUST THE 25 MG     Who is the provider that prescribed the medication: JAMES EPLEY

## 2023-05-23 ENCOUNTER — OFFICE VISIT (OUTPATIENT)
Dept: FAMILY MEDICINE CLINIC | Facility: CLINIC | Age: 78
End: 2023-05-23
Payer: MEDICARE

## 2023-05-23 VITALS
WEIGHT: 220.6 LBS | SYSTOLIC BLOOD PRESSURE: 130 MMHG | HEART RATE: 80 BPM | HEIGHT: 72 IN | RESPIRATION RATE: 14 BRPM | DIASTOLIC BLOOD PRESSURE: 81 MMHG | OXYGEN SATURATION: 97 % | BODY MASS INDEX: 29.88 KG/M2 | TEMPERATURE: 97 F

## 2023-05-23 DIAGNOSIS — E11.65 TYPE 2 DIABETES MELLITUS WITH HYPERGLYCEMIA, WITHOUT LONG-TERM CURRENT USE OF INSULIN: ICD-10-CM

## 2023-05-23 DIAGNOSIS — F32.1 MODERATE MAJOR DEPRESSION: ICD-10-CM

## 2023-05-23 DIAGNOSIS — I10 ESSENTIAL HYPERTENSION: Primary | ICD-10-CM

## 2023-05-23 DIAGNOSIS — R40.0 DAYTIME SOMNOLENCE: ICD-10-CM

## 2023-05-23 DIAGNOSIS — E78.2 MIXED HYPERLIPIDEMIA: ICD-10-CM

## 2023-05-23 PROCEDURE — 3075F SYST BP GE 130 - 139MM HG: CPT | Performed by: NURSE PRACTITIONER

## 2023-05-23 PROCEDURE — 99213 OFFICE O/P EST LOW 20 MIN: CPT | Performed by: NURSE PRACTITIONER

## 2023-05-23 PROCEDURE — 3079F DIAST BP 80-89 MM HG: CPT | Performed by: NURSE PRACTITIONER

## 2023-05-23 NOTE — PROGRESS NOTES
"Chief Complaint  Hypertension (3 month f/u)    Subjective        Micah Krishna presents to [facility]  History of Present Illness  Pleasant patient here today follow-up hypertension controlled, diabetes mellitus stable adrenal insufficiency stable sees endocrinology hyperlipidemia mixed takes statin appropriately  Renal cell carcinoma is well as prostate cancer metastasis to the bone stable up-to-date with oncology up-to-date with scans     complains of some daytime somnolence, fatigue and some depression really since turning 78  Thinks he had a sleep study before a few years back was negative  He takes a nap for couple hours wakes up tired  Is never taken antidepressant does not want to take 1 now  No chest pain increased shortness of breath fever chills or weakness    Hypertension  Pertinent negatives include no headaches, shortness of breath or sweats.   Cough  The current episode started more than 1 month ago. The problem has been unchanged. The problem occurs every few hours. The cough is productive of sputum. Associated symptoms include nasal congestion, postnasal drip, rhinorrhea and wheezing. Pertinent negatives include no chills, ear congestion, ear pain, fever, headaches, heartburn, hemoptysis, myalgias, rash, sore throat, shortness of breath, sweats or weight loss. Nothing aggravates the symptoms. Risk factors for lung disease include smoking/tobacco exposure.       Objective   Vital Signs:  /81   Pulse 80   Temp 97 °F (36.1 °C) (Temporal)   Resp 14   Ht 182 cm (71.65\")   Wt 100 kg (220 lb 9.6 oz)   SpO2 97%   BMI 30.21 kg/m²   Estimated body mass index is 30.21 kg/m² as calculated from the following:    Height as of this encounter: 182 cm (71.65\").    Weight as of this encounter: 100 kg (220 lb 9.6 oz).          Physical Exam  Vitals reviewed.   Constitutional:       General: He is not in acute distress.     Appearance: Normal appearance. He is " well-developed. He is not ill-appearing, toxic-appearing or diaphoretic.   HENT:      Head: Normocephalic.      Nose: Nose normal.   Eyes:      General: No scleral icterus.     Conjunctiva/sclera: Conjunctivae normal.      Pupils: Pupils are equal, round, and reactive to light.   Neck:      Thyroid: No thyromegaly.      Vascular: No JVD.   Cardiovascular:      Rate and Rhythm: Normal rate and regular rhythm.      Heart sounds: Normal heart sounds. No murmur heard.    No friction rub. No gallop.   Pulmonary:      Effort: Pulmonary effort is normal. No respiratory distress.      Breath sounds: Normal breath sounds. No stridor. No wheezing or rales.   Abdominal:      General: Bowel sounds are normal. There is no distension.      Palpations: Abdomen is soft.      Tenderness: There is no abdominal tenderness.      Comments: No hepatosplenomegaly, no ascites,   Musculoskeletal:         General: No tenderness.      Cervical back: Neck supple.   Lymphadenopathy:      Cervical: No cervical adenopathy.   Skin:     General: Skin is warm and dry.      Findings: No erythema or rash.   Neurological:      General: No focal deficit present.      Mental Status: He is alert and oriented to person, place, and time. Mental status is at baseline.      Deep Tendon Reflexes: Reflexes are normal and symmetric.   Psychiatric:         Mood and Affect: Mood normal.         Behavior: Behavior normal.         Thought Content: Thought content normal.         Judgment: Judgment normal.        Result Review :                Assessment and Plan   Diagnoses and all orders for this visit:    1. Essential hypertension (Primary)    2. Daytime somnolence  -     Ambulatory Referral to Sleep Medicine    3. Mixed hyperlipidemia    4. Type 2 diabetes mellitus with hyperglycemia, without long-term current use of insulin    5. Moderate major depression           I spent 20 minutes caring for Micah on this date of service. This time includes time spent by me  in the following activities:preparing for the visit, reviewing tests, obtaining and/or reviewing a separately obtained history, performing a medically appropriate examination and/or evaluation , counseling and educating the patient/family/caregiver, ordering medications, tests, or procedures, referring and communicating with other health care professionals , documenting information in the medical record and care coordination  Follow Up   Return in about 6 weeks (around 7/4/2023).  Patient was given instructions and counseling regarding his condition or for health maintenance advice. Please see specific information pulled into the AVS if appropriate.     Patient Instructions   Discharge instructions    Bright lights sunshine therapy  Try a power nap set your alarm for 20 minutes  Adjusted based on what works best for you 15-minute or 25-minute  But generally anything longer you will go into a deeper brainwave sleep and feel groggy when awaking  Very short brief nap, you do not have to fall asleep, not necessarily the goal  We will allow your brain to reset the brain waves, this is science  Followed by half cup of coffee or tea  Sit outside on the porch and see if you do not feel better    Some gentle walking were able with pain  Some pool therapy on the nice days    Positive music  105.9

## 2023-05-23 NOTE — PATIENT INSTRUCTIONS
Discharge instructions    Bright lights sunshine therapy  Try a power nap set your alarm for 20 minutes  Adjusted based on what works best for you 15-minute or 25-minute  But generally anything longer you will go into a deeper brainwave sleep and feel groggy when awaking  Very short brief nap, you do not have to fall asleep, not necessarily the goal  We will allow your brain to reset the brain waves, this is science  Followed by half cup of coffee or tea  Sit outside on the porch and see if you do not feel better    Some gentle walking were able with pain  Some pool therapy on the nice days    Positive music  105.9

## 2023-06-15 DIAGNOSIS — G89.3 CANCER ASSOCIATED PAIN: ICD-10-CM

## 2023-06-16 RX ORDER — HYDROCODONE BITARTRATE AND ACETAMINOPHEN 7.5; 325 MG/1; MG/1
1 TABLET ORAL EVERY 6 HOURS PRN
Qty: 120 TABLET | Refills: 0 | Status: SHIPPED | OUTPATIENT
Start: 2023-06-16

## 2023-06-16 RX ORDER — ERGOCALCIFEROL 1.25 MG/1
CAPSULE ORAL
Qty: 14 CAPSULE | Refills: 1 | Status: SHIPPED | OUTPATIENT
Start: 2023-06-16

## 2023-07-24 ENCOUNTER — HOSPITAL ENCOUNTER (OUTPATIENT)
Dept: CT IMAGING | Facility: HOSPITAL | Age: 78
Discharge: HOME | End: 2023-07-24
Payer: MEDICARE

## 2023-07-24 ENCOUNTER — APPOINTMENT (OUTPATIENT)
Dept: OTHER | Facility: HOSPITAL | Age: 78
End: 2023-07-24
Payer: MEDICARE

## 2023-07-24 DIAGNOSIS — C61 PROSTATE CANCER METASTATIC TO BONE: ICD-10-CM

## 2023-07-24 DIAGNOSIS — G89.3 CANCER ASSOCIATED PAIN: ICD-10-CM

## 2023-07-24 DIAGNOSIS — C79.51 PROSTATE CANCER METASTATIC TO BONE: ICD-10-CM

## 2023-07-24 DIAGNOSIS — Z09 FOLLOW-UP EXAM: ICD-10-CM

## 2023-07-24 PROCEDURE — 71250 CT THORAX DX C-: CPT

## 2023-07-24 PROCEDURE — 74176 CT ABD & PELVIS W/O CONTRAST: CPT

## 2023-07-27 NOTE — PROGRESS NOTES
"[facility] GROUP OUTPATIENT FOLLOW UP CLINIC VISIT    REASON FOR FOLLOW-UP:    1.  History of metastatic clear cell renal cell carcinoma.  All known disease had previously been resected..  2.  Pulmonary metastases discovered July 2020.  Bone metastases discovered September 2020.  3.  Votrient initiated 10/16/2020  4. Palliative radiation for pain complete on 11/10/2020  5.  1/4/2021 significantly elevated PSA > 100.  6.  1/20/2021 bone biopsy confirms metastatic prostate cancer    HISTORY OF PRESENT ILLNESS:  Micah Krishna is a 78 y.o. male with the above-mentioned history who returns today for follow-up    He is overall doing about the same.  He had an episode of hypoglycemia while driving which quickly resolved with therapy in an emergency department.  Pain is relatively stable although worse recently after he has been doing some work around the house.  Hydrocodone helps with the pain and he requests a refill today.      REVIEW OF SYSTEMS:  As per HPI    PE:  Vitals:    07/28/23 0849   BP: 154/79   Pulse: 58   Resp: 16   Temp: 98.5 °F (36.9 °C)   TempSrc: Temporal   SpO2: 99%   Weight: 99.8 kg (220 lb)   Height: 182 cm (71.65\")   PainSc:   4   PainLoc: Back        General:  No acute distress, awake, alert and oriented  Skin:  Warm and dry, no visible rash  HEENT:  Normocephalic/atraumatic.    Chest:  Normal respiratory effort.  Lungs clear to auscultation bilaterally.  Heart: Regular rate and rhythm  Extremities: No clubbing cyanosis or edema  Neuro/psych:  Grossly non-focal.  Normal mood and affect.      DIAGNOSTIC DATA:  CBC and Differential (07/28/2023 08:34)   PSA Diagnostic (07/28/2023 08:34)  Comprehensive metabolic panel (07/28/2023 08:34)  Magnesium (07/28/2023 08:34)  Phosphorus (07/28/2023 08:34)    IMAGING:  CT Abdomen Pelvis Without Contrast (07/24/2023 10:14)  CT Chest Without Contrast Diagnostic (07/24/2023 10:14)    CT images from 7/24/2023 personally reviewed.  Stable findings.  Stable " bony metastases.  Small right lower lobe groundglass density to be monitored.  No progression.    ASSESSMENT:  This is a 78 y.o. male with:    *Mild normocytic anemia:   Likely due to ADT and metastatic prostate cancer.  Hemoglobin stable at 11.2    *Adrenal insufficiency following bilateral adrenalectomy   On replacement hormone therapy.    He follows with endocrinology.    *History of metastatic renal cell carcinoma.    He had a left nephrectomy and adrenalectomy in 2000 and then a right adrenalectomy for metastatic disease in 2012.    There remains no evidence of disease.  See below.    *New metastatic bone disease diagnosed as he presented with worsening pain, ultimately found to be metastatic prostate cancer  Presumed to be metastatic renal cell carcinoma but ultimately diagnosed with metastatic prostate cancer.  Imaging discussed with radiology.  Imaging consistent with metastatic renal cell carcinoma  However, his PSA was noted to be greater than 100  As discussed below, bone biopsy subsequently confirmed metastatic prostate cancer  CT C/A/P 7/6/2020 with a RUL sub 6 mm pulmonary nodule in the RUL, stable since 1/17/2018, new 6 mm nodules in the medial RLL and RUL,  LLL nodule unchanged since 7/17/2018.  Healing left ninth rib fracture.  CT chest 9/28/2020 with stable lung nodules, pathologic fracture of the right 8th rib, abnormal appearance of T8, narrowing of the thecal sack at this level, additional lucencies at several vertebral bodies such as T7 and T11, subacute healing fractures in the anterior right 6th rib and lateral left 9th rib.  CT head 9/28/2020 with calvarial metastatic disease involving the frontal and occipital bones.  Bone scan 9/28/2020 with multifocal uptake consistent with metastatic disease. Anterior frontal bone, upper C spine at C1 or C2, T and L spine at T3, T8, T11, multifocal rib uptake, abnormal uptake in the iliac wings/bodies and left femoral heads/acetabulum, distal left  clavicle and both humeral heads.   PET scan from 10/8/2020.  Multifocal bone metastases throughout the skeleton.  Pathologic rib and vertebral body fracture.  Moderate FDG uptake in the right femoral neck with some sclerosis, new since 7/6/2020.  Mottled appearance of L3 and T8 vertebral bodies with pathologic compression fractures.  L3 fracture is a burst fracture with 20 to 25% loss of height and 4 to 5 mm of retropulsion in the central spinal canal.  Pathologic compression fracture at T8 with 10% loss of height.  New findings since 7/6/2020.  FDG avid soft tissue nodule extending into the central canal along the posterior aspect of the thecal sac at T8.  FDG uptake in the left clavicle.  Compression fracture of the right posterior eighth rib.  Votrient initiated 10/16/2020  Palliative radiation for pain complete on 11/10/2020  He did subsequently presented with an elevated PSA >100.  Bone biopsy performed on 1/20/2021 confirmed metastatic prostate cancer.  Votrient discontinued  2/5/2021 patient given Firmagon and Xgeva.  With plans to start Eligard 4 weeks later, and continue monthly Xgeva.    3/5/21.First dose of Eligard and monthly Xgeva.  Eligard will be given every 3 months.  12/17/2021: Proceeded with Xgeva.  PSA remains very low at 0.015.  1/21/2022: Hold Xgeva due to dental issues.  Bone scan on 2/7/2022 with improvement  5/20/2022: He is done with all of his dental work.  Proceed with Xgeva.  Proceed with lupron and every 3 months  8/19/2022: Proceed with Xgeva and Lupron.  Move Xgeva to every 3 months.  11/18/2022: Proceed with Xgeva and Lupron both every 3 months at this point  2/10/2023: Xgeva and Lupron.    5/5/2023: Proceed with Xgeva and Lupron.  7/28/2023: Proceed with Xgeva and Lupron    *Chronic kidney disease:   Status post left nephrectomy.    Creatinine remains excellent at 1.10    *Migratory skeletal pain:   Metastatic disease apparent now on CT imaging and bone scan and now PET  scan.  Completed radiation   Left leg pain has improved  Back pain improved  4/2/2021 patient continues on Norco 5/325 2 to 3 tablets/day with good pain control.  This remains stable as of 4/30/2021.  5/28/21.  The patient continues on [place] 5/325 with 2 to 3 tablets/day.  Pain is stable.   7/2/2021: He ran out of the hydrocodone/acetaminophen and has been using more ibuprofen which has controlled his pain but I encouraged him not to use as much ibuprofen to protect his kidney.  Pain is stable as of 7/30/2021.  10/22/2021: Pain is stable.  Pain medication refilled.  He was started on meloxicam by primary care which she will start in the near future.  12/17/2021: His low back is starting to worsen.  Increased hydrocodone to 7.5 mg with improvement.    5/5/2023: Low back pain is worse likely due to doing yard work.  He continues hydrocodone/acetaminophen which does control his pain.    *Mild hypomagnesemia: monitor. If muscle cramps advised to call us.  Magnesium normal at 1.8    *Elevated PSA, subsequent biopsy confirmed metastatic prostate cancer:   He has a history of very mild elevation of his PSA in the past with a PSA of 5.92 on 10/17/2018.    His PSA on 1/4/2021 was greater than 100  PSA repeated today is also greater than 100  He is completely asymptomatic without clinical evidence for prostatitis.    Dr. Cat did communicate with Dr. Zapata with radiology and all findings on imaging would support more metastatic renal cell carcinoma rather than metastatic prostate cancer since the bony lesions are lytic.  There is nothing obvious in his prostate on current imaging.  Dr. Cat communicated with Dr. Cabrera with urology.  He is in agreement with a bone biopsy and he will see him in the office for evaluation.  One of the bony lesions is amenable to CT-guided biopsy per Dr. Zapata.  CT-guided needle biopsy of the bone performed on 1/20/2021 confirms metastatic prostate cancer.  3/5/21.  Please see above.  The  patient received 1 dose of Firmagon and is on Eligard every 3 months.  1/21/2022: PSA stable and quite low at 0.019  PSA undetectable on 5/20/2022 7/28/2023: PSA remains undetectable    *New 40% T2 compression fracture.  Asymptomatic.  He is not interested in further radiation at this time.  Might consider referral to neurosurgery should he become more symptomatic or if this worsens.    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  He again did not complain of this today.    *He did have an MRI of the pituitary gland on 8/16/2021 as ordered by his endocrinologist.  Pituitary appears normal.  There is abnormal enhancement of the clivus suspected to be related to his metastatic prostate cancer.  There is an 11 mm lesion overlying the left frontal convexity suspected to be a meningioma.  The patient states that he was referred to neurosurgery but is not necessarily interested in pursuing this.  We can repeat an MRI in the future if desired.  He continues to not desire any follow-up for regarding this.  He does not desire surgery.    *Dental issues: 5/20/2022: Completed all of his dental work at least 6 weeks ago.  Proceed with Xgeva today and every month for now.    *Skin lesion left cheek: Question sebaceous cyst.  Previously referred to dermatology.  Not discussed again today.    *History of pain in the left lower extremity both above and below the knee.  There is a little bit of edema there in February 2023  Left lower extremity venous duplex on 2/10/2023 was negative for DVT  Bone scan was stable  Pain resolved    PLAN:   Continue Xgeva today and every 3 months  Lupron today and every 3 months  Hydrocodone/acetaminophen 7.5/325 mg every 6 hours as needed.  Refilled again today.  Follow-up in 3 months with labs, Xgeva, Lupron    He remains on high risk medication requiring intensive monitoring.      I spent 45 minutes in this visit today reviewing his record, communicating with him, examining him,  communicating with staff, placing orders, documenting the encounter.

## 2023-07-28 ENCOUNTER — INFUSION (OUTPATIENT)
Dept: ONCOLOGY | Facility: HOSPITAL | Age: 78
End: 2023-07-28
Payer: MEDICARE

## 2023-07-28 ENCOUNTER — OFFICE VISIT (OUTPATIENT)
Dept: ONCOLOGY | Facility: CLINIC | Age: 78
End: 2023-07-28
Payer: MEDICARE

## 2023-07-28 ENCOUNTER — LAB (OUTPATIENT)
Dept: OTHER | Facility: HOSPITAL | Age: 78
End: 2023-07-28
Payer: MEDICARE

## 2023-07-28 VITALS
HEART RATE: 58 BPM | WEIGHT: 220 LBS | RESPIRATION RATE: 16 BRPM | SYSTOLIC BLOOD PRESSURE: 154 MMHG | DIASTOLIC BLOOD PRESSURE: 79 MMHG | OXYGEN SATURATION: 99 % | BODY MASS INDEX: 29.8 KG/M2 | TEMPERATURE: 98.5 F | HEIGHT: 72 IN

## 2023-07-28 DIAGNOSIS — G89.3 CANCER ASSOCIATED PAIN: ICD-10-CM

## 2023-07-28 DIAGNOSIS — C79.51 PROSTATE CANCER METASTATIC TO BONE: ICD-10-CM

## 2023-07-28 DIAGNOSIS — C79.51 PROSTATE CANCER METASTATIC TO BONE: Primary | ICD-10-CM

## 2023-07-28 DIAGNOSIS — C61 PROSTATE CANCER METASTATIC TO BONE: Primary | ICD-10-CM

## 2023-07-28 DIAGNOSIS — C61 PROSTATE CANCER METASTATIC TO BONE: ICD-10-CM

## 2023-07-28 LAB
ALBUMIN SERPL-MCNC: 3.9 G/DL (ref 3.5–5.2)
ALBUMIN/GLOB SERPL: 1.4 G/DL
ALP SERPL-CCNC: 40 U/L (ref 39–117)
ALT SERPL W P-5'-P-CCNC: 12 U/L (ref 1–41)
ANION GAP SERPL CALCULATED.3IONS-SCNC: 9.3 MMOL/L (ref 5–15)
AST SERPL-CCNC: 13 U/L (ref 1–40)
BASOPHILS # BLD AUTO: 0.05 10*3/MM3 (ref 0–0.2)
BASOPHILS NFR BLD AUTO: 0.9 % (ref 0–1.5)
BILIRUB SERPL-MCNC: 0.4 MG/DL (ref 0–1.2)
BUN SERPL-MCNC: 20 MG/DL (ref 8–23)
BUN/CREAT SERPL: 18.2 (ref 7–25)
CALCIUM SPEC-SCNC: 9.2 MG/DL (ref 8.6–10.5)
CHLORIDE SERPL-SCNC: 109 MMOL/L (ref 98–107)
CO2 SERPL-SCNC: 26.7 MMOL/L (ref 22–29)
CREAT SERPL-MCNC: 1.1 MG/DL (ref 0.76–1.27)
DEPRECATED RDW RBC AUTO: 42.5 FL (ref 37–54)
EGFRCR SERPLBLD CKD-EPI 2021: 67.9 ML/MIN/1.73
EOSINOPHIL # BLD AUTO: 0.34 10*3/MM3 (ref 0–0.4)
EOSINOPHIL NFR BLD AUTO: 5.8 % (ref 0.3–6.2)
ERYTHROCYTE [DISTWIDTH] IN BLOOD BY AUTOMATED COUNT: 13.9 % (ref 12.3–15.4)
GLOBULIN UR ELPH-MCNC: 2.7 GM/DL
GLUCOSE SERPL-MCNC: 178 MG/DL (ref 65–99)
HCT VFR BLD AUTO: 33.3 % (ref 37.5–51)
HGB BLD-MCNC: 11.2 G/DL (ref 13–17.7)
IMM GRANULOCYTES # BLD AUTO: 0.02 10*3/MM3 (ref 0–0.05)
IMM GRANULOCYTES NFR BLD AUTO: 0.3 % (ref 0–0.5)
LYMPHOCYTES # BLD AUTO: 1.47 10*3/MM3 (ref 0.7–3.1)
LYMPHOCYTES NFR BLD AUTO: 25.2 % (ref 19.6–45.3)
MAGNESIUM SERPL-MCNC: 1.8 MG/DL (ref 1.6–2.4)
MCH RBC QN AUTO: 28.6 PG (ref 26.6–33)
MCHC RBC AUTO-ENTMCNC: 33.6 G/DL (ref 31.5–35.7)
MCV RBC AUTO: 85.2 FL (ref 79–97)
MONOCYTES # BLD AUTO: 0.5 10*3/MM3 (ref 0.1–0.9)
MONOCYTES NFR BLD AUTO: 8.6 % (ref 5–12)
NEUTROPHILS NFR BLD AUTO: 3.45 10*3/MM3 (ref 1.7–7)
NEUTROPHILS NFR BLD AUTO: 59.2 % (ref 42.7–76)
NRBC BLD AUTO-RTO: 0 /100 WBC (ref 0–0.2)
PHOSPHATE SERPL-MCNC: 3.3 MG/DL (ref 2.5–4.5)
PLATELET # BLD AUTO: 110 10*3/MM3 (ref 140–450)
PMV BLD AUTO: 9.4 FL (ref 6–12)
POTASSIUM SERPL-SCNC: 4.2 MMOL/L (ref 3.5–5.2)
PROT SERPL-MCNC: 6.6 G/DL (ref 6–8.5)
PSA SERPL-MCNC: <0.014 NG/ML (ref 0–4)
RBC # BLD AUTO: 3.91 10*6/MM3 (ref 4.14–5.8)
SODIUM SERPL-SCNC: 145 MMOL/L (ref 136–145)
WBC NRBC COR # BLD: 5.83 10*3/MM3 (ref 3.4–10.8)

## 2023-07-28 PROCEDURE — 84153 ASSAY OF PSA TOTAL: CPT | Performed by: INTERNAL MEDICINE

## 2023-07-28 PROCEDURE — 36415 COLL VENOUS BLD VENIPUNCTURE: CPT

## 2023-07-28 PROCEDURE — 96402 CHEMO HORMON ANTINEOPL SQ/IM: CPT

## 2023-07-28 PROCEDURE — 25010000002 DENOSUMAB 120 MG/1.7ML SOLUTION: Performed by: INTERNAL MEDICINE

## 2023-07-28 PROCEDURE — 96372 THER/PROPH/DIAG INJ SC/IM: CPT

## 2023-07-28 PROCEDURE — 83735 ASSAY OF MAGNESIUM: CPT | Performed by: INTERNAL MEDICINE

## 2023-07-28 PROCEDURE — 80053 COMPREHEN METABOLIC PANEL: CPT | Performed by: INTERNAL MEDICINE

## 2023-07-28 PROCEDURE — 25010000002 LEUPROLIDE ACETATE (3 MONTH) PER 7.5 MG: Performed by: INTERNAL MEDICINE

## 2023-07-28 PROCEDURE — 85025 COMPLETE CBC W/AUTO DIFF WBC: CPT | Performed by: INTERNAL MEDICINE

## 2023-07-28 PROCEDURE — 84100 ASSAY OF PHOSPHORUS: CPT | Performed by: INTERNAL MEDICINE

## 2023-07-28 RX ORDER — HYDROCODONE BITARTRATE AND ACETAMINOPHEN 7.5; 325 MG/1; MG/1
1 TABLET ORAL EVERY 6 HOURS PRN
Qty: 120 TABLET | Refills: 0 | Status: SHIPPED | OUTPATIENT
Start: 2023-07-28

## 2023-07-28 RX ADMIN — DENOSUMAB 120 MG: 120 INJECTION SUBCUTANEOUS at 09:29

## 2023-07-28 RX ADMIN — LEUPROLIDE ACETATE 22.5 MG: KIT at 09:33

## 2023-09-12 DIAGNOSIS — G89.3 CANCER ASSOCIATED PAIN: ICD-10-CM

## 2023-09-13 RX ORDER — HYDROCODONE BITARTRATE AND ACETAMINOPHEN 7.5; 325 MG/1; MG/1
1 TABLET ORAL EVERY 6 HOURS PRN
Qty: 120 TABLET | Refills: 0 | Status: SHIPPED | OUTPATIENT
Start: 2023-09-13

## 2023-09-14 RX ORDER — ALLOPURINOL 300 MG/1
300 TABLET ORAL DAILY
Qty: 90 TABLET | Refills: 3 | Status: SHIPPED | OUTPATIENT
Start: 2023-09-14

## 2023-09-14 RX ORDER — CLONIDINE HYDROCHLORIDE 0.2 MG/1
0.2 TABLET ORAL DAILY
Qty: 90 TABLET | Refills: 1 | Status: SHIPPED | OUTPATIENT
Start: 2023-09-14

## 2023-09-14 RX ORDER — AMLODIPINE BESYLATE 10 MG/1
10 TABLET ORAL DAILY
Qty: 90 TABLET | Refills: 1 | Status: SHIPPED | OUTPATIENT
Start: 2023-09-14

## 2023-09-14 NOTE — TELEPHONE ENCOUNTER
Rx Refill Note  Requested Prescriptions     Pending Prescriptions Disp Refills    amLODIPine (NORVASC) 10 MG tablet [Pharmacy Med Name: amLODIPine Besylate 10 MG Oral Tablet] 90 tablet 1     Sig: TAKE 1 TABLET BY MOUTH DAILY    cloNIDine (CATAPRES) 0.2 MG tablet [Pharmacy Med Name: cloNIDine HCl 0.2 MG Oral Tablet] 90 tablet 1     Sig: TAKE 1 TABLET BY MOUTH DAILY    allopurinol (ZYLOPRIM) 300 MG tablet [Pharmacy Med Name: Allopurinol 300 MG Oral Tablet] 90 tablet 3     Sig: TAKE 1 TABLET BY MOUTH DAILY      Last office visit with prescribing clinician: 7/18/2023   Last telemedicine visit with prescribing clinician: Visit date not found   Next office visit with prescribing clinician: 1/23/2024                         Would you like a call back once the refill request has been completed: [] Yes [] No    If the office needs to give you a call back, can they leave a voicemail: [] Yes [] No    Delmar Bañuelos MA  09/14/23, 08:21 EDT

## 2023-09-21 RX ORDER — ISOSORBIDE MONONITRATE 30 MG/1
TABLET, EXTENDED RELEASE ORAL
Qty: 180 TABLET | Refills: 1 | Status: SHIPPED | OUTPATIENT
Start: 2023-09-21

## 2023-09-25 RX ORDER — VALSARTAN 160 MG/1
TABLET ORAL
Qty: 180 TABLET | Refills: 2 | Status: SHIPPED | OUTPATIENT
Start: 2023-09-25

## 2023-10-19 RX ORDER — POTASSIUM CHLORIDE 20 MEQ/1
20 TABLET, EXTENDED RELEASE ORAL DAILY
Qty: 90 TABLET | Refills: 1 | Status: SHIPPED | OUTPATIENT
Start: 2023-10-19

## 2023-10-19 NOTE — TELEPHONE ENCOUNTER
Rx Refill Note  Requested Prescriptions     Pending Prescriptions Disp Refills    potassium chloride (K-DUR,KLOR-CON) 20 MEQ CR tablet [Pharmacy Med Name: Potassium Chloride [place] ER 20 MEQ Oral Tablet Extended Release] 90 tablet 1     Sig: TAKE 1 TABLET BY MOUTH DAILY      Last office visit with prescribing clinician: 7/18/2023   Last telemedicine visit with prescribing clinician: Visit date not found   Next office visit with prescribing clinician: 1/23/2024                         Would you like a call back once the refill request has been completed: [] Yes [] No    If the office needs to give you a call back, can they leave a voicemail: [] Yes [] No    Lyla Harrison  10/19/23, 08:18 EDT

## 2023-10-19 NOTE — PROGRESS NOTES
"[facility] GROUP OUTPATIENT FOLLOW UP CLINIC VISIT    REASON FOR FOLLOW-UP:    1.  History of metastatic clear cell renal cell carcinoma.  All known disease had previously been resected..  2.  Pulmonary metastases discovered July 2020.  Bone metastases discovered September 2020.  3.  Votrient initiated 10/16/2020  4. Palliative radiation for pain complete on 11/10/2020  5.  1/4/2021 significantly elevated PSA > 100.  6.  1/20/2021 bone biopsy confirms metastatic prostate cancer    HISTORY OF PRESENT ILLNESS:  Micah Krishna is a 78 y.o. male with the above-mentioned history who returns today for follow-up    He has been pretty active recently and as a result of that is having more pain, particularly some right flank pain and left lower extremity pain that radiates from the hip to the knee.  He has been using a little bit more pain medication as result of this.  He notes a nocturnal cough that is productive of sputum.  No hemoptysis.      REVIEW OF SYSTEMS:  As per HPI    PE:  Vitals:    10/20/23 0943   BP: 138/75   Pulse: 61   Resp: 18   Temp: 97.5 °F (36.4 °C)   TempSrc: Temporal   SpO2: 96%   Weight: 102 kg (223 lb 12.8 oz)   Height: 182 cm (71.65\")   PainSc:   4   PainLoc: Back          General:  No acute distress, awake, alert and oriented  Skin:  Warm and dry, no visible rash  HEENT:  Normocephalic/atraumatic.    Chest:  Normal respiratory effort.  Lungs clear to auscultation bilaterally.  Heart: Regular rate and rhythm  Extremities: No clubbing cyanosis or edema  Neuro/psych:  Grossly non-focal.  Normal mood and affect.      DIAGNOSTIC DATA:  Phosphorus (10/20/2023 09:30)  Magnesium (10/20/2023 09:30)  Comprehensive metabolic panel (10/20/2023 09:30)  CBC and Differential (10/20/2023 09:30)  PSA Diagnostic (10/20/2023 09:30)     IMAGING:  None for review    ASSESSMENT:  This is a 78 y.o. male with:    *Mild normocytic anemia:   Likely due to ADT and metastatic prostate cancer.  Hemoglobin stable at " 10.9    *Adrenal insufficiency following bilateral adrenalectomy   On replacement hormone therapy.    He follows with endocrinology.    *History of metastatic renal cell carcinoma.    He had a left nephrectomy and adrenalectomy in 2000 and then a right adrenalectomy for metastatic disease in 2012.    There remains no evidence of disease.    *New metastatic bone disease diagnosed as he presented with worsening pain, ultimately found to be metastatic prostate cancer  Presumed to be metastatic renal cell carcinoma but ultimately diagnosed with metastatic prostate cancer.  Imaging discussed with radiology.  Imaging consistent with metastatic renal cell carcinoma  However, his PSA was noted to be greater than 100  As discussed below, bone biopsy subsequently confirmed metastatic prostate cancer  CT C/A/P 7/6/2020 with a RUL sub 6 mm pulmonary nodule in the RUL, stable since 1/17/2018, new 6 mm nodules in the medial RLL and RUL,  LLL nodule unchanged since 7/17/2018.  Healing left ninth rib fracture.  CT chest 9/28/2020 with stable lung nodules, pathologic fracture of the right 8th rib, abnormal appearance of T8, narrowing of the thecal sack at this level, additional lucencies at several vertebral bodies such as T7 and T11, subacute healing fractures in the anterior right 6th rib and lateral left 9th rib.  CT head 9/28/2020 with calvarial metastatic disease involving the frontal and occipital bones.  Bone scan 9/28/2020 with multifocal uptake consistent with metastatic disease. Anterior frontal bone, upper C spine at C1 or C2, T and L spine at T3, T8, T11, multifocal rib uptake, abnormal uptake in the iliac wings/bodies and left femoral heads/acetabulum, distal left clavicle and both humeral heads.   PET scan from 10/8/2020.  Multifocal bone metastases throughout the skeleton.  Pathologic rib and vertebral body fracture.  Moderate FDG uptake in the right femoral neck with some sclerosis, new since 7/6/2020.  Mottled  appearance of L3 and T8 vertebral bodies with pathologic compression fractures.  L3 fracture is a burst fracture with 20 to 25% loss of height and 4 to 5 mm of retropulsion in the central spinal canal.  Pathologic compression fracture at T8 with 10% loss of height.  New findings since 7/6/2020.  FDG avid soft tissue nodule extending into the central canal along the posterior aspect of the thecal sac at T8.  FDG uptake in the left clavicle.  Compression fracture of the right posterior eighth rib.  Votrient initiated 10/16/2020  Palliative radiation for pain complete on 11/10/2020  He did subsequently presented with an elevated PSA >100.  Bone biopsy performed on 1/20/2021 confirmed metastatic prostate cancer.  Votrient discontinued  2/5/2021 patient given Firmagon and Xgeva.  With plans to start Eligard 4 weeks later, and continue monthly Xgeva.    3/5/21.First dose of Eligard and monthly Xgeva.  Eligard will be given every 3 months.  12/17/2021: Proceeded with Xgeva.  PSA remains very low at 0.015.  1/21/2022: Hold Xgeva due to dental issues.  Bone scan on 2/7/2022 with improvement  5/20/2022: He is done with all of his dental work.  Proceed with Xgeva.  Proceed with lupron and every 3 months  8/19/2022: Proceed with Xgeva and Lupron.  Move Xgeva to every 3 months.  11/18/2022: Proceed with Xgeva and Lupron both every 3 months at this point  2/10/2023: Xgeva and Lupron.    5/5/2023: Proceed with Xgeva and Lupron.  7/28/2023: Proceed with Xgeva and Lupron  10/20/23: proceed with therapy    *Chronic kidney disease:   Status post left nephrectomy.    Creatinine remains stable at 1.17    *Migratory skeletal pain:   Metastatic disease apparent now on CT imaging and bone scan and now PET scan.  Completed radiation   Left leg pain has improved  Back pain improved  4/2/2021 patient continues on Norco 5/325 2 to 3 tablets/day with good pain control.  This remains stable as of 4/30/2021.  5/28/21.  The patient continues on  Norco 5/325 with 2 to 3 tablets/day.  Pain is stable.   7/2/2021: He ran out of the hydrocodone/acetaminophen and has been using more ibuprofen which has controlled his pain but I encouraged him not to use as much ibuprofen to protect his kidney.  Pain is stable as of 7/30/2021.  10/22/2021: Pain is stable.  Pain medication refilled.  He was started on meloxicam by primary care which she will start in the near future.  12/17/2021: His low back is starting to worsen.  Increased hydrocodone to 7.5 mg with improvement.    5/5/2023: Low back pain is worse likely due to doing yard work.  He continues hydrocodone/acetaminophen which does control his pain.    *Mild hypomagnesemia: monitor. If muscle cramps advised to call us.  Magnesium normal at 2.0    *Elevated PSA, subsequent biopsy confirmed metastatic prostate cancer:   He has a history of very mild elevation of his PSA in the past with a PSA of 5.92 on 10/17/2018.    His PSA on 1/4/2021 was greater than 100  PSA repeated today is also greater than 100  He is completely asymptomatic without clinical evidence for prostatitis.    Dr. Cat did communicate with Dr. Zapata with radiology and all findings on imaging would support more metastatic renal cell carcinoma rather than metastatic prostate cancer since the bony lesions are lytic.  There is nothing obvious in his prostate on current imaging.  Dr. Cat communicated with Dr. Cabrera with urology.  He is in agreement with a bone biopsy and he will see him in the office for evaluation.  One of the bony lesions is amenable to CT-guided biopsy per Dr. Zapata.  CT-guided needle biopsy of the bone performed on 1/20/2021 confirms metastatic prostate cancer.  3/5/21.  Please see above.  The patient received 1 dose of Firmagon and is on Eligard every 3 months.  1/21/2022: PSA stable and quite low at 0.019  PSA undetectable on 5/20/2022  10/20/23: PSA remains undetectable    *New 40% T2 compression fracture.  Asymptomatic.  He  is not interested in further radiation at this time.  Might consider referral to neurosurgery should he become more symptomatic or if this worsens.    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  He again did not complain of this today.    *He did have an MRI of the pituitary gland on 8/16/2021 as ordered by his endocrinologist.  Pituitary appears normal.  There is abnormal enhancement of the clivus suspected to be related to his metastatic prostate cancer.  There is an 11 mm lesion overlying the left frontal convexity suspected to be a meningioma.  The patient states that he was referred to neurosurgery but is not necessarily interested in pursuing this.  We can repeat an MRI in the future if desired.  He continues to not desire any follow-up for regarding this.  He does not desire surgery.    *Dental issues: 5/20/2022: Completed all of his dental work.  Proceed with Xgeva today and every three months for now.    *Skin lesion left cheek: Question sebaceous cyst.  Previously referred to dermatology.  Not discussed again today.    *History of pain in the left lower extremity both above and below the knee.  There is a little bit of edema there in February 2023  Left lower extremity venous duplex on 2/10/2023 was negative for DVT  Bone scan was stable  Pain resolved    PLAN:     Continue Xgeva again today and every 3 months  Lupron again today and every 3 months  Hydrocodone/acetaminophen 7.5/325 mg every 6 hours as needed.  Refilled again today.  Refilled nitro spray at his request  CT and bone scan imaging prior to follow up  Follow-up in 3 months with labs, Xgeva, Lupron    He remains on high risk medication requiring intensive monitoring.      I spent 45 minutes in this visit today reviewing his record, communicating with him, examining him, placing orders, documenting the encounter.

## 2023-10-20 ENCOUNTER — INFUSION (OUTPATIENT)
Dept: ONCOLOGY | Facility: HOSPITAL | Age: 78
End: 2023-10-20
Payer: MEDICARE

## 2023-10-20 ENCOUNTER — LAB (OUTPATIENT)
Dept: OTHER | Facility: HOSPITAL | Age: 78
End: 2023-10-20
Payer: MEDICARE

## 2023-10-20 ENCOUNTER — OFFICE VISIT (OUTPATIENT)
Dept: ONCOLOGY | Facility: CLINIC | Age: 78
End: 2023-10-20
Payer: MEDICARE

## 2023-10-20 VITALS
OXYGEN SATURATION: 96 % | RESPIRATION RATE: 18 BRPM | BODY MASS INDEX: 30.31 KG/M2 | WEIGHT: 223.8 LBS | HEIGHT: 72 IN | SYSTOLIC BLOOD PRESSURE: 138 MMHG | HEART RATE: 61 BPM | TEMPERATURE: 97.5 F | DIASTOLIC BLOOD PRESSURE: 75 MMHG

## 2023-10-20 DIAGNOSIS — C79.51 PROSTATE CANCER METASTATIC TO BONE: Primary | ICD-10-CM

## 2023-10-20 DIAGNOSIS — C61 PROSTATE CANCER METASTATIC TO BONE: Primary | ICD-10-CM

## 2023-10-20 DIAGNOSIS — G89.3 CANCER ASSOCIATED PAIN: ICD-10-CM

## 2023-10-20 DIAGNOSIS — C79.51 PROSTATE CANCER METASTATIC TO BONE: ICD-10-CM

## 2023-10-20 DIAGNOSIS — D64.9 NORMOCYTIC ANEMIA: ICD-10-CM

## 2023-10-20 DIAGNOSIS — C61 PROSTATE CANCER METASTATIC TO BONE: ICD-10-CM

## 2023-10-20 LAB
ALBUMIN SERPL-MCNC: 4.3 G/DL (ref 3.5–5.2)
ALBUMIN/GLOB SERPL: 1.8 G/DL
ALP SERPL-CCNC: 37 U/L (ref 39–117)
ALT SERPL W P-5'-P-CCNC: 11 U/L (ref 1–41)
ANION GAP SERPL CALCULATED.3IONS-SCNC: 10.3 MMOL/L (ref 5–15)
AST SERPL-CCNC: 14 U/L (ref 1–40)
BASOPHILS # BLD AUTO: 0.04 10*3/MM3 (ref 0–0.2)
BASOPHILS NFR BLD AUTO: 0.8 % (ref 0–1.5)
BILIRUB SERPL-MCNC: 0.4 MG/DL (ref 0–1.2)
BUN SERPL-MCNC: 23 MG/DL (ref 8–23)
BUN/CREAT SERPL: 19.7 (ref 7–25)
CALCIUM SPEC-SCNC: 9.3 MG/DL (ref 8.6–10.5)
CHLORIDE SERPL-SCNC: 107 MMOL/L (ref 98–107)
CO2 SERPL-SCNC: 26.7 MMOL/L (ref 22–29)
CREAT SERPL-MCNC: 1.17 MG/DL (ref 0.76–1.27)
DEPRECATED RDW RBC AUTO: 43.2 FL (ref 37–54)
EGFRCR SERPLBLD CKD-EPI 2021: 63 ML/MIN/1.73
EOSINOPHIL # BLD AUTO: 0.34 10*3/MM3 (ref 0–0.4)
EOSINOPHIL NFR BLD AUTO: 6.5 % (ref 0.3–6.2)
ERYTHROCYTE [DISTWIDTH] IN BLOOD BY AUTOMATED COUNT: 13.9 % (ref 12.3–15.4)
GLOBULIN UR ELPH-MCNC: 2.4 GM/DL
GLUCOSE SERPL-MCNC: 159 MG/DL (ref 65–99)
HCT VFR BLD AUTO: 32.2 % (ref 37.5–51)
HGB BLD-MCNC: 10.9 G/DL (ref 13–17.7)
IMM GRANULOCYTES # BLD AUTO: 0.01 10*3/MM3 (ref 0–0.05)
IMM GRANULOCYTES NFR BLD AUTO: 0.2 % (ref 0–0.5)
LYMPHOCYTES # BLD AUTO: 1.44 10*3/MM3 (ref 0.7–3.1)
LYMPHOCYTES NFR BLD AUTO: 27.5 % (ref 19.6–45.3)
MAGNESIUM SERPL-MCNC: 2 MG/DL (ref 1.6–2.4)
MCH RBC QN AUTO: 28.9 PG (ref 26.6–33)
MCHC RBC AUTO-ENTMCNC: 33.9 G/DL (ref 31.5–35.7)
MCV RBC AUTO: 85.4 FL (ref 79–97)
MONOCYTES # BLD AUTO: 0.52 10*3/MM3 (ref 0.1–0.9)
MONOCYTES NFR BLD AUTO: 9.9 % (ref 5–12)
NEUTROPHILS NFR BLD AUTO: 2.88 10*3/MM3 (ref 1.7–7)
NEUTROPHILS NFR BLD AUTO: 55.1 % (ref 42.7–76)
NRBC BLD AUTO-RTO: 0 /100 WBC (ref 0–0.2)
PHOSPHATE SERPL-MCNC: 3.4 MG/DL (ref 2.5–4.5)
PLATELET # BLD AUTO: 121 10*3/MM3 (ref 140–450)
PMV BLD AUTO: 9.4 FL (ref 6–12)
POTASSIUM SERPL-SCNC: 3.7 MMOL/L (ref 3.5–5.2)
PROT SERPL-MCNC: 6.7 G/DL (ref 6–8.5)
PSA SERPL-MCNC: <0.014 NG/ML (ref 0–4)
RBC # BLD AUTO: 3.77 10*6/MM3 (ref 4.14–5.8)
SODIUM SERPL-SCNC: 144 MMOL/L (ref 136–145)
WBC NRBC COR # BLD: 5.23 10*3/MM3 (ref 3.4–10.8)

## 2023-10-20 PROCEDURE — 36415 COLL VENOUS BLD VENIPUNCTURE: CPT

## 2023-10-20 PROCEDURE — 25010000002 DENOSUMAB 120 MG/1.7ML SOLUTION: Performed by: INTERNAL MEDICINE

## 2023-10-20 PROCEDURE — 96402 CHEMO HORMON ANTINEOPL SQ/IM: CPT

## 2023-10-20 PROCEDURE — 85025 COMPLETE CBC W/AUTO DIFF WBC: CPT | Performed by: INTERNAL MEDICINE

## 2023-10-20 PROCEDURE — 80053 COMPREHEN METABOLIC PANEL: CPT | Performed by: INTERNAL MEDICINE

## 2023-10-20 PROCEDURE — 84100 ASSAY OF PHOSPHORUS: CPT | Performed by: INTERNAL MEDICINE

## 2023-10-20 PROCEDURE — 84153 ASSAY OF PSA TOTAL: CPT | Performed by: INTERNAL MEDICINE

## 2023-10-20 PROCEDURE — 83735 ASSAY OF MAGNESIUM: CPT | Performed by: INTERNAL MEDICINE

## 2023-10-20 PROCEDURE — 96372 THER/PROPH/DIAG INJ SC/IM: CPT

## 2023-10-20 PROCEDURE — 25010000002 LEUPROLIDE ACETATE (3 MONTH) PER 7.5 MG: Performed by: INTERNAL MEDICINE

## 2023-10-20 RX ORDER — HYDROCODONE BITARTRATE AND ACETAMINOPHEN 7.5; 325 MG/1; MG/1
1 TABLET ORAL EVERY 6 HOURS PRN
Qty: 120 TABLET | Refills: 0 | Status: SHIPPED | OUTPATIENT
Start: 2023-10-20

## 2023-10-20 RX ORDER — NITROGLYCERIN 400 UG/1
1 SPRAY ORAL
Qty: 12 G | Refills: 1 | Status: SHIPPED | OUTPATIENT
Start: 2023-10-20

## 2023-10-20 RX ADMIN — DENOSUMAB 120 MG: 120 INJECTION SUBCUTANEOUS at 10:31

## 2023-10-20 RX ADMIN — LEUPROLIDE ACETATE 22.5 MG: KIT at 10:46

## 2023-10-20 NOTE — NURSING NOTE
Patient to shot room after MD visit. Labs reviewed. Denies dental issues or upcoming dental procedures. Patient voicing no complaints today. Xgeva Injection performed in left arm. Lupron Injection performed in left dorsogluteal muscle. Patient tolerated the procedure well without complications. No complaints voiced. Discharged in stable condition.

## 2023-10-30 RX ORDER — ALLOPURINOL 300 MG/1
300 TABLET ORAL DAILY
Qty: 90 TABLET | Refills: 3 | Status: SHIPPED | OUTPATIENT
Start: 2023-10-30

## 2023-10-30 RX ORDER — CLONIDINE HYDROCHLORIDE 0.2 MG/1
0.2 TABLET ORAL DAILY
Qty: 90 TABLET | Refills: 1 | Status: SHIPPED | OUTPATIENT
Start: 2023-10-30

## 2023-10-30 RX ORDER — CLOPIDOGREL BISULFATE 75 MG/1
75 TABLET ORAL DAILY
Qty: 90 TABLET | Refills: 3 | Status: SHIPPED | OUTPATIENT
Start: 2023-10-30

## 2023-10-30 NOTE — TELEPHONE ENCOUNTER
Rx Refill Note  Requested Prescriptions     Pending Prescriptions Disp Refills    clopidogrel (PLAVIX) 75 MG tablet 90 tablet 3     Sig: Take 1 tablet by mouth Daily.    cloNIDine (CATAPRES) 0.2 MG tablet 90 tablet 1     Sig: Take 1 tablet by mouth Daily.    allopurinol (ZYLOPRIM) 300 MG tablet 90 tablet 3     Sig: Take 1 tablet by mouth Daily.      Last office visit with prescribing clinician: 7/18/2023   Last telemedicine visit with prescribing clinician: Visit date not found   Next office visit with prescribing clinician: 1/23/2024                         Would you like a call back once the refill request has been completed: [] Yes [] No    If the office needs to give you a call back, can they leave a voicemail: [] Yes [] No    Fuad Bautista Rep  10/30/23, 08:50 EDT

## 2023-11-21 DIAGNOSIS — G89.3 CANCER ASSOCIATED PAIN: ICD-10-CM

## 2023-11-21 RX ORDER — HYDROCODONE BITARTRATE AND ACETAMINOPHEN 7.5; 325 MG/1; MG/1
1 TABLET ORAL EVERY 6 HOURS PRN
Qty: 120 TABLET | Refills: 0 | Status: SHIPPED | OUTPATIENT
Start: 2023-11-21

## 2023-12-08 ENCOUNTER — READMISSION MANAGEMENT (OUTPATIENT)
Dept: CALL CENTER | Facility: HOSPITAL | Age: 78
End: 2023-12-08
Payer: MEDICARE

## 2023-12-08 NOTE — OUTREACH NOTE
Prep Survey      Flowsheet Row Responses   [facility] facility patient discharged from? Non-[facility]   Is LACE score < 7 ? Non-BH Discharge   Eligibility [facility]   Date of Admission 12/05/23   Discharge Disposition Home or Self Care   Discharge diagnosis COVID-19   Does the patient have one of the following disease processes/diagnoses(primary or secondary)? Other   Prep survey completed? Yes            Vivi GOODMAN - Registered Nurse

## 2023-12-11 ENCOUNTER — TRANSITIONAL CARE MANAGEMENT TELEPHONE ENCOUNTER (OUTPATIENT)
Dept: CALL CENTER | Facility: HOSPITAL | Age: 78
End: 2023-12-11
Payer: MEDICARE

## 2023-12-11 NOTE — OUTREACH NOTE
Call Center TCM Note      Flowsheet Row Responses   [facility] facility patient discharged from? Non-BH   Does the patient have one of the following disease processes/diagnoses(primary or secondary)? Other   TCM attempt successful? No   Unsuccessful attempts Attempt 2            Sushma Rollins RN    12/11/2023, 12:27 EST

## 2023-12-11 NOTE — OUTREACH NOTE
Call Center TCM Note      Flowsheet Row Responses   [facility] facility patient discharged from? Non-BH   Does the patient have one of the following disease processes/diagnoses(primary or secondary)? Other   TCM attempt successful? No   Unsuccessful attempts Attempt 1  [no verbal release on file.]            Sushma Rollins RN    12/11/2023, 11:49 EST

## 2023-12-12 ENCOUNTER — TRANSITIONAL CARE MANAGEMENT TELEPHONE ENCOUNTER (OUTPATIENT)
Dept: CALL CENTER | Facility: HOSPITAL | Age: 78
End: 2023-12-12
Payer: MEDICARE

## 2023-12-12 NOTE — OUTREACH NOTE
Call Center TCM Note      Flowsheet Row Responses   [facility] facility patient discharged from? Non-BH   Does the patient have one of the following disease processes/diagnoses(primary or secondary)? Other   TCM attempt successful? No   Unsuccessful attempts Attempt 3            Gloria Kwon LPN    12/12/2023, 14:27 EST

## 2023-12-18 ENCOUNTER — TELEPHONE (OUTPATIENT)
Dept: FAMILY MEDICINE CLINIC | Facility: CLINIC | Age: 78
End: 2023-12-18

## 2023-12-18 NOTE — TELEPHONE ENCOUNTER
Hub staff attempted to follow warm transfer process and was unsuccessful     Caller: Micah Krishna    Relationship to patient: Self    Best call back number: [phone]    Patient is needing:     WOULD LIKE TO SCHEDULE A HOPS. FOLLOW UP.      TREATED AT [facility] FOR COVID D/C 12.7.23

## 2023-12-20 DIAGNOSIS — G89.3 CANCER ASSOCIATED PAIN: ICD-10-CM

## 2023-12-20 RX ORDER — HYDROCODONE BITARTRATE AND ACETAMINOPHEN 7.5; 325 MG/1; MG/1
1 TABLET ORAL EVERY 6 HOURS PRN
Qty: 120 TABLET | Refills: 0 | Status: SHIPPED | OUTPATIENT
Start: 2023-12-20

## 2024-01-01 ENCOUNTER — READMISSION MANAGEMENT (OUTPATIENT)
Dept: CALL CENTER | Facility: HOSPITAL | Age: 79
End: 2024-01-01
Payer: MEDICARE

## 2024-01-05 RX ORDER — HYDROCORTISONE 5 MG/1
TABLET ORAL
Qty: 315 TABLET | Refills: 3 | Status: SHIPPED | OUTPATIENT
Start: 2024-01-05

## 2024-01-05 RX ORDER — MAGNESIUM OXIDE 400 MG/1
400 TABLET ORAL DAILY
Qty: 90 TABLET | Refills: 3 | Status: SHIPPED | OUTPATIENT
Start: 2024-01-05

## 2024-01-05 NOTE — TELEPHONE ENCOUNTER
Rx Refill Note  Requested Prescriptions     Pending Prescriptions Disp Refills    magnesium oxide (MAG-OX) 400 MG tablet 90 tablet 3     Sig: Take 1 tablet by mouth Daily.    hydrocortisone (Cortef) 5 MG tablet 270 tablet 3     Si tablets by mouth daily every a.m., 1 tablet every p.m.      Last office visit with prescribing clinician: 2023   Last telemedicine visit with prescribing clinician: Visit date not found   Next office visit with prescribing clinician: 2024                         Would you like a call back once the refill request has been completed: [] Yes [] No    If the office needs to give you a call back, can they leave a voicemail: [] Yes [] No    Delmar Bañuelos MA  24, 13:36 EST

## 2024-01-05 NOTE — TELEPHONE ENCOUNTER
Triston STAFFORD at discharge recommending doubling maintenance dose prednisone during periods of illness.    Patient needs to follow-up endocrinology clarify specific instructions here and gave him a message for this

## 2024-01-19 ENCOUNTER — HOSPITAL ENCOUNTER (OUTPATIENT)
Dept: CT IMAGING | Facility: HOSPITAL | Age: 79
Discharge: HOME | End: 2024-01-19
Admitting: INTERNAL MEDICINE
Payer: MEDICARE

## 2024-01-19 ENCOUNTER — HOSPITAL ENCOUNTER (OUTPATIENT)
Dept: NUCLEAR MEDICINE | Facility: HOSPITAL | Age: 79
Discharge: HOME | End: 2024-01-19
Payer: MEDICARE

## 2024-01-19 DIAGNOSIS — C79.51 PROSTATE CANCER METASTATIC TO BONE: ICD-10-CM

## 2024-01-19 DIAGNOSIS — G89.3 CANCER ASSOCIATED PAIN: ICD-10-CM

## 2024-01-19 DIAGNOSIS — C61 PROSTATE CANCER METASTATIC TO BONE: ICD-10-CM

## 2024-01-19 PROCEDURE — 74176 CT ABD & PELVIS W/O CONTRAST: CPT

## 2024-01-19 PROCEDURE — 78306 BONE IMAGING WHOLE BODY: CPT

## 2024-01-19 PROCEDURE — A9503 TC99M MEDRONATE: HCPCS | Performed by: INTERNAL MEDICINE

## 2024-01-19 PROCEDURE — 0 TECHNETIUM MEDRONATE KIT: Performed by: INTERNAL MEDICINE

## 2024-01-19 PROCEDURE — 71250 CT THORAX DX C-: CPT

## 2024-01-19 RX ORDER — TC 99M MEDRONATE 20 MG/10ML
22.5 INJECTION, POWDER, LYOPHILIZED, FOR SOLUTION INTRAVENOUS
Status: COMPLETED | OUTPATIENT
Start: 2024-01-19 | End: 2024-01-19

## 2024-01-19 RX ADMIN — Medication 22.5 MILLICURIE: at 10:15

## 2024-01-23 ENCOUNTER — OFFICE VISIT (OUTPATIENT)
Dept: FAMILY MEDICINE CLINIC | Facility: CLINIC | Age: 79
End: 2024-01-23
Payer: MEDICARE

## 2024-01-23 VITALS
OXYGEN SATURATION: 98 % | DIASTOLIC BLOOD PRESSURE: 80 MMHG | TEMPERATURE: 96.9 F | HEART RATE: 61 BPM | SYSTOLIC BLOOD PRESSURE: 125 MMHG | HEIGHT: 72 IN | BODY MASS INDEX: 30.48 KG/M2 | RESPIRATION RATE: 16 BRPM | WEIGHT: 225 LBS

## 2024-01-23 DIAGNOSIS — I10 ESSENTIAL HYPERTENSION: Primary | ICD-10-CM

## 2024-01-23 DIAGNOSIS — E11.65 TYPE 2 DIABETES MELLITUS WITH HYPERGLYCEMIA, WITH LONG-TERM CURRENT USE OF INSULIN: ICD-10-CM

## 2024-01-23 DIAGNOSIS — E78.2 MIXED HYPERLIPIDEMIA: ICD-10-CM

## 2024-01-23 DIAGNOSIS — Z79.4 TYPE 2 DIABETES MELLITUS WITH HYPERGLYCEMIA, WITH LONG-TERM CURRENT USE OF INSULIN: ICD-10-CM

## 2024-01-23 PROCEDURE — 3079F DIAST BP 80-89 MM HG: CPT | Performed by: NURSE PRACTITIONER

## 2024-01-23 PROCEDURE — 99214 OFFICE O/P EST MOD 30 MIN: CPT | Performed by: NURSE PRACTITIONER

## 2024-01-23 PROCEDURE — 3074F SYST BP LT 130 MM HG: CPT | Performed by: NURSE PRACTITIONER

## 2024-01-23 RX ORDER — VALSARTAN 160 MG/1
TABLET ORAL
Qty: 180 TABLET | Refills: 2 | Status: SHIPPED | OUTPATIENT
Start: 2024-01-23

## 2024-01-23 RX ORDER — CLOPIDOGREL BISULFATE 75 MG/1
75 TABLET ORAL DAILY
Qty: 90 TABLET | Refills: 3 | Status: SHIPPED | OUTPATIENT
Start: 2024-01-23

## 2024-01-23 RX ORDER — PSEUDOEPHEDRINE HCL 30 MG
100 TABLET ORAL
COMMUNITY
Start: 2023-12-07

## 2024-01-23 RX ORDER — ROSUVASTATIN CALCIUM 20 MG/1
20 TABLET, COATED ORAL DAILY
Qty: 90 TABLET | Refills: 1 | Status: SHIPPED | OUTPATIENT
Start: 2024-01-23

## 2024-01-23 RX ORDER — AMLODIPINE BESYLATE 10 MG/1
10 TABLET ORAL DAILY
Qty: 90 TABLET | Refills: 3 | Status: SHIPPED | OUTPATIENT
Start: 2024-01-23

## 2024-01-23 RX ORDER — ISOSORBIDE MONONITRATE 30 MG/1
30 TABLET, EXTENDED RELEASE ORAL 2 TIMES DAILY
Qty: 180 TABLET | Refills: 3 | Status: SHIPPED | OUTPATIENT
Start: 2024-01-23

## 2024-01-23 RX ORDER — METOPROLOL SUCCINATE 100 MG/1
100 TABLET, EXTENDED RELEASE ORAL DAILY
Qty: 90 TABLET | Refills: 3 | Status: SHIPPED | OUTPATIENT
Start: 2024-01-23

## 2024-01-23 RX ORDER — POTASSIUM CHLORIDE 20 MEQ/1
20 TABLET, EXTENDED RELEASE ORAL DAILY
Qty: 90 TABLET | Refills: 3 | Status: SHIPPED | OUTPATIENT
Start: 2024-01-23

## 2024-01-23 RX ORDER — ALLOPURINOL 300 MG/1
300 TABLET ORAL DAILY
Qty: 90 TABLET | Refills: 3 | Status: SHIPPED | OUTPATIENT
Start: 2024-01-23

## 2024-01-23 RX ORDER — CLONIDINE HYDROCHLORIDE 0.2 MG/1
0.2 TABLET ORAL DAILY
Qty: 90 TABLET | Refills: 3 | Status: SHIPPED | OUTPATIENT
Start: 2024-01-23

## 2024-01-23 NOTE — PATIENT INSTRUCTIONS
Discharge instructions    Trial TENS unit  Heat other maneuvers,  I agree with your plan regarding pain medication before going out etc.    Immunizations recommend Shingrix x 2      RSV vaccine  Prevnar 20        Fever cough flu test COVID test

## 2024-01-23 NOTE — PROGRESS NOTES
"Chief Complaint  Follow-up (6 month follow up)    Subjective        Micah Krishna presents to [facility]  History of Present Illness  Very pleasant gentleman here today follow-up hypertension controlled  Hyperlipidemia takes a statin appropriately diabetes mellitus type 2 controlled  Adrenal insufficiency is up-to-date with endocrinology  History of renal carcinoma, as well as prostate cancer with metastasis to bone more recently his left femur  Takes hydrocodone on 7.5 for day with oncology wondering if a different plan may help him more, and what other maneuvers can we do?  TENS unit would help?  No chest pain or increasing shortness of breath no fevers or chills, he had COVID several months ago in the hospital but did well    Overall he is happy with his progress considering he is in chronic pain      Objective   Vital Signs:  /80   Pulse 61   Temp 96.9 °F (36.1 °C) (Infrared)   Resp 16   Ht 182 cm (71.65\")   Wt 102 kg (225 lb)   SpO2 98%   BMI 30.81 kg/m²   Estimated body mass index is 30.81 kg/m² as calculated from the following:    Height as of this encounter: 182 cm (71.65\").    Weight as of this encounter: 102 kg (225 lb).            Physical Exam  Vitals reviewed.   Constitutional:       General: He is not in acute distress.     Appearance: Normal appearance. He is well-developed. He is not ill-appearing, toxic-appearing or diaphoretic.   HENT:      Head: Normocephalic.      Nose: Nose normal.   Eyes:      General: No scleral icterus.     Conjunctiva/sclera: Conjunctivae normal.      Pupils: Pupils are equal, round, and reactive to light.   Neck:      Thyroid: No thyromegaly.      Vascular: No JVD.   Cardiovascular:      Rate and Rhythm: Normal rate and regular rhythm.      Heart sounds: Normal heart sounds. No murmur heard.     No friction rub. No gallop.   Pulmonary:      Effort: Pulmonary effort is normal. No respiratory distress.      Breath sounds: Normal " breath sounds. No stridor. No wheezing or rales.   Abdominal:      General: Bowel sounds are normal. There is no distension.      Palpations: Abdomen is soft.      Tenderness: There is no abdominal tenderness.      Comments: No hepatosplenomegaly, no ascites,   Musculoskeletal:         General: No tenderness.      Cervical back: Neck supple.   Lymphadenopathy:      Cervical: No cervical adenopathy.   Skin:     General: Skin is warm and dry.      Findings: No erythema or rash.   Neurological:      General: No focal deficit present.      Mental Status: He is alert and oriented to person, place, and time. Mental status is at baseline.      Deep Tendon Reflexes: Reflexes are normal and symmetric.   Psychiatric:         Mood and Affect: Mood normal.         Behavior: Behavior normal.         Thought Content: Thought content normal.         Judgment: Judgment normal.        Result Review :                Assessment and Plan   Diagnoses and all orders for this visit:    1. Essential hypertension (Primary)    2. Mixed hyperlipidemia    3. Type 2 diabetes mellitus with hyperglycemia, with long-term current use of insulin    Other orders  -     amLODIPine (NORVASC) 10 MG tablet; Take 1 tablet by mouth Daily.  Dispense: 90 tablet; Refill: 3  -     allopurinol (ZYLOPRIM) 300 MG tablet; Take 1 tablet by mouth Daily.  Dispense: 90 tablet; Refill: 3  -     cloNIDine (CATAPRES) 0.2 MG tablet; Take 1 tablet by mouth Daily.  Dispense: 90 tablet; Refill: 3  -     clopidogrel (PLAVIX) 75 MG tablet; Take 1 tablet by mouth Daily.  Dispense: 90 tablet; Refill: 3  -     isosorbide mononitrate (IMDUR) 30 MG 24 hr tablet; Take 1 tablet by mouth 2 (Two) Times a Day.  Dispense: 180 tablet; Refill: 3  -     valsartan (DIOVAN) 160 MG tablet; 1 tablet by mouth once or twice a day depending on your blood pressure  Dispense: 180 tablet; Refill: 2  -     rosuvastatin (CRESTOR) 20 MG tablet; Take 1 tablet by mouth Daily.  Dispense: 90 tablet; Refill:  1  -     potassium chloride (K-DUR,KLOR-CON) 20 MEQ CR tablet; Take 1 tablet by mouth Daily.  Dispense: 90 tablet; Refill: 3  -     metoprolol succinate XL (TOPROL-XL) 100 MG 24 hr tablet; Take 1 tablet by mouth Daily.  Dispense: 90 tablet; Refill: 3           I spent 30   minutes caring for Micah on this date of service. This time includes time spent by me in the following activities:preparing for the visit, reviewing tests, obtaining and/or reviewing a separately obtained history, performing a medically appropriate examination and/or evaluation , counseling and educating the patient/family/caregiver, ordering medications, tests, or procedures, documenting information in the medical record, and care coordination  Follow Up   Return for Annual physical.  Patient was given instructions and counseling regarding his condition or for health maintenance advice. Please see specific information pulled into the AVS if appropriate.     Patient Instructions   Discharge instructions    Trial TENS unit  Heat other maneuvers,  I agree with your plan regarding pain medication before going out etc.    Immunizations recommend Shingrix x 2      RSV vaccine  Prevnar 20        Fever cough flu test COVID test         Answers submitted by the patient for this visit:  Primary Reason for Visit (Submitted on 1/21/2024)  What is the primary reason for your visit?: Other  Other (Submitted on 1/21/2024)  Please describe your symptoms.: Follow up, need to review  meds re: blood pressure, cholesterol, etc  Have you had these symptoms before?: Yes  How long have you been having these symptoms?: Greater than 2 weeks

## 2024-01-24 NOTE — PROGRESS NOTES
"[facility] GROUP OUTPATIENT FOLLOW UP CLINIC VISIT    REASON FOR FOLLOW-UP:    1.  History of metastatic clear cell renal cell carcinoma.  All known disease had previously been resected..  2.  Pulmonary metastases discovered July 2020.  Bone metastases discovered September 2020.  3.  Votrient initiated 10/16/2020  4. Palliative radiation for pain complete on 11/10/2020  5.  1/4/2021 significantly elevated PSA > 100.  6.  1/20/2021 bone biopsy confirms metastatic prostate cancer    HISTORY OF PRESENT ILLNESS:  Micah Krishna is a 78 y.o. male with the above-mentioned history who returns today for follow-up    He complains of intermittent left leg pain usually with ambulation that has worsened over the past couple of months.  The pain starts in his knee and may radiate down or up.  Using a cane alleviates the pain.  He had significant pain yesterday while out shopping.  He denies a cold foot.  He denies worsening low back pain or pain that sounds radicular.      REVIEW OF SYSTEMS:  As per HPI    PE:  Vitals:    01/26/24 0922   BP: 148/74   Pulse: 67   Resp: 18   Temp: 97.5 °F (36.4 °C)   TempSrc: Temporal   SpO2: 96%   Weight: 103 kg (227 lb 14.4 oz)   Height: 182 cm (71.65\")   PainSc:   3   PainLoc: Leg            General:  No acute distress, awake, alert and oriented  Skin:  Warm and dry, no visible rash  HEENT:  Normocephalic/atraumatic.    Chest:  Normal respiratory effort.  Lungs clear to auscultation bilaterally.  Heart: Regular rate and rhythm  Extremities: No clubbing cyanosis or edema.  He has an excellent dorsalis pedis pulse on the left leg and excellent capillary refill in his toes.  Neuro/psych:  Grossly non-focal.  Normal mood and affect.      DIAGNOSTIC DATA:  PSA Diagnostic (01/26/2024 08:46)   Phosphorus (01/26/2024 08:46)  Magnesium (01/26/2024 08:46)  Comprehensive metabolic panel (01/26/2024 08:46)  CBC and Differential (01/26/2024 08:46)    IMAGING:  NM Bone Scan Whole Body (01/19/2024 " 12:56)  CT Abdomen Pelvis Without Contrast (01/19/2024 10:22)  CT Chest Without Contrast Diagnostic (01/19/2024 10:22)    CT and bone scan images reviewed. Stable findings without progression.     ASSESSMENT:  This is a 78 y.o. male with:    *Mild normocytic anemia:   Likely due to ADT and metastatic prostate cancer.  Hemoglobin stable at 11.3    *Adrenal insufficiency following bilateral adrenalectomy   On replacement hormone therapy.    He follows with endocrinology.    *History of metastatic renal cell carcinoma.    He had a left nephrectomy and adrenalectomy in 2000 and then a right adrenalectomy for metastatic disease in 2012.    There remains no evidence of disease.    *New metastatic bone disease diagnosed as he presented with worsening pain, ultimately found to be metastatic prostate cancer  Presumed to be metastatic renal cell carcinoma but ultimately diagnosed with metastatic prostate cancer.  Imaging discussed with radiology.  Imaging consistent with metastatic renal cell carcinoma  However, his PSA was noted to be greater than 100  As discussed below, bone biopsy subsequently confirmed metastatic prostate cancer  CT C/A/P 7/6/2020 with a RUL sub 6 mm pulmonary nodule in the RUL, stable since 1/17/2018, new 6 mm nodules in the medial RLL and RUL,  LLL nodule unchanged since 7/17/2018.  Healing left ninth rib fracture.  CT chest 9/28/2020 with stable lung nodules, pathologic fracture of the right 8th rib, abnormal appearance of T8, narrowing of the thecal sack at this level, additional lucencies at several vertebral bodies such as T7 and T11, subacute healing fractures in the anterior right 6th rib and lateral left 9th rib.  CT head 9/28/2020 with calvarial metastatic disease involving the frontal and occipital bones.  Bone scan 9/28/2020 with multifocal uptake consistent with metastatic disease. Anterior frontal bone, upper C spine at C1 or C2, T and L spine at T3, T8, T11, multifocal rib uptake, abnormal  uptake in the iliac wings/bodies and left femoral heads/acetabulum, distal left clavicle and both humeral heads.   PET scan from 10/8/2020.  Multifocal bone metastases throughout the skeleton.  Pathologic rib and vertebral body fracture.  Moderate FDG uptake in the right femoral neck with some sclerosis, new since 7/6/2020.  Mottled appearance of L3 and T8 vertebral bodies with pathologic compression fractures.  L3 fracture is a burst fracture with 20 to 25% loss of height and 4 to 5 mm of retropulsion in the central spinal canal.  Pathologic compression fracture at T8 with 10% loss of height.  New findings since 7/6/2020.  FDG avid soft tissue nodule extending into the central canal along the posterior aspect of the thecal sac at T8.  FDG uptake in the left clavicle.  Compression fracture of the right posterior eighth rib.  Votrient initiated 10/16/2020  Palliative radiation for pain complete on 11/10/2020  He did subsequently presented with an elevated PSA >100.  Bone biopsy performed on 1/20/2021 confirmed metastatic prostate cancer.  Votrient discontinued  2/5/2021 patient given Firmagon and Xgeva.  With plans to start Eligard 4 weeks later, and continue monthly Xgeva.    3/5/21.First dose of Eligard and monthly Xgeva.  Eligard will be given every 3 months.  12/17/2021: Proceeded with Xgeva.  PSA remains very low at 0.015.  1/21/2022: Hold Xgeva due to dental issues.  Bone scan on 2/7/2022 with improvement  5/20/2022: He is done with all of his dental work.  Proceed with Xgeva.  Proceed with lupron and every 3 months  8/19/2022: Proceed with Xgeva and Lupron.  Move Xgeva to every 3 months.  11/18/2022: Proceed with Xgeva and Lupron both every 3 months at this point  2/10/2023: Xgeva and Lupron.    5/5/2023: Proceed with Xgeva and Lupron.  7/28/2023: Proceed with Xgeva and Lupron  10/20/23: proceed with therapy  1/26/2024: Proceed with Xgeva and Lupron    *Chronic kidney disease:   Status post left nephrectomy.     Creatinine remains stable at 1.19    *Migratory skeletal pain:   Metastatic disease apparent now on CT imaging and bone scan and now PET scan.  Completed radiation   Left leg pain has improved  Back pain improved  4/2/2021 patient continues on Norco 5/325 2 to 3 tablets/day with good pain control.  This remains stable as of 4/30/2021.  5/28/21.  The patient continues on Carney 5/325 with 2 to 3 tablets/day.  Pain is stable.   7/2/2021: He ran out of the hydrocodone/acetaminophen and has been using more ibuprofen which has controlled his pain but I encouraged him not to use as much ibuprofen to protect his kidney.  Pain is stable as of 7/30/2021.  10/22/2021: Pain is stable.  Pain medication refilled.  He was started on meloxicam by primary care which she will start in the near future.  12/17/2021: His low back is starting to worsen.  Increased hydrocodone to 7.5 mg with improvement.    5/5/2023: Low back pain is worse likely due to doing yard work.  He continues hydrocodone/acetaminophen which does control his pain.    *Mild hypomagnesemia: monitor. If muscle cramps advised to call us.  Magnesium normal at 1.9    *Elevated PSA, subsequent biopsy confirmed metastatic prostate cancer:   He has a history of very mild elevation of his PSA in the past with a PSA of 5.92 on 10/17/2018.    His PSA on 1/4/2021 was greater than 100  PSA repeated today is also greater than 100  He is completely asymptomatic without clinical evidence for prostatitis.    Dr. Cat did communicate with Dr. aZpata with radiology and all findings on imaging would support more metastatic renal cell carcinoma rather than metastatic prostate cancer since the bony lesions are lytic.  There is nothing obvious in his prostate on current imaging.  Dr. Cat communicated with Dr. Cabrera with urology.  He is in agreement with a bone biopsy and he will see him in the office for evaluation.  One of the bony lesions is amenable to CT-guided biopsy per   Benny.  CT-guided needle biopsy of the bone performed on 1/20/2021 confirms metastatic prostate cancer.  3/5/21.  Please see above.  The patient received 1 dose of Firmagon and is on Eligard every 3 months.  1/21/2022: PSA stable and quite low at 0.019  PSA undetectable on 5/20/2022  10/20/23: PSA remains undetectable  1/26/2024: PSA remains undetectable    *New 40% T2 compression fracture.  Asymptomatic.  He is not interested in further radiation at this time.  Might consider referral to neurosurgery should he become more symptomatic or if this worsens.    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  He again did not complain of this today.    *He did have an MRI of the pituitary gland on 8/16/2021 as ordered by his endocrinologist.  Pituitary appears normal.  There is abnormal enhancement of the clivus suspected to be related to his metastatic prostate cancer.  There is an 11 mm lesion overlying the left frontal convexity suspected to be a meningioma.  The patient states that he was referred to neurosurgery but is not necessarily interested in pursuing this.  We can repeat an MRI in the future if desired.  He continues to not desire any follow-up for regarding this.  He does not desire surgery.    *Dental issues: 5/20/2022: Completed all of his dental work.  Proceed with Xgeva today and every three months for now.    *Skin lesion left cheek: Question sebaceous cyst.  Previously referred to dermatology.  Not discussed again today.    *History of pain in the left lower extremity both above and below the knee.    Left lower extremity venous duplex on 2/10/2023 was negative for DVT  Bone scan has been stable  Pain resolved in the past but has now worsened over the past few months.  He has a good dorsalis pedis pulse and great capillary refill so I do not believe that this is vascular in origin.  It could be arthritis pain radiating from his knee.  Other etiologies are of course possible as well.  We  discussed obtaining x-rays of his knee and referring to physical therapy.  For now, he prefers to exercise at home more to see if this helps.  He can also use extra hydrocodone as needed.  I encouraged him to notify us if his pain worsens.    PLAN:     Xgeva will be continued again today and every 3 months  Lupron will be continued again today and every 3 months  Hydrocodone/acetaminophen 7.5/325 mg every 6 hours as needed.  Refilled today  He will start exercises left leg more notify us if the pain worsens.  See the discussion above.  I will see him back in 3 months with labs, Julián Olson    He remains on high risk medication requiring intensive monitoring.      I spent 48 minutes in this visit today reviewing his record, communicating with him, examining him, placing orders, documenting the encounter.

## 2024-01-26 ENCOUNTER — INFUSION (OUTPATIENT)
Dept: ONCOLOGY | Facility: HOSPITAL | Age: 79
End: 2024-01-26
Payer: MEDICARE

## 2024-01-26 ENCOUNTER — OFFICE VISIT (OUTPATIENT)
Dept: ONCOLOGY | Facility: CLINIC | Age: 79
End: 2024-01-26
Payer: MEDICARE

## 2024-01-26 ENCOUNTER — LAB (OUTPATIENT)
Dept: OTHER | Facility: HOSPITAL | Age: 79
End: 2024-01-26
Payer: MEDICARE

## 2024-01-26 VITALS
OXYGEN SATURATION: 96 % | RESPIRATION RATE: 18 BRPM | WEIGHT: 227.9 LBS | HEART RATE: 67 BPM | DIASTOLIC BLOOD PRESSURE: 74 MMHG | BODY MASS INDEX: 30.87 KG/M2 | SYSTOLIC BLOOD PRESSURE: 148 MMHG | TEMPERATURE: 97.5 F | HEIGHT: 72 IN

## 2024-01-26 DIAGNOSIS — C61 PROSTATE CANCER METASTATIC TO BONE: ICD-10-CM

## 2024-01-26 DIAGNOSIS — C79.51 PROSTATE CANCER METASTATIC TO BONE: Primary | ICD-10-CM

## 2024-01-26 DIAGNOSIS — C61 PROSTATE CANCER METASTATIC TO BONE: Primary | ICD-10-CM

## 2024-01-26 DIAGNOSIS — C79.51 PROSTATE CANCER METASTATIC TO BONE: ICD-10-CM

## 2024-01-26 DIAGNOSIS — G89.3 CANCER ASSOCIATED PAIN: ICD-10-CM

## 2024-01-26 LAB
ALBUMIN SERPL-MCNC: 4.2 G/DL (ref 3.5–5.2)
ALBUMIN/GLOB SERPL: 1.4 G/DL
ALP SERPL-CCNC: 42 U/L (ref 39–117)
ALT SERPL W P-5'-P-CCNC: 12 U/L (ref 1–41)
ANION GAP SERPL CALCULATED.3IONS-SCNC: 8 MMOL/L (ref 5–15)
AST SERPL-CCNC: 13 U/L (ref 1–40)
BASOPHILS # BLD AUTO: 0.07 10*3/MM3 (ref 0–0.2)
BASOPHILS NFR BLD AUTO: 1 % (ref 0–1.5)
BILIRUB SERPL-MCNC: 0.3 MG/DL (ref 0–1.2)
BUN SERPL-MCNC: 25 MG/DL (ref 8–23)
BUN/CREAT SERPL: 21 (ref 7–25)
CALCIUM SPEC-SCNC: 9.8 MG/DL (ref 8.6–10.5)
CHLORIDE SERPL-SCNC: 109 MMOL/L (ref 98–107)
CO2 SERPL-SCNC: 26 MMOL/L (ref 22–29)
CREAT SERPL-MCNC: 1.19 MG/DL (ref 0.76–1.27)
DEPRECATED RDW RBC AUTO: 43.8 FL (ref 37–54)
EGFRCR SERPLBLD CKD-EPI 2021: 61.8 ML/MIN/1.73
EOSINOPHIL # BLD AUTO: 0.25 10*3/MM3 (ref 0–0.4)
EOSINOPHIL NFR BLD AUTO: 3.6 % (ref 0.3–6.2)
ERYTHROCYTE [DISTWIDTH] IN BLOOD BY AUTOMATED COUNT: 14.2 % (ref 12.3–15.4)
GLOBULIN UR ELPH-MCNC: 2.9 GM/DL
GLUCOSE SERPL-MCNC: 186 MG/DL (ref 65–99)
HCT VFR BLD AUTO: 34.3 % (ref 37.5–51)
HGB BLD-MCNC: 11.3 G/DL (ref 13–17.7)
IMM GRANULOCYTES # BLD AUTO: 0.04 10*3/MM3 (ref 0–0.05)
IMM GRANULOCYTES NFR BLD AUTO: 0.6 % (ref 0–0.5)
LYMPHOCYTES # BLD AUTO: 1.17 10*3/MM3 (ref 0.7–3.1)
LYMPHOCYTES NFR BLD AUTO: 16.6 % (ref 19.6–45.3)
MAGNESIUM SERPL-MCNC: 1.9 MG/DL (ref 1.6–2.4)
MCH RBC QN AUTO: 28.3 PG (ref 26.6–33)
MCHC RBC AUTO-ENTMCNC: 32.9 G/DL (ref 31.5–35.7)
MCV RBC AUTO: 85.8 FL (ref 79–97)
MONOCYTES # BLD AUTO: 0.5 10*3/MM3 (ref 0.1–0.9)
MONOCYTES NFR BLD AUTO: 7.1 % (ref 5–12)
NEUTROPHILS NFR BLD AUTO: 5.01 10*3/MM3 (ref 1.7–7)
NEUTROPHILS NFR BLD AUTO: 71.1 % (ref 42.7–76)
NRBC BLD AUTO-RTO: 0 /100 WBC (ref 0–0.2)
PHOSPHATE SERPL-MCNC: 3.9 MG/DL (ref 2.5–4.5)
PLATELET # BLD AUTO: 141 10*3/MM3 (ref 140–450)
PMV BLD AUTO: 9.4 FL (ref 6–12)
POTASSIUM SERPL-SCNC: 4.7 MMOL/L (ref 3.5–5.2)
PROT SERPL-MCNC: 7.1 G/DL (ref 6–8.5)
PSA SERPL-MCNC: <0.014 NG/ML (ref 0–4)
RBC # BLD AUTO: 4 10*6/MM3 (ref 4.14–5.8)
SODIUM SERPL-SCNC: 143 MMOL/L (ref 136–145)
WBC NRBC COR # BLD AUTO: 7.04 10*3/MM3 (ref 3.4–10.8)

## 2024-01-26 PROCEDURE — 80053 COMPREHEN METABOLIC PANEL: CPT | Performed by: INTERNAL MEDICINE

## 2024-01-26 PROCEDURE — 36415 COLL VENOUS BLD VENIPUNCTURE: CPT

## 2024-01-26 PROCEDURE — 25010000002 DENOSUMAB 120 MG/1.7ML SOLUTION: Performed by: INTERNAL MEDICINE

## 2024-01-26 PROCEDURE — 85025 COMPLETE CBC W/AUTO DIFF WBC: CPT | Performed by: INTERNAL MEDICINE

## 2024-01-26 PROCEDURE — 84100 ASSAY OF PHOSPHORUS: CPT | Performed by: INTERNAL MEDICINE

## 2024-01-26 PROCEDURE — 96402 CHEMO HORMON ANTINEOPL SQ/IM: CPT

## 2024-01-26 PROCEDURE — 83735 ASSAY OF MAGNESIUM: CPT | Performed by: INTERNAL MEDICINE

## 2024-01-26 PROCEDURE — 96372 THER/PROPH/DIAG INJ SC/IM: CPT

## 2024-01-26 PROCEDURE — 84153 ASSAY OF PSA TOTAL: CPT | Performed by: INTERNAL MEDICINE

## 2024-01-26 PROCEDURE — 25010000002 LEUPROLIDE ACETATE (3 MONTH) PER 7.5 MG: Performed by: INTERNAL MEDICINE

## 2024-01-26 RX ORDER — CLOPIDOGREL BISULFATE 75 MG/1
75 TABLET ORAL DAILY
Qty: 90 TABLET | Refills: 3 | Status: CANCELLED | OUTPATIENT
Start: 2024-01-26

## 2024-01-26 RX ORDER — HYDROCODONE BITARTRATE AND ACETAMINOPHEN 7.5; 325 MG/1; MG/1
1 TABLET ORAL EVERY 6 HOURS PRN
Qty: 120 TABLET | Refills: 0 | Status: SHIPPED | OUTPATIENT
Start: 2024-01-26

## 2024-01-26 RX ORDER — AMLODIPINE BESYLATE 10 MG/1
10 TABLET ORAL DAILY
Qty: 90 TABLET | Refills: 3 | Status: CANCELLED | OUTPATIENT
Start: 2024-01-26

## 2024-01-26 RX ORDER — CLONIDINE HYDROCHLORIDE 0.2 MG/1
0.2 TABLET ORAL DAILY
Qty: 90 TABLET | Refills: 3 | Status: CANCELLED | OUTPATIENT
Start: 2024-01-26

## 2024-01-26 RX ORDER — ISOSORBIDE MONONITRATE 30 MG/1
30 TABLET, EXTENDED RELEASE ORAL 2 TIMES DAILY
Qty: 180 TABLET | Refills: 3 | Status: CANCELLED | OUTPATIENT
Start: 2024-01-26

## 2024-01-26 RX ORDER — METOPROLOL SUCCINATE 100 MG/1
100 TABLET, EXTENDED RELEASE ORAL DAILY
Qty: 90 TABLET | Refills: 3 | Status: CANCELLED | OUTPATIENT
Start: 2024-01-26

## 2024-01-26 RX ORDER — ALLOPURINOL 300 MG/1
300 TABLET ORAL DAILY
Qty: 90 TABLET | Refills: 3 | Status: CANCELLED | OUTPATIENT
Start: 2024-01-26

## 2024-01-26 RX ADMIN — DENOSUMAB 120 MG: 120 INJECTION SUBCUTANEOUS at 10:31

## 2024-01-26 RX ADMIN — LEUPROLIDE ACETATE 22.5 MG: KIT at 10:33

## 2024-01-26 NOTE — NURSING NOTE
Pt arrived for Xgeva and Lupron after visit with Dr. Cat.  Both injections administered without complication. Pt discharged in stable condition and encouraged to call prior to his next appointment with any questions or concerns; he v/u.

## 2024-01-29 RX ORDER — FINASTERIDE 5 MG/1
5 TABLET, FILM COATED ORAL DAILY
Qty: 90 TABLET | Refills: 3 | Status: SHIPPED | OUTPATIENT
Start: 2024-01-29

## 2024-01-29 NOTE — TELEPHONE ENCOUNTER
Rx Refill Note  Requested Prescriptions     Pending Prescriptions Disp Refills    finasteride (PROSCAR) 5 MG tablet 90 tablet 3     Sig: Take 1 tablet by mouth Daily.      Last office visit with prescribing clinician: 1/23/2024   Last telemedicine visit with prescribing clinician: Visit date not found   Next office visit with prescribing clinician: 7/23/2024                         Would you like a call back once the refill request has been completed: [] Yes [] No    If the office needs to give you a call back, can they leave a voicemail: [] Yes [] No    Delmar Bañuelos MA  01/29/24, 09:39 EST

## 2024-02-16 RX ORDER — BUMETANIDE 1 MG/1
TABLET ORAL
Qty: 90 TABLET | Refills: 2 | Status: SHIPPED | OUTPATIENT
Start: 2024-02-16

## 2024-02-29 DIAGNOSIS — G89.3 CANCER ASSOCIATED PAIN: ICD-10-CM

## 2024-02-29 RX ORDER — HYDROCODONE BITARTRATE AND ACETAMINOPHEN 7.5; 325 MG/1; MG/1
1 TABLET ORAL EVERY 6 HOURS PRN
Qty: 120 TABLET | Refills: 0 | Status: SHIPPED | OUTPATIENT
Start: 2024-02-29

## 2024-03-22 RX ORDER — VALSARTAN 160 MG/1
TABLET ORAL
Qty: 180 TABLET | Refills: 2 | Status: SHIPPED | OUTPATIENT
Start: 2024-03-22

## 2024-03-22 NOTE — TELEPHONE ENCOUNTER
Rx Refill Note  Requested Prescriptions     Pending Prescriptions Disp Refills    valsartan (DIOVAN) 160 MG tablet 180 tablet 2     Si tablet by mouth once or twice a day depending on your blood pressure      Last office visit with prescribing clinician: 2024   Last telemedicine visit with prescribing clinician: Visit date not found   Next office visit with prescribing clinician: 2024                         Would you like a call back once the refill request has been completed: [] Yes [] No    If the office needs to give you a call back, can they leave a voicemail: [] Yes [] No    Breanna Loving MA  24, 07:48 EDT

## 2024-03-28 DIAGNOSIS — G89.3 CANCER ASSOCIATED PAIN: ICD-10-CM

## 2024-03-29 RX ORDER — HYDROCORTISONE 10 MG/1
TABLET ORAL
Qty: 100 TABLET | Refills: 2 | Status: SHIPPED | OUTPATIENT
Start: 2024-03-29

## 2024-03-29 RX ORDER — HYDROCORTISONE 5 MG/1
TABLET ORAL
Qty: 315 TABLET | Refills: 3 | Status: SHIPPED | OUTPATIENT
Start: 2024-03-29

## 2024-03-29 RX ORDER — HYDROCODONE BITARTRATE AND ACETAMINOPHEN 7.5; 325 MG/1; MG/1
1 TABLET ORAL EVERY 6 HOURS PRN
Qty: 120 TABLET | Refills: 0 | Status: SHIPPED | OUTPATIENT
Start: 2024-03-29

## 2024-04-15 RX ORDER — FLUDROCORTISONE ACETATE 0.1 MG/1
0.1 TABLET ORAL DAILY
Qty: 90 TABLET | Refills: 3 | Status: SHIPPED | OUTPATIENT
Start: 2024-04-15

## 2024-04-15 NOTE — TELEPHONE ENCOUNTER
Rx Refill Note  Requested Prescriptions     Pending Prescriptions Disp Refills    fludrocortisone 0.1 MG tablet 90 tablet 3     Sig: Take 1 tablet by mouth Daily.      Last office visit with prescribing clinician: 1/23/2024   Last telemedicine visit with prescribing clinician: Visit date not found   Next office visit with prescribing clinician: 7/23/2024                         Would you like a call back once the refill request has been completed: [] Yes [] No    If the office needs to give you a call back, can they leave a voicemail: [] Yes [] No    Breanna Loving MA  04/15/24, 08:04 EDT

## 2024-04-19 NOTE — PROGRESS NOTES
"[facility] GROUP OUTPATIENT FOLLOW UP CLINIC VISIT    REASON FOR FOLLOW-UP:    1.  History of metastatic clear cell renal cell carcinoma.  All known disease had previously been resected..  2.  Pulmonary metastases discovered July 2020.  Bone metastases discovered September 2020.  3.  Votrient initiated 10/16/2020  4. Palliative radiation for pain complete on 11/10/2020  5.  1/4/2021 significantly elevated PSA > 100.  6.  1/20/2021 bone biopsy confirms metastatic prostate cancer    HISTORY OF PRESENT ILLNESS:  Micah Krishna is a 79 y.o. male with the above-mentioned history who returns today for follow-up    For about 2 months now he has been having worsening low back pain that has limited his mobility and has been severe enough to keep him from sleeping.  This occurs in the midline and radiates down the left lower extremity.  He has been hesitant to use more hydrocodone although when he takes 1 pill it does help after about an hour.  The pain relief lasts for about 1 to 2 hours then.  There was no acute injury and things have been gradually getting worse over the past couple of years but certainly more severe over the past couple of months.    REVIEW OF SYSTEMS:  As per HPI    PE:  Vitals:    04/26/24 0932   BP: 129/72   Pulse: 62   Resp: 18   Temp: 97.7 °F (36.5 °C)   TempSrc: Temporal   SpO2: 97%   Weight: 105 kg (231 lb 4.8 oz)   Height: 182 cm (71.65\")   PainSc:   6   PainLoc: Back        General:  No acute distress, awake, alert and oriented  Skin:  Warm and dry, no visible rash  HEENT:  Normocephalic/atraumatic.    Chest:  Normal respiratory effort.  Lungs clear to auscultation bilaterally.  Heart: Regular rate and rhythm  Extremities: No clubbing cyanosis or edema.    Neuro/psych:  Grossly non-focal.  Normal mood and affect.  Musculoskeletal: There is tenderness to palpation in the area of the lower lumbar spine and the midline    DIAGNOSTIC DATA:  CBC and Differential (04/26/2024 " 09:24)  Comprehensive metabolic panel (04/26/2024 09:24)  Magnesium (04/26/2024 09:24)  Phosphorus (04/26/2024 09:24)  PSA Diagnostic (04/26/2024 09:24)     IMAGING:  None reviewed    ASSESSMENT:  This is a 79 y.o. male with:    *Mild normocytic anemia:   Likely due to ADT and metastatic prostate cancer.  Hemoglobin stable at 11.1    *Adrenal insufficiency following bilateral adrenalectomy   On replacement hormone therapy.    He follows with endocrinology.    *History of metastatic renal cell carcinoma.    He had a left nephrectomy and adrenalectomy in 2000 and then a right adrenalectomy for metastatic disease in 2012.    There remains no evidence of disease.    *New metastatic bone disease diagnosed as he presented with worsening pain, ultimately found to be metastatic prostate cancer  Presumed to be metastatic renal cell carcinoma but ultimately diagnosed with metastatic prostate cancer.  Imaging discussed with radiology.  Imaging consistent with metastatic renal cell carcinoma  However, his PSA was noted to be greater than 100  As discussed below, bone biopsy subsequently confirmed metastatic prostate cancer  CT C/A/P 7/6/2020 with a RUL sub 6 mm pulmonary nodule in the RUL, stable since 1/17/2018, new 6 mm nodules in the medial RLL and RUL,  LLL nodule unchanged since 7/17/2018.  Healing left ninth rib fracture.  CT chest 9/28/2020 with stable lung nodules, pathologic fracture of the right 8th rib, abnormal appearance of T8, narrowing of the thecal sack at this level, additional lucencies at several vertebral bodies such as T7 and T11, subacute healing fractures in the anterior right 6th rib and lateral left 9th rib.  CT head 9/28/2020 with calvarial metastatic disease involving the frontal and occipital bones.  Bone scan 9/28/2020 with multifocal uptake consistent with metastatic disease. Anterior frontal bone, upper C spine at C1 or C2, T and L spine at T3, T8, T11, multifocal rib uptake, abnormal uptake in  the iliac wings/bodies and left femoral heads/acetabulum, distal left clavicle and both humeral heads.   PET scan from 10/8/2020.  Multifocal bone metastases throughout the skeleton.  Pathologic rib and vertebral body fracture.  Moderate FDG uptake in the right femoral neck with some sclerosis, new since 7/6/2020.  Mottled appearance of L3 and T8 vertebral bodies with pathologic compression fractures.  L3 fracture is a burst fracture with 20 to 25% loss of height and 4 to 5 mm of retropulsion in the central spinal canal.  Pathologic compression fracture at T8 with 10% loss of height.  New findings since 7/6/2020.  FDG avid soft tissue nodule extending into the central canal along the posterior aspect of the thecal sac at T8.  FDG uptake in the left clavicle.  Compression fracture of the right posterior eighth rib.  Votrient initiated 10/16/2020  Palliative radiation for pain complete on 11/10/2020  He did subsequently presented with an elevated PSA >100.  Bone biopsy performed on 1/20/2021 confirmed metastatic prostate cancer.  Votrient discontinued  2/5/2021 patient given Firmagon and Xgeva.  With plans to start Eligard 4 weeks later, and continue monthly Xgeva.    3/5/21.First dose of Eligard and monthly Xgeva.  Eligard will be given every 3 months.  12/17/2021: Proceeded with Xgeva.  PSA remains very low at 0.015.  1/21/2022: Hold Xgeva due to dental issues.  Bone scan on 2/7/2022 with improvement  5/20/2022: He is done with all of his dental work.  Proceed with Xgeva.  Proceed with lupron and every 3 months  8/19/2022: Proceed with Xgeva and Lupron.  Move Xgeva to every 3 months.  11/18/2022: Proceed with Xgeva and Lupron both every 3 months at this point  2/10/2023: Xgeva and Lupron.    5/5/2023: Proceed with Xgeva and Lupron.  7/28/2023: Proceed with Xgeva and Lupron  10/20/23: proceed with therapy  1/26/2024: Proceed with Xgeva and Lupron  4/26/2024: Proceed with Xgeva and Lupron    *Chronic kidney disease:    Status post left nephrectomy.    Creatinine remains stable at 1.17    *Migratory skeletal pain:   Metastatic disease apparent now on CT imaging and bone scan and now PET scan.  Completed radiation   Left leg pain has improved  Back pain improved  4/2/2021 patient continues on Norco 5/325 2 to 3 tablets/day with good pain control.  This remains stable as of 4/30/2021.  5/28/21.  The patient continues on [place] 5/325 with 2 to 3 tablets/day.  Pain is stable.   7/2/2021: He ran out of the hydrocodone/acetaminophen and has been using more ibuprofen which has controlled his pain but I encouraged him not to use as much ibuprofen to protect his kidney.  Pain is stable as of 7/30/2021.  10/22/2021: Pain is stable.  Pain medication refilled.  He was started on meloxicam by primary care which she will start in the near future.  12/17/2021: His low back is starting to worsen.  Increased hydrocodone to 7.5 mg with improvement.    5/5/2023: Low back pain is worse likely due to doing yard work.  He continues hydrocodone/acetaminophen which does control his pain.  See below    *Mild hypomagnesemia: monitor. If muscle cramps advised to call us.  Magnesium normal at 1.8    *Elevated PSA, subsequent biopsy confirmed metastatic prostate cancer:   He has a history of very mild elevation of his PSA in the past with a PSA of 5.92 on 10/17/2018.    His PSA on 1/4/2021 was greater than 100  PSA repeated today is also greater than 100  He is completely asymptomatic without clinical evidence for prostatitis.    Dr. Cat did communicate with Dr. Zapata with radiology and all findings on imaging would support more metastatic renal cell carcinoma rather than metastatic prostate cancer since the bony lesions are lytic.  There is nothing obvious in his prostate on current imaging.  Dr. Cat communicated with Dr. Cabrera with urology.  He is in agreement with a bone biopsy and he will see him in the office for evaluation.  One of the bony lesions  is amenable to CT-guided biopsy per Dr. Zapata.  CT-guided needle biopsy of the bone performed on 1/20/2021 confirms metastatic prostate cancer.  3/5/21.  Please see above.  The patient received 1 dose of Firmagon and is on Eligard every 3 months.  1/21/2022: PSA stable and quite low at 0.019  PSA undetectable on 5/20/2022  10/20/23: PSA remains undetectable  1/26/2024: PSA remains undetectable  4/26/2024: PSA remains undetectable    *New 40% T2 compression fracture.  Asymptomatic.  He is not interested in further radiation at this time.  Might consider referral to neurosurgery should he become more symptomatic or if this worsens.    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  He again did not complain of this today.    *He did have an MRI of the pituitary gland on 8/16/2021 as ordered by his endocrinologist.  Pituitary appears normal.  There is abnormal enhancement of the clivus suspected to be related to his metastatic prostate cancer.  There is an 11 mm lesion overlying the left frontal convexity suspected to be a meningioma.  The patient states that he was referred to neurosurgery but is not necessarily interested in pursuing this.  We can repeat an MRI in the future if desired.  He continues to not desire any follow-up for regarding this.  He does not desire surgery.    *Dental issues: 5/20/2022: Completed all of his dental work.  Proceed with Xgeva today and every three months for now.    *Skin lesion left cheek: Question sebaceous cyst.  Previously referred to dermatology.  Not discussed again today.    *History of pain in the left lower extremity both above and below the knee.    Left lower extremity venous duplex on 2/10/2023 was negative for DVT  Bone scan has been stable    *Worsening low back pain with radiation down the left lower extremity  Unfortunately this has been significantly worse over the past couple of months without an apparent injury.  He has been very hesitant to use more of the  hydrocodone.  He has not notified us of the worsening pain.    PLAN:     Xgeva will be continued again today and every 3 months  Lupron will be continued again today and every 3 months  We will obtain an x-ray of his lumbar spine today before he leaves the building and we will also schedule an MRI of the lumbar spine with and without contrast  Start Celebrex 200 mg daily  Increase hydrocodone/acetaminophen to 7.5/325 mg tablets, 2 tablets every 6 hours as needed  We also discussed the possibility of a Medrol Dosepak today.  He is on chronic hydrocortisone for adrenal insufficiency.  He will let us know if the Celebrex and increase in hydrocodone does not help and we will consider that a Medrol Dosepak.  I will see him back in 3 months with labs, Xgeva, Lupron  I encouraged him to notify us if the pain does not improve    He remains on high risk medication requiring intensive monitoring.      I spent 56 minutes in this visit today reviewing his record, communicating with him, examining him, placing orders, documenting the encounter.

## 2024-04-26 ENCOUNTER — INFUSION (OUTPATIENT)
Dept: ONCOLOGY | Facility: HOSPITAL | Age: 79
End: 2024-04-26
Payer: MEDICARE

## 2024-04-26 ENCOUNTER — LAB (OUTPATIENT)
Dept: OTHER | Facility: HOSPITAL | Age: 79
End: 2024-04-26
Payer: MEDICARE

## 2024-04-26 ENCOUNTER — OFFICE VISIT (OUTPATIENT)
Dept: ONCOLOGY | Facility: CLINIC | Age: 79
End: 2024-04-26
Payer: MEDICARE

## 2024-04-26 ENCOUNTER — HOSPITAL ENCOUNTER (OUTPATIENT)
Dept: GENERAL RADIOLOGY | Facility: HOSPITAL | Age: 79
Discharge: HOME | End: 2024-04-26
Payer: MEDICARE

## 2024-04-26 VITALS
WEIGHT: 231.3 LBS | OXYGEN SATURATION: 97 % | HEART RATE: 62 BPM | RESPIRATION RATE: 18 BRPM | TEMPERATURE: 97.7 F | DIASTOLIC BLOOD PRESSURE: 72 MMHG | SYSTOLIC BLOOD PRESSURE: 129 MMHG | BODY MASS INDEX: 31.33 KG/M2 | HEIGHT: 72 IN

## 2024-04-26 DIAGNOSIS — C79.51 PROSTATE CANCER METASTATIC TO BONE: Primary | ICD-10-CM

## 2024-04-26 DIAGNOSIS — G89.3 CANCER ASSOCIATED PAIN: ICD-10-CM

## 2024-04-26 DIAGNOSIS — C61 PROSTATE CANCER METASTATIC TO BONE: ICD-10-CM

## 2024-04-26 DIAGNOSIS — M54.42 ACUTE MIDLINE LOW BACK PAIN WITH LEFT-SIDED SCIATICA: ICD-10-CM

## 2024-04-26 DIAGNOSIS — C79.51 PROSTATE CANCER METASTATIC TO BONE: ICD-10-CM

## 2024-04-26 DIAGNOSIS — M54.42 ACUTE MIDLINE LOW BACK PAIN WITH LEFT-SIDED SCIATICA: Primary | ICD-10-CM

## 2024-04-26 DIAGNOSIS — C61 PROSTATE CANCER METASTATIC TO BONE: Primary | ICD-10-CM

## 2024-04-26 LAB
ALBUMIN SERPL-MCNC: 4.1 G/DL (ref 3.5–5.2)
ALBUMIN/GLOB SERPL: 1.4 G/DL
ALP SERPL-CCNC: 43 U/L (ref 39–117)
ALT SERPL W P-5'-P-CCNC: 12 U/L (ref 1–41)
ANION GAP SERPL CALCULATED.3IONS-SCNC: 8.4 MMOL/L (ref 5–15)
AST SERPL-CCNC: 13 U/L (ref 1–40)
BASOPHILS # BLD AUTO: 0.05 10*3/MM3 (ref 0–0.2)
BASOPHILS NFR BLD AUTO: 0.7 % (ref 0–1.5)
BILIRUB SERPL-MCNC: 0.4 MG/DL (ref 0–1.2)
BUN SERPL-MCNC: 21 MG/DL (ref 8–23)
BUN/CREAT SERPL: 17.9 (ref 7–25)
CALCIUM SPEC-SCNC: 10 MG/DL (ref 8.6–10.5)
CHLORIDE SERPL-SCNC: 108 MMOL/L (ref 98–107)
CO2 SERPL-SCNC: 26.6 MMOL/L (ref 22–29)
CREAT SERPL-MCNC: 1.17 MG/DL (ref 0.76–1.27)
DEPRECATED RDW RBC AUTO: 44.3 FL (ref 37–54)
EGFRCR SERPLBLD CKD-EPI 2021: 62.6 ML/MIN/1.73
EOSINOPHIL # BLD AUTO: 0.43 10*3/MM3 (ref 0–0.4)
EOSINOPHIL NFR BLD AUTO: 6.1 % (ref 0.3–6.2)
ERYTHROCYTE [DISTWIDTH] IN BLOOD BY AUTOMATED COUNT: 14.4 % (ref 12.3–15.4)
GLOBULIN UR ELPH-MCNC: 3 GM/DL
GLUCOSE SERPL-MCNC: 153 MG/DL (ref 65–99)
HCT VFR BLD AUTO: 33.1 % (ref 37.5–51)
HGB BLD-MCNC: 11.1 G/DL (ref 13–17.7)
IMM GRANULOCYTES # BLD AUTO: 0.03 10*3/MM3 (ref 0–0.05)
IMM GRANULOCYTES NFR BLD AUTO: 0.4 % (ref 0–0.5)
LYMPHOCYTES # BLD AUTO: 0.83 10*3/MM3 (ref 0.7–3.1)
LYMPHOCYTES NFR BLD AUTO: 11.8 % (ref 19.6–45.3)
MAGNESIUM SERPL-MCNC: 1.8 MG/DL (ref 1.6–2.4)
MCH RBC QN AUTO: 28.8 PG (ref 26.6–33)
MCHC RBC AUTO-ENTMCNC: 33.5 G/DL (ref 31.5–35.7)
MCV RBC AUTO: 86 FL (ref 79–97)
MONOCYTES # BLD AUTO: 0.62 10*3/MM3 (ref 0.1–0.9)
MONOCYTES NFR BLD AUTO: 8.8 % (ref 5–12)
NEUTROPHILS NFR BLD AUTO: 5.06 10*3/MM3 (ref 1.7–7)
NEUTROPHILS NFR BLD AUTO: 72.2 % (ref 42.7–76)
NRBC BLD AUTO-RTO: 0 /100 WBC (ref 0–0.2)
PHOSPHATE SERPL-MCNC: 3.4 MG/DL (ref 2.5–4.5)
PLATELET # BLD AUTO: 127 10*3/MM3 (ref 140–450)
PMV BLD AUTO: 9.1 FL (ref 6–12)
POTASSIUM SERPL-SCNC: 4.4 MMOL/L (ref 3.5–5.2)
PROT SERPL-MCNC: 7.1 G/DL (ref 6–8.5)
PSA SERPL-MCNC: <0.014 NG/ML (ref 0–4)
RBC # BLD AUTO: 3.85 10*6/MM3 (ref 4.14–5.8)
SODIUM SERPL-SCNC: 143 MMOL/L (ref 136–145)
WBC NRBC COR # BLD AUTO: 7.02 10*3/MM3 (ref 3.4–10.8)

## 2024-04-26 PROCEDURE — 72100 X-RAY EXAM L-S SPINE 2/3 VWS: CPT

## 2024-04-26 PROCEDURE — 84100 ASSAY OF PHOSPHORUS: CPT | Performed by: INTERNAL MEDICINE

## 2024-04-26 PROCEDURE — 85025 COMPLETE CBC W/AUTO DIFF WBC: CPT | Performed by: INTERNAL MEDICINE

## 2024-04-26 PROCEDURE — 83735 ASSAY OF MAGNESIUM: CPT | Performed by: INTERNAL MEDICINE

## 2024-04-26 PROCEDURE — 80053 COMPREHEN METABOLIC PANEL: CPT | Performed by: INTERNAL MEDICINE

## 2024-04-26 PROCEDURE — 84153 ASSAY OF PSA TOTAL: CPT | Performed by: INTERNAL MEDICINE

## 2024-04-26 PROCEDURE — 36415 COLL VENOUS BLD VENIPUNCTURE: CPT

## 2024-04-26 PROCEDURE — 96372 THER/PROPH/DIAG INJ SC/IM: CPT

## 2024-04-26 PROCEDURE — 25010000002 DENOSUMAB 120 MG/1.7ML SOLUTION: Performed by: INTERNAL MEDICINE

## 2024-04-26 PROCEDURE — 25010000002 LEUPROLIDE ACETATE (3 MONTH) PER 7.5 MG: Performed by: INTERNAL MEDICINE

## 2024-04-26 PROCEDURE — 96402 CHEMO HORMON ANTINEOPL SQ/IM: CPT

## 2024-04-26 RX ORDER — CELECOXIB 200 MG/1
200 CAPSULE ORAL DAILY
Qty: 30 CAPSULE | Refills: 5 | Status: SHIPPED | OUTPATIENT
Start: 2024-04-26

## 2024-04-26 RX ADMIN — LEUPROLIDE ACETATE 22.5 MG: KIT at 10:38

## 2024-04-26 RX ADMIN — DENOSUMAB 120 MG: 120 INJECTION SUBCUTANEOUS at 10:33

## 2024-04-26 NOTE — NURSING NOTE
Injection  Xgeva Injection performed in [place] by Esperanza Tavarez RN. Patient tolerated the procedure well without complications.  04/26/24   Esperanza Tavarez RN       Injection  Lupron Injection performed in left gluteal by Esperanza Tavarez RN. Patient tolerated the procedure well without complications.  04/26/24   Esperanza Tavarez RN

## 2024-05-01 ENCOUNTER — TELEPHONE (OUTPATIENT)
Dept: ONCOLOGY | Facility: CLINIC | Age: 79
End: 2024-05-01
Payer: MEDICARE

## 2024-05-01 ENCOUNTER — HOSPITAL ENCOUNTER (OUTPATIENT)
Dept: MRI IMAGING | Facility: HOSPITAL | Age: 79
Discharge: HOME | End: 2024-05-01
Admitting: INTERNAL MEDICINE
Payer: MEDICARE

## 2024-05-01 DIAGNOSIS — M54.42 ACUTE MIDLINE LOW BACK PAIN WITH LEFT-SIDED SCIATICA: ICD-10-CM

## 2024-05-01 DIAGNOSIS — C79.51 PROSTATE CANCER METASTATIC TO BONE: ICD-10-CM

## 2024-05-01 DIAGNOSIS — C61 PROSTATE CANCER METASTATIC TO BONE: ICD-10-CM

## 2024-05-01 PROCEDURE — A9577 INJ MULTIHANCE: HCPCS | Performed by: INTERNAL MEDICINE

## 2024-05-01 PROCEDURE — 72158 MRI LUMBAR SPINE W/O & W/DYE: CPT

## 2024-05-01 PROCEDURE — 0 GADOBENATE DIMEGLUMINE 529 MG/ML SOLUTION: Performed by: INTERNAL MEDICINE

## 2024-05-01 RX ADMIN — GADOBENATE DIMEGLUMINE 20 ML: 529 INJECTION, SOLUTION INTRAVENOUS at 12:38

## 2024-05-01 NOTE — TELEPHONE ENCOUNTER
I communicated with the patient by phone about his MRI result showing canal stenosis.  I recommended referral to a neurosurgeon but also discussed the possibility of referring for injections.  He wants to talk to a friend who has had a similar experience and is now receiving injections and he will then let me know how he wants to proceed.

## 2024-05-03 DIAGNOSIS — G89.3 CANCER ASSOCIATED PAIN: ICD-10-CM

## 2024-05-03 RX ORDER — HYDROCODONE BITARTRATE AND ACETAMINOPHEN 7.5; 325 MG/1; MG/1
2 TABLET ORAL EVERY 6 HOURS PRN
Qty: 240 TABLET | Refills: 0 | Status: SHIPPED | OUTPATIENT
Start: 2024-05-03

## 2024-05-13 ENCOUNTER — PATIENT MESSAGE (OUTPATIENT)
Dept: ONCOLOGY | Facility: CLINIC | Age: 79
End: 2024-05-13
Payer: MEDICARE

## 2024-05-13 DIAGNOSIS — G89.3 CANCER ASSOCIATED PAIN: ICD-10-CM

## 2024-05-13 DIAGNOSIS — C79.51 PROSTATE CANCER METASTATIC TO BONE: Primary | ICD-10-CM

## 2024-05-13 DIAGNOSIS — C61 PROSTATE CANCER METASTATIC TO BONE: Primary | ICD-10-CM

## 2024-06-03 DIAGNOSIS — G89.3 CANCER ASSOCIATED PAIN: ICD-10-CM

## 2024-06-03 RX ORDER — FLUDROCORTISONE ACETATE 0.1 MG/1
0.1 TABLET ORAL DAILY
Qty: 90 TABLET | Refills: 3 | Status: SHIPPED | OUTPATIENT
Start: 2024-06-03

## 2024-06-03 RX ORDER — HYDROCODONE BITARTRATE AND ACETAMINOPHEN 7.5; 325 MG/1; MG/1
2 TABLET ORAL EVERY 6 HOURS PRN
Qty: 240 TABLET | Refills: 0 | Status: SHIPPED | OUTPATIENT
Start: 2024-06-03

## 2024-06-03 RX ORDER — HYDROCORTISONE 10 MG/1
TABLET ORAL
Qty: 100 TABLET | Refills: 3 | Status: SHIPPED | OUTPATIENT
Start: 2024-06-03

## 2024-06-03 RX ORDER — CELECOXIB 200 MG/1
200 CAPSULE ORAL DAILY
Qty: 30 CAPSULE | Refills: 5 | Status: SHIPPED | OUTPATIENT
Start: 2024-06-03

## 2024-06-03 NOTE — TELEPHONE ENCOUNTER
Rx Refill Note  Requested Prescriptions     Pending Prescriptions Disp Refills    hydrocortisone (CORTEF) 10 MG tablet 100 tablet 2     Sig: Take 3 to 6 pills by mouth  for acute infection with adrenal insufficiency as needed daily and as directed    fludrocortisone 0.1 MG tablet 90 tablet 3     Sig: Take 1 tablet by mouth Daily.      Last office visit with prescribing clinician: 1/23/2024   Last telemedicine visit with prescribing clinician: Visit date not found   Next office visit with prescribing clinician: 7/23/2024                         Would you like a call back once the refill request has been completed: [] Yes [] No    If the office needs to give you a call back, can they leave a voicemail: [] Yes [] No    Breanna Loving MA  06/03/24, 11:53 EDT

## 2024-06-20 RX ORDER — FLUDROCORTISONE ACETATE 0.1 MG/1
0.1 TABLET ORAL DAILY
Qty: 90 TABLET | Refills: 0 | Status: SHIPPED | OUTPATIENT
Start: 2024-06-20

## 2024-06-20 NOTE — TELEPHONE ENCOUNTER
Rx Refill Note  Requested Prescriptions     Pending Prescriptions Disp Refills    fludrocortisone 0.1 MG tablet 90 tablet 3     Sig: Take 1 tablet by mouth Daily.      Last office visit with prescribing clinician: 1/23/2024   Last telemedicine visit with prescribing clinician: Visit date not found   Next office visit with prescribing clinician: 7/23/2024                         Would you like a call back once the refill request has been completed: [] Yes [] No    If the office needs to give you a call back, can they leave a voicemail: [] Yes [] No    Breanna Loving MA  06/20/24, 07:59 EDT

## 2024-07-02 DIAGNOSIS — G89.3 CANCER ASSOCIATED PAIN: ICD-10-CM

## 2024-07-02 RX ORDER — HYDROCODONE BITARTRATE AND ACETAMINOPHEN 7.5; 325 MG/1; MG/1
2 TABLET ORAL EVERY 6 HOURS PRN
Qty: 240 TABLET | Refills: 0 | Status: SHIPPED | OUTPATIENT
Start: 2024-07-02

## 2024-07-16 ENCOUNTER — READMISSION MANAGEMENT (OUTPATIENT)
Dept: CALL CENTER | Facility: HOSPITAL | Age: 79
End: 2024-07-16
Payer: MEDICARE

## 2024-07-27 ENCOUNTER — READMISSION MANAGEMENT (OUTPATIENT)
Dept: CALL CENTER | Facility: HOSPITAL | Age: 79
End: 2024-07-27
Payer: MEDICARE

## 2024-07-27 NOTE — OUTREACH NOTE
Prep Survey      Flowsheet Row Responses   [facility] facility patient discharged from? Non-[facility]   Is LACE score < 7 ? Non-BH Discharge   Eligibility Not Eligible   What are the reasons patient is not eligible? [facility]   Does the patient have one of the following disease processes/diagnoses(primary or secondary)? Other   Prep survey completed? Yes            GWENDOLYN SUBRAMANIAN - Registered Nurse

## 2024-08-01 ENCOUNTER — TELEPHONE (OUTPATIENT)
Dept: FAMILY MEDICINE CLINIC | Facility: CLINIC | Age: 79
End: 2024-08-01
Payer: MEDICARE

## 2024-08-01 NOTE — PROGRESS NOTES
"[facility] GROUP OUTPATIENT FOLLOW UP CLINIC VISIT    REASON FOR FOLLOW-UP:    1.  History of metastatic clear cell renal cell carcinoma.  All known disease had previously been resected..  2.  Pulmonary metastases discovered July 2020.  Bone metastases discovered September 2020.  3.  Votrient initiated 10/16/2020  4. Palliative radiation for pain complete on 11/10/2020  5.  1/4/2021 significantly elevated PSA > 100.  6.  1/20/2021 bone biopsy confirms metastatic prostate cancer    HISTORY OF PRESENT ILLNESS:  Micah Krishna is a 79 y.o. male with the above-mentioned history who returns today for follow-up    2 recent admissions at [place].  After the first he was discharged on 7/16/2024 after admission with septic shock, acute kidney injury, acute respiratory failure, PEA and then VT cardiac arrest requiring chest compressions.  He still complains of chest pain following this.    More recently he was discharged on 7/27/2024 after admission with urinary retention, fatigue, syncope.    He still has urinary retention and has struggled to get catheters as he does not want an indwelling Mars catheter.    He notes ongoing fatigue and weakness.  He complains of bilateral lower extremity edema.    REVIEW OF SYSTEMS:  As per HPI    PE:  Vitals:    08/02/24 1212   BP: 108/56   Pulse: 73   Resp: 18   Temp: 98 °F (36.7 °C)   TempSrc: Oral   SpO2: 94%   Height: 182 cm (71.65\")   PainSc:   5   PainLoc: Chest       General:  No acute distress, awake, alert and oriented.  Sitting in wheelchair.  Very weak appearing.  Skin:  Warm and dry, no visible rash  HEENT:  Normocephalic/atraumatic.    Chest:  Normal respiratory effort.  Lungs clear to auscultation bilaterally.  Heart: Regular rate and rhythm  Extremities: 2+ bilateral lower extremity ankle edema  Neuro/psych:  Grossly non-focal.  Normal mood and affect.    DIAGNOSTIC DATA:  CBC and Differential (08/02/2024 12:05)     IMAGING:  None reviewed    ASSESSMENT:  This is a " 79 y.o. male with:    *Mild normocytic anemia:   Likely due to ADT and metastatic prostate cancer.  More recently, 2 recent hospitalizations at [place]  Hemoglobin low at 9.5 but improving    *Adrenal insufficiency following bilateral adrenalectomy   On replacement hormone therapy.    He follows with endocrinology.    *History of metastatic renal cell carcinoma.    He had a left nephrectomy and adrenalectomy in 2000 and then a right adrenalectomy for metastatic disease in 2012.    There remains no evidence of disease.    *New metastatic bone disease diagnosed as he presented with worsening pain, ultimately found to be metastatic prostate cancer  Presumed to be metastatic renal cell carcinoma but ultimately diagnosed with metastatic prostate cancer.  Imaging discussed with radiology.  Imaging consistent with metastatic renal cell carcinoma  However, his PSA was noted to be greater than 100  As discussed below, bone biopsy subsequently confirmed metastatic prostate cancer  CT C/A/P 7/6/2020 with a RUL sub 6 mm pulmonary nodule in the RUL, stable since 1/17/2018, new 6 mm nodules in the medial RLL and RUL,  LLL nodule unchanged since 7/17/2018.  Healing left ninth rib fracture.  CT chest 9/28/2020 with stable lung nodules, pathologic fracture of the right 8th rib, abnormal appearance of T8, narrowing of the thecal sack at this level, additional lucencies at several vertebral bodies such as T7 and T11, subacute healing fractures in the anterior right 6th rib and lateral left 9th rib.  CT head 9/28/2020 with calvarial metastatic disease involving the frontal and occipital bones.  Bone scan 9/28/2020 with multifocal uptake consistent with metastatic disease. Anterior frontal bone, upper C spine at C1 or C2, T and L spine at T3, T8, T11, multifocal rib uptake, abnormal uptake in the iliac wings/bodies and left femoral heads/acetabulum, distal left clavicle and both humeral heads.   PET scan from 10/8/2020.  Multifocal bone  metastases throughout the skeleton.  Pathologic rib and vertebral body fracture.  Moderate FDG uptake in the right femoral neck with some sclerosis, new since 7/6/2020.  Mottled appearance of L3 and T8 vertebral bodies with pathologic compression fractures.  L3 fracture is a burst fracture with 20 to 25% loss of height and 4 to 5 mm of retropulsion in the central spinal canal.  Pathologic compression fracture at T8 with 10% loss of height.  New findings since 7/6/2020.  FDG avid soft tissue nodule extending into the central canal along the posterior aspect of the thecal sac at T8.  FDG uptake in the left clavicle.  Compression fracture of the right posterior eighth rib.  Votrient initiated 10/16/2020  Palliative radiation for pain complete on 11/10/2020  He did subsequently presented with an elevated PSA >100.  Bone biopsy performed on 1/20/2021 confirmed metastatic prostate cancer.  Votrient discontinued  2/5/2021 patient given Firmagon and Xgeva.  With plans to start Eligard 4 weeks later, and continue monthly Xgeva.    3/5/21.First dose of Eligard and monthly Xgeva.  Eligard will be given every 3 months.  12/17/2021: Proceeded with Xgeva.  PSA remains very low at 0.015.  1/21/2022: Hold Xgeva due to dental issues.  Bone scan on 2/7/2022 with improvement  5/20/2022: He is done with all of his dental work.  Proceed with Xgeva.  Proceed with lupron and every 3 months  8/19/2022: Proceed with Xgeva and Lupron.  Move Xgeva to every 3 months.  11/18/2022: Proceed with Xgeva and Lupron both every 3 months at this point  2/10/2023: Xgeva and Lupron.    5/5/2023: Proceed with Xgeva and Lupron.  7/28/2023: Proceed with Xgeva and Lupron  10/20/23: proceed with therapy  1/26/2024: Proceed with Xgeva and Lupron  4/26/2024: Proceed with Xgeva and Lupron  8/2/2024: Hold Xgeva and Lupron due to the fact that he is recovering from 2 recent hospitalizations    *Chronic kidney disease:   Status post left nephrectomy.    Recent  acute kidney injury.  Creatinine normalized at 1.11    *Migratory skeletal pain:   Metastatic disease apparent now on CT imaging and bone scan and now PET scan.  Completed radiation   Left leg pain has improved  Back pain improved  4/2/2021 patient continues on Norco 5/325 2 to 3 tablets/day with good pain control.  This remains stable as of 4/30/2021.  5/28/21.  The patient continues on [place] 5/325 with 2 to 3 tablets/day.  Pain is stable.   7/2/2021: He ran out of the hydrocodone/acetaminophen and has been using more ibuprofen which has controlled his pain but I encouraged him not to use as much ibuprofen to protect his kidney.  Pain is stable as of 7/30/2021.  10/22/2021: Pain is stable.  Pain medication refilled.  He was started on meloxicam by primary care which she will start in the near future.  12/17/2021: His low back is starting to worsen.  Increased hydrocodone to 7.5 mg with improvement.    5/5/2023: Low back pain is worse likely due to doing yard work.  He continues hydrocodone/acetaminophen which does control his pain.  See below    *Mild hypomagnesemia: monitor. If muscle cramps advised to call us.  Magnesium normal at 1.6    *Elevated PSA, subsequent biopsy confirmed metastatic prostate cancer:   He has a history of very mild elevation of his PSA in the past with a PSA of 5.92 on 10/17/2018.    His PSA on 1/4/2021 was greater than 100  PSA repeated today is also greater than 100  He is completely asymptomatic without clinical evidence for prostatitis.    Dr. Cat did communicate with Dr. Zapata with radiology and all findings on imaging would support more metastatic renal cell carcinoma rather than metastatic prostate cancer since the bony lesions are lytic.  There is nothing obvious in his prostate on current imaging.  Dr. Cat communicated with Dr. Cabrera with urology.  He is in agreement with a bone biopsy and he will see him in the office for evaluation.  One of the bony lesions is amenable to  CT-guided biopsy per Dr. Zapata.  CT-guided needle biopsy of the bone performed on 1/20/2021 confirms metastatic prostate cancer.  3/5/21.  Please see above.  The patient received 1 dose of Firmagon and is on Eligard every 3 months.  1/21/2022: PSA stable and quite low at 0.019  PSA undetectable on 5/20/2022  10/20/23: PSA remains undetectable  1/26/2024: PSA remains undetectable  4/26/2024: PSA remains undetectable  8/2/2024: PSA remains undetectable    *New 40% T2 compression fracture.  Asymptomatic.  He is not interested in further radiation at this time.  Might consider referral to neurosurgery should he become more symptomatic or if this worsens.    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  He again did not complain of this today.    *He did have an MRI of the pituitary gland on 8/16/2021 as ordered by his endocrinologist.  Pituitary appears normal.  There is abnormal enhancement of the clivus suspected to be related to his metastatic prostate cancer.  There is an 11 mm lesion overlying the left frontal convexity suspected to be a meningioma.  The patient states that he was referred to neurosurgery but is not necessarily interested in pursuing this.  We can repeat an MRI in the future if desired.  He continues to not desire any follow-up for regarding this.  He does not desire surgery.    *Dental issues: 5/20/2022: Completed all of his dental work.  Proceed with Xgeva today and every three months for now.    *Skin lesion left cheek: Question sebaceous cyst.  Previously referred to dermatology.  Not discussed again today.    *History of pain in the left lower extremity both above and below the knee.    Left lower extremity venous duplex on 2/10/2023 was negative for DVT  Bone scan has been stable    *Worsening low back pain with radiation down the left lower extremity  Unfortunately this has been significantly worse over the past couple of months without an apparent injury.  He has been very hesitant  to use more of the hydrocodone.  He has not notified us of the worsening pain.    *Recent issues with constipation    *2 recent hospitalizations, the first status post cardiac arrest requiring chest compressions.  He had acute kidney injury, evidence of colitis though had been constipated probable sepsis.  The second hospitalization was for syncope and fatigue.    *Urinary retention: He declines a Mars catheter and wants to straight cath in and out.  He needs more catheters.  He has been unsuccessful at obtaining these otherwise.  He asks if we can authorize some.    PLAN:     Hold Xgeva and Lupron for the next couple of weeks until he recovers more from his recent hospitalizations  We can authorize the straight catheters if the [employer] company contacts us.  He needs to send the last through the weekend until he can follow-up with urology on Monday.  He will continue acetaminophen as needed for pain.  He can continue his opiate pain medication as well.  He should avoid a lot of NSAID use.  He continues chronic hydrocortisone use for adrenal insufficiency  Follow-up in a couple of weeks if he is doing better to proceed with Lupron and Xgeva.  I will see him back 3 months after that.    He remains on high risk medication requiring intensive monitoring.      I spent 40 minutes in this visit today reviewing his record, communicating with him, examining him, placing orders, documenting the encounter.

## 2024-08-01 NOTE — TELEPHONE ENCOUNTER
Caller: [facility]    Relationship:     Best call back number: [phone]     What is the best time to reach you: ANY    Who are you requesting to speak with (clinical staff, provider,  specific staff member): JAMES EPLEY    Do you know the name of the person who called: MARIAH    What was the call regarding: REQUESTING ORDERS FOR NURSING TO GO IN AND SEE PATIENT. THEY ARE NEEDING FREQUENCY ORDERS. ALSO ORDERS FOR PT AND OT EVALUATION.      PLEASE CALL

## 2024-08-01 NOTE — TELEPHONE ENCOUNTER
Caller: Micah Krishna    Relationship: Self    Best call back number: [phone]     What orders are you requesting : SIZE 16, STRAIGHT CATHETER, 1 TIME USE, AT LEAST QTY 20    In what timeframe would the patient NEED:  ASAP.  ONLY HAS ENOUGH FOR TONIGHT    Where will you receive your lab/imaging services: [facility]    FAX: [phone]

## 2024-08-01 NOTE — TELEPHONE ENCOUNTER
Discussed with Judi our manager whenever he needs,  Happy to assist,  Likely is unable to come into the office due to his recent hospitalization he is quite ill  I believe home health will call back after checking on some things,

## 2024-08-01 NOTE — TELEPHONE ENCOUNTER
Patient has not been seen since 1/24. I do not see previous orders for this. Received a call from  with Matt. Patient may have a new pcp. If not patient needs to schedule a follow-up with James Epley. This order should go to his nephrologist or urologist     ANEESH with Details.

## 2024-08-01 NOTE — TELEPHONE ENCOUNTER
Patient has not been seen since Jan 24. Does not meet the requirements 30 days face to face. Patient may have new pcp.

## 2024-08-02 ENCOUNTER — LAB (OUTPATIENT)
Dept: OTHER | Facility: HOSPITAL | Age: 79
End: 2024-08-02
Payer: MEDICARE

## 2024-08-02 ENCOUNTER — OFFICE VISIT (OUTPATIENT)
Dept: ONCOLOGY | Facility: CLINIC | Age: 79
End: 2024-08-02
Payer: MEDICARE

## 2024-08-02 ENCOUNTER — TELEPHONE (OUTPATIENT)
Dept: FAMILY MEDICINE CLINIC | Facility: CLINIC | Age: 79
End: 2024-08-02
Payer: MEDICARE

## 2024-08-02 ENCOUNTER — INFUSION (OUTPATIENT)
Dept: ONCOLOGY | Facility: HOSPITAL | Age: 79
End: 2024-08-02
Payer: MEDICARE

## 2024-08-02 VITALS
TEMPERATURE: 98 F | HEART RATE: 73 BPM | DIASTOLIC BLOOD PRESSURE: 56 MMHG | RESPIRATION RATE: 18 BRPM | OXYGEN SATURATION: 94 % | BODY MASS INDEX: 31.67 KG/M2 | HEIGHT: 72 IN | SYSTOLIC BLOOD PRESSURE: 108 MMHG

## 2024-08-02 DIAGNOSIS — C79.51 PROSTATE CANCER METASTATIC TO BONE: Primary | ICD-10-CM

## 2024-08-02 DIAGNOSIS — C61 PROSTATE CANCER METASTATIC TO BONE: ICD-10-CM

## 2024-08-02 DIAGNOSIS — C61 PROSTATE CANCER METASTATIC TO BONE: Primary | ICD-10-CM

## 2024-08-02 DIAGNOSIS — C79.51 PROSTATE CANCER METASTATIC TO BONE: ICD-10-CM

## 2024-08-02 LAB
ALBUMIN SERPL-MCNC: 3.4 G/DL (ref 3.5–5.2)
ALBUMIN/GLOB SERPL: 1.2 G/DL
ALP SERPL-CCNC: 68 U/L (ref 39–117)
ALT SERPL W P-5'-P-CCNC: 9 U/L (ref 1–41)
ANION GAP SERPL CALCULATED.3IONS-SCNC: 9.7 MMOL/L (ref 5–15)
AST SERPL-CCNC: 14 U/L (ref 1–40)
BASOPHILS # BLD AUTO: 0.04 10*3/MM3 (ref 0–0.2)
BASOPHILS NFR BLD AUTO: 0.4 % (ref 0–1.5)
BILIRUB SERPL-MCNC: 0.3 MG/DL (ref 0–1.2)
BUN SERPL-MCNC: 14 MG/DL (ref 8–23)
BUN/CREAT SERPL: 12.6 (ref 7–25)
CALCIUM SPEC-SCNC: 8.5 MG/DL (ref 8.6–10.5)
CHLORIDE SERPL-SCNC: 105 MMOL/L (ref 98–107)
CO2 SERPL-SCNC: 28.3 MMOL/L (ref 22–29)
CREAT SERPL-MCNC: 1.11 MG/DL (ref 0.76–1.27)
DEPRECATED RDW RBC AUTO: 47.6 FL (ref 37–54)
EGFRCR SERPLBLD CKD-EPI 2021: 66.7 ML/MIN/1.73
EOSINOPHIL # BLD AUTO: 0.07 10*3/MM3 (ref 0–0.4)
EOSINOPHIL NFR BLD AUTO: 0.7 % (ref 0.3–6.2)
ERYTHROCYTE [DISTWIDTH] IN BLOOD BY AUTOMATED COUNT: 15.1 % (ref 12.3–15.4)
GLOBULIN UR ELPH-MCNC: 2.8 GM/DL
GLUCOSE SERPL-MCNC: 181 MG/DL (ref 65–99)
HCT VFR BLD AUTO: 29.5 % (ref 37.5–51)
HGB BLD-MCNC: 9.5 G/DL (ref 13–17.7)
IMM GRANULOCYTES # BLD AUTO: 0.12 10*3/MM3 (ref 0–0.05)
IMM GRANULOCYTES NFR BLD AUTO: 1.3 % (ref 0–0.5)
LYMPHOCYTES # BLD AUTO: 0.68 10*3/MM3 (ref 0.7–3.1)
LYMPHOCYTES NFR BLD AUTO: 7.1 % (ref 19.6–45.3)
MAGNESIUM SERPL-MCNC: 1.6 MG/DL (ref 1.6–2.4)
MCH RBC QN AUTO: 28.4 PG (ref 26.6–33)
MCHC RBC AUTO-ENTMCNC: 32.2 G/DL (ref 31.5–35.7)
MCV RBC AUTO: 88.1 FL (ref 79–97)
MONOCYTES # BLD AUTO: 0.59 10*3/MM3 (ref 0.1–0.9)
MONOCYTES NFR BLD AUTO: 6.2 % (ref 5–12)
NEUTROPHILS NFR BLD AUTO: 8.04 10*3/MM3 (ref 1.7–7)
NEUTROPHILS NFR BLD AUTO: 84.3 % (ref 42.7–76)
NRBC BLD AUTO-RTO: 0 /100 WBC (ref 0–0.2)
PHOSPHATE SERPL-MCNC: 2.5 MG/DL (ref 2.5–4.5)
PLATELET # BLD AUTO: 238 10*3/MM3 (ref 140–450)
PMV BLD AUTO: 9.7 FL (ref 6–12)
POTASSIUM SERPL-SCNC: 3.3 MMOL/L (ref 3.5–5.2)
PROT SERPL-MCNC: 6.2 G/DL (ref 6–8.5)
PSA SERPL-MCNC: <0.014 NG/ML (ref 0–4)
RBC # BLD AUTO: 3.35 10*6/MM3 (ref 4.14–5.8)
SODIUM SERPL-SCNC: 143 MMOL/L (ref 136–145)
WBC NRBC COR # BLD AUTO: 9.54 10*3/MM3 (ref 3.4–10.8)

## 2024-08-02 PROCEDURE — G0463 HOSPITAL OUTPT CLINIC VISIT: HCPCS

## 2024-08-02 PROCEDURE — 84100 ASSAY OF PHOSPHORUS: CPT | Performed by: INTERNAL MEDICINE

## 2024-08-02 PROCEDURE — 80053 COMPREHEN METABOLIC PANEL: CPT | Performed by: INTERNAL MEDICINE

## 2024-08-02 PROCEDURE — 36415 COLL VENOUS BLD VENIPUNCTURE: CPT

## 2024-08-02 PROCEDURE — 84153 ASSAY OF PSA TOTAL: CPT | Performed by: INTERNAL MEDICINE

## 2024-08-02 PROCEDURE — 85025 COMPLETE CBC W/AUTO DIFF WBC: CPT | Performed by: INTERNAL MEDICINE

## 2024-08-02 PROCEDURE — 83735 ASSAY OF MAGNESIUM: CPT | Performed by: INTERNAL MEDICINE

## 2024-08-02 NOTE — TELEPHONE ENCOUNTER
If I am not here give the call to Sonal. I have asked that he call the office back and ask for the office.

## 2024-08-02 NOTE — TELEPHONE ENCOUNTER
Office has been unable to reach the patient or his emergency contact. I have spoken with HH an approved orders. Patient is scheduled for a visit but Leonel can do a virtual visit since it will be easier on the patient. Unable to verify orders for the cath since we have been unable to reach the pt.

## 2024-08-06 ENCOUNTER — OFFICE VISIT (OUTPATIENT)
Dept: FAMILY MEDICINE CLINIC | Facility: CLINIC | Age: 79
End: 2024-08-06
Payer: MEDICARE

## 2024-08-06 VITALS
BODY MASS INDEX: 28.32 KG/M2 | OXYGEN SATURATION: 97 % | HEART RATE: 76 BPM | DIASTOLIC BLOOD PRESSURE: 60 MMHG | WEIGHT: 209.1 LBS | TEMPERATURE: 98 F | RESPIRATION RATE: 18 BRPM | SYSTOLIC BLOOD PRESSURE: 110 MMHG | HEIGHT: 72 IN

## 2024-08-06 DIAGNOSIS — I46.9 CARDIAC ARREST: ICD-10-CM

## 2024-08-06 DIAGNOSIS — R26.81 GAIT INSTABILITY: ICD-10-CM

## 2024-08-06 DIAGNOSIS — Z91.81 AT HIGH RISK FOR FALLS: ICD-10-CM

## 2024-08-06 DIAGNOSIS — L72.3 INFLAMED SEBACEOUS CYST: ICD-10-CM

## 2024-08-06 DIAGNOSIS — A41.9 SEPSIS WITHOUT ACUTE ORGAN DYSFUNCTION, DUE TO UNSPECIFIED ORGANISM: Primary | ICD-10-CM

## 2024-08-06 PROCEDURE — 3074F SYST BP LT 130 MM HG: CPT | Performed by: NURSE PRACTITIONER

## 2024-08-06 PROCEDURE — 99214 OFFICE O/P EST MOD 30 MIN: CPT | Performed by: NURSE PRACTITIONER

## 2024-08-06 PROCEDURE — 3078F DIAST BP <80 MM HG: CPT | Performed by: NURSE PRACTITIONER

## 2024-08-06 PROCEDURE — 1125F AMNT PAIN NOTED PAIN PRSNT: CPT | Performed by: NURSE PRACTITIONER

## 2024-08-06 NOTE — PROGRESS NOTES
"Chief Complaint  Follow-up and Chest Pain    Subjective        Micah Krishna presents to [facility]  History of Present Illness  Very pleasant patient here today with his wife, recently had sepsis likely related to C. difficile, and a pulseless PEA, thankfully he was revived, and progressed well in the hospital was discharged to rehab, then back to the hospital then at home, now home health, with [facility], he needs approval for PT OT and unfortunate Medicare requires him to return to office here for which he is here today for in the meantime he has had recurrent sebaceous cyst previously seen dermatology but is inflamed over the last several weeks is causing him discomfort without fever will need this drained,    Significant gait instability and just generalized weakness, as he is recovering he needs a rollator walker, he can utilize this with appropriate upper body he would like 1 with wheels and a seat to task as well, lost his cane in the hospital  His catheter supplies as well he can self cath himself,  He has the capability and  To utilize these devices    Due to patient's gait instability quite limited mobility,  Weakness, he cannot propel adequately a standard wheelchair due to the size and weight, but he does have upper body strength likely to enable him to propel in my opinion a light weight wheelchair, to help him do his ADLs were quite limited otherwise due to gait instability this will allow him to do the ADLs in his home,,  Cane or other assistive device is inadequate at times and cannot replace the need for lightweight wheelchair.    Follow-upCurrent symptoms: chest pain.   Chest Pain     Objective   Vital Signs:  /60 (BP Location: Right arm, Patient Position: Sitting, Cuff Size: Adult)   Pulse 76   Temp 98 °F (36.7 °C) (Oral)   Resp 18   Ht 182 cm (71.65\")   Wt 94.8 kg (209 lb 1.6 oz)   SpO2 97%   BMI 28.63 kg/m²   Estimated body mass index is 28.63 kg/m² as " "calculated from the following:    Height as of this encounter: 182 cm (71.65\").    Weight as of this encounter: 94.8 kg (209 lb 1.6 oz).          Physical Exam  Vitals reviewed.   Constitutional:       Appearance: He is well-developed. He is not ill-appearing, toxic-appearing or diaphoretic.      Comments: Pleasant, appears weaker than his baseline in the wheelchair but no distress   HENT:      Head: Normocephalic.      Nose: Nose normal.   Eyes:      General: No scleral icterus.     Conjunctiva/sclera: Conjunctivae normal.      Pupils: Pupils are equal, round, and reactive to light.   Neck:      Thyroid: No thyromegaly.      Vascular: No JVD.   Cardiovascular:      Rate and Rhythm: Normal rate and regular rhythm.      Heart sounds: Normal heart sounds. No murmur heard.     No friction rub. No gallop.   Pulmonary:      Effort: Pulmonary effort is normal. No respiratory distress.      Breath sounds: Normal breath sounds. No stridor. No wheezing or rales.   Abdominal:      General: Bowel sounds are normal. There is no distension.      Palpations: Abdomen is soft.      Tenderness: There is no abdominal tenderness.      Comments: No hepatosplenomegaly, no ascites,   Musculoskeletal:         General: No tenderness.      Cervical back: Neck supple.      Comments: Wheelchair due to chronic weakness overall global fatigue     Lymphadenopathy:      Cervical: No cervical adenopathy.   Skin:     General: Skin is warm and dry.      Findings: No erythema or rash.   Neurological:      General: No focal deficit present.      Mental Status: He is alert and oriented to person, place, and time. Mental status is at baseline.      Deep Tendon Reflexes: Reflexes are normal and symmetric.   Psychiatric:         Mood and Affect: Mood normal.         Behavior: Behavior normal.         Thought Content: Thought content normal.         Judgment: Judgment normal.      Result Review :                Assessment and Plan   Diagnoses and all " orders for this visit:    1. Sepsis without acute organ dysfunction, due to unspecified organism (Primary)    2. Cardiac arrest  Comments:  Status postcardiac arrest    3. Inflamed sebaceous cyst  -     Ambulatory Referral to Plastic Surgery    4. Gait instability    5. At high risk for falls           I spent 36  minutes caring for Micah on this date of service. This time includes time spent by me in the following activities:preparing for the visit, reviewing tests, performing a medically appropriate examination and/or evaluation , counseling and educating the patient/family/caregiver, ordering medications, tests, or procedures, referring and communicating with other health care professionals , documenting information in the medical record, and care coordination  Follow Up   Return in about 6 weeks (around 9/17/2024).  Patient was given instructions and counseling regarding his condition or for health maintenance advice. Please see specific information pulled into the AVS if appropriate.     Patient Instructions   Discharge instructions  Urgent referral to surgeon to remove the inflamed sebaceous cyst in the meantime if increased pain redness swelling emergency room for proper treatment,    Walker, rollator walker with seat, as soon as possible, work with breathing to assist, as long as you are doing okay and feeling well, follow-up in 6 weeks or so    Also precaution falls precaution       Patient will need a rolling walker with a seat he is able to use this and has upper body strength  And this will help enable him to increase mobility and decrease risk of falls, needs self cath kits to any equipment,  For inability to empty bladder, due to enlarged prostate    Short-term follow-up  Plenty fluids let me know if there is anything he needs,  Avoid antibiotics unless absolutely necessary due to risk of C. difficile  If fever chest pain shortness of breath weakness emergency room is high risk falls,

## 2024-08-06 NOTE — PATIENT INSTRUCTIONS
Discharge instructions  Urgent referral to surgeon to remove the inflamed sebaceous cyst in the meantime if increased pain redness swelling emergency room for proper treatment,    Walker, rollator walker with seat, as soon as possible, work with breathing to assist, as long as you are doing okay and feeling well, follow-up in 6 weeks or so    Also precaution falls precaution     No Repair - Repaired With Adjacent Surgical Defect Text (Leave Blank If You Do Not Want): After obtaining clear surgical margins the defect was repaired concurrently with another surgical defect which was in close approximation.

## 2024-08-16 ENCOUNTER — INFUSION (OUTPATIENT)
Dept: ONCOLOGY | Facility: HOSPITAL | Age: 79
End: 2024-08-16
Payer: MEDICARE

## 2024-08-16 ENCOUNTER — OFFICE VISIT (OUTPATIENT)
Dept: ONCOLOGY | Facility: CLINIC | Age: 79
End: 2024-08-16
Payer: MEDICARE

## 2024-08-16 ENCOUNTER — LAB (OUTPATIENT)
Dept: OTHER | Facility: HOSPITAL | Age: 79
End: 2024-08-16
Payer: MEDICARE

## 2024-08-16 VITALS
OXYGEN SATURATION: 97 % | RESPIRATION RATE: 16 BRPM | BODY MASS INDEX: 27.68 KG/M2 | TEMPERATURE: 97.5 F | WEIGHT: 204.4 LBS | SYSTOLIC BLOOD PRESSURE: 131 MMHG | HEIGHT: 72 IN | HEART RATE: 71 BPM | DIASTOLIC BLOOD PRESSURE: 83 MMHG

## 2024-08-16 DIAGNOSIS — C79.51 PROSTATE CANCER METASTATIC TO BONE: ICD-10-CM

## 2024-08-16 DIAGNOSIS — C61 PROSTATE CANCER METASTATIC TO BONE: Primary | ICD-10-CM

## 2024-08-16 DIAGNOSIS — C79.51 PROSTATE CANCER METASTATIC TO BONE: Primary | ICD-10-CM

## 2024-08-16 DIAGNOSIS — M54.42 ACUTE MIDLINE LOW BACK PAIN WITH LEFT-SIDED SCIATICA: ICD-10-CM

## 2024-08-16 DIAGNOSIS — C61 PROSTATE CANCER METASTATIC TO BONE: ICD-10-CM

## 2024-08-16 DIAGNOSIS — Z79.899 HIGH RISK MEDICATION USE: ICD-10-CM

## 2024-08-16 DIAGNOSIS — D64.9 NORMOCYTIC ANEMIA: ICD-10-CM

## 2024-08-16 DIAGNOSIS — G89.3 CANCER ASSOCIATED PAIN: ICD-10-CM

## 2024-08-16 LAB
ALBUMIN SERPL-MCNC: 3.8 G/DL (ref 3.5–5.2)
ALBUMIN/GLOB SERPL: 1.2 G/DL
ALP SERPL-CCNC: 71 U/L (ref 39–117)
ALT SERPL W P-5'-P-CCNC: 8 U/L (ref 1–41)
ANION GAP SERPL CALCULATED.3IONS-SCNC: 11.4 MMOL/L (ref 5–15)
AST SERPL-CCNC: 11 U/L (ref 1–40)
BASOPHILS # BLD AUTO: 0.04 10*3/MM3 (ref 0–0.2)
BASOPHILS NFR BLD AUTO: 0.6 % (ref 0–1.5)
BILIRUB SERPL-MCNC: 0.3 MG/DL (ref 0–1.2)
BUN SERPL-MCNC: 23 MG/DL (ref 8–23)
BUN/CREAT SERPL: 19.7 (ref 7–25)
CALCIUM SPEC-SCNC: 9.2 MG/DL (ref 8.6–10.5)
CHLORIDE SERPL-SCNC: 108 MMOL/L (ref 98–107)
CO2 SERPL-SCNC: 23.6 MMOL/L (ref 22–29)
CREAT SERPL-MCNC: 1.17 MG/DL (ref 0.76–1.27)
DEPRECATED RDW RBC AUTO: 50.3 FL (ref 37–54)
EGFRCR SERPLBLD CKD-EPI 2021: 62.6 ML/MIN/1.73
EOSINOPHIL # BLD AUTO: 0.31 10*3/MM3 (ref 0–0.4)
EOSINOPHIL NFR BLD AUTO: 4.7 % (ref 0.3–6.2)
ERYTHROCYTE [DISTWIDTH] IN BLOOD BY AUTOMATED COUNT: 15.5 % (ref 12.3–15.4)
GLOBULIN UR ELPH-MCNC: 3.1 GM/DL
GLUCOSE SERPL-MCNC: 163 MG/DL (ref 65–99)
HCT VFR BLD AUTO: 32.6 % (ref 37.5–51)
HGB BLD-MCNC: 10.3 G/DL (ref 13–17.7)
IMM GRANULOCYTES # BLD AUTO: 0.02 10*3/MM3 (ref 0–0.05)
IMM GRANULOCYTES NFR BLD AUTO: 0.3 % (ref 0–0.5)
LYMPHOCYTES # BLD AUTO: 1.04 10*3/MM3 (ref 0.7–3.1)
LYMPHOCYTES NFR BLD AUTO: 15.7 % (ref 19.6–45.3)
MAGNESIUM SERPL-MCNC: 1.7 MG/DL (ref 1.6–2.4)
MCH RBC QN AUTO: 27.9 PG (ref 26.6–33)
MCHC RBC AUTO-ENTMCNC: 31.6 G/DL (ref 31.5–35.7)
MCV RBC AUTO: 88.3 FL (ref 79–97)
MONOCYTES # BLD AUTO: 0.36 10*3/MM3 (ref 0.1–0.9)
MONOCYTES NFR BLD AUTO: 5.4 % (ref 5–12)
NEUTROPHILS NFR BLD AUTO: 4.86 10*3/MM3 (ref 1.7–7)
NEUTROPHILS NFR BLD AUTO: 73.3 % (ref 42.7–76)
NRBC BLD AUTO-RTO: 0 /100 WBC (ref 0–0.2)
PHOSPHATE SERPL-MCNC: 4.3 MG/DL (ref 2.5–4.5)
PLATELET # BLD AUTO: 139 10*3/MM3 (ref 140–450)
PMV BLD AUTO: 9.4 FL (ref 6–12)
POTASSIUM SERPL-SCNC: 3.9 MMOL/L (ref 3.5–5.2)
PROT SERPL-MCNC: 6.9 G/DL (ref 6–8.5)
PSA SERPL-MCNC: <0.014 NG/ML (ref 0–4)
RBC # BLD AUTO: 3.69 10*6/MM3 (ref 4.14–5.8)
SODIUM SERPL-SCNC: 143 MMOL/L (ref 136–145)
WBC NRBC COR # BLD AUTO: 6.63 10*3/MM3 (ref 3.4–10.8)

## 2024-08-16 PROCEDURE — 83735 ASSAY OF MAGNESIUM: CPT | Performed by: INTERNAL MEDICINE

## 2024-08-16 PROCEDURE — 25010000002 DENOSUMAB 120 MG/1.7ML SOLUTION: Performed by: INTERNAL MEDICINE

## 2024-08-16 PROCEDURE — 25010000002 LEUPROLIDE ACETATE (3 MONTH) PER 7.5 MG: Performed by: NURSE PRACTITIONER

## 2024-08-16 PROCEDURE — 85025 COMPLETE CBC W/AUTO DIFF WBC: CPT | Performed by: INTERNAL MEDICINE

## 2024-08-16 PROCEDURE — 96402 CHEMO HORMON ANTINEOPL SQ/IM: CPT

## 2024-08-16 PROCEDURE — 96372 THER/PROPH/DIAG INJ SC/IM: CPT

## 2024-08-16 PROCEDURE — 84100 ASSAY OF PHOSPHORUS: CPT | Performed by: INTERNAL MEDICINE

## 2024-08-16 PROCEDURE — 84153 ASSAY OF PSA TOTAL: CPT | Performed by: INTERNAL MEDICINE

## 2024-08-16 PROCEDURE — 36415 COLL VENOUS BLD VENIPUNCTURE: CPT

## 2024-08-16 PROCEDURE — 80053 COMPREHEN METABOLIC PANEL: CPT | Performed by: INTERNAL MEDICINE

## 2024-08-16 RX ADMIN — LEUPROLIDE ACETATE 22.5 MG: KIT at 13:58

## 2024-08-16 RX ADMIN — DENOSUMAB 120 MG: 120 INJECTION SUBCUTANEOUS at 14:00

## 2024-08-16 NOTE — PROGRESS NOTES
"[facility] GROUP OUTPATIENT FOLLOW UP CLINIC VISIT    REASON FOR FOLLOW-UP:    1.  History of metastatic clear cell renal cell carcinoma.  All known disease had previously been resected..  2.  Pulmonary metastases discovered July 2020.  Bone metastases discovered September 2020.  3.  Votrient initiated 10/16/2020  4. Palliative radiation for pain complete on 11/10/2020  5.  1/4/2021 significantly elevated PSA > 100.  6.  1/20/2021 bone biopsy confirms metastatic prostate cancer    HISTORY OF PRESENT ILLNESS:  Micah Krishna is a 79 y.o. male with the above-mentioned history who returns today for follow-up    He returns to the office today for 2-week follow-up and consideration of resuming Xgeva and Lupron.  He continues to recover from his prolonged hospital stay.  His biggest concern right now is constipation.  He is currently taking MiraLAX in the morning and also has Colace as well.  He does have bowel movements daily, however, they are difficult to pass and hard, small lakhwinder.  His bilateral lower extremity pain is worse, but he is not taking his Norco as often due to his ongoing constipation.      PE:  Vitals:    08/16/24 1259   BP: 131/83   Pulse: 71   Resp: 16   Temp: 97.5 °F (36.4 °C)   TempSrc: Oral   SpO2: 97%   Weight: 92.7 kg (204 lb 6.4 oz)   Height: 182 cm (71.65\")   PainSc:   5   PainLoc: Leg       General:  No acute distress, awake, alert and oriented.  Ambulating with walker.  Skin:  Warm and dry, no visible rash  HEENT:  Normocephalic/atraumatic.    Chest:  Normal respiratory effort.  Lungs clear to auscultation bilaterally.  Heart: Regular rate and rhythm  Extremities: 1+ bilateral lower extremity ankle edema.  Neuro/psych:  Grossly non-focal.  Normal mood and affect.    DIAGNOSTIC DATA:  Phosphorus (08/16/2024 12:56)  Magnesium (08/16/2024 12:56)  Comprehensive metabolic panel (08/16/2024 12:56)  CBC and Differential (08/16/2024 12:56)  PSA Diagnostic (08/16/2024 " 12:56)    IMAGING:  None reviewed    ASSESSMENT:  This is a 79 y.o. male with:    *Mild normocytic anemia:   Likely due to ADT and metastatic prostate cancer.  More recently, 2 recent hospitalizations at [place]  8/16/2024: Hemoglobin improved to 10.3.    *Adrenal insufficiency following bilateral adrenalectomy   On replacement hormone therapy.    He follows with endocrinology.    *History of metastatic renal cell carcinoma.    He had a left nephrectomy and adrenalectomy in 2000 and then a right adrenalectomy for metastatic disease in 2012.    There remains no evidence of disease.    *New metastatic bone disease diagnosed as he presented with worsening pain, ultimately found to be metastatic prostate cancer  Presumed to be metastatic renal cell carcinoma but ultimately diagnosed with metastatic prostate cancer.  Imaging discussed with radiology.  Imaging consistent with metastatic renal cell carcinoma  However, his PSA was noted to be greater than 100  As discussed below, bone biopsy subsequently confirmed metastatic prostate cancer  CT C/A/P 7/6/2020 with a RUL sub 6 mm pulmonary nodule in the RUL, stable since 1/17/2018, new 6 mm nodules in the medial RLL and RUL,  LLL nodule unchanged since 7/17/2018.  Healing left ninth rib fracture.  CT chest 9/28/2020 with stable lung nodules, pathologic fracture of the right 8th rib, abnormal appearance of T8, narrowing of the thecal sack at this level, additional lucencies at several vertebral bodies such as T7 and T11, subacute healing fractures in the anterior right 6th rib and lateral left 9th rib.  CT head 9/28/2020 with calvarial metastatic disease involving the frontal and occipital bones.  Bone scan 9/28/2020 with multifocal uptake consistent with metastatic disease. Anterior frontal bone, upper C spine at C1 or C2, T and L spine at T3, T8, T11, multifocal rib uptake, abnormal uptake in the iliac wings/bodies and left femoral heads/acetabulum, distal left clavicle and  both humeral heads.   PET scan from 10/8/2020.  Multifocal bone metastases throughout the skeleton.  Pathologic rib and vertebral body fracture.  Moderate FDG uptake in the right femoral neck with some sclerosis, new since 7/6/2020.  Mottled appearance of L3 and T8 vertebral bodies with pathologic compression fractures.  L3 fracture is a burst fracture with 20 to 25% loss of height and 4 to 5 mm of retropulsion in the central spinal canal.  Pathologic compression fracture at T8 with 10% loss of height.  New findings since 7/6/2020.  FDG avid soft tissue nodule extending into the central canal along the posterior aspect of the thecal sac at T8.  FDG uptake in the left clavicle.  Compression fracture of the right posterior eighth rib.  Votrient initiated 10/16/2020  Palliative radiation for pain complete on 11/10/2020  He did subsequently presented with an elevated PSA >100.  Bone biopsy performed on 1/20/2021 confirmed metastatic prostate cancer.  Votrient discontinued  2/5/2021 patient given Firmagon and Xgeva.  With plans to start Eligard 4 weeks later, and continue monthly Xgeva.    3/5/21.First dose of Eligard and monthly Xgeva.  Eligard will be given every 3 months.  12/17/2021: Proceeded with Xgeva.  PSA remains very low at 0.015.  1/21/2022: Hold Xgeva due to dental issues.  Bone scan on 2/7/2022 with improvement  5/20/2022: He is done with all of his dental work.  Proceed with Xgeva.  Proceed with lupron and every 3 months  8/19/2022: Proceed with Xgeva and Lupron.  Move Xgeva to every 3 months.  11/18/2022: Proceed with Xgeva and Lupron both every 3 months at this point  2/10/2023: Xgeva and Lupron.    5/5/2023: Proceed with Xgeva and Lupron.  7/28/2023: Proceed with Xgeva and Lupron  10/20/23: proceed with therapy  1/26/2024: Proceed with Xgeva and Lupron  4/26/2024: Proceed with Xgeva and Lupron  8/2/2024: Hold Xgeva and Lupron due to the fact that he is recovering from 2 recent  hospitalizations  8/16/2024: Proceed with Xgeva and Lupron today.  Although he continues to struggle with constipation, he is otherwise improved since his hospital stay.    *Chronic kidney disease:   Status post left nephrectomy.    Recent acute kidney injury.  Creatinine normalized at 1.11  8/16/2024: Creatinine 1.17.    *Migratory skeletal pain:   Metastatic disease apparent now on CT imaging and bone scan and now PET scan.  Completed radiation   Left leg pain has improved  Back pain improved  4/2/2021 patient continues on Norco 5/325 2 to 3 tablets/day with good pain control.  This remains stable as of 4/30/2021.  5/28/21.  The patient continues on [place] 5/325 with 2 to 3 tablets/day.  Pain is stable.   7/2/2021: He ran out of the hydrocodone/acetaminophen and has been using more ibuprofen which has controlled his pain but I encouraged him not to use as much ibuprofen to protect his kidney.  Pain is stable as of 7/30/2021.  10/22/2021: Pain is stable.  Pain medication refilled.  He was started on meloxicam by primary care which she will start in the near future.  12/17/2021: His low back is starting to worsen.  Increased hydrocodone to 7.5 mg with improvement.    5/5/2023: Low back pain is worse likely due to doing yard work.  He continues hydrocodone/acetaminophen which does control his pain.  8/16/2024: Ongoing.  He is scheduled for a consult to discuss epidural in the middle of September.  His skeletal pain is not well-controlled since he is only utilizing the Norco every other day given constipation concerns.      *Mild hypomagnesemia: monitor. If muscle cramps advised to call us.  Magnesium normal at 1.6    *Elevated PSA, subsequent biopsy confirmed metastatic prostate cancer:   He has a history of very mild elevation of his PSA in the past with a PSA of 5.92 on 10/17/2018.    His PSA on 1/4/2021 was greater than 100  PSA repeated today is also greater than 100  He is completely asymptomatic without clinical  evidence for prostatitis.    Dr. Cat did communicate with Dr. Zapata with radiology and all findings on imaging would support more metastatic renal cell carcinoma rather than metastatic prostate cancer since the bony lesions are lytic.  There is nothing obvious in his prostate on current imaging.  Dr. Cat communicated with Dr. Cabrera with urology.  He is in agreement with a bone biopsy and he will see him in the office for evaluation.  One of the bony lesions is amenable to CT-guided biopsy per Dr. Zapata.  CT-guided needle biopsy of the bone performed on 1/20/2021 confirms metastatic prostate cancer.  3/5/21.  Please see above.  The patient received 1 dose of Firmagon and is on Eligard every 3 months.  1/21/2022: PSA stable and quite low at 0.019  PSA undetectable on 5/20/2022  10/20/23: PSA remains undetectable  1/26/2024: PSA remains undetectable  4/26/2024: PSA remains undetectable  8/16/2024: PSA remains undetectable    *New 40% T2 compression fracture.  Asymptomatic.  He is not interested in further radiation at this time.  Might consider referral to neurosurgery should he become more symptomatic or if this worsens.    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  He again did not complain of this today.    *He did have an MRI of the pituitary gland on 8/16/2021 as ordered by his endocrinologist.  Pituitary appears normal.  There is abnormal enhancement of the clivus suspected to be related to his metastatic prostate cancer.  There is an 11 mm lesion overlying the left frontal convexity suspected to be a meningioma.  The patient states that he was referred to neurosurgery but is not necessarily interested in pursuing this.  We can repeat an MRI in the future if desired.  He continues to not desire any follow-up for regarding this.  He does not desire surgery.    *Dental issues: 5/20/2022: Completed all of his dental work.  Proceed with Xgeva today and every three months for now.    *Skin lesion left  cheek: Question sebaceous cyst.  Previously referred to dermatology.  Not discussed again today.    *History of pain in the left lower extremity both above and below the knee.    Left lower extremity venous duplex on 2/10/2023 was negative for DVT  Bone scan has been stable    *Worsening low back pain with radiation down the left lower extremity  Unfortunately this has been significantly worse over the past couple of months without an apparent injury.  He has been very hesitant to use more of the hydrocodone.  He has not notified us of the worsening pain.    *Opioid-induced constipation  Today we discussed increasing his bowel regimen to MiraLAX twice daily and adding 2 tablets Senokot-S twice daily.  Hopefully this resolves his constipation though he is less hesitant to take his Norco for better pain control.  Advised him to call the office if this increase in his bowel regimen does not resolve his constipation.    *2 recent hospitalizations, the first status post cardiac arrest requiring chest compressions.  He had acute kidney injury, evidence of colitis though had been constipated probable sepsis.  The second hospitalization was for syncope and fatigue.    *Urinary retention: He declines a Mars catheter and wants to straight cath in and out.  He needs more catheters.  He has been unsuccessful at obtaining these otherwise.  He asks if we can authorize some.      PLAN:   Proceed with Xgeva and Lupron today.  Increase MiraLAX to twice daily and start taking Senokot S  twice daily as well.  He is aware to call the office if constipation does not improve with this regimen.  Encouraged more frequent use of Norco once we obtain better control of constipation.  Start Voltaren cream for lower back pain.  Continue hydrocortisone for adrenal insufficiency. Follow up with endocrinology as scheduled.  Follow-up with pain management for possible epidural injections  Return to clinic in 3 months for MD visit, Julián and  Xgeva, CBC/CMP/magnesium/phosphorus/PSA level.    He remains on high risk medication requiring intensive monitoring.      Yris De Anda, APRN  08/16/2024

## 2024-08-16 NOTE — NURSING NOTE
Pt arrived to injection room for Lupron and Xgeva after seeing BON Kyle. Lupron given in R dorsogluteal and Xgeva given R arm without incidence.

## 2024-08-19 ENCOUNTER — TELEPHONE (OUTPATIENT)
Dept: ONCOLOGY | Facility: CLINIC | Age: 79
End: 2024-08-19
Payer: MEDICARE

## 2024-08-19 NOTE — TELEPHONE ENCOUNTER
Called patient to check on status since his visit w/ Tyrel and the new bowel regimen. Patient states medications are working and he is feeling much better. Will pass along to GAYLA De Anda. No other needs identified at this time. Vivienne Morrissey RN

## 2024-08-20 ENCOUNTER — TELEPHONE (OUTPATIENT)
Dept: FAMILY MEDICINE CLINIC | Facility: CLINIC | Age: 79
End: 2024-08-20
Payer: MEDICARE

## 2024-08-20 NOTE — TELEPHONE ENCOUNTER
Jessica with [facility] calling to get verbal orders for PT for pt. [facility] did not have an MA available at the time of the call. Jessica request a callback when possible

## 2024-08-28 RX ORDER — FLUDROCORTISONE ACETATE 0.1 MG/1
0.1 TABLET ORAL DAILY
Qty: 90 TABLET | Refills: 3 | Status: SHIPPED | OUTPATIENT
Start: 2024-08-28

## 2024-08-28 NOTE — TELEPHONE ENCOUNTER
Rx Refill Note  Requested Prescriptions     Pending Prescriptions Disp Refills    fludrocortisone 0.1 MG tablet [Pharmacy Med Name: Fludrocortisone Acetate 0.1 MG Oral Tablet] 90 tablet 0     Sig: TAKE 1 TABLET BY MOUTH DAILY      Last office visit with prescribing clinician: Visit date not found   Last telemedicine visit with prescribing clinician: Visit date not found   Next office visit with prescribing clinician: Visit date not found                         Would you like a call back once the refill request has been completed: [] Yes [] No    If the office needs to give you a call back, can they leave a voicemail: [] Yes [] No    Lyla Harrison  08/28/24, 09:49 EDT

## 2024-09-10 ENCOUNTER — TELEPHONE (OUTPATIENT)
Dept: FAMILY MEDICINE CLINIC | Facility: CLINIC | Age: 79
End: 2024-09-10
Payer: MEDICARE

## 2024-09-10 DIAGNOSIS — R26.81 GAIT INSTABILITY: Primary | ICD-10-CM

## 2024-09-10 DIAGNOSIS — R53.1 DECREASED STRENGTH, ENDURANCE, AND MOBILITY: ICD-10-CM

## 2024-09-10 DIAGNOSIS — R68.89 DECREASED STRENGTH, ENDURANCE, AND MOBILITY: ICD-10-CM

## 2024-09-10 DIAGNOSIS — Z74.09 DECREASED STRENGTH, ENDURANCE, AND MOBILITY: ICD-10-CM

## 2024-09-10 NOTE — TELEPHONE ENCOUNTER
Caller: [facility]    Relationship: Home Health    Best call back number: [phone]    What orders are you requesting (i.e. lab or imaging): PT    In what timeframe would the patient need to come in: ASAP    Where will you receive your lab/imaging services: [facility] FAX #[phone]

## 2024-10-09 NOTE — OUTREACH NOTE
Prep Survey      Flowsheet Row Responses   [facility] facility patient discharged from? Non-BH   Is LACE score < 7 ? Non-BH Discharge   Eligibility Not Eligible   What are the reasons patient is not eligible? Other   Does the patient have one of the following disease processes/diagnoses(primary or secondary)? Other   Prep survey completed? Yes            GWENDOLYN SUBRAMANIAN - Registered Nurse